# Patient Record
Sex: FEMALE | Race: WHITE | Employment: OTHER | URBAN - METROPOLITAN AREA
[De-identification: names, ages, dates, MRNs, and addresses within clinical notes are randomized per-mention and may not be internally consistent; named-entity substitution may affect disease eponyms.]

---

## 2017-04-24 PROBLEM — M43.16 SPONDYLOLISTHESIS OF LUMBAR REGION: Status: ACTIVE | Noted: 2017-04-24

## 2017-04-28 ENCOUNTER — HOSPITAL ENCOUNTER (OUTPATIENT)
Dept: MRI IMAGING | Facility: HOSPITAL | Age: 72
Discharge: HOME | End: 2017-04-28
Attending: PHYSICIAN ASSISTANT
Payer: MEDICARE

## 2017-04-28 DIAGNOSIS — M43.16 SPONDYLOLISTHESIS OF LUMBAR REGION: ICD-10-CM

## 2017-04-28 DIAGNOSIS — M54.16 LUMBAR RADICULOPATHY: ICD-10-CM

## 2017-04-28 PROCEDURE — 72148 MRI LUMBAR SPINE W/O DYE: CPT

## 2017-07-07 ENCOUNTER — LAB ENCOUNTER (OUTPATIENT)
Dept: LAB | Facility: HOSPITAL | Age: 72
End: 2017-07-07
Attending: INTERNAL MEDICINE
Payer: MEDICARE

## 2017-07-07 ENCOUNTER — HOSPITAL ENCOUNTER (OUTPATIENT)
Dept: GENERAL RADIOLOGY | Facility: HOSPITAL | Age: 72
Discharge: HOME | End: 2017-07-07
Attending: INTERNAL MEDICINE
Payer: MEDICARE

## 2017-07-07 DIAGNOSIS — Z01.818 PRE-OP TESTING: ICD-10-CM

## 2017-07-07 DIAGNOSIS — Z01.818 PRE-OP EXAM: ICD-10-CM

## 2017-07-07 DIAGNOSIS — Z01.818 PRE-OP EXAMINATION: ICD-10-CM

## 2017-07-07 DIAGNOSIS — Z01.818 PRE-OP EXAMINATION: Primary | ICD-10-CM

## 2017-07-07 LAB
ALBUMIN SERPL BCP-MCNC: 3.7 G/DL (ref 3.5–4.8)
ALBUMIN/GLOB SERPL: 1.4 {RATIO} (ref 1–2)
ALP SERPL-CCNC: 113 U/L (ref 32–100)
ALT SERPL-CCNC: 13 U/L (ref 14–54)
ANION GAP SERPL CALC-SCNC: 13 MMOL/L (ref 0–18)
APTT PPP: 24.8 SECONDS (ref 23.2–35.3)
AST SERPL-CCNC: 17 U/L (ref 15–41)
BASOPHILS # BLD: 0.1 K/UL (ref 0–0.2)
BASOPHILS NFR BLD: 1 %
BILIRUB SERPL-MCNC: 0.4 MG/DL (ref 0.3–1.2)
BILIRUB UR QL: NEGATIVE
BUN SERPL-MCNC: 16 MG/DL (ref 8–20)
BUN/CREAT SERPL: 17 (ref 10–20)
CALCIUM SERPL-MCNC: 9.4 MG/DL (ref 8.5–10.5)
CHLORIDE SERPL-SCNC: 104 MMOL/L (ref 95–110)
CO2 SERPL-SCNC: 24 MMOL/L (ref 22–32)
COLOR UR: YELLOW
CREAT SERPL-MCNC: 0.94 MG/DL (ref 0.5–1.5)
EOSINOPHIL # BLD: 0.1 K/UL (ref 0–0.7)
EOSINOPHIL NFR BLD: 2 %
ERYTHROCYTE [DISTWIDTH] IN BLOOD BY AUTOMATED COUNT: 15.3 % (ref 11–15)
GLOBULIN PLAS-MCNC: 2.6 G/DL (ref 2.5–3.7)
GLUCOSE SERPL-MCNC: 130 MG/DL (ref 70–99)
GLUCOSE UR-MCNC: NEGATIVE MG/DL
HCT VFR BLD AUTO: 39.8 % (ref 35–48)
HGB BLD-MCNC: 13.2 G/DL (ref 12–16)
HGB UR QL STRIP.AUTO: NEGATIVE
HYALINE CASTS #/AREA URNS AUTO: 1 /LPF
INR BLD: 0.9 (ref 0.9–1.2)
KETONES UR-MCNC: NEGATIVE MG/DL
LYMPHOCYTES # BLD: 1.7 K/UL (ref 1–4)
LYMPHOCYTES NFR BLD: 23 %
MCH RBC QN AUTO: 28.1 PG (ref 27–32)
MCHC RBC AUTO-ENTMCNC: 33.3 G/DL (ref 32–37)
MCV RBC AUTO: 84.5 FL (ref 80–100)
MONOCYTES # BLD: 0.5 K/UL (ref 0–1)
MONOCYTES NFR BLD: 7 %
NEUTROPHILS # BLD AUTO: 4.8 K/UL (ref 1.8–7.7)
NEUTROPHILS NFR BLD: 67 %
NITRITE UR QL STRIP.AUTO: NEGATIVE
OSMOLALITY UR CALC.SUM OF ELEC: 295 MOSM/KG (ref 275–295)
PH UR: 5 [PH] (ref 5–8)
PLATELET # BLD AUTO: 247 K/UL (ref 140–400)
PMV BLD AUTO: 9.5 FL (ref 7.4–10.3)
POTASSIUM SERPL-SCNC: 4.1 MMOL/L (ref 3.3–5.1)
PROT SERPL-MCNC: 6.3 G/DL (ref 5.9–8.4)
PROT UR-MCNC: 30 MG/DL
PROTHROMBIN TIME: 12.2 SECONDS (ref 11.8–14.5)
RBC # BLD AUTO: 4.71 M/UL (ref 3.7–5.4)
RBC #/AREA URNS AUTO: 1 /HPF
SODIUM SERPL-SCNC: 141 MMOL/L (ref 136–144)
SP GR UR STRIP: 1.02 (ref 1–1.03)
UROBILINOGEN UR STRIP-ACNC: <2
VIT C UR-MCNC: NEGATIVE MG/DL
WBC # BLD AUTO: 7.2 K/UL (ref 4–11)
WBC #/AREA URNS AUTO: 4 /HPF

## 2017-07-07 PROCEDURE — 81001 URINALYSIS AUTO W/SCOPE: CPT

## 2017-07-07 PROCEDURE — 80053 COMPREHEN METABOLIC PANEL: CPT

## 2017-07-07 PROCEDURE — 85025 COMPLETE CBC W/AUTO DIFF WBC: CPT

## 2017-07-07 PROCEDURE — 93010 ELECTROCARDIOGRAM REPORT: CPT | Performed by: INTERNAL MEDICINE

## 2017-07-07 PROCEDURE — 85730 THROMBOPLASTIN TIME PARTIAL: CPT

## 2017-07-07 PROCEDURE — 36415 COLL VENOUS BLD VENIPUNCTURE: CPT

## 2017-07-07 PROCEDURE — 85610 PROTHROMBIN TIME: CPT

## 2017-07-07 PROCEDURE — 93005 ELECTROCARDIOGRAM TRACING: CPT

## 2017-07-07 PROCEDURE — 87081 CULTURE SCREEN ONLY: CPT

## 2017-07-07 PROCEDURE — 71020 XR CHEST PA + LAT CHEST (CPT=71020): CPT | Performed by: INTERNAL MEDICINE

## 2017-07-13 ENCOUNTER — HOSPITAL ENCOUNTER (OUTPATIENT)
Dept: NUCLEAR MEDICINE | Facility: HOSPITAL | Age: 72
Discharge: HOME | End: 2017-07-13
Attending: INTERNAL MEDICINE
Payer: MEDICARE

## 2017-07-13 ENCOUNTER — HOSPITAL ENCOUNTER (OUTPATIENT)
Dept: CV DIAGNOSTICS | Facility: HOSPITAL | Age: 72
Discharge: HOME | End: 2017-07-13
Attending: INTERNAL MEDICINE
Payer: MEDICARE

## 2017-07-13 DIAGNOSIS — Z01.810 PRE-OPERATIVE CARDIOVASCULAR EXAMINATION: ICD-10-CM

## 2017-07-13 DIAGNOSIS — I25.10 ASHD (ARTERIOSCLEROTIC HEART DISEASE): ICD-10-CM

## 2017-07-13 PROCEDURE — 78452 HT MUSCLE IMAGE SPECT MULT: CPT | Performed by: INTERNAL MEDICINE

## 2017-07-13 PROCEDURE — 93016 CV STRESS TEST SUPVJ ONLY: CPT | Performed by: INTERNAL MEDICINE

## 2017-07-13 PROCEDURE — 93018 CV STRESS TEST I&R ONLY: CPT | Performed by: INTERNAL MEDICINE

## 2017-07-13 PROCEDURE — 93017 CV STRESS TEST TRACING ONLY: CPT | Performed by: INTERNAL MEDICINE

## 2017-07-13 RX ORDER — 0.9 % SODIUM CHLORIDE 0.9 %
VIAL (ML) INJECTION
Status: COMPLETED
Start: 2017-07-13 | End: 2017-07-13

## 2017-07-13 RX ADMIN — 0.9 % SODIUM CHLORIDE 10 ML: 0.9 % VIAL (ML) INJECTION at 12:50:00

## 2017-07-18 RX ORDER — ALBUTEROL SULFATE 90 UG/1
2 AEROSOL, METERED RESPIRATORY (INHALATION) EVERY 6 HOURS PRN
COMMUNITY
End: 2017-11-27

## 2017-07-21 ENCOUNTER — APPOINTMENT (OUTPATIENT)
Dept: GENERAL RADIOLOGY | Facility: HOSPITAL | Age: 72
End: 2017-07-21
Attending: ORTHOPAEDIC SURGERY
Payer: MEDICARE

## 2017-07-21 ENCOUNTER — HOSPITAL ENCOUNTER (OUTPATIENT)
Facility: HOSPITAL | Age: 72
Setting detail: HOSPITAL OUTPATIENT SURGERY
Discharge: HOME | End: 2017-07-21
Attending: ORTHOPAEDIC SURGERY | Admitting: ORTHOPAEDIC SURGERY
Payer: MEDICARE

## 2017-07-21 ENCOUNTER — ANESTHESIA (OUTPATIENT)
Dept: SURGERY | Facility: HOSPITAL | Age: 72
End: 2017-07-21

## 2017-07-21 ENCOUNTER — SURGERY (OUTPATIENT)
Age: 72
End: 2017-07-21

## 2017-07-21 ENCOUNTER — ANESTHESIA EVENT (OUTPATIENT)
Dept: SURGERY | Facility: HOSPITAL | Age: 72
End: 2017-07-21

## 2017-07-21 VITALS
HEART RATE: 68 BPM | HEIGHT: 61 IN | WEIGHT: 171.13 LBS | SYSTOLIC BLOOD PRESSURE: 126 MMHG | TEMPERATURE: 98 F | RESPIRATION RATE: 16 BRPM | DIASTOLIC BLOOD PRESSURE: 47 MMHG | BODY MASS INDEX: 32.31 KG/M2 | OXYGEN SATURATION: 95 %

## 2017-07-21 DIAGNOSIS — M51.26 LUMBAR DISC HERNIATION: Primary | ICD-10-CM

## 2017-07-21 DIAGNOSIS — M43.16 SPONDYLOLISTHESIS OF LUMBAR REGION: ICD-10-CM

## 2017-07-21 LAB
GLUCOSE BLDC GLUCOMTR-MCNC: 180 MG/DL (ref 70–99)
GLUCOSE BLDC GLUCOMTR-MCNC: 236 MG/DL (ref 70–99)

## 2017-07-21 PROCEDURE — 88311 DECALCIFY TISSUE: CPT | Performed by: ORTHOPAEDIC SURGERY

## 2017-07-21 PROCEDURE — 82962 GLUCOSE BLOOD TEST: CPT

## 2017-07-21 PROCEDURE — 88304 TISSUE EXAM BY PATHOLOGIST: CPT | Performed by: ORTHOPAEDIC SURGERY

## 2017-07-21 PROCEDURE — 76001 XR C-ARM FLUORO >1 HOUR  (CPT=76001): CPT | Performed by: ORTHOPAEDIC SURGERY

## 2017-07-21 PROCEDURE — 72100 X-RAY EXAM L-S SPINE 2/3 VWS: CPT | Performed by: ORTHOPAEDIC SURGERY

## 2017-07-21 PROCEDURE — 01NB0ZZ RELEASE LUMBAR NERVE, OPEN APPROACH: ICD-10-PCS | Performed by: ORTHOPAEDIC SURGERY

## 2017-07-21 PROCEDURE — 0SB20ZZ EXCISION OF LUMBAR VERTEBRAL DISC, OPEN APPROACH: ICD-10-PCS | Performed by: ORTHOPAEDIC SURGERY

## 2017-07-21 RX ORDER — NALOXONE HYDROCHLORIDE 0.4 MG/ML
80 INJECTION, SOLUTION INTRAMUSCULAR; INTRAVENOUS; SUBCUTANEOUS AS NEEDED
Status: DISCONTINUED | OUTPATIENT
Start: 2017-07-21 | End: 2017-07-21

## 2017-07-21 RX ORDER — HYDROMORPHONE HYDROCHLORIDE 1 MG/ML
0.4 INJECTION, SOLUTION INTRAMUSCULAR; INTRAVENOUS; SUBCUTANEOUS EVERY 5 MIN PRN
Status: DISCONTINUED | OUTPATIENT
Start: 2017-07-21 | End: 2017-07-21

## 2017-07-21 RX ORDER — METOCLOPRAMIDE 10 MG/1
10 TABLET ORAL ONCE
Status: COMPLETED | OUTPATIENT
Start: 2017-07-21 | End: 2017-07-21

## 2017-07-21 RX ORDER — DEXTROSE MONOHYDRATE 25 G/50ML
50 INJECTION, SOLUTION INTRAVENOUS AS NEEDED
Status: DISCONTINUED | OUTPATIENT
Start: 2017-07-21 | End: 2017-07-21

## 2017-07-21 RX ORDER — PANTOPRAZOLE SODIUM 40 MG/1
40 TABLET, DELAYED RELEASE ORAL NIGHTLY
Status: CANCELLED | OUTPATIENT
Start: 2017-07-21

## 2017-07-21 RX ORDER — OXYBUTYNIN CHLORIDE 5 MG/1
5 TABLET, EXTENDED RELEASE ORAL DAILY
Status: DISCONTINUED | OUTPATIENT
Start: 2017-07-22 | End: 2017-07-21

## 2017-07-21 RX ORDER — ACETAMINOPHEN 325 MG/1
650 TABLET ORAL ONCE
Status: DISCONTINUED | OUTPATIENT
Start: 2017-07-21 | End: 2017-07-21 | Stop reason: HOSPADM

## 2017-07-21 RX ORDER — SODIUM CHLORIDE, SODIUM LACTATE, POTASSIUM CHLORIDE, CALCIUM CHLORIDE 600; 310; 30; 20 MG/100ML; MG/100ML; MG/100ML; MG/100ML
INJECTION, SOLUTION INTRAVENOUS CONTINUOUS
Status: DISCONTINUED | OUTPATIENT
Start: 2017-07-21 | End: 2017-07-21

## 2017-07-21 RX ORDER — EPHEDRINE SULFATE 50 MG/ML
INJECTION, SOLUTION INTRAVENOUS AS NEEDED
Status: DISCONTINUED | OUTPATIENT
Start: 2017-07-21 | End: 2017-07-21 | Stop reason: SURG

## 2017-07-21 RX ORDER — NEBIVOLOL 5 MG/1
5 TABLET ORAL
Status: DISCONTINUED | OUTPATIENT
Start: 2017-07-22 | End: 2017-07-21

## 2017-07-21 RX ORDER — EZETIMIBE 10 MG/1
10 TABLET ORAL NIGHTLY
Status: DISCONTINUED | OUTPATIENT
Start: 2017-07-21 | End: 2017-07-21

## 2017-07-21 RX ORDER — NIFEDIPINE 60 MG/1
60 TABLET, EXTENDED RELEASE ORAL DAILY
Status: CANCELLED | OUTPATIENT
Start: 2017-07-22

## 2017-07-21 RX ORDER — HYDROCODONE BITARTRATE AND ACETAMINOPHEN 10; 325 MG/1; MG/1
2 TABLET ORAL EVERY 6 HOURS PRN
Status: DISCONTINUED | OUTPATIENT
Start: 2017-07-21 | End: 2017-07-21

## 2017-07-21 RX ORDER — MORPHINE SULFATE 4 MG/ML
6 INJECTION, SOLUTION INTRAMUSCULAR; INTRAVENOUS EVERY 10 MIN PRN
Status: DISCONTINUED | OUTPATIENT
Start: 2017-07-21 | End: 2017-07-21

## 2017-07-21 RX ORDER — GLYCOPYRROLATE 0.2 MG/ML
INJECTION INTRAMUSCULAR; INTRAVENOUS AS NEEDED
Status: DISCONTINUED | OUTPATIENT
Start: 2017-07-21 | End: 2017-07-21 | Stop reason: SURG

## 2017-07-21 RX ORDER — ONDANSETRON 2 MG/ML
4 INJECTION INTRAMUSCULAR; INTRAVENOUS ONCE AS NEEDED
Status: DISCONTINUED | OUTPATIENT
Start: 2017-07-21 | End: 2017-07-21

## 2017-07-21 RX ORDER — NEBIVOLOL 5 MG/1
5 TABLET ORAL
Status: CANCELLED | OUTPATIENT
Start: 2017-07-22

## 2017-07-21 RX ORDER — HALOPERIDOL 5 MG/ML
0.25 INJECTION INTRAMUSCULAR ONCE AS NEEDED
Status: DISCONTINUED | OUTPATIENT
Start: 2017-07-21 | End: 2017-07-21

## 2017-07-21 RX ORDER — FAMOTIDINE 20 MG/1
20 TABLET ORAL ONCE
Status: COMPLETED | OUTPATIENT
Start: 2017-07-21 | End: 2017-07-21

## 2017-07-21 RX ORDER — PANTOPRAZOLE SODIUM 40 MG/1
40 TABLET, DELAYED RELEASE ORAL NIGHTLY
Status: DISCONTINUED | OUTPATIENT
Start: 2017-07-21 | End: 2017-07-21

## 2017-07-21 RX ORDER — METOPROLOL TARTRATE 5 MG/5ML
2.5 INJECTION INTRAVENOUS ONCE
Status: DISCONTINUED | OUTPATIENT
Start: 2017-07-21 | End: 2017-07-21

## 2017-07-21 RX ORDER — HYDROMORPHONE HYDROCHLORIDE 1 MG/ML
0.6 INJECTION, SOLUTION INTRAMUSCULAR; INTRAVENOUS; SUBCUTANEOUS EVERY 5 MIN PRN
Status: DISCONTINUED | OUTPATIENT
Start: 2017-07-21 | End: 2017-07-21

## 2017-07-21 RX ORDER — HYDROMORPHONE HYDROCHLORIDE 1 MG/ML
0.2 INJECTION, SOLUTION INTRAMUSCULAR; INTRAVENOUS; SUBCUTANEOUS EVERY 5 MIN PRN
Status: DISCONTINUED | OUTPATIENT
Start: 2017-07-21 | End: 2017-07-21

## 2017-07-21 RX ORDER — TEMAZEPAM 15 MG/1
15 CAPSULE ORAL NIGHTLY PRN
Status: DISCONTINUED | OUTPATIENT
Start: 2017-07-21 | End: 2017-07-21

## 2017-07-21 RX ORDER — HYDROCODONE BITARTRATE AND ACETAMINOPHEN 5; 325 MG/1; MG/1
2 TABLET ORAL AS NEEDED
Status: COMPLETED | OUTPATIENT
Start: 2017-07-21 | End: 2017-07-21

## 2017-07-21 RX ORDER — HYDROCODONE BITARTRATE AND ACETAMINOPHEN 5; 325 MG/1; MG/1
1 TABLET ORAL AS NEEDED
Status: COMPLETED | OUTPATIENT
Start: 2017-07-21 | End: 2017-07-21

## 2017-07-21 RX ORDER — MORPHINE SULFATE 2 MG/ML
2 INJECTION, SOLUTION INTRAMUSCULAR; INTRAVENOUS EVERY 10 MIN PRN
Status: DISCONTINUED | OUTPATIENT
Start: 2017-07-21 | End: 2017-07-21

## 2017-07-21 RX ORDER — ONDANSETRON 2 MG/ML
INJECTION INTRAMUSCULAR; INTRAVENOUS AS NEEDED
Status: DISCONTINUED | OUTPATIENT
Start: 2017-07-21 | End: 2017-07-21 | Stop reason: SURG

## 2017-07-21 RX ORDER — NIFEDIPINE 60 MG/1
60 TABLET, EXTENDED RELEASE ORAL DAILY
Status: DISCONTINUED | OUTPATIENT
Start: 2017-07-22 | End: 2017-07-21

## 2017-07-21 RX ORDER — BUPIVACAINE HYDROCHLORIDE 2.5 MG/ML
INJECTION, SOLUTION EPIDURAL; INFILTRATION; INTRACAUDAL AS NEEDED
Status: DISCONTINUED | OUTPATIENT
Start: 2017-07-21 | End: 2017-07-21 | Stop reason: HOSPADM

## 2017-07-21 RX ORDER — MORPHINE SULFATE 4 MG/ML
4 INJECTION, SOLUTION INTRAMUSCULAR; INTRAVENOUS EVERY 10 MIN PRN
Status: DISCONTINUED | OUTPATIENT
Start: 2017-07-21 | End: 2017-07-21

## 2017-07-21 RX ORDER — NEOSTIGMINE METHYLSULFATE 0.5 MG/ML
INJECTION INTRAVENOUS AS NEEDED
Status: DISCONTINUED | OUTPATIENT
Start: 2017-07-21 | End: 2017-07-21 | Stop reason: SURG

## 2017-07-21 RX ORDER — ALBUTEROL SULFATE 90 UG/1
2 AEROSOL, METERED RESPIRATORY (INHALATION) EVERY 6 HOURS PRN
Status: DISCONTINUED | OUTPATIENT
Start: 2017-07-21 | End: 2017-07-21

## 2017-07-21 RX ORDER — ROCURONIUM BROMIDE 10 MG/ML
INJECTION, SOLUTION INTRAVENOUS AS NEEDED
Status: DISCONTINUED | OUTPATIENT
Start: 2017-07-21 | End: 2017-07-21 | Stop reason: SURG

## 2017-07-21 RX ORDER — DIAZEPAM 5 MG/1
5 TABLET ORAL EVERY 6 HOURS PRN
Status: DISCONTINUED | OUTPATIENT
Start: 2017-07-21 | End: 2017-07-21

## 2017-07-21 RX ORDER — EZETIMIBE 10 MG/1
10 TABLET ORAL NIGHTLY
Status: CANCELLED | OUTPATIENT
Start: 2017-07-21

## 2017-07-21 RX ORDER — OXYBUTYNIN CHLORIDE 5 MG/1
5 TABLET, EXTENDED RELEASE ORAL DAILY
Status: CANCELLED | OUTPATIENT
Start: 2017-07-22

## 2017-07-21 RX ORDER — LIDOCAINE HYDROCHLORIDE 10 MG/ML
INJECTION, SOLUTION EPIDURAL; INFILTRATION; INTRACAUDAL; PERINEURAL AS NEEDED
Status: DISCONTINUED | OUTPATIENT
Start: 2017-07-21 | End: 2017-07-21 | Stop reason: SURG

## 2017-07-21 RX ADMIN — ONDANSETRON 4 MG: 2 INJECTION INTRAMUSCULAR; INTRAVENOUS at 11:20:00

## 2017-07-21 RX ADMIN — LIDOCAINE HYDROCHLORIDE 40 MG: 10 INJECTION, SOLUTION EPIDURAL; INFILTRATION; INTRACAUDAL; PERINEURAL at 10:03:00

## 2017-07-21 RX ADMIN — SODIUM CHLORIDE, SODIUM LACTATE, POTASSIUM CHLORIDE, CALCIUM CHLORIDE: 600; 310; 30; 20 INJECTION, SOLUTION INTRAVENOUS at 10:40:00

## 2017-07-21 RX ADMIN — SODIUM CHLORIDE, SODIUM LACTATE, POTASSIUM CHLORIDE, CALCIUM CHLORIDE: 600; 310; 30; 20 INJECTION, SOLUTION INTRAVENOUS at 11:15:00

## 2017-07-21 RX ADMIN — GLYCOPYRROLATE 0.6 MG: 0.2 INJECTION INTRAMUSCULAR; INTRAVENOUS at 11:25:00

## 2017-07-21 RX ADMIN — SODIUM CHLORIDE, SODIUM LACTATE, POTASSIUM CHLORIDE, CALCIUM CHLORIDE: 600; 310; 30; 20 INJECTION, SOLUTION INTRAVENOUS at 11:01:00

## 2017-07-21 RX ADMIN — ROCURONIUM BROMIDE 40 MG: 10 INJECTION, SOLUTION INTRAVENOUS at 10:03:00

## 2017-07-21 RX ADMIN — SODIUM CHLORIDE, SODIUM LACTATE, POTASSIUM CHLORIDE, CALCIUM CHLORIDE: 600; 310; 30; 20 INJECTION, SOLUTION INTRAVENOUS at 11:00:00

## 2017-07-21 RX ADMIN — NEOSTIGMINE METHYLSULFATE 3 MG: 0.5 INJECTION INTRAVENOUS at 11:25:00

## 2017-07-21 RX ADMIN — SODIUM CHLORIDE, SODIUM LACTATE, POTASSIUM CHLORIDE, CALCIUM CHLORIDE: 600; 310; 30; 20 INJECTION, SOLUTION INTRAVENOUS at 09:58:00

## 2017-07-21 RX ADMIN — EPHEDRINE SULFATE 10 MG: 50 INJECTION, SOLUTION INTRAVENOUS at 10:25:00

## 2017-07-21 NOTE — ANESTHESIA POSTPROCEDURE EVALUATION
Patient: Londa Skiff    Procedure Summary     Date:  07/21/17 Room / Location:  [address] [place] [address] MAIN OR 10 / [address] [place] [address] MAIN OR    Anesthesia Start:  1069 Anesthesia Stop:  6002    Procedure:  POSTERIOR LUMBAR LAMINECT SPINAL FUS W/INSTR 1 LEV (N/A ) Diagnosis:  (lumbar

## 2017-07-21 NOTE — INTERVAL H&P NOTE
Pre-op Diagnosis: lumbar spondylolisthesis, lumbar stenosis    The above referenced H&P was reviewed by Fernanda Vázquez MD on 7/21/2017, the patient was examined and no significant changes have occurred in the patient's condition since the H&P was performed.

## 2017-07-21 NOTE — OPERATIVE REPORT
[facility]   PATIENT'S NAME:  Melany Hilliardmonds   ATTENDING PHYSICIAN:  Basim Berumen M.D. OPERATING PHYSICIAN:  Basim Berumen M.D.     PATIENT CSN#: [id number]  MEDICAL RECORD #:  [id number]  YOB: 1945    ADMISSION DATE: 7/21/2017  OPE used a Bovie cautery to expose down to the fascial layer on the bilateral aspect of the L3 lamina down to the L3-4 facet joints bilaterally. Using the Bovie, we retracted the muscular layer off the lamina using a Ladd.  We placed our retractor in position a and covered with Tegaderm dressings. Patient was then undraped and placed onto the bed. Patient was extubated and taken to recovery in stable condition.   There no complications to the procedure and I was present for the entire case    My physician assistan

## 2017-07-21 NOTE — ANESTHESIA PREPROCEDURE EVALUATION
Anesthesia PreOp Note    HPI:     Harvinder Medley is a 67year old female who presents for preoperative consultation requested by: Claudia Fox MD    Date of Surgery: 7/21/2017    Procedure(s):  POSTERIOR LUMBAR LAMINECT SPINAL FUS W/INSTR 1 LEV  Indicat FUROSEMIDE OR Take 20 mg by mouth. Disp:  Rfl:  7/19/2017   Coenzyme Q10 (CO Q 10 OR) Take by mouth. Disp:  Rfl:  7/17/2017   PANTOPRAZOLE SODIUM OR Take 40 mg by mouth nightly.    Disp:  Rfl:  7/17/2017   Clopidogrel Bisulfate 75 MG Oral Tab Take 75 mg b RBC 4.71 07/07/2017   HGB 13.2 07/07/2017   HCT 39.8 07/07/2017   MCV 84.5 07/07/2017   MCH 28.1 07/07/2017   MCHC 33.3 07/07/2017   RDW 15.3 (H) 07/07/2017    07/07/2017   MPV 9.5 07/07/2017       Lab Results  Component Value Date    07/07/20

## 2017-07-31 PROBLEM — Z98.890 S/P LUMBAR LAMINECTOMY: Status: ACTIVE | Noted: 2017-07-31

## 2017-07-31 PROBLEM — Z98.890 S/P LUMBAR MICRODISCECTOMY: Status: ACTIVE | Noted: 2017-07-31

## 2017-10-16 ENCOUNTER — HOSPITAL ENCOUNTER (INPATIENT)
Facility: HOSPITAL | Age: 72
LOS: 8 days | Discharge: HOME HEALTH | DRG: 041 | End: 2017-10-24
Attending: EMERGENCY MEDICINE | Admitting: INTERNAL MEDICINE
Payer: MEDICARE

## 2017-10-16 ENCOUNTER — APPOINTMENT (OUTPATIENT)
Dept: CT IMAGING | Facility: HOSPITAL | Age: 72
DRG: 041 | End: 2017-10-16
Attending: EMERGENCY MEDICINE
Payer: MEDICARE

## 2017-10-16 ENCOUNTER — APPOINTMENT (OUTPATIENT)
Dept: CT IMAGING | Facility: HOSPITAL | Age: 72
DRG: 041 | End: 2017-10-16
Attending: INTERNAL MEDICINE
Payer: MEDICARE

## 2017-10-16 ENCOUNTER — APPOINTMENT (OUTPATIENT)
Dept: GENERAL RADIOLOGY | Facility: HOSPITAL | Age: 72
DRG: 041 | End: 2017-10-16
Attending: EMERGENCY MEDICINE
Payer: MEDICARE

## 2017-10-16 DIAGNOSIS — G45.1 HEMISPHERIC CAROTID ARTERY SYNDROME: ICD-10-CM

## 2017-10-16 DIAGNOSIS — I63.9 CVA (CEREBRAL VASCULAR ACCIDENT) (HCC): ICD-10-CM

## 2017-10-16 DIAGNOSIS — I16.9 HYPERTENSIVE CRISIS: Primary | ICD-10-CM

## 2017-10-16 PROBLEM — R73.9 HYPERGLYCEMIA: Status: ACTIVE | Noted: 2017-10-16

## 2017-10-16 PROCEDURE — 70450 CT HEAD/BRAIN W/O DYE: CPT | Performed by: INTERNAL MEDICINE

## 2017-10-16 PROCEDURE — 99291 CRITICAL CARE FIRST HOUR: CPT | Performed by: HOSPITALIST

## 2017-10-16 PROCEDURE — 0JH602Z INSERTION OF MONITORING DEVICE INTO CHEST SUBCUTANEOUS TISSUE AND FASCIA, OPEN APPROACH: ICD-10-PCS | Performed by: INTERNAL MEDICINE

## 2017-10-16 PROCEDURE — 71010 XR CHEST AP PORTABLE  (CPT=71010): CPT | Performed by: EMERGENCY MEDICINE

## 2017-10-16 PROCEDURE — 99223 1ST HOSP IP/OBS HIGH 75: CPT | Performed by: OTHER

## 2017-10-16 PROCEDURE — 70450 CT HEAD/BRAIN W/O DYE: CPT | Performed by: EMERGENCY MEDICINE

## 2017-10-16 RX ORDER — MORPHINE SULFATE 2 MG/ML
2 INJECTION, SOLUTION INTRAMUSCULAR; INTRAVENOUS EVERY 4 HOURS PRN
Status: DISCONTINUED | OUTPATIENT
Start: 2017-10-16 | End: 2017-10-24

## 2017-10-16 RX ORDER — ALBUTEROL SULFATE 90 UG/1
2 AEROSOL, METERED RESPIRATORY (INHALATION) EVERY 6 HOURS PRN
Status: DISCONTINUED | OUTPATIENT
Start: 2017-10-16 | End: 2017-10-24

## 2017-10-16 RX ORDER — PANTOPRAZOLE SODIUM 40 MG/1
40 TABLET, DELAYED RELEASE ORAL NIGHTLY
Status: DISCONTINUED | OUTPATIENT
Start: 2017-10-16 | End: 2017-10-24

## 2017-10-16 RX ORDER — NEBIVOLOL 5 MG/1
5 TABLET ORAL DAILY
Status: DISCONTINUED | OUTPATIENT
Start: 2017-10-16 | End: 2017-10-20

## 2017-10-16 RX ORDER — LORAZEPAM 2 MG/ML
1 INJECTION INTRAMUSCULAR EVERY 4 HOURS PRN
Status: DISCONTINUED | OUTPATIENT
Start: 2017-10-16 | End: 2017-10-24

## 2017-10-16 RX ORDER — ENOXAPARIN SODIUM 100 MG/ML
40 INJECTION SUBCUTANEOUS DAILY
Status: DISCONTINUED | OUTPATIENT
Start: 2017-10-16 | End: 2017-10-16

## 2017-10-16 RX ORDER — DEXTROSE MONOHYDRATE 25 G/50ML
50 INJECTION, SOLUTION INTRAVENOUS AS NEEDED
Status: DISCONTINUED | OUTPATIENT
Start: 2017-10-16 | End: 2017-10-24

## 2017-10-16 RX ORDER — TEMAZEPAM 15 MG/1
15 CAPSULE ORAL NIGHTLY PRN
Status: DISCONTINUED | OUTPATIENT
Start: 2017-10-16 | End: 2017-10-24

## 2017-10-16 RX ORDER — HYDRALAZINE HYDROCHLORIDE 20 MG/ML
10 INJECTION INTRAMUSCULAR; INTRAVENOUS ONCE
Status: COMPLETED | OUTPATIENT
Start: 2017-10-16 | End: 2017-10-16

## 2017-10-16 RX ORDER — SODIUM CHLORIDE 9 MG/ML
INJECTION, SOLUTION INTRAVENOUS CONTINUOUS
Status: DISCONTINUED | OUTPATIENT
Start: 2017-10-16 | End: 2017-10-24

## 2017-10-16 RX ORDER — ENALAPRILAT 2.5 MG/2ML
1.25 INJECTION INTRAVENOUS ONCE
Status: COMPLETED | OUTPATIENT
Start: 2017-10-16 | End: 2017-10-16

## 2017-10-16 RX ORDER — CLOPIDOGREL BISULFATE 75 MG/1
75 TABLET ORAL DAILY
Status: DISCONTINUED | OUTPATIENT
Start: 2017-10-16 | End: 2017-10-18

## 2017-10-16 RX ORDER — HALOPERIDOL 5 MG/ML
1 INJECTION INTRAMUSCULAR EVERY 6 HOURS PRN
Status: DISCONTINUED | OUTPATIENT
Start: 2017-10-16 | End: 2017-10-18

## 2017-10-16 RX ORDER — LOSARTAN POTASSIUM 25 MG/1
25 TABLET ORAL DAILY
Status: DISCONTINUED | OUTPATIENT
Start: 2017-10-16 | End: 2017-10-16

## 2017-10-16 RX ORDER — CLOPIDOGREL BISULFATE 75 MG/1
75 TABLET ORAL DAILY
COMMUNITY
End: 2017-10-24

## 2017-10-16 RX ORDER — LOSARTAN POTASSIUM 25 MG/1
25 TABLET ORAL DAILY
COMMUNITY
End: 2017-10-24

## 2017-10-16 RX ORDER — HEPARIN SODIUM 5000 [USP'U]/ML
5000 INJECTION, SOLUTION INTRAVENOUS; SUBCUTANEOUS EVERY 12 HOURS SCHEDULED
Status: DISCONTINUED | OUTPATIENT
Start: 2017-10-17 | End: 2017-10-18

## 2017-10-16 RX ORDER — ONDANSETRON 2 MG/ML
4 INJECTION INTRAMUSCULAR; INTRAVENOUS EVERY 6 HOURS PRN
Status: DISCONTINUED | OUTPATIENT
Start: 2017-10-16 | End: 2017-10-24

## 2017-10-16 RX ORDER — ASPIRIN 325 MG
325 TABLET ORAL DAILY
Status: DISCONTINUED | OUTPATIENT
Start: 2017-10-16 | End: 2017-10-18

## 2017-10-16 RX ORDER — HYDRALAZINE HYDROCHLORIDE 25 MG/1
25 TABLET, FILM COATED ORAL EVERY 8 HOURS SCHEDULED
Status: DISCONTINUED | OUTPATIENT
Start: 2017-10-16 | End: 2017-10-20

## 2017-10-16 RX ORDER — ENALAPRIL MALEATE 10 MG/1
10 TABLET ORAL DAILY
Status: DISCONTINUED | OUTPATIENT
Start: 2017-10-16 | End: 2017-10-20

## 2017-10-16 RX ORDER — HYDROCODONE BITARTRATE AND ACETAMINOPHEN 5; 325 MG/1; MG/1
1 TABLET ORAL EVERY 4 HOURS PRN
Status: DISCONTINUED | OUTPATIENT
Start: 2017-10-16 | End: 2017-10-24

## 2017-10-16 RX ORDER — ASPIRIN 300 MG
300 SUPPOSITORY, RECTAL RECTAL DAILY
Status: DISCONTINUED | OUTPATIENT
Start: 2017-10-16 | End: 2017-10-18

## 2017-10-16 RX ORDER — OXYBUTYNIN CHLORIDE 10 MG/1
10 TABLET, EXTENDED RELEASE ORAL DAILY
Status: DISCONTINUED | OUTPATIENT
Start: 2017-10-16 | End: 2017-10-24

## 2017-10-16 RX ORDER — ACETAMINOPHEN 325 MG/1
650 TABLET ORAL EVERY 6 HOURS PRN
Status: DISCONTINUED | OUTPATIENT
Start: 2017-10-16 | End: 2017-10-24

## 2017-10-16 RX ORDER — HYDRALAZINE HYDROCHLORIDE 20 MG/ML
10 INJECTION INTRAMUSCULAR; INTRAVENOUS
Status: DISCONTINUED | OUTPATIENT
Start: 2017-10-16 | End: 2017-10-24

## 2017-10-16 NOTE — TREATMENT PLAN
Last known well 15[facility] alert &n rrt called, stat ct scan done.

## 2017-10-16 NOTE — CONSULTS
[facility] HOSP - [facility]    Report of Consultation    Homa Redman Patient Status:  Inpatient    1945 MRN [id number]   Location [facility] 2W/SW Attending Jaqui Capone MD   Hosp Day # 0 PCP Emma Lucio     Date of Admission:  10 Medical History:   Diagnosis Date   • Anxiety state    • Aortic aneurysm, thoracic (HCC)    • Back problem    • Calculus of kidney    • Cataract    • COPD (chronic obstructive pulmonary disease) (HCC)    • Coronary atherosclerosis    • [facility]    • Sodium (PROTONIX) EC tab 40 mg 40 mg Oral Nightly   Oxybutynin Chloride ER (DITROPAN-XL) 24 hr tab 10 mg 10 mg Oral Daily   temazepam (RESTORIL) cap 15 mg 15 mg Oral Nightly PRN   hydrALAzine HCl (APRESOLINE) tab 25 mg 25 mg Oral Q8H [address]   acetaminophen (T or frequency; no nocturia  MUSCULOSKELETAL: no joint complaints upper or lower extremities  PSYCHE:no depression or anxiety  NEURO: As in HPI    Physical Exam:   Blood pressure 142/96, pulse 85, temperature 98.2 °F (36.8 °C), resp.  rate 16, height 60\", we 07/07/2017   TP 6.3 07/07/2017   AST 17 07/07/2017   ALT 13 (L) 07/07/2017   PTT 24.8 07/07/2017   INR 0.9 07/07/2017   PTP 12.2 07/07/2017   TROP 0.01 10/16/2017         Imaging:  Ct Brain Or Head ([id number])    Result Date: 10/16/2017  CONCLUSION:  1.  No acu BRAINSTEM: No edema, hemorrhage, mass, acute infarction, or significant atrophy. CALVARIUM: No apparent fracture, mass, or other significant visible lesion. SINUSES: Limited views demonstrate no significant mucosal thickening or fluid.   ORBITS: Limited v posterior ischemia in the left hemisphere. Clinically she has significantly improved, and now has normal speech and language function. She has a mild right facial droop, but moves all 4 extremities well.   I reviewed the CT images with the patient's son,

## 2017-10-16 NOTE — H&P
[address] Patient Status:  Inpatient    1945 MRN [id number]   Location [facility] 2W/SW Attending Arnaldo Morris MD   Hosp Day # 0 VIRAL Irene     Date:  10/16/2017  Date of Anaphylaxis  Shellfish-Derived P*      Codeine                 Other (See Comments)    Comment:Face and body get puffy/swell             headache    Home Medications:    Prescriptions Prior to Admission:  Clopidogrel Bisulfate 75 MG Oral Tab Take 75 mg by Nose:    Throat:    Neck:   Supple, symmetrical, trachea midline, no adenopathy;     thyroid:  no enlargement/tenderness/nodules; no carotid    bruit or JVD   Back:     Symmetric, no curvature, ROM normal, no CVA tenderness   Lungs:     Clear to auscult the clinical documentation in H+P. Based on patients current state of illness, I anticipate that, after discharge, patient will require TBD.       Rodolfo Combs  10/16/2017  11:09 AM

## 2017-10-16 NOTE — ED PROVIDER NOTES
Patient Seen in: [facility] Emergency Department    History   Patient presents with:  Altered Mental Status (neurologic)  Headache (neurologic)    Stated Complaint: pt has been sick for a few days pt has new onset of confussion.  fever for few d* 0314]  BP: (!) 204/132  Pulse: 95  Resp: 16  Temp: 98.5 °F (36.9 °C)  Temp src: Oral  SpO2: 94 %  O2 Device: None (Room air)    Current:BP (!) 218/87   Pulse 90   Temp 98.5 °F (36.9 °C) (Oral)   Resp 22   Ht 152.4 cm (5')   Wt 79.4 kg   SpO2 97%   BMI 34. 1 Narrative: The following orders were created for panel order CBC WITH DIFFERENTIAL WITH PLATELET.   Procedure                               Abnormality         Status                     ---------                               -----------         ------ Reviewed: 4/24/2017          ICD-10-CM Noted POA    Hyperglycemia R73.9 10/16/2017 Yes    Hypertensive crisis I16.9 10/16/2017 Unknown

## 2017-10-16 NOTE — PLAN OF CARE
CARDIOVASCULAR - ADULT    • Maintains optimal cardiac output and hemodynamic stability Progressing        Diabetes/Glucose Control    • Glucose maintained within prescribed range Progressing        DISCHARGE PLANNING    • Discharge to home or other facilit

## 2017-10-16 NOTE — SIGNIFICANT EVENT
[facility]    Progress Note    Dennie Leriche Patient Status:  Inpatient    1945 MRN [id number]   Location [facility] 2W/SW Attending Matthew Gomez MD   Hosp Day # 0 PCP Angel Stewart       Subjective:   Dennie Leriche 10/16/2017    (H) 10/16/2017   CA 10.3 10/16/2017   ALB 3.7 07/07/2017   ALKPHO 113 (H) 07/07/2017   BILT 0.4 07/07/2017   TP 6.3 07/07/2017   AST 17 07/07/2017   ALT 13 (L) 07/07/2017   PTT 24.8 07/07/2017   INR 0.9 07/07/2017   TROP 0.01 10/16/201

## 2017-10-16 NOTE — PROGRESS NOTES
10/16/17 1600   Clinical Encounter Type   Visited With Patient and family together   Routine Visit Introduction   Continue Visiting Yes   Crisis Visit Critical care   Referral From Other (Comment)  (Stroke Alert)   Referral To    Patient Spiritu

## 2017-10-16 NOTE — ED INITIAL ASSESSMENT (HPI)
Patient reporting generalized headache and intermittent blurred vision. +emesis. Patient somewhat confused.

## 2017-10-17 ENCOUNTER — APPOINTMENT (OUTPATIENT)
Dept: MRI IMAGING | Facility: HOSPITAL | Age: 72
DRG: 041 | End: 2017-10-17
Attending: Other
Payer: MEDICARE

## 2017-10-17 ENCOUNTER — APPOINTMENT (OUTPATIENT)
Dept: CV DIAGNOSTICS | Facility: HOSPITAL | Age: 72
DRG: 041 | End: 2017-10-17
Attending: Other
Payer: MEDICARE

## 2017-10-17 ENCOUNTER — APPOINTMENT (OUTPATIENT)
Dept: ULTRASOUND IMAGING | Facility: HOSPITAL | Age: 72
DRG: 041 | End: 2017-10-17
Attending: Other
Payer: MEDICARE

## 2017-10-17 PROCEDURE — 93306 TTE W/DOPPLER COMPLETE: CPT | Performed by: OTHER

## 2017-10-17 PROCEDURE — 70551 MRI BRAIN STEM W/O DYE: CPT | Performed by: OTHER

## 2017-10-17 PROCEDURE — 70544 MR ANGIOGRAPHY HEAD W/O DYE: CPT | Performed by: OTHER

## 2017-10-17 PROCEDURE — 99232 SBSQ HOSP IP/OBS MODERATE 35: CPT | Performed by: OTHER

## 2017-10-17 PROCEDURE — 93880 EXTRACRANIAL BILAT STUDY: CPT | Performed by: OTHER

## 2017-10-17 PROCEDURE — B246YZZ ULTRASONOGRAPHY OF RIGHT AND LEFT HEART USING OTHER CONTRAST: ICD-10-PCS | Performed by: INTERNAL MEDICINE

## 2017-10-17 RX ORDER — 0.9 % SODIUM CHLORIDE 0.9 %
VIAL (ML) INJECTION
Status: DISPENSED
Start: 2017-10-17 | End: 2017-10-18

## 2017-10-17 RX ORDER — LORAZEPAM 2 MG/ML
2 INJECTION INTRAMUSCULAR
Status: COMPLETED | OUTPATIENT
Start: 2017-10-18 | End: 2017-10-17

## 2017-10-17 RX ORDER — POTASSIUM CHLORIDE 20 MEQ/1
40 TABLET, EXTENDED RELEASE ORAL EVERY 4 HOURS
Status: COMPLETED | OUTPATIENT
Start: 2017-10-17 | End: 2017-10-17

## 2017-10-17 RX ORDER — ACETAMINOPHEN 650 MG/1
650 SUPPOSITORY RECTAL EVERY 6 HOURS PRN
Status: DISCONTINUED | OUTPATIENT
Start: 2017-10-17 | End: 2017-10-24

## 2017-10-17 RX ORDER — SODIUM CHLORIDE 9 MG/ML
INJECTION, SOLUTION INTRAVENOUS
Status: COMPLETED
Start: 2017-10-17 | End: 2017-10-17

## 2017-10-17 NOTE — OCCUPATIONAL THERAPY NOTE
Pt is on bedrest 10-17 until 16:50, Pt currently at cardiac testing, nurse Herberth Mcconnell aware that therapy will reatempt 10-18.

## 2017-10-17 NOTE — PROGRESS NOTES
10/17/17 0915   Clinical Encounter Type   Visited With Patient and family together   Routine Visit Follow-up   Continue Visiting Yes   Crisis Visit Critical care   Patient Spiritual Encounters   Spiritual Assessment Completed 1   Spiritual Needs Comfort

## 2017-10-17 NOTE — PROGRESS NOTES
[facility]    Progress Note    Jose Boucher Patient Status:  Inpatient    1945 MRN [id number]   Location [facility] 2W/SW Attending Vanessa Ruiz MD   Hosp Day # 1 PCP Merline Rudd       Subjective:   Jose Boucher pending. Decision on anticoagulation will be made once the MRI results are available. I discussed the plan with the patient and her daughter, who is at the bedside.         Medications:     Current Facility-Administered Medications:  acetaminophen (TYLENO Intramuscular Q6H PRN       Results:     Lab Results  Component Value Date   WBC 8.9 10/17/2017   HGB 13.0 10/17/2017   HCT 39.2 10/17/2017    10/17/2017   CREATSERUM 1.43 10/17/2017   BUN 19 10/17/2017    10/17/2017   K 3.4 10/17/2017   CL 10 Automated exposure control for dose reduction was used. FINDINGS:  Exam quality is degraded by patient motion artifact which decreases sensitivity for subtle abnormalities which can be seen with ischemic stroke as well as other small sized abnormalities. intracranial process. Exam quality is degraded due to the presence of patient motion artifact, despite repeat imaging acquisition. No large intra- or extra-axial measurable hematoma. This exam is limited for assessment of ischemic stroke.  2. There are m

## 2017-10-17 NOTE — PLAN OF CARE
Problem: Diabetes/Glucose Control  Goal: Glucose maintained within prescribed range  INTERVENTIONS:  - Monitor Blood Glucose as ordered  - Assess for signs and symptoms of hyperglycemia and hypoglycemia  - Administer ordered medications to maintain glucose daughter at bedside.      Problem: CARDIOVASCULAR - ADULT  Goal: Maintains optimal cardiac output and hemodynamic stability  INTERVENTIONS:  - Monitor vital signs, rhythm, and trends  - Monitor for bleeding, hypotension and signs of decreased cardiac output

## 2017-10-17 NOTE — SLP NOTE
SPEECH/LANGUAGE/COGNITIVE EVALUATION - INPATIENT    Admission Date: 10/16/2017  Evaluation Date: 10/17/17    Reason for Referral: R/O aspiration (\"STOKE ALERT\" called on Pt. )    ASSESSMENT & PLAN   ASSESSMENT & IMPRESSION    Pt with reported periods of patient motion artifact, despite repeat imaging acquisition.  No large intra- or extra-axial measurable hematoma.  This exam is limited for assessment of ischemic stroke.   2. There are moderate microvascular white matter ischemic changes, likely related to

## 2017-10-17 NOTE — DIETARY NOTE
ADULT NUTRITION INITIAL ASSESSMENT    Pt is at moderate nutrition risk. Pt does not meet malnutrition criteria. RECOMMENDATIONS TO MD:  none at this time.       NUTRITION DIAGNOSIS/PROBLEM:  Inadequate oral intake related to decreased ability to consu 152.4 cm (5')       WT: 77.5 kg (170 lb 13.7 oz)  BMI: Body mass index is 33.37 kg/m².          BMI Classification: 30-34.9 kg/m2 - obesity class I  IBW: 100#         170%% IBW       Usual Body Wt: 155-160# per DTR        106% UBW  WEIGHT HISTORY:  Wt Readi

## 2017-10-17 NOTE — PHYSICAL THERAPY NOTE
Patient on bedrest till [email or web address], currently gone for cardiac testing. Will check back later, RN Lisa Osgood is aware.

## 2017-10-17 NOTE — CM/SW NOTE
CTL met with the patient's daughter at bedside regarding progression of care and discharge plan. Patient is a 67year old female who lives with her daughter & son in [facility]. Patient's daughter works prn in the [address] [place] [address] [employer].   They live near [address] [place] [address]

## 2017-10-17 NOTE — SLP NOTE
ADULT SWALLOWING EVALUATION    ASSESSMENT    ASSESSMENT/OVERALL IMPRESSION:       Per Pt's daughter, Pt coughs on liquids \"sometimes\" at home. This evaluation was ordered d/t \"stroke alert\" and Pt's report of being SOB after breakfast this a.m.      Pt Problems:    Hyperglycemia    Hemispheric carotid artery syndrome      Past Medical History  Past Medical History:   Diagnosis Date   • Anxiety state    • Aortic aneurysm, thoracic (HCC)    • Back problem    • Calculus of kidney    • Cataract    • COPD (ch assistance 100 % of the time across 1 session. In Progress   Goal #2 The patient will tolerate solid consistency and thin liquids without overt signs or symptoms of aspiration with 100 % accuracy over one session(s).     In Progress     FOLLOW UP  Treatmen

## 2017-10-17 NOTE — CM/SW NOTE
SLIM met with the patient at bedside today and she lives with her daughter and son in a 2 story home in [facility]. She is independent with her care and has a h/o outpatient and cardiac rehab.   PT/OT pending at this time and SLIM will follow-up pending needs/rec

## 2017-10-18 ENCOUNTER — APPOINTMENT (OUTPATIENT)
Dept: CV DIAGNOSTICS | Facility: HOSPITAL | Age: 72
DRG: 041 | End: 2017-10-18
Attending: INTERNAL MEDICINE
Payer: MEDICARE

## 2017-10-18 PROCEDURE — 99232 SBSQ HOSP IP/OBS MODERATE 35: CPT | Performed by: OTHER

## 2017-10-18 RX ORDER — MIDAZOLAM HYDROCHLORIDE 1 MG/ML
INJECTION INTRAMUSCULAR; INTRAVENOUS
Status: COMPLETED
Start: 2017-10-18 | End: 2017-10-18

## 2017-10-18 RX ORDER — HALOPERIDOL 5 MG/ML
2 INJECTION INTRAMUSCULAR EVERY 6 HOURS PRN
Status: DISCONTINUED | OUTPATIENT
Start: 2017-10-18 | End: 2017-10-24

## 2017-10-18 RX ORDER — HEPARIN SODIUM AND DEXTROSE 10000; 5 [USP'U]/100ML; G/100ML
12 INJECTION INTRAVENOUS ONCE
Status: COMPLETED | OUTPATIENT
Start: 2017-10-18 | End: 2017-10-18

## 2017-10-18 RX ORDER — 0.9 % SODIUM CHLORIDE 0.9 %
VIAL (ML) INJECTION
Status: COMPLETED
Start: 2017-10-18 | End: 2017-10-18

## 2017-10-18 RX ORDER — HEPARIN SODIUM 1000 [USP'U]/ML
30 INJECTION, SOLUTION INTRAVENOUS; SUBCUTANEOUS ONCE
Status: COMPLETED | OUTPATIENT
Start: 2017-10-18 | End: 2017-10-18

## 2017-10-18 RX ORDER — HEPARIN SODIUM 1000 [USP'U]/ML
30 INJECTION, SOLUTION INTRAVENOUS; SUBCUTANEOUS ONCE
Status: DISCONTINUED | OUTPATIENT
Start: 2017-10-18 | End: 2017-10-18

## 2017-10-18 RX ORDER — MIDAZOLAM HYDROCHLORIDE 1 MG/ML
0.5 INJECTION INTRAMUSCULAR; INTRAVENOUS ONCE
Status: COMPLETED | OUTPATIENT
Start: 2017-10-18 | End: 2017-10-18

## 2017-10-18 RX ORDER — HEPARIN SODIUM AND DEXTROSE 10000; 5 [USP'U]/100ML; G/100ML
INJECTION INTRAVENOUS CONTINUOUS
Status: DISCONTINUED | OUTPATIENT
Start: 2017-10-18 | End: 2017-10-19

## 2017-10-18 RX ORDER — 0.9 % SODIUM CHLORIDE 0.9 %
VIAL (ML) INJECTION
Status: DISPENSED
Start: 2017-10-18 | End: 2017-10-18

## 2017-10-18 RX ORDER — ATORVASTATIN CALCIUM 40 MG/1
40 TABLET, FILM COATED ORAL NIGHTLY
Status: DISCONTINUED | OUTPATIENT
Start: 2017-10-18 | End: 2017-10-19

## 2017-10-18 NOTE — PLAN OF CARE
Problem: Diabetes/Glucose Control  Goal: Glucose maintained within prescribed range  INTERVENTIONS:  - Monitor Blood Glucose as ordered  - Assess for signs and symptoms of hyperglycemia and hypoglycemia  - Administer ordered medications to maintain glucose Problem: CARDIOVASCULAR - ADULT  Goal: Maintains optimal cardiac output and hemodynamic stability  INTERVENTIONS:  - Monitor vital signs, rhythm, and trends  - Monitor for bleeding, hypotension and signs of decreased cardiac output  - Evaluate effectiv

## 2017-10-18 NOTE — PLAN OF CARE
Pt currently resting at bedside with family awaiting MRI. She is currently A/O x4 and her BP has improved to 136/83.

## 2017-10-18 NOTE — SLP NOTE
SPEECH DAILY NOTE - INPATIENT    ASSESSMENT & PLAN   ASSESSMENT  New orders rec'd to re-evaluate patient's swallow. Pt reportedly \"coughed\" on medication administration today. Per family, pt coughed on \"very large pill\".  Upon entrance for this session, Undetermined    Treatment Plan  Treatment Plan/Recommendations: Dysphagia therapy;Cognitive communication therapy    Interdisciplinary Communication: Discussed with RN    GOALS  Goal #1 The patient will utilize compensatory strategies as outlined by  DANNY

## 2017-10-18 NOTE — CONSULTS
Patient is a 67year old female who was admitted to the hospital for acute CVA. Patient presented from home with headaches and aphasia. She was brought to the ED and evaluated by neurology.  MRI done showing several small acute infarcts in the left david haloperidol lactate (HALDOL) 5 MG/ML injection 2 mg 2 mg Intramuscular Q6H PRN   acetaminophen (TYLENOL) 650 MG rectal suppository 650 mg 650 mg Rectal Q6H PRN   CefTRIAXone Sodium (ROCEPHIN) 1 g in sodium chloride 0.9 % 100 mL IVPB-minibag/addvantage 1 g PANTOPRAZOLE SODIUM OR Take 40 mg by mouth nightly. Nebivolol HCl 5 MG Oral Tab Take 5 mg by mouth daily. Solifenacin Succinate (VESICARE) 5 MG Oral Tab Take 5 mg by mouth daily. ezetimibe 10 MG Oral Tab Take 10 mg by mouth nightly.    KRILL OIL OR - Bilateral carotid dopplar shows bilateral proximal ICA plague without hemodynamic significance   - Echo shows normal left ventricular systolic function, increased ventricular thickness, bubble study inconclusive   - ECG shows sinus rhythm no history of a

## 2017-10-18 NOTE — OCCUPATIONAL THERAPY NOTE
OCCUPATIONAL THERAPY EVALUATION - INPATIENT     Room Number: 225/225-A  Evaluation Date: 10/18/2017  Type of Evaluation: Initial  Presenting Problem: micro infarcts partietal cortex    Physician Order: IP Consult to Occupational Therapy  Reason for Therapy Date   • Anxiety state    • Aortic aneurysm, thoracic (HCC)    • Back problem    • Calculus of kidney    • Cataract    • COPD (chronic obstructive pulmonary disease) (HCC)    • Coronary atherosclerosis    • Diabetes ([facility]    • Diverticulosis of large intes right side sensory deficits. Pt responding to left side but not to right side during sensation assessment.       Communication: Pt appears to have some word finding difficulty    Behavioral/Emotional/Social: cooperative, lethargic    RANGE OF MOTION   Upper april  Upper Extremity Dressing: NT  Lower Extremity Dressing: Max A to don socks      Education Provided: Role of OT, bed mobility via log roll  Patient End of Session: In bed;Needs met;Call light within reach;RN aware of session/findings; Alarm set      O

## 2017-10-18 NOTE — PHYSICAL THERAPY NOTE
PT eval orders received. Pt is off the bed rest. But per EMR notes and RN Edil, pt was confused, combative, pulling IV and yelling last night. Pt is on soft restraints and has been given Haldol. Pt is resting at this time and sleeping.  Per RN, please let pt

## 2017-10-18 NOTE — PHYSICAL THERAPY NOTE
PHYSICAL THERAPY EVALUATION - INPATIENT     Room Number: 225/225-A  Evaluation Date: 10/18/2017  Type of Evaluation: Initial  Physician Order: PT Eval and Treat    Presenting Problem: HTN crisis; several small acute infarct of L parietal & occipital re History related to current admission: Per H&P: Yadira Saleh is a(n) 67year old female presenting to ED with C/C severe headache for one week. She was noted to have BP on >200/100 in Ed. Denies focal motor weakness or sensory changes.  No reported carrying heavy laundry basket. SUBJECTIVE  \"I'm not going to be here for many days. I'm going home. \"    PHYSICAL THERAPY EXAMINATION     OBJECTIVE  Precautions: Bed/chair alarm  Fall Risk: High fall risk    WEIGHT BEARING RESTRICTION  Weight Bearing modified independent with walker - rolling     Goal #2  Current Status NT   Goal #3 Patient is able to ambulate 150 feet with assist device: walker - rolling at assistance level: modified independent   Goal #3   Current Status NT   Goal #4 If pt goes home:

## 2017-10-18 NOTE — PROGRESS NOTES
[facility]    Progress Note    Eliana Smith Patient Status:  Inpatient    1945 MRN [id number]   Location [facility] 2W/SW Attending Eva Smith MD   [facility] Day # 2 PCP Juliana Pham       Subjective:   Eliana Smith carotid stenosis. 2D echocardiogram showed no definite embolic source. Sedimentation rate was normal.    I would recommend continuing heparin.   Because of the multiple lesions, I would recommend a transesophageal echocardiogram.  I discussed this with th 300 MG rectal suppository 300 mg 300 mg Rectal Daily   LORazepam (ATIVAN) injection 1 mg 1 mg Intravenous Q4H PRN   0.9%  NaCl infusion  Intravenous Continuous       Results:     Lab Results  Component Value Date   WBC 6.5 10/18/2017   HGB 12.8 10/18/2017 obtained without contrast material.  Automated exposure control for dose reduction was used.    FINDINGS:  Exam quality is degraded by patient motion artifact which decreases sensitivity for subtle abnormalities which can be seen with ischemic stroke as wel 10/16/2017  CONCLUSION:  1. No acute intracranial process. Exam quality is degraded due to the presence of patient motion artifact, despite repeat imaging acquisition. No large intra- or extra-axial measurable hematoma.   This exam is limited for assessme

## 2017-10-18 NOTE — PROGRESS NOTES
During writers shift this evening, Dr. Tho Dunn (neuro) called and informed writer that he read the patients MRI and the results show acute embolytic infarcts. Ordered low dose Heparin gtt with NO loading dose, and cancel ASA on patients MAR.    Informed hi the dr decided to place her in the restraints because it was for her own safety. She acknowledged and understood and was ok with the restraints.

## 2017-10-18 NOTE — PROGRESS NOTES
[facility]    Progress Note    Abdon Rowland Patient Status:  Inpatient    1945 MRN [id number]   Location [facility] 2W/SW Attending Cecily Katz MD   [facility] Day # 2 PCP Travis Knott       Subjective:   Elvan Bosworth tab 10 mg 10 mg Oral Daily   ondansetron HCl (ZOFRAN) injection 4 mg 4 mg Intravenous Q6H PRN   hydrALAzine HCl (APRESOLINE) injection 10 mg 10 mg Intravenous Q3H PRN   dextrose 50% injection 50 mL 50 mL Intravenous PRN   Insulin Aspart Pen (NOVOLOG) 100 U (L) 10/16/2017   PTT 34.6 10/18/2017   INR 1.0 10/16/2017   ESRML 6 10/16/2017   TROP 0.01 10/16/2017   CKMB 1.0 10/16/2017       Assessment and Plan:     Hypertensive crisis  Blood pressure better controlled and titrated meds      Hyperglycemia    Better SPACES: Ventricles, cisterns, and sulci are appropriate for age. No hydrocephalus, subarachnoid hemorrhage, or mass. No midline shift. CEREBRUM: No edema, hemorrhage, mass, acute infarction, or significant atrophy.   WHITE MATTER: Moderate chronic white microvascular white matter ischemic changes, likely related to long-standing hypertension and/or diabetes. 3. Atherosclerosis in the anterior and posterior circulations. Findings were discussed with Dr. Christian Navarro on 10/16/17 at 1631 hours.                 Dina Tao

## 2017-10-18 NOTE — PLAN OF CARE
CARDIOVASCULAR - ADULT    • Maintains optimal cardiac output and hemodynamic stability Progressing    Patient SR on heart monitor    Diabetes/Glucose Control    • Glucose maintained within prescribed range Progressing    Patient takes oral diabetic medicat

## 2017-10-18 NOTE — PLAN OF CARE
Problem: Risk for Violence-Violent Restraints/Seclusion  Goal: Patient will not express any violent or self-destructive behaviors  Interventions:  - Apply the least restrictive restraint to prevent harm if patient strikes out and is not responding to redir

## 2017-10-18 NOTE — PROGRESS NOTES
[facility]    Progress Note    Daniellorne Rivas Patient Status:  Inpatient    1945 MRN [id number]   Location [facility] 2W/SW Attending Gilmer Lucas MD   Hosp Day # 2 PCP [facility] FRANCISCO       SUBJECTIVE:  No CP, SOB, or Intravenous Continuous   Sodium Chloride 0.9 % solution      haloperidol lactate (HALDOL) 5 MG/ML injection 2 mg 2 mg Intramuscular Q6H PRN   atorvastatin (LIPITOR) tab 40 mg 40 mg Oral Nightly   acetaminophen (TYLENOL) 650 MG rectal suppository 650 mg 650 tomorrow. Cardiology consult for AARON. # Headache: improved now.   # H/o Aortic aneurysm   # HLD  # DM  # Back pain, recent back surgery: pain control  # Dysphagia: SLP eval     Questions/concerns were discussed with patient and family, daughter and harriet

## 2017-10-19 ENCOUNTER — APPOINTMENT (OUTPATIENT)
Dept: CV DIAGNOSTICS | Facility: HOSPITAL | Age: 72
DRG: 041 | End: 2017-10-19
Attending: INTERNAL MEDICINE
Payer: MEDICARE

## 2017-10-19 ENCOUNTER — ANESTHESIA (OUTPATIENT)
Dept: INTERVENTIONAL RADIOLOGY/VASCULAR | Facility: HOSPITAL | Age: 72
DRG: 041 | End: 2017-10-19
Payer: MEDICARE

## 2017-10-19 ENCOUNTER — APPOINTMENT (OUTPATIENT)
Dept: INTERVENTIONAL RADIOLOGY/VASCULAR | Facility: HOSPITAL | Age: 72
DRG: 041 | End: 2017-10-19
Attending: INTERNAL MEDICINE
Payer: MEDICARE

## 2017-10-19 PROCEDURE — 93325 DOPPLER ECHO COLOR FLOW MAPG: CPT | Performed by: INTERNAL MEDICINE

## 2017-10-19 PROCEDURE — 93320 DOPPLER ECHO COMPLETE: CPT | Performed by: INTERNAL MEDICINE

## 2017-10-19 PROCEDURE — B246ZZ4 ULTRASONOGRAPHY OF RIGHT AND LEFT HEART, TRANSESOPHAGEAL: ICD-10-PCS | Performed by: INTERNAL MEDICINE

## 2017-10-19 PROCEDURE — 99232 SBSQ HOSP IP/OBS MODERATE 35: CPT | Performed by: OTHER

## 2017-10-19 RX ORDER — LIDOCAINE HYDROCHLORIDE 10 MG/ML
INJECTION, SOLUTION EPIDURAL; INFILTRATION; INTRACAUDAL; PERINEURAL AS NEEDED
Status: DISCONTINUED | OUTPATIENT
Start: 2017-10-19 | End: 2017-10-19 | Stop reason: SURG

## 2017-10-19 RX ORDER — CLOPIDOGREL BISULFATE 75 MG/1
75 TABLET ORAL DAILY
Status: DISCONTINUED | OUTPATIENT
Start: 2017-10-19 | End: 2017-10-24

## 2017-10-19 RX ADMIN — LIDOCAINE HYDROCHLORIDE 25 MG: 10 INJECTION, SOLUTION EPIDURAL; INFILTRATION; INTRACAUDAL; PERINEURAL at 13:57:00

## 2017-10-19 NOTE — PROGRESS NOTES
Patient transported off unit by hospital staff to get AARON. Patient alert and has friendly demeanor. Patient has working IV line. Transporter has patient chart with patient consents. Patient has no objective S & S of acute distress.   Patient daughter Rosaura Prasad

## 2017-10-19 NOTE — PROGRESS NOTES
Patient seen in follow up. No overnight events. Patient denies any chest pain or sob. Denies any cough. Denies abdominal pain.      10/19/17  0500   BP: 130/55   Pulse: 69   Resp: 19   Temp:        Intake/Output Summary (Last 24 hours) at 10/19/17 092 Oxybutynin Chloride ER (DITROPAN-XL) 24 hr tab 10 mg 10 mg Oral Daily   temazepam (RESTORIL) cap 15 mg 15 mg Oral Nightly PRN   hydrALAzine HCl (APRESOLINE) tab 25 mg 25 mg Oral Q8H RODNEY   acetaminophen (TYLENOL) tab 650 mg 650 mg Oral Q6H PRN   HYDROcodone 1. Acute CVA    - MRI showed several small acute infarcts in the left parietal and occipital region raising concern for emboli   - Bilateral carotid dopplar shows bilateral proximal ICA plague without hemodynamic significance   - Echo shows normal left agustin

## 2017-10-19 NOTE — CM/SW NOTE
SLIM left a message for the patient's daughter to discuss SNF options for discharge. MD ordered a PM&R consult and SLIM will follow-up pending recommendations.      Maria Esther Miles [facility]  [id number]

## 2017-10-19 NOTE — PROGRESS NOTES
Patient and daughter states they do not want patient to take Atorvastatin or ANY statins. Daughter reports patient takes Zetia at home. Dr. Erica Dominguez informed. Verbal Order received to discontinue Atorvastatin.

## 2017-10-19 NOTE — PROGRESS NOTES
Marcy Maxwell  [id number]      Post procedure/ recovery hand-off report given to [facility]. Patient's vital signs stable. MD was able to talk to daughter.     Kaycee Aguirre RN

## 2017-10-19 NOTE — SLP NOTE
Pt NPO for AARON today at time of attempt. SLP to follow for diet zachariah/cog tx as recommended once patient able to take PO intake.      Krystle Shea MA, [address] Pathologist  [place]. [phone]

## 2017-10-19 NOTE — PROGRESS NOTES
Called to give handoff report to floor nurse, spoke to [facility] [place] [facility] will call CCU back. Awaiting call back from [facility] on 3rd floor.

## 2017-10-19 NOTE — PROGRESS NOTES
[facility]    Progress Note    Luella Ahumada Patient Status:  Inpatient    1945 MRN [id number]   Location [facility] 2W/SW Attending Nidhi Hernandez MD   Hosp Day # 3 PCP Fidel Negro       Subjective:   Luella Ahumada Units/hr Intravenous Continuous   haloperidol lactate (HALDOL) 5 MG/ML injection 2 mg 2 mg Intramuscular Q6H PRN   acetaminophen (TYLENOL) 650 MG rectal suppository 650 mg 650 mg Rectal Q6H PRN   Enalapril Maleate (VASOTEC) tab 10 mg 10 mg Oral Daily   ond degradation by patient motion artifact. Negative MRA within limitations of the exam. 2. MRI of the brain was performed and reported separately. Mri Brain (oem=[id number])    Result Date: 10/17/2017  CONCLUSION:  1.  Few acute tiny/ micro-infarcts in left

## 2017-10-19 NOTE — CONSULTS
PM&R Consult dictated. Recommendations:    Based on patient's current functional status with ongoing medical needs, she would benefit from short acute rehab stay at this time. ELOS 10-14 days. Prognosis fair. Goal is home with daughter.  If patient progr

## 2017-10-19 NOTE — ANESTHESIA PREPROCEDURE EVALUATION
Anesthesia PreOp Note    HPI:     Kang Fofana is a 67year old female who presents for preoperative consultation requested by: * No surgeons listed *    Date of Surgery:     * No procedures listed *  Indication: * No pre-op diagnosis entered *      Hi time   Coenzyme Q10 (CO Q 10 OR) Take by mouth. Disp:  Rfl:  10/15/2017 at Unknown time   temazepam 15 MG Oral Cap Take 15 mg by mouth nightly as needed for Sleep.  Disp:  Rfl:  Past Month at Unknown time   PANTOPRAZOLE SODIUM OR Take 40 mg by mouth nightly Inhalation Q6H PRN Ariana Bacon MD     MetFORMIN HCl (GLUCOPHAGE) tab 850 mg 850 mg Oral BID with meals Ariana Bacon  mg at 10/18/17 1800    Nebivolol HCl (BYSTOLIC) tab 5 mg 5 mg Oral Daily Ariana Bacon MD 5 mg at 10/19/17 0950    Pantoprazole Other Topics Concern   None on file     Social History Narrative   None on file       Available pre-op labs reviewed. Lab Results  Component Value Date   WBC 6.5 10/18/2017   RBC 4.60 10/18/2017   HGB 12.8 10/18/2017   HCT 39.0 10/18/2017   MCV 84. 9 complications, and any alternative forms of anesthetic management. All of the patient's questions were answered to the best of my ability. The patient desires the anesthetic management as planned.   Cody Lackey  10/19/2017 1:08 PM

## 2017-10-19 NOTE — PHYSICAL THERAPY NOTE
PHYSICAL THERAPY TREATMENT NOTE - INPATIENT    Room Number: 225/225-A       Presenting Problem: HTN crisis; several small acute infarct of L parietal & occipital region    Problem List  Principal Problem:    Hypertensive crisis  Active Problems:    Hyperg -  Dynamic Standing: Fair -    ACTIVITY TOLERANCE  O2 Saturation with activity 93%  Room air    AM-PAC '6-Clicks' INPATIENT SHORT FORM - BASIC MOBILITY  How much difficulty does the patient currently have. ..  -   Turning over in bed (including adjusting be Patient to demonstrate independence with home activity/exercise instructions provided to patient in preparation for discharge.    Goal #5   Current Status IN PROGRESS   Goal #6     Goal #6  Current Status

## 2017-10-19 NOTE — ANESTHESIA POSTPROCEDURE EVALUATION
Patient: Floresita Navarro    Procedure Summary     Date:  10/19/17 Room / Location:  [place] [address].    Anesthesia Start:  2769 Anesthesia Stop:      Procedure:  EP AARON Diagnosis:       CVA (cerebral vascular accident) ([facility] [address]      (C

## 2017-10-19 NOTE — PROGRESS NOTES
Handoff report given to next shift. Patient alert and oriented to handoff report. Patient denies pain or distress.   No objective S & S of acute distress at time of handoff

## 2017-10-19 NOTE — PROGRESS NOTES
[facility]    Progress Note    Vishnu Massed Patient Status:  Inpatient    1945 MRN [id number]   Location [facility] 2W/SW Attending Winnie Trujillo MD   Hosp Day # 3 PCP [facility] FRANCISCO       SUBJECTIVE:  No CP, SOB, or injection 4 mg 4 mg Intravenous Q6H PRN   hydrALAzine HCl (APRESOLINE) injection 10 mg 10 mg Intravenous Q3H PRN   dextrose 50% injection 50 mL 50 mL Intravenous PRN   Insulin Aspart Pen (NOVOLOG) 100 UNIT/ML flexpen 1-5 Units 1-5 Units Subcutaneous TID CC

## 2017-10-19 NOTE — PROGRESS NOTES
Handoff report given to Jacky Werner RN on 3rd Floor. Patient awake and alert, patient denies pain and discomfort. Patient has no objective S & S of distress. Patient has all belonging with them on transport. Transport ordered.   Patient transport and daugh

## 2017-10-19 NOTE — PROGRESS NOTES
Spoke to Dr. Sean Mosqueda, Neuro and Dr. Eri Bui ok to transfer patient to Remote Tele. Informed Dr. Eri Bui of Results for Rhonda Goltz (MRN [id number]) as of 10/19/2017 17:00   Ref. Range 10/19/2017 16:01   APTT Latest Ref Range: 23.2 - 35.3 seconds 148. 7

## 2017-10-19 NOTE — PLAN OF CARE
Safety Risk - Non-Violent Restraints    • Patient will remain free from self-harm NOT Progressing          Patient attempting to discontinue medical treatment, patient confused

## 2017-10-19 NOTE — OCCUPATIONAL THERAPY NOTE
OCCUPATIONAL THERAPY TREATMENT NOTE - INPATIENT     Room Number: 225/225-A   Presenting Problem: micro infarcts partietal cortex    Problem List  Principal Problem:    Hypertensive crisis  Active Problems:    Hyperglycemia    Hemispheric carotid artery syn (including washing, rinsing, drying)?: A Lot  -   Toileting, which includes using toilet, bedpan or urinal? : A Lot  -   Putting on and taking off regular upper body clothing?: A Lot  -   Taking care of personal grooming such as brushing teeth?: None  -

## 2017-10-19 NOTE — CONSULTS
[facility]    PATIENT'S NAME: Beny Gerardo   ATTENDING PHYSICIAN: Barbie Sheikh MD   CONSULTING PHYSICIAN: Minerva Gardner DO   PATIENT ACCOUNT#:   [de-identified]    LOCATION:  [address] [place] [address] Road #:   [id number]       DATE OF BIRTH:  0 Codeine, contrast iodine, shell fish. FAMILY HISTORY:  Significant for father having cancer and diabetes, mother having diabetes.     SOCIAL HISTORY:  The patient lives with her daughter in a house with 2 steps to enter, a flight of steps to the bedroom range of motion distal upper extremities. Manual muscle test reveals fairly good strength symmetrically with hand  limited by wrist restraints. Bilateral lower extremity at least 3/5 for hip flexion bilaterally, dorsiflexion 5/5 bilaterally.   Mae Marie 10 mg and titrating up to t.i.d. as tolerated to help improve agitation. May also consider trialing Seroquel at bedtime and titrating up as needed for behavioral management.     I would be happy to reassess this patient should her medical and her functiona

## 2017-10-19 NOTE — PLAN OF CARE
CARDIOVASCULAR - ADULT    • Maintains optimal cardiac output and hemodynamic stability Progressing    Patient on heart monitor BP has hydrolazine    Diabetes/Glucose Control    • Glucose maintained within prescribed range Progressing    Monitoring patient

## 2017-10-20 PROCEDURE — 90792 PSYCH DIAG EVAL W/MED SRVCS: CPT | Performed by: OTHER

## 2017-10-20 PROCEDURE — 99232 SBSQ HOSP IP/OBS MODERATE 35: CPT | Performed by: OTHER

## 2017-10-20 RX ORDER — ENOXAPARIN SODIUM 100 MG/ML
40 INJECTION SUBCUTANEOUS DAILY
Status: DISCONTINUED | OUTPATIENT
Start: 2017-10-20 | End: 2017-10-24

## 2017-10-20 RX ORDER — NEBIVOLOL 5 MG/1
10 TABLET ORAL DAILY
Status: DISCONTINUED | OUTPATIENT
Start: 2017-10-21 | End: 2017-10-24

## 2017-10-20 RX ORDER — NALOXONE HYDROCHLORIDE 0.4 MG/ML
80 INJECTION, SOLUTION INTRAMUSCULAR; INTRAVENOUS; SUBCUTANEOUS AS NEEDED
Status: ACTIVE | OUTPATIENT
Start: 2017-10-20 | End: 2017-10-20

## 2017-10-20 RX ORDER — ENALAPRIL MALEATE 20 MG/1
20 TABLET ORAL DAILY
Status: DISCONTINUED | OUTPATIENT
Start: 2017-10-21 | End: 2017-10-21

## 2017-10-20 RX ORDER — HYDRALAZINE HYDROCHLORIDE 25 MG/1
25 TABLET, FILM COATED ORAL EVERY 6 HOURS PRN
Status: DISCONTINUED | OUTPATIENT
Start: 2017-10-20 | End: 2017-10-24

## 2017-10-20 RX ORDER — SODIUM CHLORIDE, SODIUM LACTATE, POTASSIUM CHLORIDE, CALCIUM CHLORIDE 600; 310; 30; 20 MG/100ML; MG/100ML; MG/100ML; MG/100ML
INJECTION, SOLUTION INTRAVENOUS CONTINUOUS
Status: DISCONTINUED | OUTPATIENT
Start: 2017-10-20 | End: 2017-10-24

## 2017-10-20 RX ORDER — RISPERIDONE 0.5 MG/1
0.5 TABLET, FILM COATED ORAL NIGHTLY
Status: DISCONTINUED | OUTPATIENT
Start: 2017-10-20 | End: 2017-10-24

## 2017-10-20 NOTE — PROGRESS NOTES
After fall, patient continues to be non-compliant with fall prevention + recommendations from staff. Patient having multiple changes in mood and is restless at times. Patient is alert and oriented but continues to be very non-compliant. Patient also becomin

## 2017-10-20 NOTE — PROGRESS NOTES
[facility]    Progress Note    Luella Ahumada Patient Status:  Inpatient    1945 MRN [id number]   Location [facility] 3W/SW Attending Nidhi Hernandez MD   [address] Day # 4 PCP Fidel Negro       Subjective:   Luella Ahumada options with the patient and her daughter, we elected to treat with Plavix. I did discuss the possibility of intermittent atrial fibrillation being a possible source of the stroke, and suggested a 30 day event monitor be arranged.   Family is agreeable to 650 mg Oral Q6H PRN   HYDROcodone-acetaminophen (NORCO) 5-325 MG per tab 1 tablet 1 tablet Oral Q4H PRN   LORazepam (ATIVAN) injection 1 mg 1 mg Intravenous Q4H PRN   0.9%  NaCl infusion  Intravenous Continuous       Results:     Lab Results  Component Martine

## 2017-10-20 NOTE — CM/SW NOTE
SW contacted [facility] - left  for admissions    SW contacted [facility] - per admissions, still reviewing at this time.      Levi Tan, [address]

## 2017-10-20 NOTE — PROGRESS NOTES
Patient having fall episode at approx. 2330 (10/19/17). Fall happened after patient became agitated and was walking in the room with family. Patient reports \"being tripped up\" by family (daughter).  Patient assessed immediately after staff alerting this n

## 2017-10-20 NOTE — SLP NOTE
SPEECH DAILY NOTE - INPATIENT    ASSESSMENT & PLAN   ASSESSMENT  RN called SLP department for diet recommendations. SLP followed with patient and family for diet tolerance and PO safety.  Family reported patient did not eat breakfast today and her last meal encouragement for PO intake during session. Family at bedside. In progress. Goal #2 The patient will tolerate solid consistency and thin liquids without overt signs or symptoms of aspiration with 100 % accuracy over one session(s).     Pt seen on hard s

## 2017-10-20 NOTE — PROGRESS NOTES
[facility]    Progress Note    Marvin Goodpasture Patient Status:  Inpatient    1945 MRN [id number]   Location [facility] 3W/SW Attending Zach Smith MD   Hosp Day # 4 PCP MILE SINGH       SUBJECTIVE:  No CP, SOB, or MG/ML injection 2 mg 2 mg Intramuscular Q6H PRN   acetaminophen (TYLENOL) 650 MG rectal suppository 650 mg 650 mg Rectal Q6H PRN   ondansetron HCl (ZOFRAN) injection 4 mg 4 mg Intravenous Q6H PRN   hydrALAzine HCl (APRESOLINE) injection 10 mg 10 mg Intrave with patient and coordinating care: 25 minutes            Zabrina Ruiz  10/20/2017  2:46 PM

## 2017-10-20 NOTE — PLAN OF CARE
DISCHARGE PLANNING    • Discharge to home or other facility with appropriate resources Not Progressing        Patient/Family Goals    • Patient/Family Long Term Goal Not Progressing        SAFETY ADULT - FALL    • Free from fall injury Not Progressing

## 2017-10-20 NOTE — CM/SW NOTE
SW spoke w/ [facility] from [facility] who stated MD currently reviewing pt's referral. Francesca/[facility] liaison will do on-site eval Piter 10/22, contact #: [phone] ex. [phone],  -Ext.  [phone]

## 2017-10-20 NOTE — PROGRESS NOTES
Patient seen in follow up. No overnight events. Patient denies any chest pain or sob. Denies any cough. Denies abdominal pain. Discussed with nursing staff  Chart reviewed.         10/20/17  0844   BP: (!) 182/86   Pulse: 78   Resp: 20   Temp: Pantoprazole Sodium (PROTONIX) EC tab 40 mg 40 mg Oral Nightly   Oxybutynin Chloride ER (DITROPAN-XL) 24 hr tab 10 mg 10 mg Oral Daily   temazepam (RESTORIL) cap 15 mg 15 mg Oral Nightly PRN   hydrALAzine HCl (APRESOLINE) tab 25 mg 25 mg Oral Q8H [facility]   ace - Attempted bedside AARON but patient too agitated and pulling the probe out and gagging.   - NPO after midnight   - On heparin drip. Will start atorvastatin 40mg daily   2. Coronary artery disease with history of stent placement more than a year   3.  HTN

## 2017-10-21 RX ORDER — POTASSIUM CHLORIDE 20 MEQ/1
40 TABLET, EXTENDED RELEASE ORAL ONCE
Status: DISCONTINUED | OUTPATIENT
Start: 2017-10-21 | End: 2017-10-24

## 2017-10-21 RX ORDER — POTASSIUM CHLORIDE 20 MEQ/1
40 TABLET, EXTENDED RELEASE ORAL EVERY 4 HOURS
Status: COMPLETED | OUTPATIENT
Start: 2017-10-21 | End: 2017-10-21

## 2017-10-21 RX ORDER — ENALAPRIL MALEATE 20 MG/1
20 TABLET ORAL 2 TIMES DAILY
Status: DISCONTINUED | OUTPATIENT
Start: 2017-10-21 | End: 2017-10-24

## 2017-10-21 NOTE — SLP NOTE
SPEECH DAILY NOTE - INPATIENT    ASSESSMENT & PLAN   ASSESSMENT  Pt seen sitting upright in bed for all PO trials and cognitive tx. Family at bedside. Pt required moderate verbal cues and encouragement for participation.  Pt rec'd reclined position with huong Helena Burns

## 2017-10-21 NOTE — PROGRESS NOTES
[facility]    Progress Note    Kang Fofana Patient Status:  Inpatient    1945 MRN [id number]   Location [facility] 3W/SW Attending Tracy Viera MD   [facility] Day [facility]       SUBJECTIVE:  No CP, SOB, or Pen (NOVOLOG) 100 UNIT/ML flexpen 1-5 Units 1-5 Units Subcutaneous Once   Enalapril Maleate (VASOTEC) tab 20 mg 20 mg Oral Daily   Nebivolol HCl (BYSTOLIC) tab 10 mg 10 mg Oral Daily   hydrALAzine HCl (APRESOLINE) tab 25 mg 25 mg Oral Q6H PRN   Enoxaparin H/o aortic aneurysm  # DM    AARON negative for any source of emboli, heparin discontinued. Put on Plavix per neuro recs. No need for [facility] now. Continue with Plavix, statin. PT/OT working with her. PMR recommended acute rehab.    SW working on placement for [facility]

## 2017-10-21 NOTE — CONSULTS
[facility]    Report of Consultation    Gaurav Serrano Patient Status:  Inpatient    1945 MRN [id number]   Location [facility] 3W/SW Attending Darcie Reno MD   Hosp Day # 4 PCP Alfie Heredia     Date of Admission: and she understand that there are not really    Patient otherwise admitted that recently her vision has been more frequent and dependence on the situation.   Patient for example reported when she go to the hospital she tend to have more confusion and has be obstructive pulmonary disease) ([facility]    • Coronary atherosclerosis    • Diabetes ([facility]    • Diverticulosis of large intestine    • Heart attack     2013   • High blood pressure    • High cholesterol    • History of stomach ulcers    • Incontinence    • Rosaura Escamilla injection 10 mg 10 mg Intravenous Q3H PRN   dextrose 50% injection 50 mL 50 mL Intravenous PRN   Insulin Aspart Pen (NOVOLOG) 100 UNIT/ML flexpen 1-5 Units 1-5 Units Subcutaneous TID CC   morphINE sulfate (PF) 2 MG/ML injection 2 mg 2 mg Intravenous Q4H FL no adverse effects      Review of Systems:     As by Admitting/Attending    Results:     Laboratory Data:    Lab Results  Component Value Date   WBC 6.5 10/18/2017   HGB 12.8 10/18/2017   HCT 39.0 10/18/2017    10/20/2017   CREATSERUM 1.13 10/18/201 the current symptom and severity. The patient with chronic history of visual hallucination who has been exhibiting recent changes in the trend of her hallucination with more frequency and bizarre images.   Patient admitted with history of depression ryan Antibody Screen Once      Emergency MRSA Screen by PCR STAT      Urine Culture, Routine Once    Meds This Visit:    No prescriptions requested or ordered in this encounter           Rhiannon Brady MD  10/20/2017

## 2017-10-21 NOTE — PROGRESS NOTES
Patient seen in follow up. Tele stable.  Alert and conversant     10/21/17  0834   BP: (!) 175/82   Pulse: 55   Resp: 18   Temp:        Intake/Output Summary (Last 24 hours) at 10/21/17 1225  Last data filed at 10/21/17 0700   Gross per 24 hour   In Albuterol Sulfate  (90 Base) MCG/ACT inhaler 2 puff 2 puff Inhalation Q6H PRN   MetFORMIN HCl (GLUCOPHAGE) tab 850 mg 850 mg Oral BID with meals   Pantoprazole Sodium (PROTONIX) EC tab 40 mg 40 mg Oral Nightly   Oxybutynin Chloride ER (DITROPAN-XL) - Bilateral carotid dopplar shows bilateral proximal ICA plague without hemodynamic significance   - Echo shows normal left ventricular systolic function, increased ventricular thickness, bubble study inconclusive   - ECG shows sinus rhythm no history of a

## 2017-10-22 RX ORDER — HYDROCHLOROTHIAZIDE 25 MG/1
25 TABLET ORAL DAILY
Status: DISCONTINUED | OUTPATIENT
Start: 2017-10-22 | End: 2017-10-24

## 2017-10-22 NOTE — PHYSICAL THERAPY NOTE
PHYSICAL THERAPY TREATMENT NOTE - INPATIENT    Room Number:312/312A     Presenting Problem: HTN crisis; several small acute infarct of L parietal & occipital region    Problem List  Principal Problem:    Hypertensive crisis  Active Problems:    Hyperglyce (supervision and assist at home)     PLAN  PT Treatment Plan: Bed mobility;Transfer training;Gait training;Stair training;Strengthening;Patient education; Body mechanics; Family education    SUBJECTIVE  Pt wanted her restraints taken off.     OBJECTIVE  Preca demonstrate supine - sit EOB @ level: modified independent   Goal #1   Current Status Mod I   Goal #2 Patient is able to demonstrate transfers Sit to/from Stand at assistance level: modified independent with walker - rolling   Goal #2  Current Status Mod I

## 2017-10-22 NOTE — PROGRESS NOTES
[place] [facility]    Progress Note    Toro Fontenot Patient Status:  Inpatient    1945 MRN [id number]   Location [facility] 3W/SW Attending Rob Lozano MD   Hosp Day # 6 PCP MILE SINGH       SUBJECTIVE:  No CP, SOB, or flexpen 1-5 Units 1-5 Units Subcutaneous Once   Nebivolol HCl (BYSTOLIC) tab 10 mg 10 mg Oral Daily   hydrALAzine HCl (APRESOLINE) tab 25 mg 25 mg Oral Q6H PRN   Enoxaparin Sodium (LOVENOX) 40 MG/0.4ML injection 40 mg 40 mg Subcutaneous Daily   risperiDONE discontinued. Put on Plavix per neuro recs. [facility]. Continue with Plavix, statin. PT/OT working with her. PMR recommended acute rehab. SW working on placement for acute rehab.     # Acute hallucination: On and off, could be from anesthesia fo

## 2017-10-22 NOTE — PROGRESS NOTES
Patient seen in follow up. Isolated PVCs on tele.      10/22/17  8110   BP: 160/69   Pulse: 67   Resp: 18   Temp: 98.1 °F (36.7 °C)       Intake/Output Summary (Last 24 hours) at 10/22/17 1352  Last data filed at 10/22/17 1000   Gross per 24 hour MetFORMIN HCl (GLUCOPHAGE) tab 850 mg 850 mg Oral BID with meals   Pantoprazole Sodium (PROTONIX) EC tab 40 mg 40 mg Oral Nightly   Oxybutynin Chloride ER (DITROPAN-XL) 24 hr tab 10 mg 10 mg Oral Daily   temazepam (RESTORIL) cap 15 mg 15 mg Oral Nightly WA - ECG shows sinus rhythm no history of atrial fibrillation   -  AARON negative for shunts or thrombi. 2. Coronary artery disease with history of stent placement more than a year   3. HTN   4. Dyslipidemia   - Not on statin   5. Thoracic aortic aneurysm.  B

## 2017-10-23 ENCOUNTER — APPOINTMENT (OUTPATIENT)
Dept: INTERVENTIONAL RADIOLOGY/VASCULAR | Facility: HOSPITAL | Age: 72
DRG: 041 | End: 2017-10-23
Attending: INTERNAL MEDICINE
Payer: MEDICARE

## 2017-10-23 PROCEDURE — 99232 SBSQ HOSP IP/OBS MODERATE 35: CPT | Performed by: OTHER

## 2017-10-23 RX ORDER — LIDOCAINE HYDROCHLORIDE AND EPINEPHRINE 20; 5 MG/ML; UG/ML
INJECTION, SOLUTION EPIDURAL; INFILTRATION; INTRACAUDAL; PERINEURAL
Status: DISPENSED
Start: 2017-10-23 | End: 2017-10-23

## 2017-10-23 NOTE — PHYSICAL THERAPY NOTE
PHYSICAL THERAPY TREATMENT NOTE - INPATIENT    Room Number: 446/773-F       Presenting Problem: HTN crisis; several small acute infarct of L parietal & occipital region    Problem List  Principal Problem:    Hypertensive crisis  Active Problems:    Hyperg and blankets)?: None   -   Sitting down on and standing up from a chair with arms (e.g., wheelchair, bedside commode, etc.): None   -   Moving from lying on back to sitting on the side of the bed?: None   How much help from another person does the patient Goal #6  Current Status

## 2017-10-23 NOTE — PROGRESS NOTES
Patient seen in follow up. S/p LINQ implantation  Discussed with nursing staff  Chart reviewed.         10/23/17  0815   BP: 155/62   Pulse: 71   Resp: 24   Temp:        Intake/Output Summary (Last 24 hours) at 10/23/17 1038  Last data filed at 10/2 Albuterol Sulfate  (90 Base) MCG/ACT inhaler 2 puff 2 puff Inhalation Q6H PRN   MetFORMIN HCl (GLUCOPHAGE) tab 850 mg 850 mg Oral BID with meals   Pantoprazole Sodium (PROTONIX) EC tab 40 mg 40 mg Oral Nightly   Oxybutynin Chloride ER (DITROPAN-XL) 2. Coronary artery disease with history of stent placement more than a year   3. HTN   4. Dyslipidemia   - Not on statin   5. Thoracic aortic aneurysm. Being monitored outpatient  - On bystolic and enalapril     S/p Linq device implantation.  No complicatio

## 2017-10-23 NOTE — OPERATIVE REPORT
Preop diagnosis: cryptogenic cva   Post op diagnosis: same  Procedures: Linq implantation  Findings: As above. Patient tolerated well.    EBL: 20 mls  Specimens: None

## 2017-10-23 NOTE — SLP NOTE
SPEECH DAILY NOTE - INPATIENT    ASSESSMENT & PLAN   ASSESSMENT  Patient seen for both cognitive communication and swallowing. Swallowing:  Patient seen to monitor tolerance of PO diet and train compensatory strategies.   Observed with food and liquid without overt signs or symptoms of aspiration with 100 % accuracy over one session(s). Pt seen on hard solid and thin liquids trials by cup and assessment of thin liquids by straw. No overt clinical signs of aspiration while SLP was in room.   Cough with

## 2017-10-23 NOTE — PROGRESS NOTES
The patient was sleep today, otherwise communicating with her daughter who was in the room and with RN. The patient has been exhibiting much improvement according to the staff and family with no symptom or sign of confusion or auditory hallucination.   Kayce Fatima

## 2017-10-23 NOTE — PROGRESS NOTES
Loop recorder implanted by Dr Tanisha Sanchez. Dressing applied. Pt. Tolerated well.  Handoff to [employer]

## 2017-10-24 VITALS
HEIGHT: 60 IN | TEMPERATURE: 98 F | RESPIRATION RATE: 16 BRPM | SYSTOLIC BLOOD PRESSURE: 143 MMHG | OXYGEN SATURATION: 96 % | HEART RATE: 60 BPM | BODY MASS INDEX: 44.72 KG/M2 | WEIGHT: 227.81 LBS | DIASTOLIC BLOOD PRESSURE: 74 MMHG

## 2017-10-24 RX ORDER — RISPERIDONE 0.5 MG/1
0.5 TABLET, FILM COATED ORAL NIGHTLY
Qty: 30 TABLET | Refills: 0 | Status: SHIPPED | OUTPATIENT
Start: 2017-10-24 | End: 2017-11-27

## 2017-10-24 RX ORDER — POTASSIUM CHLORIDE 20 MEQ/1
40 TABLET, EXTENDED RELEASE ORAL EVERY 4 HOURS
Status: COMPLETED | OUTPATIENT
Start: 2017-10-24 | End: 2017-10-24

## 2017-10-24 RX ORDER — HYDROCHLOROTHIAZIDE 25 MG/1
25 TABLET ORAL DAILY
Qty: 30 TABLET | Refills: 0 | Status: SHIPPED | OUTPATIENT
Start: 2017-10-25 | End: 2017-11-24

## 2017-10-24 RX ORDER — NEBIVOLOL 10 MG/1
10 TABLET ORAL DAILY
Qty: 30 TABLET | Refills: 0 | Status: SHIPPED | OUTPATIENT
Start: 2017-10-25 | End: 2017-11-24

## 2017-10-24 RX ORDER — ENALAPRIL MALEATE 20 MG/1
20 TABLET ORAL 2 TIMES DAILY
Qty: 60 TABLET | Refills: 0 | Status: SHIPPED | OUTPATIENT
Start: 2017-10-24 | End: 2018-11-07 | Stop reason: ALTCHOICE

## 2017-10-24 RX ORDER — CLOPIDOGREL BISULFATE 75 MG/1
75 TABLET ORAL DAILY
Qty: 30 TABLET | Refills: 0 | Status: SHIPPED | OUTPATIENT
Start: 2017-10-25

## 2017-10-24 NOTE — PHYSICAL THERAPY NOTE
PHYSICAL THERAPY TREATMENT NOTE - INPATIENT    Room Number: 854/098-M       Presenting Problem: HTN crisis; several small acute infarct of L parietal & occipital region    Problem List  Principal Problem:    Hypertensive crisis  Active Problems:    Hyperg to a chair (including a wheelchair)?: None   -   Need to walk in hospital room?: None   -   Climbing 3-5 steps with a railing?: A Little    AM-PAC Score:  Raw Score: 23   PT Approx Degree of Impairment Score: 11.2%   Standardized Score (AM-PAC Scale): 56. 9

## 2017-10-24 NOTE — PHYSICAL THERAPY NOTE
Attempted PT Rx, however, pt requesting I come back after she finishes her lunch, as she just started eating. Will return.

## 2017-10-24 NOTE — PLAN OF CARE
CARDIOVASCULAR - ADULT    • Maintains optimal cardiac output and hemodynamic stability Progressing        Telemetry monitor in place, Link device in place. Patient reminded of need to stay within 6 feet of link monitor when sleeping.    DISCHARGE PLANNING

## 2017-10-24 NOTE — CM/SW NOTE
[facility]  Face to Face Encounter Note    Londa Skiff Patient Status:  Inpatient    1945 MRN [id number]   Location [facility] 3W/SW Attending Elpidio Pena MD   Hosp Day # 8 PCP Autumn Bowman I, or a non-physician practi nursing care, physical therapy and/or speech therapy or continues to need occupational therapy. The patient is under my care, and I have initiated the establishment of the plan of care.   This physician will be followed by a physician who will periodically

## 2017-10-24 NOTE — DIETARY NOTE
ADULT NUTRITION REASSESSMENT    Pt is at moderate nutrition risk. Pt does not meet malnutrition criteria. RECOMMENDATIONS TO MD:  none at this time.       NUTRITION DIAGNOSIS/PROBLEM:  Inadequate oral intake related to decreased ability to consume suf atherosclerosis, others as documented.     ANTHROPOMETRICS:  HT: 152.4 cm (5')       WT: 170 .9 lb on 10/17;  228.8# today requested repeat weight--now 166.1#  More consistent weight daily weights       GASTROINTESTINAL PROBLEMS: diet upgraded by SLP    JUAN

## 2017-10-24 NOTE — DISCHARGE SUMMARY
[place] [facility]    Discharge Summary    Jessica Mathis Patient Status:  Inpatient    1945 MRN [id number]   Location [facility] 3W/SW Attending Nicol Allred MD   [facility] Day # 8 PCP Zabrina Redding     Date of Admission: 10/16/ negative. Seen and followed by neurology. Stroke work up done. MRI brain revealed  Few acute tiny/ micro-infarcts in left parietal cortex and 2 in the left posterior frontal cortex. No large cortical infarcts. Aphasia and confusion resolved.  Given multipl 0     risperiDONE 0.5 MG Tabs  Commonly known as:  RISPERDAL      Take 1 tablet (0.5 mg total) by mouth nightly.    Quantity:  30 tablet  Refills:  0        CHANGE how you take these medications      Instructions Prescription details   Nebivolol HCl 10 MG T Tabs  · risperiDONE 0.5 MG Tabs         Follow up Visits:  Follow-up with PCP in 7 days            Whitney Cruz  10/24/2017  10:33 AM

## 2017-10-24 NOTE — SLP NOTE
SPEECH DAILY NOTE - INPATIENT    ASSESSMENT & PLAN   ASSESSMENT  Patient seen for dysphagia therapy to monitor swallowing tolerance and train swallowing precautions. Pt is alert and cooperative for therapy.   The pt independently repositioned herself to a compensatory strategies as outlined by  BSSE (clinical evaluation) including Slow rate, Small bites, Small sips, No straws with no assistance 100 % of the time across 1 session. Educated the pt on swallowing strategies.   The pt demonstrated use of preca

## 2017-10-24 NOTE — CM/SW NOTE
10/24/17 CM Discharge planning / MDO for home health   Met with pt, plans to return home agreed to home RN and PT. Referral, [facility] orders and face to face made to Kylie Belcher at [facility].   Martine Daigle X [phone]

## 2017-10-24 NOTE — PROGRESS NOTES
Patient seen in follow up. No overnight events.    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

## 2017-10-24 NOTE — PROGRESS NOTES
[facility]    Progress Note    Casey Isaac Patient Status:  Inpatient    1945 MRN [id number]   Location [facility] 3W/SW Attending Ania Ortega MD   Hosp Day # 7 PCP MILE SINGH       SUBJECTIVE:  No CP, SOB, or Enoxaparin Sodium (LOVENOX) 40 MG/0.4ML injection 40 mg 40 mg Subcutaneous Daily   risperiDONE (RISPERDAL) tab 0.5 mg 0.5 mg Oral Nightly   Clopidogrel Bisulfate (PLAVIX) tab 75 mg 75 mg Oral Daily   haloperidol lactate (HALDOL) 5 MG/ML injection 2 mg 2 placement for acute rehab. # Acute hallucination: On and off, could be from anesthesia for AARON. Better with risperidone. Awaiting placement to acute rehab. # Implanted loop recorder placed today. Cleared from cardiology for discharge.   Possible disc

## 2017-10-25 ENCOUNTER — TELEPHONE (OUTPATIENT)
Dept: CARDIOLOGY UNIT | Facility: HOSPITAL | Age: 72
End: 2017-10-25

## 2017-11-07 ENCOUNTER — TELEPHONE (OUTPATIENT)
Dept: MEDSURG UNIT | Facility: HOSPITAL | Age: 72
End: 2017-11-07

## 2018-01-29 ENCOUNTER — HOSPITAL ENCOUNTER (OUTPATIENT)
Dept: CT IMAGING | Facility: HOSPITAL | Age: 73
Discharge: HOME | End: 2018-01-29
Attending: INTERNAL MEDICINE
Payer: MEDICARE

## 2018-01-29 DIAGNOSIS — I71.9 AORTIC ANEURYSM (HCC): ICD-10-CM

## 2018-01-29 PROCEDURE — 71250 CT THORAX DX C-: CPT | Performed by: INTERNAL MEDICINE

## 2018-01-29 PROCEDURE — 74150 CT ABDOMEN W/O CONTRAST: CPT | Performed by: INTERNAL MEDICINE

## 2018-07-12 ENCOUNTER — LAB ENCOUNTER (OUTPATIENT)
Dept: LAB | Age: 73
End: 2018-07-12
Attending: INTERNAL MEDICINE
Payer: MEDICARE

## 2018-07-12 DIAGNOSIS — I25.10 CORONARY ATHEROSCLEROSIS OF NATIVE CORONARY ARTERY: Primary | ICD-10-CM

## 2018-07-12 LAB
ANION GAP SERPL CALC-SCNC: 13 MMOL/L (ref 0–18)
BUN SERPL-MCNC: 30 MG/DL (ref 8–20)
BUN/CREAT SERPL: 18.8 (ref 10–20)
CALCIUM SERPL-MCNC: 9.6 MG/DL (ref 8.5–10.5)
CHLORIDE SERPL-SCNC: 100 MMOL/L (ref 95–110)
CHOLEST SERPL-MCNC: 218 MG/DL (ref 110–200)
CO2 SERPL-SCNC: 29 MMOL/L (ref 22–32)
CREAT SERPL-MCNC: 1.6 MG/DL (ref 0.5–1.5)
GLUCOSE SERPL-MCNC: 148 MG/DL (ref 70–99)
HDLC SERPL-MCNC: 32 MG/DL
LDLC SERPL DIRECT ASSAY-MCNC: 111 MG/DL (ref 0–99)
NONHDLC SERPL-MCNC: 186 MG/DL
OSMOLALITY UR CALC.SUM OF ELEC: 303 MOSM/KG (ref 275–295)
POTASSIUM SERPL-SCNC: 3.3 MMOL/L (ref 3.3–5.1)
SODIUM SERPL-SCNC: 142 MMOL/L (ref 136–144)
TRIGL SERPL-MCNC: 522 MG/DL (ref 1–149)

## 2018-07-12 PROCEDURE — 80061 LIPID PANEL: CPT

## 2018-07-12 PROCEDURE — 36415 COLL VENOUS BLD VENIPUNCTURE: CPT

## 2018-07-12 PROCEDURE — 83721 ASSAY OF BLOOD LIPOPROTEIN: CPT

## 2018-07-12 PROCEDURE — 80048 BASIC METABOLIC PNL TOTAL CA: CPT

## 2018-08-02 ENCOUNTER — HOSPITAL ENCOUNTER (OUTPATIENT)
Dept: MAMMOGRAPHY | Age: 73
Discharge: HOME | End: 2018-08-02
Attending: INTERNAL MEDICINE
Payer: MEDICARE

## 2018-08-02 ENCOUNTER — HOSPITAL ENCOUNTER (OUTPATIENT)
Dept: GENERAL RADIOLOGY | Age: 73
Discharge: HOME | End: 2018-08-02
Attending: INTERNAL MEDICINE
Payer: MEDICARE

## 2018-08-02 DIAGNOSIS — R07.89 OTHER CHEST PAIN: ICD-10-CM

## 2018-08-02 DIAGNOSIS — Z12.31 ENCOUNTER FOR SCREENING MAMMOGRAM FOR MALIGNANT NEOPLASM OF BREAST: ICD-10-CM

## 2018-08-02 PROCEDURE — 77067 SCR MAMMO BI INCL CAD: CPT | Performed by: INTERNAL MEDICINE

## 2018-08-02 PROCEDURE — 71046 X-RAY EXAM CHEST 2 VIEWS: CPT | Performed by: INTERNAL MEDICINE

## 2018-08-24 ENCOUNTER — HOSPITAL ENCOUNTER (OUTPATIENT)
Dept: ULTRASOUND IMAGING | Facility: HOSPITAL | Age: 73
Discharge: HOME | End: 2018-08-24
Attending: INTERNAL MEDICINE
Payer: MEDICARE

## 2018-08-24 ENCOUNTER — HOSPITAL ENCOUNTER (OUTPATIENT)
Dept: MAMMOGRAPHY | Facility: HOSPITAL | Age: 73
Discharge: HOME | End: 2018-08-24
Attending: INTERNAL MEDICINE
Payer: MEDICARE

## 2018-08-24 DIAGNOSIS — R92.8 ABNORMAL MAMMOGRAM: ICD-10-CM

## 2018-08-24 PROCEDURE — 76642 ULTRASOUND BREAST LIMITED: CPT | Performed by: INTERNAL MEDICINE

## 2018-08-24 PROCEDURE — 77065 DX MAMMO INCL CAD UNI: CPT | Performed by: INTERNAL MEDICINE

## 2018-08-24 PROCEDURE — 77061 BREAST TOMOSYNTHESIS UNI: CPT | Performed by: INTERNAL MEDICINE

## 2018-11-07 ENCOUNTER — OFFICE VISIT (OUTPATIENT)
Dept: SURGERY | Facility: CLINIC | Age: 73
End: 2018-11-07
Payer: MEDICARE

## 2018-11-07 VITALS
BODY MASS INDEX: 30.96 KG/M2 | WEIGHT: 164 LBS | RESPIRATION RATE: 18 BRPM | HEART RATE: 56 BPM | DIASTOLIC BLOOD PRESSURE: 110 MMHG | OXYGEN SATURATION: 97 % | SYSTOLIC BLOOD PRESSURE: 155 MMHG | HEIGHT: 61 IN

## 2018-11-07 DIAGNOSIS — N60.82 CYST, BREAST, SEBACEOUS, LEFT: Primary | ICD-10-CM

## 2018-11-07 PROCEDURE — 99204 OFFICE O/P NEW MOD 45 MIN: CPT | Performed by: SURGERY

## 2018-11-07 RX ORDER — POTASSIUM CHLORIDE 750 MG/1
TABLET, FILM COATED, EXTENDED RELEASE ORAL
Refills: 0 | COMMUNITY
Start: 2018-08-20 | End: 2019-07-24 | Stop reason: ALTCHOICE

## 2018-11-07 RX ORDER — NEBIVOLOL 20 MG/1
20 TABLET ORAL DAILY
COMMUNITY

## 2018-11-07 RX ORDER — NYSTATIN 100000 U/G
CREAM TOPICAL
COMMUNITY
End: 2018-12-08

## 2018-11-07 RX ORDER — HYDROCHLOROTHIAZIDE 25 MG/1
25 TABLET ORAL DAILY
Status: ON HOLD | COMMUNITY
Start: 2017-11-16 | End: 2021-07-05

## 2018-11-07 RX ORDER — AMLODIPINE BESYLATE 10 MG/1
TABLET ORAL
Refills: 0 | Status: ON HOLD | COMMUNITY
Start: 2018-08-20 | End: 2021-07-05

## 2018-11-07 RX ORDER — LANCETS 33 GAUGE
EACH MISCELLANEOUS
COMMUNITY

## 2018-11-07 RX ORDER — NEOMYCIN SULFATE, POLYMYXIN B SULFATE AND HYDROCORTISONE 10; 3.5; 1 MG/ML; MG/ML; [USP'U]/ML
SUSPENSION/ DROPS AURICULAR (OTIC)
Status: ON HOLD | COMMUNITY
End: 2021-07-05

## 2018-11-07 RX ORDER — KRILL/OM-3/DHA/EPA/PHOSPHO/AST 500-110 MG
1 CAPSULE ORAL
COMMUNITY

## 2018-11-07 NOTE — PROGRESS NOTES
Education Record    Learner:  Patient and Family Member    Disease / Estela Villa instructions    Barriers / Limitations:  None   Comments:    Method:  Discussion and Printed material   Comments:    General Topics:  Procedure and Plan of care reviewe

## 2018-11-12 NOTE — PROGRESS NOTES
Breast Surgery New Patient Consultation    This is the first visit for this 75year old woman, referred by Dr. Jose Antonio Caba, who presents for evaluation of Left breast sebaceous cyst.    History of Present Illness:   Ms. Meg Crystal is a 75year old woman HYSTERECTOMY     • PRATEEK NEEDLE LOCALIZATION W/ SPECIMEN 1 SITE RIGHT      benign-20 years ago   • POSTERIOR LUMBAR LAMINECT SPINAL FUS W/INSTR 1 LEV N/A 7/21/2017    Performed by Ronel Sanchez MD at [facility] OR   • [address] [place] [address] mouth. Disp:  Rfl:    PANTOPRAZOLE SODIUM OR Take 40 mg by mouth nightly. Disp:  Rfl:    ezetimibe 10 MG Oral Tab Take 10 mg by mouth nightly.  Disp:  Rfl:        Allergies:      Enalapril Maleate-F*    Oscar 121  Radiology Contrast *    ANAPHYL while walking.     Breasts:  See history of present illness    Gastrointestinal:     There is no history of difficulty or pain with swallowing, reflux symptoms, vomiting, dark or bloody stools, constipation, yellowing of the skin, indigestion, nausea, peoples normal. Her affect is appropriate. HEENT: The head is normocephalic. The neck is supple. The thyroid is not enlarged and is without palpable masses/nodules. There are no palpable masses. The trachea is in the midline. Conjunctiva are clear, non-icteric. noted bilaterally. I personally reviewed her recent imaging we discussed this at length. We discussed that sebaceous cysts are not known to be associated with any increased risk for the development of cancer.   They can be associated with recurrent infect

## 2018-12-13 ENCOUNTER — ANESTHESIA EVENT (OUTPATIENT)
Dept: SURGERY | Facility: HOSPITAL | Age: 73
End: 2018-12-13
Payer: MEDICARE

## 2018-12-13 ENCOUNTER — HOSPITAL ENCOUNTER (OUTPATIENT)
Facility: HOSPITAL | Age: 73
Setting detail: HOSPITAL OUTPATIENT SURGERY
Discharge: HOME | End: 2018-12-13
Attending: SURGERY | Admitting: SURGERY
Payer: MEDICARE

## 2018-12-13 ENCOUNTER — ANESTHESIA (OUTPATIENT)
Dept: SURGERY | Facility: HOSPITAL | Age: 73
End: 2018-12-13
Payer: MEDICARE

## 2018-12-13 VITALS
DIASTOLIC BLOOD PRESSURE: 59 MMHG | HEIGHT: 61 IN | HEART RATE: 55 BPM | WEIGHT: 163 LBS | RESPIRATION RATE: 14 BRPM | TEMPERATURE: 98 F | SYSTOLIC BLOOD PRESSURE: 130 MMHG | BODY MASS INDEX: 30.78 KG/M2 | OXYGEN SATURATION: 97 %

## 2018-12-13 DIAGNOSIS — L72.3 SEBACEOUS CYST: ICD-10-CM

## 2018-12-13 PROCEDURE — 82962 GLUCOSE BLOOD TEST: CPT

## 2018-12-13 PROCEDURE — 88304 TISSUE EXAM BY PATHOLOGIST: CPT | Performed by: SURGERY

## 2018-12-13 PROCEDURE — 0HBU0ZZ EXCISION OF LEFT BREAST, OPEN APPROACH: ICD-10-PCS | Performed by: SURGERY

## 2018-12-13 RX ORDER — DEXTROSE MONOHYDRATE 50 MG/ML
INJECTION, SOLUTION INTRAVENOUS CONTINUOUS
Status: DISCONTINUED | OUTPATIENT
Start: 2018-12-13 | End: 2018-12-13

## 2018-12-13 RX ORDER — INSULIN ASPART 100 [IU]/ML
INJECTION, SOLUTION INTRAVENOUS; SUBCUTANEOUS ONCE
Status: COMPLETED | OUTPATIENT
Start: 2018-12-13 | End: 2018-12-13

## 2018-12-13 RX ORDER — LIDOCAINE HYDROCHLORIDE 10 MG/ML
INJECTION, SOLUTION EPIDURAL; INFILTRATION; INTRACAUDAL; PERINEURAL AS NEEDED
Status: DISCONTINUED | OUTPATIENT
Start: 2018-12-13 | End: 2018-12-13 | Stop reason: SURG

## 2018-12-13 RX ORDER — MIDAZOLAM HYDROCHLORIDE 1 MG/ML
INJECTION INTRAMUSCULAR; INTRAVENOUS AS NEEDED
Status: DISCONTINUED | OUTPATIENT
Start: 2018-12-13 | End: 2018-12-13 | Stop reason: SURG

## 2018-12-13 RX ORDER — CEFAZOLIN SODIUM/WATER 2 G/20 ML
2 SYRINGE (ML) INTRAVENOUS ONCE
Status: COMPLETED | OUTPATIENT
Start: 2018-12-13 | End: 2018-12-13

## 2018-12-13 RX ORDER — FAMOTIDINE 20 MG/1
20 TABLET ORAL ONCE
Status: COMPLETED | OUTPATIENT
Start: 2018-12-13 | End: 2018-12-13

## 2018-12-13 RX ORDER — METOCLOPRAMIDE 10 MG/1
10 TABLET ORAL ONCE
Status: COMPLETED | OUTPATIENT
Start: 2018-12-13 | End: 2018-12-13

## 2018-12-13 RX ORDER — ACETAMINOPHEN 500 MG
1000 TABLET ORAL ONCE
Status: COMPLETED | OUTPATIENT
Start: 2018-12-13 | End: 2018-12-13

## 2018-12-13 RX ORDER — LIDOCAINE HYDROCHLORIDE AND EPINEPHRINE 10; 10 MG/ML; UG/ML
INJECTION, SOLUTION INFILTRATION; PERINEURAL AS NEEDED
Status: DISCONTINUED | OUTPATIENT
Start: 2018-12-13 | End: 2018-12-13 | Stop reason: HOSPADM

## 2018-12-13 RX ORDER — BUPIVACAINE HYDROCHLORIDE 5 MG/ML
INJECTION, SOLUTION EPIDURAL; INTRACAUDAL AS NEEDED
Status: DISCONTINUED | OUTPATIENT
Start: 2018-12-13 | End: 2018-12-13 | Stop reason: HOSPADM

## 2018-12-13 RX ORDER — TRAMADOL HYDROCHLORIDE 50 MG/1
TABLET ORAL EVERY 6 HOURS PRN
Qty: 20 TABLET | Refills: 0 | Status: SHIPPED | OUTPATIENT
Start: 2018-12-13 | End: 2018-12-19 | Stop reason: ALTCHOICE

## 2018-12-13 RX ORDER — SODIUM CHLORIDE, SODIUM LACTATE, POTASSIUM CHLORIDE, CALCIUM CHLORIDE 600; 310; 30; 20 MG/100ML; MG/100ML; MG/100ML; MG/100ML
INJECTION, SOLUTION INTRAVENOUS CONTINUOUS
Status: DISCONTINUED | OUTPATIENT
Start: 2018-12-13 | End: 2018-12-13

## 2018-12-13 RX ORDER — DEXTROSE MONOHYDRATE 25 G/50ML
50 INJECTION, SOLUTION INTRAVENOUS
Status: DISCONTINUED | OUTPATIENT
Start: 2018-12-13 | End: 2018-12-13 | Stop reason: HOSPADM

## 2018-12-13 RX ADMIN — SODIUM CHLORIDE, SODIUM LACTATE, POTASSIUM CHLORIDE, CALCIUM CHLORIDE: 600; 310; 30; 20 INJECTION, SOLUTION INTRAVENOUS at 08:20:00

## 2018-12-13 RX ADMIN — CEFAZOLIN SODIUM/WATER 2 G: 2 G/20 ML SYRINGE (ML) INTRAVENOUS at 08:09:00

## 2018-12-13 RX ADMIN — LIDOCAINE HYDROCHLORIDE 50 MG: 10 INJECTION, SOLUTION EPIDURAL; INFILTRATION; INTRACAUDAL; PERINEURAL at 08:04:00

## 2018-12-13 RX ADMIN — SODIUM CHLORIDE, SODIUM LACTATE, POTASSIUM CHLORIDE, CALCIUM CHLORIDE: 600; 310; 30; 20 INJECTION, SOLUTION INTRAVENOUS at 07:55:00

## 2018-12-13 RX ADMIN — MIDAZOLAM HYDROCHLORIDE 1 MG: 1 INJECTION INTRAMUSCULAR; INTRAVENOUS at 08:01:00

## 2018-12-13 NOTE — ANESTHESIA PREPROCEDURE EVALUATION
Anesthesia PreOp Note    HPI:     Casey Isaac is a 75year old female who presents for preoperative consultation requested by: Lucía Parsons MD    Date of Surgery: 12/13/2018    Procedure(s):  BREAST BIOPSY  Indication: Sebaceous cyst [L72.3] 7/21/2017    Performed by Claudia Fox MD at [facility] MAIN OR   • SPINE SURGERY PROCEDURE UNLISTED           Medications Prior to Admission:  Krill Oil (OMEGA-3) 500 MG Oral Cap Take 1 tablet by mouth.  Disp:  Rfl:  Taking   AmLODIPine Besylate 10 MG Oral Tab T 10 mg Oral Once Ed Tracy MD   ceFAZolin sodium (ANCEF/KEFZOL) 2 GM/20ML premix IV syringe 2 g 2 g Intravenous Once Ed Tracy MD     No current [facility]-ordered outpatient medications on file.       Enalapril Maleate-F*    SHORTNESS OF BREATH height is 1.549 m (5' 1\") and weight is 74.8 kg (165 lb).     12/08/18  1128   Weight: 74.8 kg (165 lb)   Height: 1.549 m (5' 1\")        Anesthesia ROS/Med Hx and Physical Exam     Patient summary reviewed and Nursing notes reviewed    Airway   Mallampati

## 2018-12-13 NOTE — H&P
History of Present Illness:   Ms. Nelsy Talley is a 75year old woman who presents with concerns of a self detected pain associated with a mass in the left breast that has been present since April 2018.   The patient denies any nipple discharge, palpabl NEELA N/A 2017     Performed by Brock Morrison MD at [address] [place] [address] MAIN OR   • SPINE SURGERY PROCEDURE UNLISTED             Gynecological History:  Pt is a   Pt was 23years old at time of first pregnancy. She denies any cumulative breastfeeding history.   Mane Max nightly.  Disp:  Rfl:          Allergies:       Enalapril Maleate-F*    SHORTNESS OF BREATH  Radiology Contrast *    ANAPHYLAXIS  Shellfish-Derived P*      Codeine                 OTHER (SEE COMMENTS)    Comment:Face and body get puffy/swell, headache difficulty or pain with swallowing, reflux symptoms, vomiting, dark or bloody stools, constipation, yellowing of the skin, indigestion, nausea, change in bowel habits, diarrhea, abdominal pain or vomiting blood.      Genitourinary:  The patient denies+ susan is not enlarged and is without palpable masses/nodules. There are no palpable masses. The trachea is in the midline. Conjunctiva are clear, non-icteric.     Chest: The chest expands symmetrically. The lungs are clear to auscultation.     Heart:  The rhythm discussed that sebaceous cysts are not known to be associated with any increased risk for the development of cancer. They can be associated with recurrent infections or pain or discomfort and may enlarge over time.   Though her cyst is small, does appear t

## 2018-12-13 NOTE — BRIEF OP NOTE
Pre-Operative Diagnosis: Sebaceous cyst [L72.3]     Post-Operative Diagnosis: Sebaceous cyst [L72.3]      Procedure Performed:   Procedure(s):  Excision of Left breast sebaceous cyst    Surgeon(s) and Role:     Fernando Moses MD - Primary    [place]

## 2018-12-13 NOTE — ANESTHESIA POSTPROCEDURE EVALUATION
Patient: Londa Skiff    Procedure Summary     Date:  12/13/18 Room / Location:  [address] [place] [address] MAIN OR 02 / [address] [place] [address] OR    Anesthesia Start:  0801 Anesthesia Stop:      Procedure:  BREAST BIOPSY (Left ) Diagnosis:       Sebaceous cyst      (Sebaceous cyst [L72. 3

## 2018-12-14 ENCOUNTER — TELEPHONE (OUTPATIENT)
Dept: SURGERY | Facility: CLINIC | Age: 73
End: 2018-12-14

## 2018-12-14 NOTE — OPERATIVE REPORT
[facility]    PATIENT'S NAME: Rhonda Goltz   ATTENDING PHYSICIAN: Doris Pratt MD   OPERATING PHYSICIAN: Doris Goff.  Navi Pratt MD   PATIENT ACCOUNT#:   [id number]    LOCATION:  [facility] 3 [facility] 10  MEDICAL RECORD #:   [id number] the 1 to 3 o'clock periareolar border with a 15-blade knife in the skin. Palpable cyst was identified, brought into the field, and using sharp dissection electrocautery, this lesion was excised.   It was sent without orientation, labeled as left breast cys

## 2018-12-14 NOTE — TELEPHONE ENCOUNTER
Pt reports she is doing well after her procedure. A little sore, but not bad. I let her know the pathology showed benign cyst.Pt verbalized understanding, confirmed post op appt.

## 2018-12-19 ENCOUNTER — OFFICE VISIT (OUTPATIENT)
Dept: SURGERY | Facility: CLINIC | Age: 73
End: 2018-12-19
Payer: MEDICARE

## 2018-12-19 VITALS
HEART RATE: 59 BPM | DIASTOLIC BLOOD PRESSURE: 69 MMHG | HEIGHT: 61 IN | WEIGHT: 163 LBS | SYSTOLIC BLOOD PRESSURE: 148 MMHG | OXYGEN SATURATION: 60 % | BODY MASS INDEX: 30.78 KG/M2 | RESPIRATION RATE: 18 BRPM

## 2018-12-19 DIAGNOSIS — N60.82 CYST, BREAST, SEBACEOUS, LEFT: Primary | ICD-10-CM

## 2018-12-19 PROCEDURE — 99024 POSTOP FOLLOW-UP VISIT: CPT | Performed by: SURGERY

## 2018-12-19 RX ORDER — POTASSIUM CHLORIDE 20 MEQ/1
20 TABLET, EXTENDED RELEASE ORAL WEEKLY
Refills: 1 | COMMUNITY
Start: 2018-07-17 | End: 2019-07-24 | Stop reason: ALTCHOICE

## 2018-12-19 RX ORDER — PANTOPRAZOLE SODIUM 40 MG/1
TABLET, DELAYED RELEASE ORAL
Refills: 0 | COMMUNITY
Start: 2018-11-27

## 2019-01-21 ENCOUNTER — HOSPITAL ENCOUNTER (OUTPATIENT)
Dept: CT IMAGING | Facility: HOSPITAL | Age: 74
Discharge: HOME | End: 2019-01-21
Attending: UROLOGY
Payer: MEDICARE

## 2019-01-21 DIAGNOSIS — N20.0 CALCIUM KIDNEY STONE: ICD-10-CM

## 2019-01-21 PROCEDURE — 74176 CT ABD & PELVIS W/O CONTRAST: CPT | Performed by: UROLOGY

## 2019-02-20 ENCOUNTER — LAB ENCOUNTER (OUTPATIENT)
Dept: LAB | Age: 74
End: 2019-02-20
Attending: INTERNAL MEDICINE
Payer: MEDICARE

## 2019-02-20 DIAGNOSIS — E78.5 HYPERLIPEMIA: Primary | ICD-10-CM

## 2019-02-20 LAB
CHOLEST SMN-MCNC: 183 MG/DL (ref ?–200)
HDLC SERPL-MCNC: 30 MG/DL (ref 40–59)
LDLC SERPL DIRECT ASSAY-MCNC: 97 MG/DL (ref ?–100)
NONHDLC SERPL-MCNC: 153 MG/DL (ref ?–130)
TRIGL SERPL-MCNC: 407 MG/DL (ref 30–149)

## 2019-02-20 PROCEDURE — 83721 ASSAY OF BLOOD LIPOPROTEIN: CPT

## 2019-02-20 PROCEDURE — 80061 LIPID PANEL: CPT

## 2019-02-20 PROCEDURE — 36415 COLL VENOUS BLD VENIPUNCTURE: CPT

## 2019-03-04 NOTE — PROGRESS NOTES
Breast Surgery Post-Operative Visit    Diagnosis: Left breast sebaceous cyst status post surgical excision on December 13, 2018. Stage: N/A    Disease Status:  Surgical treatment complete, no further treatment pending. History:    This 75year old wom Laterality Date   • APPENDECTOMY     • ARTHROSCOPY OF JOINT UNLISTED Right     Rotator cuff surgery   • BACK SURGERY  07/21/2017    L3-4 LUIS and hemilaminectomies   • BREAST BIOPSY Left 12/13/2018    Performed by Lucía Parsons MD at [facility] OR   • [place] DIRECTED Disp:  Rfl:    Albuterol Sulfate HFA (PROAIR HFA) 108 (90 Base) MCG/ACT Inhalation Aero Soln Inhale into the lungs every 6 (six) hours as needed for Wheezing.  Disp:  Rfl:    Clopidogrel Bisulfate 75 MG Oral Tab Take 1 tablet (75 mg total) by mouth in voice, facial trauma.     Respiratory:  The patient denies +chronic cough, phlegm, hemoptysis, pleurisy/chest pain, pneumonia, asthma, wheezing, difficulty in breathing with exertion, emphysema, chronic bronchitis, +shortness of breath or abnormal sound fertility or hormone problems, +cold intolerance, thyroid disease. Allergic/Immunologic:  There is no history of hives, hay fever, angioedema or anaphylaxis.     /69 (BP Location: Left arm, Patient Position: Sitting, Cuff Size: adult)   Pulse 59

## 2019-03-09 ENCOUNTER — HOSPITAL ENCOUNTER (OUTPATIENT)
Dept: CV DIAGNOSTICS | Facility: HOSPITAL | Age: 74
Discharge: HOME | End: 2019-03-09
Attending: INTERNAL MEDICINE
Payer: MEDICARE

## 2019-03-09 DIAGNOSIS — I25.10 CAD (CORONARY ARTERY DISEASE): ICD-10-CM

## 2019-03-09 PROCEDURE — 93306 TTE W/DOPPLER COMPLETE: CPT | Performed by: INTERNAL MEDICINE

## 2019-06-09 ENCOUNTER — LAB ENCOUNTER (OUTPATIENT)
Dept: LAB | Facility: HOSPITAL | Age: 74
End: 2019-06-09
Attending: INTERNAL MEDICINE
Payer: MEDICARE

## 2019-06-09 DIAGNOSIS — Q71.819: ICD-10-CM

## 2019-06-09 DIAGNOSIS — E78.5 HYPERLIPEMIA: Primary | ICD-10-CM

## 2019-06-09 DIAGNOSIS — E10.10 DIABETIC ACIDOSIS, TYPE I (HCC): ICD-10-CM

## 2019-06-09 DIAGNOSIS — I10 ESSENTIAL HYPERTENSION, MALIGNANT: ICD-10-CM

## 2019-06-09 PROCEDURE — 80061 LIPID PANEL: CPT

## 2019-06-09 PROCEDURE — 36415 COLL VENOUS BLD VENIPUNCTURE: CPT

## 2019-06-09 PROCEDURE — 80048 BASIC METABOLIC PNL TOTAL CA: CPT

## 2019-07-24 ENCOUNTER — OFFICE VISIT (OUTPATIENT)
Dept: NEPHROLOGY | Facility: CLINIC | Age: 74
End: 2019-07-24
Payer: MEDICARE

## 2019-07-24 ENCOUNTER — LAB ENCOUNTER (OUTPATIENT)
Dept: LAB | Facility: HOSPITAL | Age: 74
End: 2019-07-24
Attending: INTERNAL MEDICINE
Payer: MEDICARE

## 2019-07-24 VITALS
SYSTOLIC BLOOD PRESSURE: 124 MMHG | HEART RATE: 66 BPM | HEIGHT: 60 IN | DIASTOLIC BLOOD PRESSURE: 65 MMHG | BODY MASS INDEX: 30.98 KG/M2 | WEIGHT: 157.81 LBS

## 2019-07-24 DIAGNOSIS — N18.30 CKD (CHRONIC KIDNEY DISEASE) STAGE 3, GFR 30-59 ML/MIN (HCC): ICD-10-CM

## 2019-07-24 DIAGNOSIS — N18.30 CKD (CHRONIC KIDNEY DISEASE) STAGE 3, GFR 30-59 ML/MIN (HCC): Primary | ICD-10-CM

## 2019-07-24 LAB
ALBUMIN SERPL-MCNC: 3.7 G/DL (ref 3.4–5)
ANION GAP SERPL CALC-SCNC: 12 MMOL/L (ref 0–18)
BASOPHILS # BLD AUTO: 0.1 X10(3) UL (ref 0–0.2)
BASOPHILS NFR BLD AUTO: 1.1 %
BILIRUB UR QL: NEGATIVE
BUN BLD-MCNC: 28 MG/DL (ref 7–18)
BUN/CREAT SERPL: 19.9 (ref 10–20)
C3 SERPL-MCNC: 133 MG/DL (ref 90–180)
C4 SERPL-MCNC: 25.7 MG/DL (ref 10–40)
CALCIUM BLD-MCNC: 10.3 MG/DL (ref 8.5–10.1)
CHLORIDE SERPL-SCNC: 104 MMOL/L (ref 98–112)
CLARITY UR: CLEAR
CO2 SERPL-SCNC: 28 MMOL/L (ref 21–32)
COLOR UR: YELLOW
CREAT BLD-MCNC: 1.41 MG/DL (ref 0.55–1.02)
CREAT UR-SCNC: 174 MG/DL
CRP SERPL-MCNC: 0.86 MG/DL (ref ?–0.3)
DEPRECATED RDW RBC AUTO: 43.7 FL (ref 35.1–46.3)
EOSINOPHIL # BLD AUTO: 0.45 X10(3) UL (ref 0–0.7)
EOSINOPHIL NFR BLD AUTO: 4.9 %
ERYTHROCYTE [DISTWIDTH] IN BLOOD BY AUTOMATED COUNT: 13.6 % (ref 11–15)
ERYTHROCYTE [SEDIMENTATION RATE] IN BLOOD: 9 MM/HR (ref 0–30)
GLUCOSE BLD-MCNC: 136 MG/DL (ref 70–99)
GLUCOSE UR-MCNC: NEGATIVE MG/DL
HCT VFR BLD AUTO: 40.7 % (ref 35–48)
HGB BLD-MCNC: 13.4 G/DL (ref 12–16)
HGB UR QL STRIP.AUTO: NEGATIVE
IMM GRANULOCYTES # BLD AUTO: 0.03 X10(3) UL (ref 0–1)
IMM GRANULOCYTES NFR BLD: 0.3 %
KETONES UR-MCNC: NEGATIVE MG/DL
LEUKOCYTE ESTERASE UR QL STRIP.AUTO: NEGATIVE
LYMPHOCYTES # BLD AUTO: 2.15 X10(3) UL (ref 1–4)
LYMPHOCYTES NFR BLD AUTO: 23.3 %
MCH RBC QN AUTO: 28.7 PG (ref 26–34)
MCHC RBC AUTO-ENTMCNC: 32.9 G/DL (ref 31–37)
MCV RBC AUTO: 87.2 FL (ref 80–100)
MICROALBUMIN UR-MCNC: 7.7 MG/DL
MICROALBUMIN/CREAT 24H UR-RTO: 44.3 UG/MG (ref ?–30)
MONOCYTES # BLD AUTO: 0.67 X10(3) UL (ref 0.1–1)
MONOCYTES NFR BLD AUTO: 7.3 %
NEUTROPHILS # BLD AUTO: 5.84 X10 (3) UL (ref 1.5–7.7)
NEUTROPHILS # BLD AUTO: 5.84 X10(3) UL (ref 1.5–7.7)
NEUTROPHILS NFR BLD AUTO: 63.1 %
NITRITE UR QL STRIP.AUTO: NEGATIVE
OSMOLALITY SERPL CALC.SUM OF ELEC: 306 MOSM/KG (ref 275–295)
PH UR: 6 [PH] (ref 5–8)
PHOSPHATE SERPL-MCNC: 2.8 MG/DL (ref 2.5–4.9)
PLATELET # BLD AUTO: 354 10(3)UL (ref 150–450)
POTASSIUM SERPL-SCNC: 3.4 MMOL/L (ref 3.5–5.1)
PROT UR-MCNC: 29.2 MG/DL
PROT UR-MCNC: NEGATIVE MG/DL
RBC # BLD AUTO: 4.67 X10(6)UL (ref 3.8–5.3)
RHEUMATOID FACT SERPL-ACNC: <10 IU/ML (ref ?–15)
SODIUM SERPL-SCNC: 144 MMOL/L (ref 136–145)
SP GR UR STRIP: 1.02 (ref 1–1.03)
UROBILINOGEN UR STRIP-ACNC: <2
VIT C UR-MCNC: NEGATIVE MG/DL
WBC # BLD AUTO: 9.2 X10(3) UL (ref 4–11)

## 2019-07-24 PROCEDURE — 85652 RBC SED RATE AUTOMATED: CPT

## 2019-07-24 PROCEDURE — 84166 PROTEIN E-PHORESIS/URINE/CSF: CPT

## 2019-07-24 PROCEDURE — 36415 COLL VENOUS BLD VENIPUNCTURE: CPT

## 2019-07-24 PROCEDURE — 86160 COMPLEMENT ANTIGEN: CPT

## 2019-07-24 PROCEDURE — 82043 UR ALBUMIN QUANTITATIVE: CPT

## 2019-07-24 PROCEDURE — 81003 URINALYSIS AUTO W/O SCOPE: CPT

## 2019-07-24 PROCEDURE — 84165 PROTEIN E-PHORESIS SERUM: CPT

## 2019-07-24 PROCEDURE — 85025 COMPLETE CBC W/AUTO DIFF WBC: CPT

## 2019-07-24 PROCEDURE — 86140 C-REACTIVE PROTEIN: CPT

## 2019-07-24 PROCEDURE — 86038 ANTINUCLEAR ANTIBODIES: CPT

## 2019-07-24 PROCEDURE — 82570 ASSAY OF URINE CREATININE: CPT

## 2019-07-24 PROCEDURE — G0463 HOSPITAL OUTPT CLINIC VISIT: HCPCS | Performed by: INTERNAL MEDICINE

## 2019-07-24 PROCEDURE — 86335 IMMUNFIX E-PHORSIS/URINE/CSF: CPT

## 2019-07-24 PROCEDURE — 86431 RHEUMATOID FACTOR QUANT: CPT

## 2019-07-24 PROCEDURE — 86039 ANTINUCLEAR ANTIBODIES (ANA): CPT

## 2019-07-24 PROCEDURE — 80069 RENAL FUNCTION PANEL: CPT

## 2019-07-24 PROCEDURE — 99205 OFFICE O/P NEW HI 60 MIN: CPT | Performed by: INTERNAL MEDICINE

## 2019-07-24 RX ORDER — HYDRALAZINE HYDROCHLORIDE 25 MG/1
25 TABLET, FILM COATED ORAL 2 TIMES DAILY
Refills: 0 | COMMUNITY
Start: 2019-07-24

## 2019-07-24 NOTE — PROGRESS NOTES
07/24/19        Patient: Casey Isaac   YOB: 1945   Date of Visit: 7/24/2019       Dear  Dr. Isha Black,      Thank you for referring Casey Isaac to my practice. Please find my assessment and plan below.       As you know she is a 74-y of systems is otherwise unremarkable. On physical exam her blood pressure was 124/65 with a pulse of 66 and she weighed 157 pounds. Her neck was supple without JVD. Lungs were clear to auscultation percussion.   Heart revealed a regular rate and rhythm

## 2019-07-25 LAB — NUCLEAR IGG TITR SER IF: POSITIVE {TITER}

## 2019-07-26 LAB
ALBUMIN SERPL ELPH-MCNC: 4.31 G/DL (ref 3.75–5.21)
ALBUMIN/GLOB SERPL: 1.54 {RATIO} (ref 1–2)
ALPHA1 GLOB SERPL ELPH-MCNC: 0.4 G/DL (ref 0.19–0.46)
ALPHA2 GLOB SERPL ELPH-MCNC: 0.85 G/DL (ref 0.48–1.05)
ANA NUCLEOLAR TITR SER IF: 160 {TITER}
B-GLOBULIN SERPL ELPH-MCNC: 0.81 G/DL (ref 0.68–1.23)
GAMMA GLOB SERPL ELPH-MCNC: 0.73 G/DL (ref 0.62–1.7)
TOTAL PROTEIN (SPECIAL TESTING): 7.1 G/DL (ref 6.5–9.1)

## 2019-07-27 ENCOUNTER — TELEPHONE (OUTPATIENT)
Dept: NEPHROLOGY | Facility: CLINIC | Age: 74
End: 2019-07-27

## 2019-07-29 NOTE — TELEPHONE ENCOUNTER
US kidneys is scheduled for 7/31/19 at [facility]. LMTCB to schedule a follow up visit with Dr. Mary Alcaraz to discuss results.

## 2019-07-31 ENCOUNTER — TELEPHONE (OUTPATIENT)
Dept: NEPHROLOGY | Facility: CLINIC | Age: 74
End: 2019-07-31

## 2019-07-31 ENCOUNTER — HOSPITAL ENCOUNTER (OUTPATIENT)
Dept: ULTRASOUND IMAGING | Age: 74
Discharge: HOME | End: 2019-07-31
Attending: INTERNAL MEDICINE
Payer: MEDICARE

## 2019-07-31 DIAGNOSIS — N18.30 CKD (CHRONIC KIDNEY DISEASE) STAGE 3, GFR 30-59 ML/MIN (HCC): ICD-10-CM

## 2019-07-31 PROCEDURE — 76770 US EXAM ABDO BACK WALL COMP: CPT | Performed by: INTERNAL MEDICINE

## 2019-08-02 ENCOUNTER — OFFICE VISIT (OUTPATIENT)
Dept: NEPHROLOGY | Facility: CLINIC | Age: 74
End: 2019-08-02
Payer: MEDICARE

## 2019-08-02 VITALS
WEIGHT: 158.63 LBS | HEIGHT: 60 IN | BODY MASS INDEX: 31.14 KG/M2 | SYSTOLIC BLOOD PRESSURE: 112 MMHG | DIASTOLIC BLOOD PRESSURE: 70 MMHG | HEART RATE: 58 BPM

## 2019-08-02 DIAGNOSIS — N18.30 CKD (CHRONIC KIDNEY DISEASE) STAGE 3, GFR 30-59 ML/MIN (HCC): Primary | ICD-10-CM

## 2019-08-02 PROCEDURE — 99213 OFFICE O/P EST LOW 20 MIN: CPT | Performed by: INTERNAL MEDICINE

## 2019-08-02 PROCEDURE — G0463 HOSPITAL OUTPT CLINIC VISIT: HCPCS | Performed by: INTERNAL MEDICINE

## 2019-08-02 RX ORDER — TROSPIUM CHLORIDE 20 MG/1
TABLET, FILM COATED ORAL
Status: ON HOLD | COMMUNITY
End: 2021-07-05

## 2019-08-03 NOTE — PATIENT INSTRUCTIONS
Continue to monitor your blood pressure regularly. Repeat your kidney blood test in 3 months. Remember if you should have another episode where you pass out I strongly recommend that you go to the emergency room.

## 2019-08-03 NOTE — PROGRESS NOTES
08/02/19        Patient: Katelynn Antonio   YOB: 1945   Date of Visit: 8/2/2019       Dear  Dr. Lisa Cook,      Thank you for referring Karlrenetta Marisela to my practice. Please find my assessment and plan below.       As you know she is a 74-ye indicated. Thank you again for allowing me to participate in the care of your patient. If you have any questions please feel free to call.            Sincerely,   Toni Magaña MD   [facility], [facility]  [address], [facility]

## 2019-10-03 ENCOUNTER — APPOINTMENT (OUTPATIENT)
Dept: GENERAL RADIOLOGY | Facility: HOSPITAL | Age: 74
End: 2019-10-03
Attending: EMERGENCY MEDICINE
Payer: MEDICARE

## 2019-10-03 ENCOUNTER — HOSPITAL ENCOUNTER (EMERGENCY)
Facility: HOSPITAL | Age: 74
Discharge: HOME | End: 2019-10-03
Attending: EMERGENCY MEDICINE
Payer: MEDICARE

## 2019-10-03 VITALS
OXYGEN SATURATION: 95 % | DIASTOLIC BLOOD PRESSURE: 85 MMHG | TEMPERATURE: 99 F | WEIGHT: 158.5 LBS | BODY MASS INDEX: 31 KG/M2 | SYSTOLIC BLOOD PRESSURE: 135 MMHG | RESPIRATION RATE: 18 BRPM | HEART RATE: 72 BPM

## 2019-10-03 DIAGNOSIS — S63.501A SPRAIN OF RIGHT WRIST, INITIAL ENCOUNTER: Primary | ICD-10-CM

## 2019-10-03 PROCEDURE — 99283 EMERGENCY DEPT VISIT LOW MDM: CPT

## 2019-10-03 PROCEDURE — 73110 X-RAY EXAM OF WRIST: CPT | Performed by: EMERGENCY MEDICINE

## 2019-10-03 RX ORDER — HYDROCODONE BITARTRATE AND ACETAMINOPHEN 5; 325 MG/1; MG/1
1-2 TABLET ORAL EVERY 6 HOURS PRN
Qty: 10 TABLET | Refills: 0 | Status: SHIPPED | OUTPATIENT
Start: 2019-10-03 | End: 2019-10-10

## 2019-10-03 NOTE — ED INITIAL ASSESSMENT (HPI)
The patient states that she lost her balance approximately 5 days ago after tripping on an electrical cord at home and she braced her fall with her right arm onto a wall as she slid to the floor. The patient is Plavix and Eliquis.  The patient complains of

## 2019-10-03 NOTE — ED NOTES
The patient states that she has had thoughts of wishing she was dead or wishing she could go to sleep and not wake up \"off and on for a long time\" because she had not felt physically well for \"a long time\".  The patient denies any current plan to harm h

## 2019-10-03 NOTE — ED NOTES
Informed by Yina Jain RN that Ms [facility] no longer needs to be assessed and that the request is for resources only. Confirmed with Dr Tamika Hess that assessment is being cancelled. Provided outpatient resources for sofy psychiatrists.

## 2019-10-03 NOTE — ED NOTES
Patient with c/o right wrist pain, swelling, redness s/p fall last week. + radial pulse, +swelling noted. + CMS. Patient tried to take a tramadol last night with little relief. Tylenol/iburprofen offered, but refused.  Patient also admits to feeling down an

## 2019-10-04 NOTE — ED PROVIDER NOTES
Patient Seen in: [facility] AND [facility] Emergency Department    History   Patient presents with:  Upper Extremity Injury (musculoskeletal)  Fall (musculoskeletal, neurologic)  Eval-P (psychiatric)      HPI    Patient presents to the ED after tripping 5 days a Aunt         x2 in 66's       Smoking Status: Social History    Socioeconomic History      Marital status:        Spouse name: Not on file      Number of children: Not on file      Years of education: Not on file      Highest education level: Not on Available and Reviewed while in ED: Xr Wrist Complete (min 3 Views), Right (cpt=73110)    Result Date: 10/3/2019  CONCLUSION:  1. There is no acute appearing fracture or dislocation. Degenerative narrowing as discussed above.     Dictated by (CST): Lc

## 2019-11-21 ENCOUNTER — HOSPITAL ENCOUNTER (OUTPATIENT)
Age: 74
Discharge: OTHER INSTITUTION | End: 2019-11-21
Attending: EMERGENCY MEDICINE
Payer: MEDICARE

## 2019-11-21 ENCOUNTER — APPOINTMENT (OUTPATIENT)
Dept: CT IMAGING | Facility: HOSPITAL | Age: 74
End: 2019-11-21
Attending: EMERGENCY MEDICINE
Payer: MEDICARE

## 2019-11-21 ENCOUNTER — HOSPITAL ENCOUNTER (EMERGENCY)
Facility: HOSPITAL | Age: 74
Discharge: HOME | End: 2019-11-21
Attending: EMERGENCY MEDICINE
Payer: MEDICARE

## 2019-11-21 VITALS
RESPIRATION RATE: 18 BRPM | HEART RATE: 77 BPM | DIASTOLIC BLOOD PRESSURE: 86 MMHG | OXYGEN SATURATION: 97 % | TEMPERATURE: 98 F | SYSTOLIC BLOOD PRESSURE: 132 MMHG

## 2019-11-21 VITALS
HEIGHT: 61 IN | BODY MASS INDEX: 30 KG/M2 | HEART RATE: 78 BPM | SYSTOLIC BLOOD PRESSURE: 155 MMHG | OXYGEN SATURATION: 97 % | TEMPERATURE: 98 F | RESPIRATION RATE: 18 BRPM | DIASTOLIC BLOOD PRESSURE: 71 MMHG

## 2019-11-21 DIAGNOSIS — E87.6 HYPOKALEMIA: ICD-10-CM

## 2019-11-21 DIAGNOSIS — L03.213 PERIORBITAL CELLULITIS OF RIGHT EYE: Primary | ICD-10-CM

## 2019-11-21 DIAGNOSIS — B02.9 HERPES ZOSTER WITHOUT COMPLICATION: Primary | ICD-10-CM

## 2019-11-21 PROCEDURE — 99213 OFFICE O/P EST LOW 20 MIN: CPT

## 2019-11-21 PROCEDURE — 99284 EMERGENCY DEPT VISIT MOD MDM: CPT

## 2019-11-21 PROCEDURE — 70450 CT HEAD/BRAIN W/O DYE: CPT | Performed by: EMERGENCY MEDICINE

## 2019-11-21 PROCEDURE — 36415 COLL VENOUS BLD VENIPUNCTURE: CPT

## 2019-11-21 PROCEDURE — 80048 BASIC METABOLIC PNL TOTAL CA: CPT | Performed by: EMERGENCY MEDICINE

## 2019-11-21 PROCEDURE — 85025 COMPLETE CBC W/AUTO DIFF WBC: CPT | Performed by: EMERGENCY MEDICINE

## 2019-11-21 PROCEDURE — 99212 OFFICE O/P EST SF 10 MIN: CPT

## 2019-11-21 RX ORDER — TETRACAINE HYDROCHLORIDE 5 MG/ML
1 SOLUTION OPHTHALMIC ONCE
Status: COMPLETED | OUTPATIENT
Start: 2019-11-21 | End: 2019-11-21

## 2019-11-21 RX ORDER — POTASSIUM CHLORIDE 20 MEQ/1
20 TABLET, EXTENDED RELEASE ORAL 2 TIMES DAILY
Qty: 8 TABLET | Refills: 0 | Status: SHIPPED | OUTPATIENT
Start: 2019-11-21 | End: 2019-11-25

## 2019-11-21 RX ORDER — POTASSIUM CHLORIDE 20 MEQ/1
40 TABLET, EXTENDED RELEASE ORAL ONCE
Status: COMPLETED | OUTPATIENT
Start: 2019-11-21 | End: 2019-11-21

## 2019-11-21 RX ORDER — PREDNISONE 10 MG/1
TABLET ORAL
Qty: 74 TABLET | Refills: 0 | Status: SHIPPED | OUTPATIENT
Start: 2019-11-21 | End: 2019-12-12

## 2019-11-21 RX ORDER — GABAPENTIN 100 MG/1
100 CAPSULE ORAL 3 TIMES DAILY
Qty: 30 CAPSULE | Refills: 0 | Status: SHIPPED | OUTPATIENT
Start: 2019-11-21 | End: 2019-12-01

## 2019-11-21 NOTE — ED PROVIDER NOTES
Patient Seen in: [facility]    History   Patient presents with:  Headache    Stated Complaint: right side headache and swollen, pain in eye     HPI    Patient is here with complaint of pain and redness and swelling to th status: Current Every Day Smoker        Packs/day: 0.50        Years: 35.00        Pack years: 17.5      Smokeless tobacco: Former User      Tobacco comment: 1-3 cigs per day    Alcohol use: No    Drug use: No      Family history is reviewed and is as note except as noted above.           Physical Exam     ED Triage Vitals [11/21/19 1730]   /86   Pulse 77   Resp 18   Temp 98.2 °F (36.8 °C)   Temp src Oral   SpO2 97 %   O2 Device None (Room air)       Current:/86   Pulse 77   Temp 98.2 °F (36.8 °C) Prescribed:  Current Discharge Medication List

## 2019-11-21 NOTE — ED INITIAL ASSESSMENT (HPI)
Pt to IC with headache and nausea starting today. Pt states she has decreased vision in right eye.  Dr Damian Anderson at [facility]

## 2019-11-22 NOTE — ED INITIAL ASSESSMENT (HPI)
Pt sent her from [facility] for further evaluation. Pt feels nausea and vomiting this am. Pt does reports right eye swelling, pain , ear pain all to right side of head.  Pt was on eliquis and has not taken it since last week

## 2019-11-22 NOTE — ED NOTES
Pt to ER from [address] [place] [address] with c/o right forehead and right periorbital pain/redness since 11-8-19. Pt states this started after her cardiac stress test and MD \"squeezed\" a cyst to right forehead. Dry raised lesions noted to right forehead and around right eye.  P

## 2019-11-22 NOTE — ED PROVIDER NOTES
Patient Seen in: [facility] AND [facility] Emergency Department      History   Patient presents with:  Cellulitis (integumentary, infectious)    Stated Complaint: periorbital cellulitis of right eye    HPI    74yoF with hx of HTN, HL, COPD, sent here from [facility] LOCALIZATION WIRE 1 SITE RIGHT (CPT=19281)      benign-20 years ago   • POSTERIOR LUMBAR LAMINECT SPINAL FUS W/INSTR 1 LEV N/A 7/21/2017    Performed by Miguel Norwood MD at [address]   • [address] [place] [address] atraumatic. Comments: Old vesicular lesions crusted over  in right V1 distribution, no proptosis, no entrapment, TMs normal b/l  Eyes:      Pupils: Pupils are equal, round, and reactive to light. Neck:      Musculoskeletal: Normal range of motion. Ref Range    WBC 6.7 4.0 - 11.0 x10(3) uL    RBC 4.34 3.80 - 5.30 x10(6)uL    HGB 12.0 12.0 - 16.0 g/dL    HCT 36.6 35.0 - 48.0 %    MCV 84.3 80.0 - 100.0 fL    MCH 27.6 26.0 - 34.0 pg    MCHC 32.8 31.0 - 37.0 g/dL    RDW-SD 44.3 35.1 - 46.3 fL    RDW 14. 7 reviewed the labs and imaging and found no leukocytosis, no anemia, hypokalemia  - fluorescein stain without dendritic ulcer  - CT without acute finding  - supportive care discussed with close followup    The patient was informed of their elevated blood pr screening including reassessment of your blood pressure.     Medications Prescribed:  Current Discharge Medication List    START taking these medications    gabapentin 100 MG Oral Cap  Take 1 capsule (100 mg total) by mouth 3 (three) times daily for 10 days

## 2020-01-20 ENCOUNTER — HOSPITAL ENCOUNTER (OUTPATIENT)
Dept: CT IMAGING | Facility: HOSPITAL | Age: 75
Discharge: HOME | End: 2020-01-20
Attending: INTERNAL MEDICINE
Payer: MEDICARE

## 2020-01-20 ENCOUNTER — LAB ENCOUNTER (OUTPATIENT)
Dept: LAB | Facility: HOSPITAL | Age: 75
End: 2020-01-20
Attending: INTERNAL MEDICINE
Payer: MEDICARE

## 2020-01-20 DIAGNOSIS — Z82.49 FH: THORACIC ANEURYSM: ICD-10-CM

## 2020-01-20 DIAGNOSIS — I10 ESSENTIAL HYPERTENSION, MALIGNANT: Primary | ICD-10-CM

## 2020-01-20 LAB
ANION GAP SERPL CALC-SCNC: 10 MMOL/L (ref 0–18)
BUN BLD-MCNC: 42 MG/DL (ref 7–18)
BUN/CREAT SERPL: 23.5 (ref 10–20)
CALCIUM BLD-MCNC: 10.3 MG/DL (ref 8.5–10.1)
CHLORIDE SERPL-SCNC: 105 MMOL/L (ref 98–112)
CO2 SERPL-SCNC: 26 MMOL/L (ref 21–32)
CREAT BLD-MCNC: 1.6 MG/DL (ref 0.55–1.02)
CREAT BLD-MCNC: 1.79 MG/DL (ref 0.55–1.02)
GLUCOSE BLD-MCNC: 242 MG/DL (ref 70–99)
OSMOLALITY SERPL CALC.SUM OF ELEC: 310 MOSM/KG (ref 275–295)
PATIENT FASTING Y/N/NP: YES
POTASSIUM SERPL-SCNC: 3.6 MMOL/L (ref 3.5–5.1)
SODIUM SERPL-SCNC: 141 MMOL/L (ref 136–145)

## 2020-01-20 PROCEDURE — 36415 COLL VENOUS BLD VENIPUNCTURE: CPT

## 2020-01-20 PROCEDURE — 82565 ASSAY OF CREATININE: CPT

## 2020-01-20 PROCEDURE — 71275 CT ANGIOGRAPHY CHEST: CPT | Performed by: INTERNAL MEDICINE

## 2020-01-20 PROCEDURE — 80048 BASIC METABOLIC PNL TOTAL CA: CPT

## 2020-12-19 ENCOUNTER — LAB ENCOUNTER (OUTPATIENT)
Dept: LAB | Age: 75
End: 2020-12-19
Attending: INTERNAL MEDICINE
Payer: MEDICARE

## 2020-12-19 DIAGNOSIS — I10 ESSENTIAL HYPERTENSION, MALIGNANT: ICD-10-CM

## 2020-12-19 DIAGNOSIS — E78.5 HYPERLIPEMIA: Primary | ICD-10-CM

## 2020-12-19 PROCEDURE — 80061 LIPID PANEL: CPT

## 2020-12-19 PROCEDURE — 80053 COMPREHEN METABOLIC PANEL: CPT

## 2020-12-19 PROCEDURE — 36415 COLL VENOUS BLD VENIPUNCTURE: CPT

## 2021-01-07 ENCOUNTER — HOSPITAL ENCOUNTER (OUTPATIENT)
Dept: CV DIAGNOSTICS | Facility: HOSPITAL | Age: 76
Discharge: HOME | End: 2021-01-07
Attending: INTERNAL MEDICINE
Payer: MEDICARE

## 2021-01-07 ENCOUNTER — HOSPITAL ENCOUNTER (OUTPATIENT)
Dept: NUCLEAR MEDICINE | Facility: HOSPITAL | Age: 76
Discharge: HOME | End: 2021-01-07
Attending: INTERNAL MEDICINE
Payer: MEDICARE

## 2021-01-07 DIAGNOSIS — R07.9 CHEST PAIN, UNSPECIFIED: ICD-10-CM

## 2021-01-07 PROCEDURE — 78452 HT MUSCLE IMAGE SPECT MULT: CPT | Performed by: INTERNAL MEDICINE

## 2021-01-07 PROCEDURE — 93018 CV STRESS TEST I&R ONLY: CPT | Performed by: INTERNAL MEDICINE

## 2021-01-07 PROCEDURE — 93017 CV STRESS TEST TRACING ONLY: CPT | Performed by: INTERNAL MEDICINE

## 2021-01-07 PROCEDURE — 93016 CV STRESS TEST SUPVJ ONLY: CPT | Performed by: INTERNAL MEDICINE

## 2021-01-12 ENCOUNTER — HOSPITAL ENCOUNTER (OUTPATIENT)
Dept: CT IMAGING | Facility: HOSPITAL | Age: 76
Discharge: HOME | End: 2021-01-12
Attending: INTERNAL MEDICINE
Payer: MEDICARE

## 2021-01-12 DIAGNOSIS — I71.2 ASCENDING AORTIC ANEURYSM (HCC): ICD-10-CM

## 2021-01-12 LAB — CREAT BLD-MCNC: 1.5 MG/DL (ref 0.55–1.02)

## 2021-01-12 PROCEDURE — 82565 ASSAY OF CREATININE: CPT

## 2021-01-12 PROCEDURE — 71260 CT THORAX DX C+: CPT | Performed by: INTERNAL MEDICINE

## 2021-02-01 DIAGNOSIS — Z23 NEED FOR VACCINATION: ICD-10-CM

## 2021-02-10 ENCOUNTER — IMMUNIZATION (OUTPATIENT)
Dept: LAB | Age: 76
End: 2021-02-10
Attending: HOSPITALIST
Payer: MEDICARE

## 2021-02-10 DIAGNOSIS — Z23 NEED FOR VACCINATION: Primary | ICD-10-CM

## 2021-02-10 PROCEDURE — 0001A SARSCOV2 VAC 30MCG/0.3ML IM: CPT

## 2021-03-03 ENCOUNTER — IMMUNIZATION (OUTPATIENT)
Dept: LAB | Age: 76
End: 2021-03-03
Attending: HOSPITALIST
Payer: MEDICARE

## 2021-03-03 DIAGNOSIS — Z23 NEED FOR VACCINATION: Primary | ICD-10-CM

## 2021-03-03 PROCEDURE — 0002A SARSCOV2 VAC 30MCG/0.3ML IM: CPT

## 2021-04-30 ENCOUNTER — LAB ENCOUNTER (OUTPATIENT)
Dept: LAB | Age: 76
End: 2021-04-30
Attending: INTERNAL MEDICINE
Payer: MEDICARE

## 2021-04-30 DIAGNOSIS — I10 ESSENTIAL HYPERTENSION, MALIGNANT: Primary | ICD-10-CM

## 2021-04-30 PROCEDURE — 36415 COLL VENOUS BLD VENIPUNCTURE: CPT

## 2021-04-30 PROCEDURE — 80048 BASIC METABOLIC PNL TOTAL CA: CPT

## 2021-06-01 ENCOUNTER — HOSPITAL ENCOUNTER (OUTPATIENT)
Dept: MRI IMAGING | Facility: HOSPITAL | Age: 76
Discharge: HOME | End: 2021-06-01
Attending: NURSE PRACTITIONER
Payer: MEDICARE

## 2021-06-01 DIAGNOSIS — M54.12 CERVICAL RADICULITIS: ICD-10-CM

## 2021-06-01 PROCEDURE — 72141 MRI NECK SPINE W/O DYE: CPT | Performed by: NURSE PRACTITIONER

## 2021-07-01 ENCOUNTER — HOSPITAL ENCOUNTER (OUTPATIENT)
Dept: MRI IMAGING | Age: 76
Discharge: HOME | End: 2021-07-01
Attending: PAIN MEDICINE
Payer: MEDICARE

## 2021-07-01 DIAGNOSIS — M54.16 LUMBAR RADICULOPATHY: ICD-10-CM

## 2021-07-01 PROCEDURE — 72148 MRI LUMBAR SPINE W/O DYE: CPT | Performed by: PAIN MEDICINE

## 2021-07-03 ENCOUNTER — APPOINTMENT (OUTPATIENT)
Dept: GENERAL RADIOLOGY | Facility: HOSPITAL | Age: 76
End: 2021-07-03
Attending: EMERGENCY MEDICINE
Payer: MEDICARE

## 2021-07-03 ENCOUNTER — HOSPITAL ENCOUNTER (OUTPATIENT)
Facility: HOSPITAL | Age: 76
Setting detail: OBSERVATION
Discharge: HOME | End: 2021-07-05
Attending: EMERGENCY MEDICINE | Admitting: HOSPITALIST
Payer: MEDICARE

## 2021-07-03 DIAGNOSIS — E87.6 HYPOKALEMIA: ICD-10-CM

## 2021-07-03 DIAGNOSIS — L03.116 LEFT LEG CELLULITIS: Primary | ICD-10-CM

## 2021-07-03 DIAGNOSIS — N18.9 CHRONIC KIDNEY DISEASE, UNSPECIFIED CKD STAGE: ICD-10-CM

## 2021-07-03 LAB
ANION GAP SERPL CALC-SCNC: 9 MMOL/L (ref 0–18)
BASOPHILS # BLD AUTO: 0.05 X10(3) UL (ref 0–0.2)
BASOPHILS NFR BLD AUTO: 0.3 %
BUN BLD-MCNC: 26 MG/DL (ref 7–18)
BUN/CREAT SERPL: 18.7 (ref 10–20)
CALCIUM BLD-MCNC: 9.4 MG/DL (ref 8.5–10.1)
CHLORIDE SERPL-SCNC: 100 MMOL/L (ref 98–112)
CO2 SERPL-SCNC: 31 MMOL/L (ref 21–32)
CREAT BLD-MCNC: 1.39 MG/DL
DEPRECATED RDW RBC AUTO: 42.4 FL (ref 35.1–46.3)
EOSINOPHIL # BLD AUTO: 0.13 X10(3) UL (ref 0–0.7)
EOSINOPHIL NFR BLD AUTO: 0.8 %
ERYTHROCYTE [DISTWIDTH] IN BLOOD BY AUTOMATED COUNT: 13.8 % (ref 11–15)
GLUCOSE BLD-MCNC: 154 MG/DL (ref 70–99)
GLUCOSE BLDC GLUCOMTR-MCNC: 187 MG/DL (ref 70–99)
HCT VFR BLD AUTO: 37.9 %
HGB BLD-MCNC: 12.3 G/DL
IMM GRANULOCYTES # BLD AUTO: 0.08 X10(3) UL (ref 0–1)
IMM GRANULOCYTES NFR BLD: 0.5 %
LACTATE SERPL-SCNC: 1.5 MMOL/L (ref 0.4–2)
LYMPHOCYTES # BLD AUTO: 1.28 X10(3) UL (ref 1–4)
LYMPHOCYTES NFR BLD AUTO: 8.3 %
MCH RBC QN AUTO: 27.2 PG (ref 26–34)
MCHC RBC AUTO-ENTMCNC: 32.5 G/DL (ref 31–37)
MCV RBC AUTO: 83.7 FL
MONOCYTES # BLD AUTO: 1.1 X10(3) UL (ref 0.1–1)
MONOCYTES NFR BLD AUTO: 7.2 %
NEUTROPHILS # BLD AUTO: 12.72 X10 (3) UL (ref 1.5–7.7)
NEUTROPHILS # BLD AUTO: 12.72 X10(3) UL (ref 1.5–7.7)
NEUTROPHILS NFR BLD AUTO: 82.9 %
NT-PROBNP SERPL-MCNC: 622 PG/ML (ref ?–450)
OSMOLALITY SERPL CALC.SUM OF ELEC: 298 MOSM/KG (ref 275–295)
PLATELET # BLD AUTO: 337 10(3)UL (ref 150–450)
POTASSIUM SERPL-SCNC: 3 MMOL/L (ref 3.5–5.1)
PROCALCITONIN SERPL-MCNC: 0.1 NG/ML (ref ?–0.16)
RBC # BLD AUTO: 4.53 X10(6)UL
SODIUM SERPL-SCNC: 140 MMOL/L (ref 136–145)
URATE SERPL-MCNC: 9.2 MG/DL
WBC # BLD AUTO: 15.4 X10(3) UL (ref 4–11)

## 2021-07-03 PROCEDURE — 73630 X-RAY EXAM OF FOOT: CPT | Performed by: EMERGENCY MEDICINE

## 2021-07-03 PROCEDURE — 99220 INITIAL OBSERVATION CARE,LEVL III: CPT | Performed by: HOSPITALIST

## 2021-07-03 PROCEDURE — 73502 X-RAY EXAM HIP UNI 2-3 VIEWS: CPT | Performed by: EMERGENCY MEDICINE

## 2021-07-03 PROCEDURE — 73610 X-RAY EXAM OF ANKLE: CPT | Performed by: EMERGENCY MEDICINE

## 2021-07-03 RX ORDER — AMLODIPINE BESYLATE 5 MG/1
5 TABLET ORAL 2 TIMES DAILY
COMMUNITY

## 2021-07-03 RX ORDER — TIZANIDINE 4 MG/1
4 TABLET ORAL NIGHTLY PRN
COMMUNITY

## 2021-07-03 RX ORDER — POTASSIUM CHLORIDE 20 MEQ/1
40 TABLET, EXTENDED RELEASE ORAL ONCE
Status: COMPLETED | OUTPATIENT
Start: 2021-07-03 | End: 2021-07-03

## 2021-07-04 LAB
ANION GAP SERPL CALC-SCNC: 7 MMOL/L (ref 0–18)
BASOPHILS # BLD AUTO: 0.06 X10(3) UL (ref 0–0.2)
BASOPHILS NFR BLD AUTO: 0.6 %
BUN BLD-MCNC: 27 MG/DL (ref 7–18)
BUN/CREAT SERPL: 20.9 (ref 10–20)
CALCIUM BLD-MCNC: 9.5 MG/DL (ref 8.5–10.1)
CHLORIDE SERPL-SCNC: 102 MMOL/L (ref 98–112)
CO2 SERPL-SCNC: 31 MMOL/L (ref 21–32)
CREAT BLD-MCNC: 1.29 MG/DL
DEPRECATED RDW RBC AUTO: 42.6 FL (ref 35.1–46.3)
EOSINOPHIL # BLD AUTO: 0.18 X10(3) UL (ref 0–0.7)
EOSINOPHIL NFR BLD AUTO: 1.8 %
ERYTHROCYTE [DISTWIDTH] IN BLOOD BY AUTOMATED COUNT: 13.9 % (ref 11–15)
EST. AVERAGE GLUCOSE BLD GHB EST-MCNC: 180 MG/DL (ref 68–126)
GLUCOSE BLD-MCNC: 150 MG/DL (ref 70–99)
GLUCOSE BLDC GLUCOMTR-MCNC: 151 MG/DL (ref 70–99)
GLUCOSE BLDC GLUCOMTR-MCNC: 286 MG/DL (ref 70–99)
GLUCOSE BLDC GLUCOMTR-MCNC: 299 MG/DL (ref 70–99)
GLUCOSE BLDC GLUCOMTR-MCNC: 389 MG/DL (ref 70–99)
GLUCOSE BLDC GLUCOMTR-MCNC: 430 MG/DL (ref 70–99)
HBA1C MFR BLD HPLC: 7.9 % (ref ?–5.7)
HCT VFR BLD AUTO: 34.4 %
HGB BLD-MCNC: 11.1 G/DL
IMM GRANULOCYTES # BLD AUTO: 0.07 X10(3) UL (ref 0–1)
IMM GRANULOCYTES NFR BLD: 0.7 %
LYMPHOCYTES # BLD AUTO: 1.38 X10(3) UL (ref 1–4)
LYMPHOCYTES NFR BLD AUTO: 13.7 %
MCH RBC QN AUTO: 27.1 PG (ref 26–34)
MCHC RBC AUTO-ENTMCNC: 32.3 G/DL (ref 31–37)
MCV RBC AUTO: 83.9 FL
MONOCYTES # BLD AUTO: 0.91 X10(3) UL (ref 0.1–1)
MONOCYTES NFR BLD AUTO: 9 %
NEUTROPHILS # BLD AUTO: 7.46 X10 (3) UL (ref 1.5–7.7)
NEUTROPHILS # BLD AUTO: 7.46 X10(3) UL (ref 1.5–7.7)
NEUTROPHILS NFR BLD AUTO: 74.2 %
OSMOLALITY SERPL CALC.SUM OF ELEC: 298 MOSM/KG (ref 275–295)
PLATELET # BLD AUTO: 300 10(3)UL (ref 150–450)
POTASSIUM SERPL-SCNC: 3.2 MMOL/L (ref 3.5–5.1)
RBC # BLD AUTO: 4.1 X10(6)UL
SODIUM SERPL-SCNC: 140 MMOL/L (ref 136–145)
WBC # BLD AUTO: 10.1 X10(3) UL (ref 4–11)

## 2021-07-04 PROCEDURE — 99226 SUBSEQUENT OBSERVATION CARE: CPT | Performed by: HOSPITALIST

## 2021-07-04 RX ORDER — PANTOPRAZOLE SODIUM 40 MG/1
40 TABLET, DELAYED RELEASE ORAL DAILY
Status: DISCONTINUED | OUTPATIENT
Start: 2021-07-04 | End: 2021-07-04

## 2021-07-04 RX ORDER — ONDANSETRON 2 MG/ML
4 INJECTION INTRAMUSCULAR; INTRAVENOUS EVERY 6 HOURS PRN
Status: DISCONTINUED | OUTPATIENT
Start: 2021-07-04 | End: 2021-07-05

## 2021-07-04 RX ORDER — NEBIVOLOL 5 MG/1
20 TABLET ORAL NIGHTLY
Status: DISCONTINUED | OUTPATIENT
Start: 2021-07-04 | End: 2021-07-05

## 2021-07-04 RX ORDER — ACETAMINOPHEN 325 MG/1
650 TABLET ORAL EVERY 6 HOURS PRN
Status: DISCONTINUED | OUTPATIENT
Start: 2021-07-04 | End: 2021-07-05

## 2021-07-04 RX ORDER — POTASSIUM CHLORIDE 1.5 G/1.77G
40 POWDER, FOR SOLUTION ORAL ONCE
Status: COMPLETED | OUTPATIENT
Start: 2021-07-04 | End: 2021-07-04

## 2021-07-04 RX ORDER — DEXTROSE MONOHYDRATE 25 G/50ML
50 INJECTION, SOLUTION INTRAVENOUS
Status: DISCONTINUED | OUTPATIENT
Start: 2021-07-04 | End: 2021-07-05

## 2021-07-04 RX ORDER — HEPARIN SODIUM 5000 [USP'U]/ML
5000 INJECTION, SOLUTION INTRAVENOUS; SUBCUTANEOUS EVERY 12 HOURS
Status: DISCONTINUED | OUTPATIENT
Start: 2021-07-04 | End: 2021-07-05

## 2021-07-04 RX ORDER — AMLODIPINE BESYLATE 5 MG/1
5 TABLET ORAL 2 TIMES DAILY
Status: DISCONTINUED | OUTPATIENT
Start: 2021-07-04 | End: 2021-07-05

## 2021-07-04 RX ORDER — INDOMETHACIN 75 MG/1
75 CAPSULE, EXTENDED RELEASE ORAL 2 TIMES DAILY WITH MEALS
Status: DISCONTINUED | OUTPATIENT
Start: 2021-07-04 | End: 2021-07-04

## 2021-07-04 RX ORDER — TRAMADOL HYDROCHLORIDE 50 MG/1
50 TABLET ORAL EVERY 12 HOURS PRN
Status: DISCONTINUED | OUTPATIENT
Start: 2021-07-04 | End: 2021-07-05

## 2021-07-04 RX ORDER — HYDRALAZINE HYDROCHLORIDE 25 MG/1
25 TABLET, FILM COATED ORAL 2 TIMES DAILY
Status: DISCONTINUED | OUTPATIENT
Start: 2021-07-04 | End: 2021-07-05

## 2021-07-04 RX ORDER — CLOPIDOGREL BISULFATE 75 MG/1
75 TABLET ORAL DAILY
Status: DISCONTINUED | OUTPATIENT
Start: 2021-07-04 | End: 2021-07-05

## 2021-07-04 RX ORDER — EZETIMIBE 10 MG/1
10 TABLET ORAL NIGHTLY
Status: DISCONTINUED | OUTPATIENT
Start: 2021-07-04 | End: 2021-07-05

## 2021-07-04 RX ORDER — ALBUTEROL SULFATE 90 UG/1
2 AEROSOL, METERED RESPIRATORY (INHALATION) EVERY 6 HOURS PRN
Status: DISCONTINUED | OUTPATIENT
Start: 2021-07-04 | End: 2021-07-05

## 2021-07-04 RX ORDER — TIZANIDINE 4 MG/1
4 TABLET ORAL NIGHTLY PRN
Status: DISCONTINUED | OUTPATIENT
Start: 2021-07-04 | End: 2021-07-05

## 2021-07-04 RX ORDER — METHYLPREDNISOLONE SODIUM SUCCINATE 40 MG/ML
40 INJECTION, POWDER, LYOPHILIZED, FOR SOLUTION INTRAMUSCULAR; INTRAVENOUS EVERY 8 HOURS
Status: DISCONTINUED | OUTPATIENT
Start: 2021-07-04 | End: 2021-07-05

## 2021-07-04 RX ORDER — ZOLPIDEM TARTRATE 5 MG/1
5 TABLET ORAL NIGHTLY PRN
Status: DISCONTINUED | OUTPATIENT
Start: 2021-07-04 | End: 2021-07-05

## 2021-07-04 RX ORDER — PANTOPRAZOLE SODIUM 40 MG/1
40 TABLET, DELAYED RELEASE ORAL
Status: DISCONTINUED | OUTPATIENT
Start: 2021-07-04 | End: 2021-07-05

## 2021-07-04 NOTE — SLP NOTE
ADULT SWALLOWING EVALUATION    ASSESSMENT    ASSESSMENT/OVERALL IMPRESSION:    This BSE was ordered d/t Pt presenting with overall cough. Pt reported no current swallowing difficulty. No PMH of dysphagia at [facility].  Pt on solid/thin liquids at home, with fam Diet Recommendations - Solids: Regular  Diet Recommendations - Liquids: Thin Liquids    Compensatory Strategies Recommended: No straws  Aspiration Precautions: Upright position; Slow rate;Small bites and sips; No straw  Medication Administration Recommenda impaired     (Please note: Silent aspiration cannot be evaluated clinically.  Videofluoroscopic Swallow Study is required to rule-out silent aspiration.)    GOALS  Goal #1 The patient will tolerate solid consistency and thin liquids without overt signs or s

## 2021-07-04 NOTE — PLAN OF CARE
A/Ox4. Fall risk precautions appropriate for pt: bed in lowest position, call light within reach, non-skid socks. Alarm parameters appropriate for pt: bed/chair alarm on. Potasium replaced per protocol. Speech therapist today. Tolerating diet.    Pt on SAFETY ADULT - FALL  Goal: Free from fall injury  Description: INTERVENTIONS:  - Assess pt frequently for physical needs  - Identify cognitive and physical deficits and behaviors that affect risk of falls.   - [place] fall precautions as indicated by asse Assess and document dressing/incision, wound bed, drain sites and surrounding tissue  - Implement wound care per orders  - Initiate isolation precautions as appropriate  - Initiate Pressure Ulcer prevention bundle as indicated  Outcome: Progressing  Goal:

## 2021-07-04 NOTE — ED PROVIDER NOTES
Patient Seen in: [facility] AND [facility] Emergency Department      History   Patient presents with:  Pain    Stated Complaint: Pain    HPI/Subjective:   HPI    67year old female with multiple medical issues including thoracic aneurysm, copd, cad with stent, d Smoking status: Current Every Day Smoker        Packs/day: 0.50        Years: 35.00        Pack years: 17.5      Smokeless tobacco: Former User      Tobacco comment: 5-6 cigs per day    Vaping Use      Vaping Use: Never used    Alcohol use: No    Drug u ankle: No swelling. Tenderness present over the lateral malleolus and medial malleolus. Decreased range of motion. Left foot: Tenderness present. No swelling. Legs:    Skin:     General: Skin is warm and dry. Findings: No rash.    Neurologi DIFFERENTIAL[[id number]]          Abnormal            Final result                 Please view results for these tests on the individual orders.    HEMOGLOBIN A1C         MDM      Pulse Ox: 93%, Abnormal, expected for COPD      Radiology findings: XR ANKLE ( Impression:  Left leg cellulitis  (primary encounter diagnosis)  Hypokalemia  Chronic kidney disease, unspecified CKD stage     Disposition:  Admit  7/3/2021  9:13 pm    Follow-up:  No follow-up provider specified.   We recommend that you schedule follow up

## 2021-07-04 NOTE — PROGRESS NOTES
[facility]    Progress Note    Kang Fofana Patient Status:  Observation    1945 MRN [id number]   Location [facility] 5SW/SE Attending Radha Moon MD   Hosp Day # 0 PCP Stephanie Estes       Subjective:   Tami Oliver MethylPREDNISolone Sodium Succ  40 mg Intravenous Q8H       Current PRN Inpatient Meds:      tiZANidine HCl, Albuterol Sulfate HFA, glucose **OR** Glucose-Vitamin C **OR** dextrose **OR** glucose **OR** Glucose-Vitamin C, acetaminophen, ondansetron HCl, zo at 8:47 PM          MRI SPINE LUMBAR (CPT=72148)    Result Date: 7/1/2021  CONCLUSION:  1. Multilevel degenerative changes of the lumbar spine, particularly at L3-L4, where there is a left paracentral paracentral/foraminal annular fissure.   There is result nephrotoxic medications, continue to monitor BMP as needed.     Prophylaxis  Subcutaneous heparin     CODE STATUS  Full         Greater than 35 minutes spent, >50% spent counseling re: treatment plan and workup    Terri Armenta MD  [facility]

## 2021-07-04 NOTE — ED QUICK NOTES
Orders for admission, patient is aware of plan and ready to go upstairs. Any questions, please call ED RN Barnard Brochure  at extension [phone].      Type of COVID test sent: n/a  COVID Suspicion level: fully vaccinated - now  Drug(s) infusing: ancef  LOC at time of

## 2021-07-04 NOTE — PROGRESS NOTES
[facility] Pharmacy Note:  Renal Adjustment for cefazolin (ANCEF)    Chey Tidwell is a 67year old patient who has been prescribed cefazolin (ANCEF) 1gm every 8 hrs. The estimated creatinine clearance is 25.6 mL/min (A) (based on SCr of 1.39 mg/dL (H)).  The

## 2021-07-04 NOTE — H&P
[id number] [address] Patient Status:  Observation    1945 MRN [id number]   Location [facility] 5SW/SE Attending Lety Louis MD   Hosp Day # 0 PCP Zackary Claros     Date:  2021  Date PROCEDURE UNLISTED       Family History   Problem Relation Age of Onset   • Cancer Father    • Diabetes Father    • Diabetes Mother    • Cancer Daughter    • Breast Cancer Maternal Aunt         x2 in 66's      reports that she has been smoking.  She has a 1 BLOOD GLUCOSE TWO TIMES A DAY AS DIRECTED   Pantoprazole Sodium 40 MG Oral Tab EC   Yes Yes   Sig: TK 1 T PO QD   Trospium Chloride 20 MG Oral Tab   Yes No   Sig: trospium 20 mg tablet   Take 1 tablet every day by oral route.    amLODIPine Besylate 5 MG Ora breath sounds are equal, symmetrical chest wall expansion. Cardiovascular:  Normal rate, regular rhythm, no murmur, no edema. Gastrointestinal:  Soft, non-tender, non-distended, normal bowel sounds, no organomegaly.   Lymphatics:  No lymphadenopathy neck, 7/03/2021 at 8:44 PM             Assessment and Plan:    Left foot cellulitis  Patient started on Ancef 1 g IV piggyback every 8 hours, will continue same. Area of erythema has been demarcated we will continue to monitor.     Possible gout  Elevated uric a

## 2021-07-04 NOTE — PLAN OF CARE
Problem: Patient Centered Care  Goal: Patient preferences are identified and integrated in the patient's plan of care  Description: Interventions:  - What would you like us to know as we care for you?    Problem: Diabetes/Glucose Control  Goal: Glucose ma skin breakdown  - Initiate interventions, skin care algorithm/standards of care as needed  Outcome: Progressing  Goal: Incision(s), wounds(s) or drain site(s) healing without S/S of infection  Description: INTERVENTIONS:  - Assess and document risk factors

## 2021-07-05 VITALS
OXYGEN SATURATION: 95 % | HEIGHT: 61 IN | TEMPERATURE: 98 F | DIASTOLIC BLOOD PRESSURE: 60 MMHG | HEART RATE: 66 BPM | RESPIRATION RATE: 18 BRPM | SYSTOLIC BLOOD PRESSURE: 123 MMHG | BODY MASS INDEX: 29.77 KG/M2 | WEIGHT: 157.69 LBS

## 2021-07-05 LAB
GLUCOSE BLDC GLUCOMTR-MCNC: 211 MG/DL (ref 70–99)
GLUCOSE BLDC GLUCOMTR-MCNC: 272 MG/DL (ref 70–99)
GLUCOSE BLDC GLUCOMTR-MCNC: 368 MG/DL (ref 70–99)

## 2021-07-05 PROCEDURE — 99217 OBSERVATION CARE DISCHARGE: CPT | Performed by: HOSPITALIST

## 2021-07-05 RX ORDER — TRAMADOL HYDROCHLORIDE 50 MG/1
50 TABLET ORAL EVERY 12 HOURS PRN
Qty: 25 TABLET | Refills: 0 | Status: SHIPPED | OUTPATIENT
Start: 2021-07-05

## 2021-07-05 RX ORDER — METHYLPREDNISOLONE SODIUM SUCCINATE 40 MG/ML
20 INJECTION, POWDER, LYOPHILIZED, FOR SOLUTION INTRAMUSCULAR; INTRAVENOUS EVERY 8 HOURS
Status: DISCONTINUED | OUTPATIENT
Start: 2021-07-05 | End: 2021-07-05

## 2021-07-05 RX ORDER — PEN NEEDLE, DIABETIC 32GX 5/32"
NEEDLE, DISPOSABLE MISCELLANEOUS
Qty: 60 EACH | Refills: 6 | Status: SHIPPED | OUTPATIENT
Start: 2021-07-05

## 2021-07-05 RX ORDER — PREDNISONE 10 MG/1
TABLET ORAL
Qty: 9 TABLET | Refills: 0 | Status: SHIPPED | OUTPATIENT
Start: 2021-07-05 | End: 2021-07-09

## 2021-07-05 NOTE — PROGRESS NOTES
Discharge RN Summary: Patient has discharge order in. Patient to discharge home with family. IV to be removed by JOVANNA Anton. Understands to follow up with PCP in 2 days. Patient understands to  mesd from pharmacy. aware of stopped meds per avs

## 2021-07-05 NOTE — OCCUPATIONAL THERAPY NOTE
OCCUPATIONAL THERAPY EVALUATION - INPATIENT     Room Number: 561/561-A  Evaluation Date: 7/5/2021  Type of Evaluation: Initial       Physician Order: IP Consult to Occupational Therapy  Reason for Therapy: ADL/IADL Dysfunction and Discharge Planning    [facility] techniques;ADL training;Functional transfer training; Endurance training;Patient/Family education;Patient/Family training; Compensatory technique education       OCCUPATIONAL THERAPY MEDICAL/SOCIAL HISTORY     Problem List  Principal Problem:    Left leg damir Status:  WFL - within functional limits    RANGE OF MOTION   Upper extremity ROM is within functional limits     STRENGTH ASSESSMENT  Upper extremity strength is within functional limits     ACTIVITIES OF DAILY LIVING ASSESSMENT  AM-PAC ‘6-Clicks’ Inpatien

## 2021-07-05 NOTE — DISCHARGE SUMMARY
[facility]    Discharge Summary    Toro Fontenot Patient Status:  Observation    1945 MRN [id number]   Location [facility] 5SW/SE Attending Abhijeet Ortiz MD   Hosp Day # 0 PCP Javan Rowe     Date of Admission: sliding scale coverage as needed, Last A1c value was 7.9% done 7/3/2021.   Sugars worse on steroids - gave Rx for sliding scale at home but patient may refuse      Hypokalemia  Initiate electrolyte replacement protocol.- 2/2 HCTZ     CKD stage III  Renal fu osseous injury of the lumbar spine is evident. 5. Mild scarring of the paraspinal fat is suggested. 6. Lesser incidental findings as above.     Dictated by (CST): Jeffrey Nur MD on 7/01/2021 at 2:39 PM     Finalized by (CST): Jeffrey Nur MD on 7/ injection as directed by your doctor   Quantity: 60 each  Refills: 6     Diclofenac Sodium 1 % Gel  Commonly known as: VOLTAREN      Apply 2 g topically 3 (three) times daily as needed (ankle pain).    Quantity: 1 each  Refills: 0     Insulin Aspart Pen 100 medications      Instructions Prescription details   Clopidogrel Bisulfate 75 MG Tabs  Commonly known as: PLAVIX      Take 1 tablet (75 mg total) by mouth daily. Quantity: 30 tablet  Refills: 0     CO Q 10 OR      Take by mouth.    Refills: 0     ezetimib medications  · BD Pen Needle Kayla U/F 32G X 4 MM Misc         Follow up Visits  [address] [place] [address] 27-21-80-10    Call in 2 days  With an update      Consultants  Chat With All Active Members    Provider

## 2021-07-05 NOTE — PLAN OF CARE
Problem: Patient Centered Care  Goal: Patient preferences are identified and integrated in the patient's plan of care  Description: Interventions:  - What would you like us to know as we care for you?   - Provide timely, complete, and accurate informatio precautions as indicated by assessment.  - Educate pt/family on patient safety including physical limitations  - Instruct pt to call for assistance with activity based on assessment  - Modify environment to reduce risk of injury  - Provide assistive device indicated  Outcome: Progressing  Goal: Oral mucous membranes remain intact  Description: INTERVENTIONS  - Assess oral mucosa and hygiene practices  - Implement preventative oral hygiene regimen  - Implement oral medicated treatments as ordered  Outcome: Pr

## 2021-07-05 NOTE — PLAN OF CARE
A/Ox4. Fall risk precautions appropriate for pt: bed in lowest position, call light within reach, non-skid socks. Alarm parameters appropriate for pt: bed/chair alarm on. PT/OT today. Pt cleared for DC. DC instructions given by DC RN. IV removed.   Pt u Goal: to go home     Interventions:  - POC update  - See additional Care Plan goals for specific interventions  Outcome: Adequate for Discharge  Goal: Patient/Family Short Term Goal  Description: Patient's Short Term Goal:not to be so anxious    Interventi factors for pressure ulcer development  - Assess and document skin integrity  - Monitor for areas of redness and/or skin breakdown  - Initiate interventions, skin care algorithm/standards of care as needed  Outcome: Adequate for Discharge  Goal: Incision(s

## 2021-07-05 NOTE — PROGRESS NOTES
MD contacted regarding unreconciled medication ( levemir), MD stated pt would not be going home with levemir, but he had  Added sliding scale of novolog. New AVS printed containing Novolog sliding scale.  MD reached to ask for prescription for Novolog and

## 2021-07-05 NOTE — CM/SW NOTE
PT rec is home PT    Cm met with pt to discuss. Pt sts she has done [address] in the past and is not interested. She is enrolled with outpt therapy and a pain sevice. Pt lives with her dtr and declined DC needs at this time.     / to

## 2021-07-05 NOTE — PHYSICAL THERAPY NOTE
PHYSICAL THERAPY EVALUATION - INPATIENT     Room Number: 561/561-A  Evaluation Date: 7/5/2021  Type of Evaluation: Initial   Physician Order: PT Eval and Treat    Presenting Problem: Left LE cellulitis  Reason for Therapy: Mobility Dysfunction and Dischar discharge. DISCHARGE RECOMMENDATIONS  PT Discharge Recommendations: Home with home health PT;24 hour care/supervision    PLAN  PT Treatment Plan: Bed mobility; Body mechanics; Energy conservation;Patient education; Family education;Gait training;Strengthen Rolling walker;Cane    Prior Level of [place]: prior to admission, patient was independent with functional mobility and ADLs. Has a cane and walker at home. Lives with daughter and grandson.  Hx of falls    SUBJECTIVE  Patient agreeable to therapy sess Scale): 50.25   CMS Modifier (G-Code): CJ      Exercise/Education Provided:  Bed mobility  Body mechanics  Energy conservation  Functional activity tolerated  Gait training  Posture  Strengthening  Transfer training    Patient End of Session: Up in chair;C

## 2021-07-05 NOTE — SLP NOTE
SPEECH DAILY NOTE - INPATIENT    ASSESSMENT & PLAN   ASSESSMENT  PPE REQUIRED. THIS SLP WORE GOGGLES, GLOVES, AND DROPLET MASK. HANDS SANITIZED/WASHED UPON ENTRANCE/EXIT. Pt f/u for ongoing meal assessment per recommendations of 7/5/2021 BSE.  RN report evaluation) including Slow rate, Small bites, Small sips, No straws, Upright 90 degrees with no feeding assistance 95 % of the time across 1-2 sessions. Pt with independent use of strategies.   Goal met        FOLLOW UP  Follow Up Needed (Documentation Req

## 2021-07-21 ENCOUNTER — HOSPITAL ENCOUNTER (OUTPATIENT)
Dept: INTERVENTIONAL RADIOLOGY/VASCULAR | Facility: HOSPITAL | Age: 76
Discharge: HOME | End: 2021-07-21
Attending: INTERNAL MEDICINE | Admitting: INTERNAL MEDICINE
Payer: MEDICARE

## 2021-07-21 VITALS
SYSTOLIC BLOOD PRESSURE: 150 MMHG | DIASTOLIC BLOOD PRESSURE: 54 MMHG | BODY MASS INDEX: 30.21 KG/M2 | WEIGHT: 160 LBS | OXYGEN SATURATION: 94 % | HEART RATE: 71 BPM | RESPIRATION RATE: 16 BRPM | HEIGHT: 61 IN

## 2021-07-21 DIAGNOSIS — I63.9 CEREBRAL VASCULAR ACCIDENT (HCC): ICD-10-CM

## 2021-07-21 PROCEDURE — 33286 RMVL SUBQ CAR RHYTHM MNTR: CPT

## 2021-07-21 PROCEDURE — 0JPT02Z REMOVAL OF MONITORING DEVICE FROM TRUNK SUBCUTANEOUS TISSUE AND FASCIA, OPEN APPROACH: ICD-10-PCS | Performed by: INTERNAL MEDICINE

## 2021-07-21 RX ORDER — LIDOCAINE HYDROCHLORIDE 20 MG/ML
INJECTION, SOLUTION EPIDURAL; INFILTRATION; INTRACAUDAL; PERINEURAL
Status: COMPLETED
Start: 2021-07-21 | End: 2021-07-21

## 2021-07-21 RX ORDER — LIDOCAINE HYDROCHLORIDE AND EPINEPHRINE 10; 10 MG/ML; UG/ML
INJECTION, SOLUTION INFILTRATION; PERINEURAL
Status: COMPLETED
Start: 2021-07-21 | End: 2021-07-21

## 2021-07-21 NOTE — OPERATIVE REPORT
Preop diagnosis: ILR at explant  Post op diagnosis: Same  Procedures: ILR explanted successfully  Findings: As above.   EBL: 20 mls  Specimens: None

## 2021-07-21 NOTE — H&P
Patient is a 67year old female for implantable loop explant. No symptoms.     Past Medical History:   Past Medical History:   Diagnosis Date   • Aortic aneurysm, thoracic (HCC)    • Back problem    • Calculus of kidney    • Cataract    • COPD (chronic obst and Norco             previously with no adverse effects  Dilaudid [[employer]*    OTHER (SEE COMMENTS)    Comment:PATIENT'S DAUGHTER HAD A VERY BAD REACTION FROM             DILAUDID (MENTALLY)  Sulfa Antibiotics       OTHER (SEE COMMENTS)    Comment:Rash IMPRESSION:    For Implantable loop explant. Discussed with patient.     Kashmir Escamilla DO [facility]  [address] [facility] [address]  [place]  [phone]

## 2021-07-21 NOTE — IVS NOTE
S/p linq removal. Patient tolerated it well. Denies pain. Dc instructions given to patient and family. Verbalized understanding. Discharge home per wheelchair to the lobby in fair condition.

## 2021-07-22 NOTE — PROCEDURES
[facility]    PATIENT'S NAME: Juan Francisco Jimenezurbon   ATTENDING PHYSICIAN: Kashmir Whalen DO   OPERATING PHYSICIAN: Kashmir Whalen DO   PATIENT ACCOUNT#:   [de-identified]    LOCATION:  [address]  MEDICAL RECORD #:   [id number]       DATE Mikel Newsome

## 2021-09-18 ENCOUNTER — HOSPITAL ENCOUNTER (OUTPATIENT)
Dept: MAMMOGRAPHY | Age: 76
Discharge: HOME | End: 2021-09-18
Attending: INTERNAL MEDICINE
Payer: MEDICARE

## 2021-09-18 DIAGNOSIS — Z12.31 BREAST CANCER SCREENING BY MAMMOGRAM: ICD-10-CM

## 2021-09-18 PROCEDURE — 77067 SCR MAMMO BI INCL CAD: CPT | Performed by: INTERNAL MEDICINE

## 2021-09-18 PROCEDURE — 77063 BREAST TOMOSYNTHESIS BI: CPT | Performed by: INTERNAL MEDICINE

## 2022-02-21 ENCOUNTER — LAB ENCOUNTER (OUTPATIENT)
Dept: LAB | Age: 77
End: 2022-02-21
Payer: MEDICARE

## 2022-02-21 ENCOUNTER — HOSPITAL ENCOUNTER (OUTPATIENT)
Dept: GENERAL RADIOLOGY | Age: 77
Discharge: HOME | End: 2022-02-21
Payer: MEDICARE

## 2022-02-21 DIAGNOSIS — I25.10 CORONARY ATHEROSCLEROSIS OF NATIVE CORONARY ARTERY: Primary | ICD-10-CM

## 2022-02-21 DIAGNOSIS — E78.5 HYPERLIPEMIA: ICD-10-CM

## 2022-02-21 DIAGNOSIS — M25.551 RIGHT HIP PAIN: ICD-10-CM

## 2022-02-21 LAB
CHOLEST SERPL-MCNC: 184 MG/DL (ref ?–200)
FASTING PATIENT LIPID ANSWER: YES
HDLC SERPL-MCNC: 38 MG/DL (ref 40–59)
LDLC SERPL CALC-MCNC: 105 MG/DL (ref ?–100)
NONHDLC SERPL-MCNC: 146 MG/DL (ref ?–130)
TRIGL SERPL-MCNC: 240 MG/DL (ref 30–149)
VLDLC SERPL CALC-MCNC: 41 MG/DL (ref 0–30)

## 2022-02-21 PROCEDURE — 73502 X-RAY EXAM HIP UNI 2-3 VIEWS: CPT

## 2022-02-21 PROCEDURE — 80061 LIPID PANEL: CPT

## 2022-02-21 PROCEDURE — 36415 COLL VENOUS BLD VENIPUNCTURE: CPT

## 2022-02-21 PROCEDURE — 73502 X-RAY EXAM HIP UNI 2-3 VIEWS: CPT | Performed by: NURSE PRACTITIONER

## 2022-03-23 ENCOUNTER — TELEPHONE (OUTPATIENT)
Dept: PHYSICAL MEDICINE AND REHAB | Facility: CLINIC | Age: 77
End: 2022-03-23

## 2022-03-23 ENCOUNTER — OFFICE VISIT (OUTPATIENT)
Dept: NEUROLOGY | Facility: CLINIC | Age: 77
End: 2022-03-23
Payer: MEDICARE

## 2022-03-23 ENCOUNTER — LAB ENCOUNTER (OUTPATIENT)
Dept: LAB | Age: 77
End: 2022-03-23
Attending: Other
Payer: MEDICARE

## 2022-03-23 VITALS
SYSTOLIC BLOOD PRESSURE: 138 MMHG | DIASTOLIC BLOOD PRESSURE: 76 MMHG | WEIGHT: 152 LBS | HEIGHT: 60 IN | BODY MASS INDEX: 29.84 KG/M2 | HEART RATE: 80 BPM

## 2022-03-23 DIAGNOSIS — I63.9 CEREBROVASCULAR ACCIDENT (CVA), UNSPECIFIED MECHANISM (HCC): Primary | ICD-10-CM

## 2022-03-23 DIAGNOSIS — R51.9 CHRONIC INTRACTABLE HEADACHE, UNSPECIFIED HEADACHE TYPE: ICD-10-CM

## 2022-03-23 DIAGNOSIS — R25.1 TREMORS OF NERVOUS SYSTEM: ICD-10-CM

## 2022-03-23 DIAGNOSIS — R41.3 MEMORY CHANGE: ICD-10-CM

## 2022-03-23 DIAGNOSIS — G89.29 CHRONIC INTRACTABLE HEADACHE, UNSPECIFIED HEADACHE TYPE: ICD-10-CM

## 2022-03-23 DIAGNOSIS — I63.9 CEREBROVASCULAR ACCIDENT (CVA), UNSPECIFIED MECHANISM (HCC): ICD-10-CM

## 2022-03-23 LAB
ERYTHROCYTE [SEDIMENTATION RATE] IN BLOOD: 9 MM/HR
TSI SER-ACNC: 1.03 MIU/ML (ref 0.36–3.74)
VIT B12 SERPL-MCNC: 302 PG/ML (ref 193–986)

## 2022-03-23 PROCEDURE — 84443 ASSAY THYROID STIM HORMONE: CPT

## 2022-03-23 PROCEDURE — 85652 RBC SED RATE AUTOMATED: CPT

## 2022-03-23 PROCEDURE — 36415 COLL VENOUS BLD VENIPUNCTURE: CPT

## 2022-03-23 PROCEDURE — 99204 OFFICE O/P NEW MOD 45 MIN: CPT | Performed by: OTHER

## 2022-03-23 PROCEDURE — 82607 VITAMIN B-12: CPT

## 2022-03-23 RX ORDER — ISOSORBIDE MONONITRATE 30 MG/1
30 TABLET, EXTENDED RELEASE ORAL DAILY
COMMUNITY
Start: 2022-01-22

## 2022-03-23 RX ORDER — POTASSIUM CHLORIDE 750 MG/1
10 TABLET, FILM COATED, EXTENDED RELEASE ORAL
COMMUNITY
Start: 2022-02-14

## 2022-03-23 RX ORDER — HYDROCHLOROTHIAZIDE 25 MG/1
12.5 TABLET ORAL DAILY
COMMUNITY
Start: 2022-01-22

## 2022-03-23 NOTE — PROGRESS NOTES
Reason the office with her daughter with whom she lives. They live in [place]. She is a trip with tremors since her stroke in 2018. She had trouble with language with a stroke. She complete recovery. She is followed with neurology since then. She apparently fell about 2 weeks ago with right forehead trauma no loss of consciousness. Subsequently has had right frontal temporal headaches. No jaw claudication. Denies cervical, lumbar spine pain. Denies focal weakness in the arms or legs. No visual symptoms. No trouble swallowing. No other history of TIA CVA loss consciousness seizure    The tremors do not occur at rest.  They are random. She relates it does not interfere with her activities of daily living. Daughter is also concerned about some short-term memory difficulty. Exam: Speech language intact. Visual fields are full. Cranial nerves normal.  Strength in the arms and legs normal.  Alternating motion rates normal.  Tone is normal.  No rest tremor. No signs of trauma. Finger-nose normal.    Impression: Posttraumatic headache. Will obtain a CT of the head. Tremors. Short-term memory difficulty. She relates that the present time she does not wish to try any medication for the tremor or memory difficulty. Will obtain B12 thyroid function. We will call him with results.

## 2022-03-23 NOTE — TELEPHONE ENCOUNTER
Per Medicare Guidelines -no authorization is required for imaging     Status: Approved-Covered Benefit    Patient can proceed with scheduling CT Brain CPT 87204   Patient is scheduled 3/25/22

## 2022-03-25 ENCOUNTER — HOSPITAL ENCOUNTER (OUTPATIENT)
Dept: CT IMAGING | Age: 77
Discharge: HOME | End: 2022-03-25
Attending: Other
Payer: MEDICARE

## 2022-03-25 DIAGNOSIS — R51.9 CHRONIC INTRACTABLE HEADACHE, UNSPECIFIED HEADACHE TYPE: ICD-10-CM

## 2022-03-25 DIAGNOSIS — G89.29 CHRONIC INTRACTABLE HEADACHE, UNSPECIFIED HEADACHE TYPE: ICD-10-CM

## 2022-03-25 DIAGNOSIS — I63.9 CEREBROVASCULAR ACCIDENT (CVA), UNSPECIFIED MECHANISM (HCC): ICD-10-CM

## 2022-03-25 PROCEDURE — 70450 CT HEAD/BRAIN W/O DYE: CPT | Performed by: OTHER

## 2022-06-21 ENCOUNTER — TELEPHONE (OUTPATIENT)
Dept: GASTROENTEROLOGY | Facility: CLINIC | Age: 77
End: 2022-06-21

## 2022-06-21 ENCOUNTER — OFFICE VISIT (OUTPATIENT)
Dept: GASTROENTEROLOGY | Facility: CLINIC | Age: 77
End: 2022-06-21
Payer: MEDICARE

## 2022-06-21 ENCOUNTER — LAB ENCOUNTER (OUTPATIENT)
Dept: LAB | Age: 77
End: 2022-06-21
Attending: INTERNAL MEDICINE
Payer: MEDICARE

## 2022-06-21 VITALS
DIASTOLIC BLOOD PRESSURE: 81 MMHG | WEIGHT: 158 LBS | BODY MASS INDEX: 31.02 KG/M2 | HEIGHT: 60 IN | HEART RATE: 71 BPM | SYSTOLIC BLOOD PRESSURE: 118 MMHG

## 2022-06-21 DIAGNOSIS — K92.1 MELENA: Primary | ICD-10-CM

## 2022-06-21 DIAGNOSIS — R10.10 UPPER ABDOMINAL PAIN: ICD-10-CM

## 2022-06-21 DIAGNOSIS — K92.1 MELENA: ICD-10-CM

## 2022-06-21 LAB
ALBUMIN SERPL-MCNC: 3.7 G/DL (ref 3.4–5)
ALBUMIN/GLOB SERPL: 1.3 {RATIO} (ref 1–2)
ALP LIVER SERPL-CCNC: 77 U/L
ALT SERPL-CCNC: 17 U/L
ANION GAP SERPL CALC-SCNC: 11 MMOL/L (ref 0–18)
AST SERPL-CCNC: 11 U/L (ref 15–37)
BASOPHILS # BLD AUTO: 0.11 X10(3) UL (ref 0–0.2)
BASOPHILS NFR BLD AUTO: 1.4 %
BILIRUB SERPL-MCNC: 0.3 MG/DL (ref 0.1–2)
BUN BLD-MCNC: 32 MG/DL (ref 7–18)
BUN/CREAT SERPL: 20.1 (ref 10–20)
CALCIUM BLD-MCNC: 10.5 MG/DL (ref 8.5–10.1)
CHLORIDE SERPL-SCNC: 105 MMOL/L (ref 98–112)
CO2 SERPL-SCNC: 28 MMOL/L (ref 21–32)
CREAT BLD-MCNC: 1.59 MG/DL
DEPRECATED RDW RBC AUTO: 44.9 FL (ref 35.1–46.3)
EOSINOPHIL # BLD AUTO: 0.16 X10(3) UL (ref 0–0.7)
EOSINOPHIL NFR BLD AUTO: 2.1 %
ERYTHROCYTE [DISTWIDTH] IN BLOOD BY AUTOMATED COUNT: 13.8 % (ref 11–15)
FASTING STATUS PATIENT QL REPORTED: NO
GLOBULIN PLAS-MCNC: 2.9 G/DL (ref 2.8–4.4)
GLUCOSE BLD-MCNC: 145 MG/DL (ref 70–99)
HCT VFR BLD AUTO: 40.5 %
HGB BLD-MCNC: 12.7 G/DL
IMM GRANULOCYTES # BLD AUTO: 0.03 X10(3) UL (ref 0–1)
IMM GRANULOCYTES NFR BLD: 0.4 %
LYMPHOCYTES # BLD AUTO: 1.54 X10(3) UL (ref 1–4)
LYMPHOCYTES NFR BLD AUTO: 19.9 %
MCH RBC QN AUTO: 27.9 PG (ref 26–34)
MCHC RBC AUTO-ENTMCNC: 31.4 G/DL (ref 31–37)
MCV RBC AUTO: 88.8 FL
MONOCYTES # BLD AUTO: 0.51 X10(3) UL (ref 0.1–1)
MONOCYTES NFR BLD AUTO: 6.6 %
NEUTROPHILS # BLD AUTO: 5.38 X10 (3) UL (ref 1.5–7.7)
NEUTROPHILS # BLD AUTO: 5.38 X10(3) UL (ref 1.5–7.7)
NEUTROPHILS NFR BLD AUTO: 69.6 %
OSMOLALITY SERPL CALC.SUM OF ELEC: 307 MOSM/KG (ref 275–295)
PLATELET # BLD AUTO: 316 10(3)UL (ref 150–450)
POTASSIUM SERPL-SCNC: 3.7 MMOL/L (ref 3.5–5.1)
PROT SERPL-MCNC: 6.6 G/DL (ref 6.4–8.2)
RBC # BLD AUTO: 4.56 X10(6)UL
SODIUM SERPL-SCNC: 144 MMOL/L (ref 136–145)
WBC # BLD AUTO: 7.7 X10(3) UL (ref 4–11)

## 2022-06-21 PROCEDURE — 80053 COMPREHEN METABOLIC PANEL: CPT

## 2022-06-21 PROCEDURE — 85025 COMPLETE CBC W/AUTO DIFF WBC: CPT

## 2022-06-21 PROCEDURE — 36415 COLL VENOUS BLD VENIPUNCTURE: CPT

## 2022-06-21 PROCEDURE — 99204 OFFICE O/P NEW MOD 45 MIN: CPT | Performed by: INTERNAL MEDICINE

## 2022-06-21 RX ORDER — PANTOPRAZOLE SODIUM 40 MG/1
40 TABLET, DELAYED RELEASE ORAL
Qty: 180 TABLET | Refills: 3 | Status: ON HOLD | OUTPATIENT
Start: 2022-06-21

## 2022-06-21 RX ORDER — ALPRAZOLAM 0.25 MG/1
0.25 TABLET ORAL DAILY PRN
Status: ON HOLD | COMMUNITY
Start: 2022-06-04

## 2022-06-21 NOTE — TELEPHONE ENCOUNTER
Scheduled for:  D [id number]  Provider Name: Dr Mitchel Arriaga   Date: Monica Winterkeo 7/28/2022   Location: [facility]    Sedation: MAC   Time: 12:30 pm   Prep: Nothing after midnight the day before procedure and NPO 3 hrs prior procedure  Meds/Allergies Reconciled?: NKDA    Diagnosis with codes: melena K92.1, upper abdominal pain R10.10,   Was patient informed to call insurance with codes (Y/N):  Yes  Referral sent?:  yes  [address] [place] [address] or [address] [place] [address] notified?: I sent an electronic request to Endo Scheduling and received a confirmation today. Medication Orders: Hold the Metformin the night before and the morning of the procedure  -We will reach out to your cardiologist ( Dr Lauren Parker) to see if OK to told the Plavix for 5 days before the procedure  Pt is aware to NOT take iron pills, herbal meds and diet supplements for 7 days before exam. Also to NOT take any form of alcohol, recreational drugs and any forms of ED meds 24 hours before exam.      Misc Orders:  Patient was informed that they will need a COVID 19 test prior to their procedure. Patient verbally understood & will await a phone call from [facility] to schedule. Further instructions given by staff:   Instructions given and pt verbalized understanding

## 2022-06-21 NOTE — TELEPHONE ENCOUNTER
GI Staff - please reach out to the patient's cardiologist re: whether it is OK to hold plavix for 5 days prior to the EGD.

## 2022-06-21 NOTE — PATIENT INSTRUCTIONS
PLAN    - Please schedule the abdominal dopplers and abdominal ultrasound and CT scan    - Schedule upper endoscopy (EGD) - see below    - Please have blood work done today    EGD Instructions  1. Schedule EGD with anesthesia [Diagnosis: melena, upper abdominal pain]    2. Medication adjustment:       A. Hold the metformin the night before and the morning of the procedure     B. We will reach out to your cardiologist to see if OK to told the Plavix for 5 days before the procedure    3. You will need to go for COVID testing 72 hours before procedure. The testing team will call you a few days before your procedure to notify you where/when you can get COVID testing. If you do not go for COVID testing, the procedure cannot be performed. 4. If you start any NEW medication after your visit today, please notify us. Certain medications will need to be held before the procedure, or the procedure cannot be performed.

## 2022-06-28 ENCOUNTER — TELEPHONE (OUTPATIENT)
Dept: GASTROENTEROLOGY | Facility: CLINIC | Age: 77
End: 2022-06-28

## 2022-06-28 NOTE — TELEPHONE ENCOUNTER
Dr. Tanja Khanna--    Received incoming call from [facility]. US abdomen doppler is not covered (per medicare) but US abdomen (w/o doppler) is covered per plan. If you'd like patient to proceed with both exams, need to change doppler scan diagnosis code. Patients daughter doesn't understand why two ultrasounds ordered and would like clarification as to why. Thank you!

## 2022-06-28 NOTE — TELEPHONE ENCOUNTER
Marcie/Central Scheduling called for clarification on ultrasound orders. Diagnosis is not passing medical necessity for Medicare. Please call.

## 2022-06-30 ENCOUNTER — TELEPHONE (OUTPATIENT)
Dept: GASTROENTEROLOGY | Facility: CLINIC | Age: 77
End: 2022-06-30

## 2022-06-30 DIAGNOSIS — R10.84 GENERALIZED ABDOMINAL PAIN: Primary | ICD-10-CM

## 2022-07-05 ENCOUNTER — HOSPITAL ENCOUNTER (OUTPATIENT)
Dept: CT IMAGING | Facility: HOSPITAL | Age: 77
Discharge: HOME | End: 2022-07-05
Attending: INTERNAL MEDICINE
Payer: MEDICARE

## 2022-07-05 DIAGNOSIS — R10.10 UPPER ABDOMINAL PAIN: ICD-10-CM

## 2022-07-05 PROCEDURE — 74176 CT ABD & PELVIS W/O CONTRAST: CPT | Performed by: INTERNAL MEDICINE

## 2022-07-06 NOTE — TELEPHONE ENCOUNTER
Refer to telephone encounter dated 06/30/2022. Routed to Dr. Brenden Bernard for further advisement.

## 2022-07-14 NOTE — TELEPHONE ENCOUNTER
Contacted Dr. Kathy Juárez at [facility]. 2nd attempt. Requested clearance to hold plavix 5 days prior to procedure. Scheduled for egd with Dr. Reyna Parkinson Thursday, 07/28/2022. Spoke with Yazmin, . Verified initial blood thinner hold form was received via fax and uploaded into patients chart. MD has not addressed yet as of today. Printed out for rn clinical staff to review and obtain MD response. Aware procedure is scheduled in two weeks. Plan to await f/u fax and/or contact office for update.

## 2022-07-15 NOTE — TELEPHONE ENCOUNTER
Called pt's daughter back but no answer. Recommend getting the US dopplers only to evaluate the mesenteric vessels for now. I re-ordered it and put my contact info in case there are still issues with the order.     Neri Javier MD  [facility], [facility]

## 2022-07-19 NOTE — TELEPHONE ENCOUNTER
Provided with return fax with Dr. Lenore Zuniga signature and check zaki next to request for OK to hold plavix x 5 days prior to EGD. Sent to scan for record. EGD with Dr. Mayte Sheldon next Thursday, 07/28/2022. Contacted patients daughter to relay anticoagulant adjustment instructions given received clearance on behalf of cardiology. Left message to call back. 1st attempt. Plan to await call back and/or follow up.

## 2022-07-20 NOTE — TELEPHONE ENCOUNTER
Spoke with Zia Melgar directly and confirmed . Conveyed below plavix instruction. Communicated received OK per Dr. Naveed Berry to hold x 5 days prior to egd. Patient read-back correctly and her daughter also present who added into her phone notes for reference. No additional questions or concerns at this time.

## 2022-07-25 ENCOUNTER — HOSPITAL ENCOUNTER (OUTPATIENT)
Dept: ULTRASOUND IMAGING | Age: 77
Discharge: HOME | End: 2022-07-25
Attending: INTERNAL MEDICINE
Payer: MEDICARE

## 2022-07-25 ENCOUNTER — TELEPHONE (OUTPATIENT)
Dept: GASTROENTEROLOGY | Facility: CLINIC | Age: 77
End: 2022-07-25

## 2022-07-25 ENCOUNTER — LAB ENCOUNTER (OUTPATIENT)
Dept: LAB | Age: 77
End: 2022-07-25
Attending: INTERNAL MEDICINE
Payer: MEDICARE

## 2022-07-25 DIAGNOSIS — R10.10 UPPER ABDOMINAL PAIN: ICD-10-CM

## 2022-07-25 DIAGNOSIS — Z01.818 PRE-OP TESTING: ICD-10-CM

## 2022-07-25 LAB — SARS-COV-2 RNA RESP QL NAA+PROBE: NOT DETECTED

## 2022-07-25 PROCEDURE — 76705 ECHO EXAM OF ABDOMEN: CPT | Performed by: INTERNAL MEDICINE

## 2022-07-25 NOTE — TELEPHONE ENCOUNTER
Spoke with Estela Flores from [facility] administration at radiology. Verified patient name,  and rationale for urgent call. Need verification for diagnosis codes for ordered abdomen doppler. Scheduled for both us abdomen and us abdomen doppler. With patients plan does NOT cover doppler with utilized dx codes. Doppler NOT covered with generalized abdominal pain and upper abdominal pain coding. Out of nursing scope of practice to change dx coding entered on behalf of ordering provider. Faizanjane Sim is choosing to ONLY proceed with US abdomen given doppler exam NOT covered. No further action / inquires required.

## 2022-07-25 NOTE — TELEPHONE ENCOUNTER
Pt is there now for US - Dr gautam Epps [address] will only cover one - asking which one should be done today

## 2022-07-28 ENCOUNTER — ANESTHESIA (OUTPATIENT)
Dept: ENDOSCOPY | Facility: HOSPITAL | Age: 77
End: 2022-07-28
Payer: MEDICARE

## 2022-07-28 ENCOUNTER — ANESTHESIA EVENT (OUTPATIENT)
Dept: ENDOSCOPY | Facility: HOSPITAL | Age: 77
End: 2022-07-28
Payer: MEDICARE

## 2022-07-28 ENCOUNTER — TELEPHONE (OUTPATIENT)
Dept: GASTROENTEROLOGY | Facility: CLINIC | Age: 77
End: 2022-07-28

## 2022-07-28 DIAGNOSIS — R10.13 EPIGASTRIC PAIN: Primary | ICD-10-CM

## 2022-07-28 NOTE — TELEPHONE ENCOUNTER
Please fax cardiac clearance request to Dr. Francisco Burnett at [phone] tomorrow. Thank you  Phone [phone]      Today's procedure cancelled in Epic.

## 2022-07-28 NOTE — TELEPHONE ENCOUNTER
Case discussed with anesthesia. Recommend canceling the EGD since her cardiologist ordered a stress test for chest pain. The patient has no s/sx of GI bleeding and recent hgb was 12.4. EGD is being done to evaluate her epigastric pain. Recommend rescheduling the EGD once cleared by cardiology.     Nisreen Marquez MD  [address] [facility]

## 2022-07-29 NOTE — TELEPHONE ENCOUNTER
Requested cardiac clearance per Dr. Cyndy Fox at [facility]. F: [phone]; [phone]    Need cardiac clearance prior to r/s given reporting chest pain at time of EGD. Fax confirmation received 07/29/2022 at 12:23 pm.     Plan to await f/u response per Dr. Cyndy Fox and/or contact [facility] directly for updates.

## 2022-09-08 NOTE — TELEPHONE ENCOUNTER
Spoke with Patient, she does not wish to move forward with scheduling EGD. Will call when she is ready.     Closing TE

## 2022-09-08 NOTE — TELEPHONE ENCOUNTER
Dr. Lorena Tena [facility]) / GI Schedulers--    Granted cardiac clearance as requested per Dr. Tiffany Gonsales ([facility]). See below. Please assist with r/s EGD. Attached original MD orders. Ensure she understands to hold her clopidogrel (plavix) 5 days prior to procedure. Thank you!      \"EGD    - dx: melena K92.1; epigastric pain R10.10    - hold metformin the night before and morning of the procedures    - avoid all NSAIDs\"

## 2022-09-09 RX ORDER — METOPROLOL SUCCINATE 50 MG/1
50 TABLET, EXTENDED RELEASE ORAL DAILY
COMMUNITY

## 2022-09-12 ENCOUNTER — NURSE ONLY (OUTPATIENT)
Dept: LAB | Age: 77
End: 2022-09-12
Attending: INTERNAL MEDICINE
Payer: MEDICARE

## 2022-09-12 ENCOUNTER — LAB ENCOUNTER (OUTPATIENT)
Dept: LAB | Age: 77
End: 2022-09-12
Attending: INTERNAL MEDICINE
Payer: MEDICARE

## 2022-09-12 DIAGNOSIS — I25.10 CORONARY ATHEROSCLEROSIS OF NATIVE CORONARY ARTERY: ICD-10-CM

## 2022-09-12 DIAGNOSIS — E78.5 HYPERLIPEMIA: ICD-10-CM

## 2022-09-12 DIAGNOSIS — Z01.818 PRE-OP TESTING: ICD-10-CM

## 2022-09-12 LAB
ANION GAP SERPL CALC-SCNC: 13 MMOL/L (ref 0–18)
BUN BLD-MCNC: 33 MG/DL (ref 7–18)
BUN/CREAT SERPL: 17.6 (ref 10–20)
CALCIUM BLD-MCNC: 10 MG/DL (ref 8.5–10.1)
CHLORIDE SERPL-SCNC: 101 MMOL/L (ref 98–112)
CO2 SERPL-SCNC: 26 MMOL/L (ref 21–32)
CREAT BLD-MCNC: 1.88 MG/DL
DEPRECATED RDW RBC AUTO: 42.1 FL (ref 35.1–46.3)
ERYTHROCYTE [DISTWIDTH] IN BLOOD BY AUTOMATED COUNT: 13.7 % (ref 11–15)
FASTING STATUS PATIENT QL REPORTED: NO
GFR SERPLBLD BASED ON 1.73 SQ M-ARVRAT: 27 ML/MIN/1.73M2 (ref 60–?)
GLUCOSE BLD-MCNC: 223 MG/DL (ref 70–99)
HCT VFR BLD AUTO: 39.3 %
HGB BLD-MCNC: 12.8 G/DL
MCH RBC QN AUTO: 27.5 PG (ref 26–34)
MCHC RBC AUTO-ENTMCNC: 32.6 G/DL (ref 31–37)
MCV RBC AUTO: 84.3 FL
OSMOLALITY SERPL CALC.SUM OF ELEC: 304 MOSM/KG (ref 275–295)
PLATELET # BLD AUTO: 342 10(3)UL (ref 150–450)
POTASSIUM SERPL-SCNC: 3.1 MMOL/L (ref 3.5–5.1)
RBC # BLD AUTO: 4.66 X10(6)UL
SODIUM SERPL-SCNC: 140 MMOL/L (ref 136–145)
WBC # BLD AUTO: 7.6 X10(3) UL (ref 4–11)

## 2022-09-12 PROCEDURE — 80048 BASIC METABOLIC PNL TOTAL CA: CPT

## 2022-09-12 PROCEDURE — 85027 COMPLETE CBC AUTOMATED: CPT

## 2022-09-12 PROCEDURE — 36415 COLL VENOUS BLD VENIPUNCTURE: CPT

## 2022-09-13 LAB — SARS-COV-2 RNA RESP QL NAA+PROBE: NOT DETECTED

## 2022-09-15 ENCOUNTER — HOSPITAL ENCOUNTER (OUTPATIENT)
Dept: INTERVENTIONAL RADIOLOGY/VASCULAR | Facility: HOSPITAL | Age: 77
Discharge: HOME | End: 2022-09-15
Attending: INTERNAL MEDICINE | Admitting: INTERNAL MEDICINE

## 2022-09-15 VITALS
WEIGHT: 168 LBS | BODY MASS INDEX: 32.98 KG/M2 | SYSTOLIC BLOOD PRESSURE: 125 MMHG | RESPIRATION RATE: 23 BRPM | HEIGHT: 60 IN | HEART RATE: 78 BPM | DIASTOLIC BLOOD PRESSURE: 111 MMHG | TEMPERATURE: 98 F | OXYGEN SATURATION: 94 %

## 2022-09-15 DIAGNOSIS — I25.10 CAD (CORONARY ARTERY DISEASE): ICD-10-CM

## 2022-09-15 DIAGNOSIS — Z01.818 PRE-OP TESTING: Primary | ICD-10-CM

## 2022-09-15 LAB — GLUCOSE BLDC GLUCOMTR-MCNC: 356 MG/DL (ref 70–99)

## 2022-09-15 PROCEDURE — 93454 CORONARY ARTERY ANGIO S&I: CPT

## 2022-09-15 PROCEDURE — B2111ZZ FLUOROSCOPY OF MULTIPLE CORONARY ARTERIES USING LOW OSMOLAR CONTRAST: ICD-10-PCS | Performed by: INTERNAL MEDICINE

## 2022-09-15 PROCEDURE — 82962 GLUCOSE BLOOD TEST: CPT

## 2022-09-15 PROCEDURE — 36415 COLL VENOUS BLD VENIPUNCTURE: CPT

## 2022-09-15 PROCEDURE — 99152 MOD SED SAME PHYS/QHP 5/>YRS: CPT

## 2022-09-15 PROCEDURE — 99153 MOD SED SAME PHYS/QHP EA: CPT

## 2022-09-15 RX ORDER — DIPHENHYDRAMINE HYDROCHLORIDE 50 MG/ML
50 INJECTION INTRAMUSCULAR; INTRAVENOUS ONCE
Status: COMPLETED | OUTPATIENT
Start: 2022-09-15 | End: 2022-09-15

## 2022-09-15 RX ORDER — HEPARIN SODIUM 1000 [USP'U]/ML
INJECTION, SOLUTION INTRAVENOUS; SUBCUTANEOUS
Status: COMPLETED
Start: 2022-09-15 | End: 2022-09-15

## 2022-09-15 RX ORDER — SODIUM CHLORIDE 9 MG/ML
INJECTION, SOLUTION INTRAVENOUS
Status: DISCONTINUED | OUTPATIENT
Start: 2022-09-15 | End: 2022-09-15

## 2022-09-15 RX ORDER — LIDOCAINE HYDROCHLORIDE 20 MG/ML
INJECTION, SOLUTION EPIDURAL; INFILTRATION; INTRACAUDAL; PERINEURAL
Status: COMPLETED
Start: 2022-09-15 | End: 2022-09-15

## 2022-09-15 RX ORDER — NITROGLYCERIN 20 MG/100ML
INJECTION INTRAVENOUS
Status: COMPLETED
Start: 2022-09-15 | End: 2022-09-15

## 2022-09-15 RX ORDER — SODIUM CHLORIDE 9 MG/ML
1 INJECTION, SOLUTION INTRAVENOUS CONTINUOUS
Status: DISCONTINUED | OUTPATIENT
Start: 2022-09-15 | End: 2022-09-16

## 2022-09-15 RX ORDER — DIPHENHYDRAMINE HYDROCHLORIDE 50 MG/ML
INJECTION INTRAMUSCULAR; INTRAVENOUS
Status: DISCONTINUED
Start: 2022-09-15 | End: 2022-09-15

## 2022-09-15 RX ORDER — CHLORHEXIDINE GLUCONATE 4 G/100ML
30 SOLUTION TOPICAL
Status: COMPLETED | OUTPATIENT
Start: 2022-09-15 | End: 2022-09-15

## 2022-09-15 RX ORDER — MIDAZOLAM HYDROCHLORIDE 1 MG/ML
INJECTION INTRAMUSCULAR; INTRAVENOUS
Status: COMPLETED
Start: 2022-09-15 | End: 2022-09-15

## 2022-09-15 RX ORDER — VERAPAMIL HYDROCHLORIDE 2.5 MG/ML
INJECTION, SOLUTION INTRAVENOUS
Status: COMPLETED
Start: 2022-09-15 | End: 2022-09-15

## 2022-09-15 RX ORDER — DIPHENHYDRAMINE HYDROCHLORIDE 50 MG/ML
INJECTION INTRAMUSCULAR; INTRAVENOUS
Status: COMPLETED
Start: 2022-09-15 | End: 2022-09-15

## 2022-09-15 RX ADMIN — CHLORHEXIDINE GLUCONATE 30 ML: 4 SOLUTION TOPICAL at 08:30:00

## 2022-09-15 RX ADMIN — SODIUM CHLORIDE 3 ML/KG/HR: 9 INJECTION, SOLUTION INTRAVENOUS at 08:30:00

## 2022-09-15 RX ADMIN — DIPHENHYDRAMINE HYDROCHLORIDE 50 MG: 50 INJECTION INTRAMUSCULAR; INTRAVENOUS at 10:28:00

## 2022-09-15 RX ADMIN — SODIUM CHLORIDE 1 ML/KG/HR: 9 INJECTION, SOLUTION INTRAVENOUS at 09:30:00

## 2022-09-15 NOTE — PROCEDURES
[facility]    PATIENT'S NAME: Kailyn Meyers   ATTENDING PHYSICIAN: Kashmir Whalen DO   OPERATING PHYSICIAN: Kashmir Whalen DO   PATIENT ACCOUNT#:   [de-identified]    LOCATION:  [place] [address]  MEDICAL RECORD #:   [id number]       YOB: 1945  ADMISSION DATE:       09/15/2022      OPERATION DATE:  09/15/2022    CARDIAC PROCEDURE TRANSCRIPTION      ANGIOGRAPHY  PREOPERATIVE DIAGNOSIS:    POSTOPERATIVE DIAGNOSIS:    PROCEDURE PERFORMED:  Bilateral coronary angiogram.    SEDATION:  Conscious sedation for 45 minutes by a registered nurse who independently monitored the patient's blood pressure, oximetry, and heart rate. INDICATION:  Chest pain, abnormal stress test.    DESCRIPTION OF PROCEDURE:  Informed consent was obtained. The patient was placed in the cardiac catheterization lab. Right wrist was sterilely draped and prepped. The patient received preprocedural hydration because of renal insufficiency. A 6-Afghan sheath was inserted. We noted a lot of tortuosity in the right radial artery as well as the brachial artery. We navigated with a Whisper wire and then a Glidewire. We used TIG catheter for right coronary angiography and an EBU 5-Afghan 4.0 guide catheter for left coronary angiography. At the end of the procedure, a radial band was applied. The patient received heparin, verapamil, and nitroglycerin during the procedure. FINDINGS:  There is a proximal right coronary artery stent that is totally occluded and fills by collaterals faintly distally from the right, but mainly fills from the distal LAD and mid LAD brisk collaterals. The left main is normal in appearance. The left anterior descending coronary artery is normal in appearance in the proximal portion; in the mid portion there is a 50% calcific stenosis appreciated.   At the very distal portion of the vessel, there is a very heavily calcified 90% stenosis that happens to be right where there are 2 large collateral branches going to the distal right coronary artery. There are 2 major diagonal branches; both are patent with moderate proximal disease involving the first diagonal branch and mild proximal disease involving the second diagonal branch. The ramus intermedius branch has severe disease in the proximal portion about 50% stenosis. The rest of the circumflex is small. IMPRESSION:    1. 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 2.   Very distal heavily calcified 90% stenosis of the left anterior descending artery right where there are collaterals going to the distal right coronary artery. RECOMMENDATIONS:  There is a distal left anterior descending artery stenosis, but this area is very heavily calcified and also in a location where there are collateral vessels going to the distal right coronary artery at about a 90-degree bend and therefore high risk for occlusion of these collateral vessels. Intervention in the distal vessel with heavy calcification also is with risk and poor outcome. Therefore, medical management would be recommended. Furthermore, the patient has renal failure and additional contrast use would have jeopardized the renal function.       Dictated By Smiley Mary DO  d: 09/15/2022 12:03:48  t: 09/15/2022 14:06:01  [facility] [id number]  AS/

## 2022-09-15 NOTE — IVS NOTE
DISCHARGE NOTE     Pt is able to sit up and ambulate without difficulty. Pt voided and tolerated fluids and food. Procedural site remains dry and intact with good circulation, motion, and sensation. No signs and symptoms of bleeding/hematoma noted. IV access removed  Instruction provided, patient/family verbalizes understanding. Dr. Britany Greenfield spoke with patient/family post procedure. Pt discharge via wheelchair to [address]     Follow up Appointment: as already scheduled with Dr. Britany Greenfield in 2 weeks. Dr Britany Greenfield aware of follow up date and ok with the 2 week follow up.     New Prescription: none

## 2022-10-31 ENCOUNTER — HOSPITAL ENCOUNTER (OUTPATIENT)
Age: 77
Discharge: HOME | End: 2022-10-31
Payer: MEDICARE

## 2022-10-31 ENCOUNTER — APPOINTMENT (OUTPATIENT)
Dept: GENERAL RADIOLOGY | Age: 77
End: 2022-10-31
Attending: NURSE PRACTITIONER
Payer: MEDICARE

## 2022-10-31 VITALS
TEMPERATURE: 97 F | HEART RATE: 68 BPM | SYSTOLIC BLOOD PRESSURE: 153 MMHG | RESPIRATION RATE: 20 BRPM | OXYGEN SATURATION: 97 % | DIASTOLIC BLOOD PRESSURE: 79 MMHG

## 2022-10-31 DIAGNOSIS — S69.92XA INJURY OF LEFT WRIST, INITIAL ENCOUNTER: Primary | ICD-10-CM

## 2022-10-31 DIAGNOSIS — S69.92XA HAND INJURY, LEFT, INITIAL ENCOUNTER: ICD-10-CM

## 2022-10-31 PROCEDURE — 73110 X-RAY EXAM OF WRIST: CPT | Performed by: NURSE PRACTITIONER

## 2022-10-31 PROCEDURE — 99203 OFFICE O/P NEW LOW 30 MIN: CPT | Performed by: NURSE PRACTITIONER

## 2022-10-31 RX ORDER — TRAMADOL HYDROCHLORIDE 50 MG/1
50 TABLET ORAL EVERY 6 HOURS PRN
Qty: 10 TABLET | Refills: 0 | Status: SHIPPED | OUTPATIENT
Start: 2022-10-31

## 2023-02-05 ENCOUNTER — LAB ENCOUNTER (OUTPATIENT)
Dept: LAB | Facility: REFERENCE LAB | Age: 78
End: 2023-02-05
Attending: INTERNAL MEDICINE
Payer: MEDICARE

## 2023-02-05 DIAGNOSIS — I25.10 CORONARY ATHEROSCLEROSIS OF NATIVE CORONARY ARTERY: Primary | ICD-10-CM

## 2023-02-05 LAB
ANION GAP SERPL CALC-SCNC: 7 MMOL/L (ref 0–18)
BUN BLD-MCNC: 33 MG/DL (ref 7–18)
BUN/CREAT SERPL: 19 (ref 10–20)
CALCIUM BLD-MCNC: 10 MG/DL (ref 8.5–10.1)
CHLORIDE SERPL-SCNC: 106 MMOL/L (ref 98–112)
CO2 SERPL-SCNC: 30 MMOL/L (ref 21–32)
CREAT BLD-MCNC: 1.74 MG/DL
FASTING STATUS PATIENT QL REPORTED: YES
GFR SERPLBLD BASED ON 1.73 SQ M-ARVRAT: 30 ML/MIN/1.73M2 (ref 60–?)
GLUCOSE BLD-MCNC: 188 MG/DL (ref 70–99)
OSMOLALITY SERPL CALC.SUM OF ELEC: 308 MOSM/KG (ref 275–295)
POTASSIUM SERPL-SCNC: 3.8 MMOL/L (ref 3.5–5.1)
SODIUM SERPL-SCNC: 143 MMOL/L (ref 136–145)

## 2023-02-05 PROCEDURE — 80048 BASIC METABOLIC PNL TOTAL CA: CPT

## 2023-02-05 PROCEDURE — 36415 COLL VENOUS BLD VENIPUNCTURE: CPT

## 2023-02-05 PROCEDURE — 80061 LIPID PANEL: CPT

## 2023-02-06 LAB
CHOLEST SERPL-MCNC: 177 MG/DL (ref ?–200)
HDLC SERPL-MCNC: 32 MG/DL (ref 40–59)
LDLC SERPL CALC-MCNC: 99 MG/DL (ref ?–100)
NONHDLC SERPL-MCNC: 145 MG/DL (ref ?–130)
TRIGL SERPL-MCNC: 271 MG/DL (ref 30–149)
VLDLC SERPL CALC-MCNC: 45 MG/DL (ref 0–30)

## 2023-09-19 ENCOUNTER — HOSPITAL ENCOUNTER (OUTPATIENT)
Dept: CT IMAGING | Facility: HOSPITAL | Age: 78
Discharge: HOME | End: 2023-09-19
Attending: INTERNAL MEDICINE
Payer: MEDICARE

## 2023-09-19 DIAGNOSIS — I71.40 ABDOMINAL AORTIC ANEURYSM (AAA) WITHOUT RUPTURE, UNSPECIFIED PART (HCC): ICD-10-CM

## 2023-09-19 LAB
CREAT BLD-MCNC: 1.5 MG/DL
EGFRCR SERPLBLD CKD-EPI 2021: 35 ML/MIN/1.73M2 (ref 60–?)

## 2023-09-19 PROCEDURE — 82565 ASSAY OF CREATININE: CPT

## 2023-09-19 PROCEDURE — 75635 CT ANGIO ABDOMINAL ARTERIES: CPT | Performed by: INTERNAL MEDICINE

## 2023-10-02 ENCOUNTER — HOSPITAL ENCOUNTER (OUTPATIENT)
Dept: CT IMAGING | Age: 78
Discharge: HOME | End: 2023-10-02
Attending: INTERNAL MEDICINE
Payer: MEDICARE

## 2023-10-02 ENCOUNTER — EKG ENCOUNTER (OUTPATIENT)
Dept: LAB | Age: 78
End: 2023-10-02
Attending: SURGERY
Payer: MEDICARE

## 2023-10-02 ENCOUNTER — LAB ENCOUNTER (OUTPATIENT)
Dept: LAB | Age: 78
End: 2023-10-02
Attending: SURGERY
Payer: MEDICARE

## 2023-10-02 DIAGNOSIS — Z01.818 PRE-OP TESTING: ICD-10-CM

## 2023-10-02 DIAGNOSIS — I71.60: ICD-10-CM

## 2023-10-02 DIAGNOSIS — I71.40 ABDOMINAL AORTIC ANEURYSM (AAA) (HCC): ICD-10-CM

## 2023-10-02 DIAGNOSIS — Z91.89 AT HIGH RISK FOR BLEEDING: ICD-10-CM

## 2023-10-02 LAB
ALBUMIN SERPL-MCNC: 3 G/DL (ref 3.4–5)
ALBUMIN/GLOB SERPL: 0.8 {RATIO} (ref 1–2)
ALP LIVER SERPL-CCNC: 98 U/L
ALT SERPL-CCNC: 21 U/L
ANION GAP SERPL CALC-SCNC: 12 MMOL/L (ref 0–18)
ANTIBODY SCREEN: NEGATIVE
APTT PPP: 22.5 SECONDS (ref 23.3–35.6)
AST SERPL-CCNC: 13 U/L (ref 15–37)
ATRIAL RATE: 68 BPM
BASOPHILS # BLD AUTO: 0.02 X10(3) UL (ref 0–0.2)
BASOPHILS NFR BLD AUTO: 0.2 %
BILIRUB SERPL-MCNC: 0.3 MG/DL (ref 0.1–2)
BUN BLD-MCNC: 23 MG/DL (ref 7–18)
CALCIUM BLD-MCNC: 9.6 MG/DL (ref 8.5–10.1)
CHLORIDE SERPL-SCNC: 102 MMOL/L (ref 98–112)
CO2 SERPL-SCNC: 22 MMOL/L (ref 21–32)
CREAT BLD-MCNC: 1.47 MG/DL
EGFRCR SERPLBLD CKD-EPI 2021: 36 ML/MIN/1.73M2 (ref 60–?)
EOSINOPHIL # BLD AUTO: 0 X10(3) UL (ref 0–0.7)
EOSINOPHIL NFR BLD AUTO: 0 %
ERYTHROCYTE [DISTWIDTH] IN BLOOD BY AUTOMATED COUNT: 14.3 %
FASTING STATUS PATIENT QL REPORTED: YES
GLOBULIN PLAS-MCNC: 4 G/DL (ref 2.8–4.4)
GLUCOSE BLD-MCNC: 318 MG/DL (ref 70–99)
HCT VFR BLD AUTO: 38.5 %
HGB BLD-MCNC: 12.2 G/DL
IMM GRANULOCYTES # BLD AUTO: 0.06 X10(3) UL (ref 0–1)
IMM GRANULOCYTES NFR BLD: 0.6 %
INR BLD: 0.96 (ref 0.85–1.16)
LYMPHOCYTES # BLD AUTO: 0.7 X10(3) UL (ref 1–4)
LYMPHOCYTES NFR BLD AUTO: 6.6 %
MCH RBC QN AUTO: 27.2 PG (ref 26–34)
MCHC RBC AUTO-ENTMCNC: 31.7 G/DL (ref 31–37)
MCV RBC AUTO: 85.7 FL
MONOCYTES # BLD AUTO: 0.1 X10(3) UL (ref 0.1–1)
MONOCYTES NFR BLD AUTO: 0.9 %
NEUTROPHILS # BLD AUTO: 9.69 X10 (3) UL (ref 1.5–7.7)
NEUTROPHILS # BLD AUTO: 9.69 X10(3) UL (ref 1.5–7.7)
NEUTROPHILS NFR BLD AUTO: 91.7 %
OSMOLALITY SERPL CALC.SUM OF ELEC: 298 MOSM/KG (ref 275–295)
P AXIS: 67 DEGREES
P-R INTERVAL: 154 MS
PLATELET # BLD AUTO: 362 10(3)UL (ref 150–450)
POTASSIUM SERPL-SCNC: 3.5 MMOL/L (ref 3.5–5.1)
PROT SERPL-MCNC: 7 G/DL (ref 6.4–8.2)
PROTHROMBIN TIME: 12.8 SECONDS (ref 11.6–14.8)
Q-T INTERVAL: 448 MS
QRS DURATION: 94 MS
QTC CALCULATION (BEZET): 476 MS
R AXIS: -44 DEGREES
RBC # BLD AUTO: 4.49 X10(6)UL
RH BLOOD TYPE: POSITIVE
SODIUM SERPL-SCNC: 136 MMOL/L (ref 136–145)
T AXIS: -21 DEGREES
VENTRICULAR RATE: 68 BPM
WBC # BLD AUTO: 10.6 X10(3) UL (ref 4–11)

## 2023-10-02 PROCEDURE — 86850 RBC ANTIBODY SCREEN: CPT

## 2023-10-02 PROCEDURE — 85610 PROTHROMBIN TIME: CPT

## 2023-10-02 PROCEDURE — 71275 CT ANGIOGRAPHY CHEST: CPT | Performed by: INTERNAL MEDICINE

## 2023-10-02 PROCEDURE — 82565 ASSAY OF CREATININE: CPT

## 2023-10-02 PROCEDURE — 86901 BLOOD TYPING SEROLOGIC RH(D): CPT

## 2023-10-02 PROCEDURE — 85730 THROMBOPLASTIN TIME PARTIAL: CPT

## 2023-10-02 PROCEDURE — 36415 COLL VENOUS BLD VENIPUNCTURE: CPT

## 2023-10-02 PROCEDURE — 93010 ELECTROCARDIOGRAM REPORT: CPT | Performed by: INTERNAL MEDICINE

## 2023-10-02 PROCEDURE — 86900 BLOOD TYPING SEROLOGIC ABO: CPT

## 2023-10-02 PROCEDURE — 93005 ELECTROCARDIOGRAM TRACING: CPT

## 2023-10-02 PROCEDURE — 85025 COMPLETE CBC W/AUTO DIFF WBC: CPT

## 2023-10-02 PROCEDURE — 80053 COMPREHEN METABOLIC PANEL: CPT

## 2023-10-02 RX ORDER — FENOFIBRATE 48 MG/1
48 TABLET, COATED ORAL DAILY
COMMUNITY
End: 2023-10-19

## 2023-10-02 RX ORDER — METOPROLOL TARTRATE 100 MG/1
100 TABLET ORAL 2 TIMES DAILY
COMMUNITY

## 2023-10-02 RX ORDER — NITROGLYCERIN 0.4 MG/1
0.4 TABLET SUBLINGUAL EVERY 5 MIN PRN
COMMUNITY

## 2023-10-03 LAB
CREAT BLD-MCNC: 1.4 MG/DL
EGFRCR SERPLBLD CKD-EPI 2021: 38 ML/MIN/1.73M2 (ref 60–?)

## 2023-10-03 RX ORDER — PREDNISONE 50 MG/1
50 TABLET ORAL DAILY
COMMUNITY
End: 2023-10-19

## 2023-10-03 NOTE — PAT NURSING NOTE
PAT nursing note: patient denies taking ASA, ACEs, ARBs or other anti-coagulants; no PPM, ICD or spinal cord stimulator; daughter/patient verbalized understanding of all instructions. Pre-Surgical Instructions for 10/9/23      Pre-Surgical Testing:     -You are scheduled for a COVID-19 test on Saturday, 10/7 at 2:00 pm at the [facility] located [address]/[facility].   -If the COVID-19 test is refused or is positive, the surgery will be cancelled. Visitor Instructions    -Visitors are welcome to accompany you the day of surgery and may visit with you from 7:00 am-8:00 pm during your hospital inpatient stay. Pre-Op Instructions    Scheduled Surgery: You are scheduled for Vascular Surgery with Dr. Dianna Mcghee      Date of Procedure: Monday, 10/09/23      Diet Instructions:     -Do not eat or drink after 10:00 pm the evening prior to surgery. This includes water, gum, candy and food. Medication Instructions:     Continue to take all of your prescribed medications EXCEPT:    -The morning of surgery only take your Prednisone, Benadryl, Metoprolol and Isosorbide with a sip of water.   -You can take Tylenol as needed for pain relief. Medications to Stop:     -The last dose of Plavix is 10/2  -The last dose of Metformin will be 10/4 evening dose.  -Stop all vitamins, supplements, herbal products and NSAID's (ex. Ibuprofen, Excederin, Aleve, Diclofenac, and any gels having steroids) starting today, 10/3. Contrast Allergy:     -Please take your Prednisone and Benadryl medication as instructed prior to surgery.  -Contact your pharmacy to make sure the prescriptions were called in. If the prescriptions have NOT been called in, please contact your surgeon. Diabetic Instructions:     -The last dose of Metformin will be 10/4 evening dose. -Continue to monitor you glucose levels and contact your physician with any questions or concerns.    -If you wear a CGM (continuous glucose monitor), you will need to remove the entire device (sensor/transmitter) and leave at home prior to your procedure. Skin Prep:     -Shower with Dial Soap the morning of your surgery (or any antibacterial soap). -You can wear deodorant but no make-up, lotions, powder, or perfume. Time of Arrival: 06:30 am    -This is going to be a tentative time of arrival for surgery.   -We will call you the buisness day prior to your surgery in the late afternoon to reconfirm your arrival.   -Please check your voicemail for a message. Admit/Discharge Status:     -You will be admitted to the hospital after surgery. Discharge Teaching:     -Your nurse will give you specific instructions before discharge.  -Any questions, please call the surgeon's office. Additional Instructions:     -Bring insurance card(s) and picture ID.  -Leave all valuables at home, including jewelry.  -Wear comfortable clothing.  -No contacts, wear glasses and bring eye glass case. -Bring brace/immobilizer/crutches/walker.  -You'll receive arrival time 1 business day prior to scheduled surgery. Park in the [place] parking garage at [facility], [address]. [address], [place]. -Check in at the [facility] [employer] reception desk in the [place]. -Our  will be there to check you in for your procedure.   -Complimentary  parking is available starting at 6:00 am.  -Wheelchairs are available at all entrances. If you are scheduled to arrive early for 5:30 am, the [employer] reception desk does not open till 5:30 am. It may be dark, but you are in the correct location. We are open M-F from 8am- 5pm. We are closed on holidays and weekends. We can be reached at [phone].        Thank you

## 2023-10-07 ENCOUNTER — LAB ENCOUNTER (OUTPATIENT)
Dept: LAB | Age: 78
End: 2023-10-07
Attending: SURGERY
Payer: MEDICARE

## 2023-10-07 DIAGNOSIS — Z01.818 PRE-OP TESTING: ICD-10-CM

## 2023-10-07 DIAGNOSIS — I71.60: ICD-10-CM

## 2023-10-07 PROCEDURE — 87635 SARS-COV-2 COVID-19 AMP PRB: CPT

## 2023-10-08 ENCOUNTER — ANESTHESIA EVENT (OUTPATIENT)
Dept: CARDIAC SURGERY | Facility: HOSPITAL | Age: 78
End: 2023-10-08
Payer: MEDICARE

## 2023-10-08 LAB — SARS-COV-2 RNA RESP QL NAA+PROBE: NOT DETECTED

## 2023-10-09 ENCOUNTER — ANESTHESIA (OUTPATIENT)
Dept: CARDIAC SURGERY | Facility: HOSPITAL | Age: 78
End: 2023-10-09
Payer: MEDICARE

## 2023-10-09 ENCOUNTER — HOSPITAL ENCOUNTER (INPATIENT)
Facility: HOSPITAL | Age: 78
LOS: 10 days | Discharge: HOME HEALTH | DRG: 220 | End: 2023-10-19
Attending: SURGERY | Admitting: SURGERY

## 2023-10-09 DIAGNOSIS — Z91.89 AT HIGH RISK FOR BLEEDING: ICD-10-CM

## 2023-10-09 DIAGNOSIS — I71.60: Primary | ICD-10-CM

## 2023-10-09 DIAGNOSIS — Z01.818 PRE-OP TESTING: ICD-10-CM

## 2023-10-09 LAB
ALBUMIN SERPL-MCNC: 2.6 G/DL (ref 3.4–5)
ALBUMIN/GLOB SERPL: 0.9 {RATIO} (ref 1–2)
ALP LIVER SERPL-CCNC: 90 U/L
ALT SERPL-CCNC: 13 U/L
ANION GAP SERPL CALC-SCNC: 10 MMOL/L (ref 0–18)
APTT PPP: 34.1 SECONDS (ref 23.3–35.6)
AST SERPL-CCNC: 12 U/L (ref 15–37)
BASE EXCESS BLD CALC-SCNC: -2 MMOL/L
BASE EXCESS BLD CALC-SCNC: -2 MMOL/L
BASE EXCESS BLD CALC-SCNC: 1 MMOL/L
BASE EXCESS BLDA CALC-SCNC: -1.2 MMOL/L (ref ?–2)
BASE EXCESS BLDA CALC-SCNC: -3.8 MMOL/L (ref ?–2)
BASOPHILS # BLD: 0.22 X10(3) UL (ref 0–0.2)
BASOPHILS NFR BLD: 1 %
BILIRUB SERPL-MCNC: 0.2 MG/DL (ref 0.1–2)
BODY TEMPERATURE: 98.6 F
BODY TEMPERATURE: 98.6 F
BUN BLD-MCNC: 27 MG/DL (ref 7–18)
CA-I BLD-SCNC: 1.1 MMOL/L (ref 0.95–1.32)
CA-I BLD-SCNC: 1.1 MMOL/L (ref 1.12–1.32)
CA-I BLD-SCNC: 1.12 MMOL/L (ref 0.95–1.32)
CA-I BLD-SCNC: 1.12 MMOL/L (ref 1.12–1.32)
CA-I BLD-SCNC: 1.17 MMOL/L (ref 1.12–1.32)
CALCIUM BLD-MCNC: 7.8 MG/DL (ref 8.5–10.1)
CHLORIDE SERPL-SCNC: 110 MMOL/L (ref 98–112)
CO2 BLD-SCNC: 23 MMOL/L (ref 22–32)
CO2 BLD-SCNC: 23 MMOL/L (ref 22–32)
CO2 BLD-SCNC: 28 MMOL/L (ref 22–32)
CO2 SERPL-SCNC: 20 MMOL/L (ref 21–32)
COHGB MFR BLD: 1.5 % SAT (ref 0–3)
COHGB MFR BLD: 1.6 % SAT (ref 0–3)
CREAT BLD-MCNC: 1.68 MG/DL
EGFRCR SERPLBLD CKD-EPI 2021: 31 ML/MIN/1.73M2 (ref 60–?)
EOSINOPHIL # BLD: 0 X10(3) UL (ref 0–0.7)
EOSINOPHIL NFR BLD: 0 %
ERYTHROCYTE [DISTWIDTH] IN BLOOD BY AUTOMATED COUNT: 14.2 %
FIO2: 36 %
GLOBULIN PLAS-MCNC: 2.8 G/DL (ref 2.8–4.4)
GLUCOSE BLD-MCNC: 232 MG/DL (ref 70–99)
GLUCOSE BLD-MCNC: 235 MG/DL (ref 70–99)
GLUCOSE BLD-MCNC: 263 MG/DL (ref 70–99)
GLUCOSE BLD-MCNC: 280 MG/DL (ref 70–99)
GLUCOSE BLD-MCNC: 292 MG/DL (ref 70–99)
GLUCOSE BLD-MCNC: 312 MG/DL (ref 70–99)
GLUCOSE BLD-MCNC: 387 MG/DL (ref 70–99)
GLUCOSE BLD-MCNC: 453 MG/DL (ref 70–99)
HCO3 BLD-SCNC: 21.7 MEQ/L
HCO3 BLD-SCNC: 22 MEQ/L
HCO3 BLD-SCNC: 26.3 MEQ/L
HCO3 BLDA-SCNC: 22 MEQ/L (ref 21–27)
HCO3 BLDA-SCNC: 24 MEQ/L (ref 21–27)
HCT VFR BLD AUTO: 32.4 %
HCT VFR BLD AUTO: 36.8 %
HCT VFR BLD CALC: 29 %
HCT VFR BLD CALC: 29 %
HCT VFR BLD CALC: 32 %
HGB BLD-MCNC: 10.4 G/DL
HGB BLD-MCNC: 10.6 G/DL
HGB BLD-MCNC: 11.2 G/DL
HGB BLD-MCNC: 12 G/DL
INR BLD: 1.27 (ref 0.85–1.16)
ISTAT ACTIVATED CLOTTING TIME: 113 SECONDS (ref 74–137)
ISTAT ACTIVATED CLOTTING TIME: 143 SECONDS (ref 74–137)
ISTAT ACTIVATED CLOTTING TIME: 215 SECONDS (ref 74–137)
ISTAT ACTIVATED CLOTTING TIME: 245 SECONDS (ref 74–137)
ISTAT ACTIVATED CLOTTING TIME: 263 SECONDS (ref 74–137)
ISTAT ACTIVATED CLOTTING TIME: 275 SECONDS (ref 74–137)
ISTAT ACTIVATED CLOTTING TIME: 293 SECONDS (ref 74–137)
L/M: 2 L/MIN
L/M: 4 L/MIN
LACTATE BLD-SCNC: 2.3 MMOL/L (ref 0.5–2)
LACTATE BLD-SCNC: 4 MMOL/L (ref 0.5–2)
LACTATE SERPL-SCNC: 5.3 MMOL/L (ref 0.4–2)
LYMPHOCYTES NFR BLD: 0.89 X10(3) UL (ref 1–4)
LYMPHOCYTES NFR BLD: 4 %
MCH RBC QN AUTO: 27.3 PG (ref 26–34)
MCHC RBC AUTO-ENTMCNC: 32.7 G/DL (ref 31–37)
MCV RBC AUTO: 83.5 FL
METAMYELOCYTES # BLD: 0.45 X10(3) UL
METAMYELOCYTES NFR BLD: 2 %
METHGB MFR BLD: 0 % SAT (ref 0.4–1.5)
METHGB MFR BLD: 0.2 % SAT (ref 0.4–1.5)
MONOCYTES # BLD: 0.22 X10(3) UL (ref 0.1–1)
MONOCYTES NFR BLD: 1 %
MORPHOLOGY: NORMAL
MRSA DNA SPEC QL NAA+PROBE: NEGATIVE
NEUTROPHILS # BLD AUTO: 19.1 X10 (3) UL (ref 1.5–7.7)
NEUTROPHILS NFR BLD: 87 %
NEUTS BAND NFR BLD: 5 %
NEUTS HYPERSEG # BLD: 20.52 X10(3) UL (ref 1.5–7.7)
OSMOLALITY SERPL CALC.SUM OF ELEC: 303 MOSM/KG (ref 275–295)
OXYHGB MFR BLDA: 96.9 % (ref 92–100)
OXYHGB MFR BLDA: 97.7 % (ref 92–100)
PCO2 BLD: 29 MMHG
PCO2 BLD: 30.7 MMHG
PCO2 BLD: 43.6 MMHG
PCO2 BLDA: 39 MM HG (ref 35–45)
PCO2 BLDA: 43 MM HG (ref 35–45)
PH BLD: 7.39 [PH]
PH BLD: 7.46 [PH]
PH BLD: 7.48 [PH]
PH BLDA: 7.35 [PH] (ref 7.35–7.45)
PH BLDA: 7.36 [PH] (ref 7.35–7.45)
PLATELET # BLD AUTO: 328 10(3)UL (ref 150–450)
PLATELET MORPHOLOGY: NORMAL
PO2 BLD: 168 MMHG
PO2 BLD: 194 MMHG
PO2 BLD: 396 MMHG
PO2 BLDA: 88 MM HG (ref 80–100)
PO2 BLDA: 93 MM HG (ref 80–100)
POTASSIUM BLD-SCNC: 2.8 MMOL/L (ref 3.6–5.1)
POTASSIUM BLD-SCNC: 3.2 MMOL/L (ref 3.6–5.1)
POTASSIUM BLD-SCNC: 3.3 MMOL/L (ref 3.6–5.1)
POTASSIUM BLD-SCNC: 3.4 MMOL/L (ref 3.6–5.1)
POTASSIUM BLD-SCNC: 3.6 MMOL/L (ref 3.6–5.1)
POTASSIUM SERPL-SCNC: 3.2 MMOL/L (ref 3.5–5.1)
PROT SERPL-MCNC: 5.4 G/DL (ref 6.4–8.2)
PROTHROMBIN TIME: 15.9 SECONDS (ref 11.6–14.8)
RBC # BLD AUTO: 3.88 X10(6)UL
SAO2 % BLD: 100 %
SODIUM BLD-SCNC: 136 MMOL/L (ref 135–145)
SODIUM BLD-SCNC: 137 MMOL/L (ref 136–145)
SODIUM BLD-SCNC: 137 MMOL/L (ref 136–145)
SODIUM BLD-SCNC: 138 MMOL/L (ref 135–145)
SODIUM BLD-SCNC: 138 MMOL/L (ref 136–145)
SODIUM SERPL-SCNC: 140 MMOL/L (ref 136–145)
TOTAL CELLS COUNTED BLD: 100
WBC # BLD AUTO: 22.3 X10(3) UL (ref 4–11)

## 2023-10-09 PROCEDURE — B440ZZ3 ULTRASONOGRAPHY OF ABDOMINAL AORTA, INTRAVASCULAR: ICD-10-PCS | Performed by: SURGERY

## 2023-10-09 PROCEDURE — 85730 THROMBOPLASTIN TIME PARTIAL: CPT | Performed by: SURGERY

## 2023-10-09 PROCEDURE — 85018 HEMOGLOBIN: CPT | Performed by: SURGERY

## 2023-10-09 PROCEDURE — B44HZZ3 ULTRASONOGRAPHY OF BILATERAL LOWER EXTREMITY ARTERIES, INTRAVASCULAR: ICD-10-PCS | Performed by: SURGERY

## 2023-10-09 PROCEDURE — 82330 ASSAY OF CALCIUM: CPT

## 2023-10-09 PROCEDURE — 84295 ASSAY OF SERUM SODIUM: CPT | Performed by: SURGERY

## 2023-10-09 PROCEDURE — 009U30Z DRAINAGE OF SPINAL CANAL WITH DRAINAGE DEVICE, PERCUTANEOUS APPROACH: ICD-10-PCS | Performed by: SURGERY

## 2023-10-09 PROCEDURE — 85014 HEMATOCRIT: CPT

## 2023-10-09 PROCEDURE — B31P1ZZ FLUOROSCOPY OF THORACO-ABDOMINAL AORTA USING LOW OSMOLAR CONTRAST: ICD-10-PCS | Performed by: SURGERY

## 2023-10-09 PROCEDURE — 82962 GLUCOSE BLOOD TEST: CPT

## 2023-10-09 PROCEDURE — B41CYZZ FLUOROSCOPY OF PELVIC ARTERIES USING OTHER CONTRAST: ICD-10-PCS | Performed by: SURGERY

## 2023-10-09 PROCEDURE — 82330 ASSAY OF CALCIUM: CPT | Performed by: SURGERY

## 2023-10-09 PROCEDURE — 83050 HGB METHEMOGLOBIN QUAN: CPT | Performed by: SURGERY

## 2023-10-09 PROCEDURE — 85025 COMPLETE CBC W/AUTO DIFF WBC: CPT | Performed by: SURGERY

## 2023-10-09 PROCEDURE — 83605 ASSAY OF LACTIC ACID: CPT | Performed by: SURGERY

## 2023-10-09 PROCEDURE — 82803 BLOOD GASES ANY COMBINATION: CPT

## 2023-10-09 PROCEDURE — 84295 ASSAY OF SERUM SODIUM: CPT

## 2023-10-09 PROCEDURE — 85347 COAGULATION TIME ACTIVATED: CPT

## 2023-10-09 PROCEDURE — 02VW3FZ RESTRICTION OF THORACIC AORTA, DESCENDING WITH BRANCHED OR FENESTRATED INTRALUMINAL DEVICE, THREE OR MORE ARTERIES, PERCUTANEOUS APPROACH: ICD-10-PCS | Performed by: SURGERY

## 2023-10-09 PROCEDURE — 36430 TRANSFUSION BLD/BLD COMPNT: CPT

## 2023-10-09 PROCEDURE — 80053 COMPREHEN METABOLIC PANEL: CPT | Performed by: SURGERY

## 2023-10-09 PROCEDURE — 04793DZ DILATION OF RIGHT RENAL ARTERY WITH INTRALUMINAL DEVICE, PERCUTANEOUS APPROACH: ICD-10-PCS | Performed by: SURGERY

## 2023-10-09 PROCEDURE — 84132 ASSAY OF SERUM POTASSIUM: CPT

## 2023-10-09 PROCEDURE — 85014 HEMATOCRIT: CPT | Performed by: SURGERY

## 2023-10-09 PROCEDURE — 85007 BL SMEAR W/DIFF WBC COUNT: CPT | Performed by: SURGERY

## 2023-10-09 PROCEDURE — 82375 ASSAY CARBOXYHB QUANT: CPT | Performed by: SURGERY

## 2023-10-09 PROCEDURE — 85610 PROTHROMBIN TIME: CPT | Performed by: SURGERY

## 2023-10-09 PROCEDURE — 83036 HEMOGLOBIN GLYCOSYLATED A1C: CPT | Performed by: HOSPITALIST

## 2023-10-09 PROCEDURE — 87641 MR-STAPH DNA AMP PROBE: CPT | Performed by: HOSPITALIST

## 2023-10-09 PROCEDURE — 84132 ASSAY OF SERUM POTASSIUM: CPT | Performed by: SURGERY

## 2023-10-09 PROCEDURE — 82803 BLOOD GASES ANY COMBINATION: CPT | Performed by: SURGERY

## 2023-10-09 PROCEDURE — 85027 COMPLETE CBC AUTOMATED: CPT | Performed by: SURGERY

## 2023-10-09 PROCEDURE — 047A3DZ DILATION OF LEFT RENAL ARTERY WITH INTRALUMINAL DEVICE, PERCUTANEOUS APPROACH: ICD-10-PCS | Performed by: SURGERY

## 2023-10-09 PROCEDURE — 30233N1 TRANSFUSION OF NONAUTOLOGOUS RED BLOOD CELLS INTO PERIPHERAL VEIN, PERCUTANEOUS APPROACH: ICD-10-PCS | Performed by: SURGERY

## 2023-10-09 DEVICE — VIABAHN BX BALLOON EXP ENDO 7MMX29MM 7FR 135CMCATH HEPARIN
Type: IMPLANTABLE DEVICE | Status: FUNCTIONAL
Brand: GORE VIABAHN VBX BALLOON EXPANDABLE ENDO

## 2023-10-09 DEVICE — VIABAHN BX BALLOON EXP ENDO 10MMX29MM 8FR135CMCATH HEPARIN
Type: IMPLANTABLE DEVICE | Status: FUNCTIONAL
Brand: GORE VIABAHN VBX BALLOON EXPANDABLE ENDO

## 2023-10-09 DEVICE — PERCLOSE PROGLIDE™ SUTURE-MEDIATED CLOSURE SYSTEM
Type: IMPLANTABLE DEVICE | Status: FUNCTIONAL
Brand: PERCLOSE PROGLIDE™

## 2023-10-09 DEVICE — VIABAHN BX BALLOON EXP ENDO 5MMX29MM 7FR 135CMCATH HEPARIN
Type: IMPLANTABLE DEVICE | Status: FUNCTIONAL
Brand: GORE VIABAHN VBX BALLOON EXPANDABLE ENDO

## 2023-10-09 DEVICE — ICAST COVERED STENT, 6MMX22MMX120CM
Type: IMPLANTABLE DEVICE | Status: FUNCTIONAL
Brand: ICAST

## 2023-10-09 RX ORDER — PROTAMINE SULFATE 10 MG/ML
INJECTION, SOLUTION INTRAVENOUS AS NEEDED
Status: DISCONTINUED | OUTPATIENT
Start: 2023-10-09 | End: 2023-10-09 | Stop reason: SURG

## 2023-10-09 RX ORDER — PANTOPRAZOLE SODIUM 40 MG/1
40 TABLET, DELAYED RELEASE ORAL
Status: DISCONTINUED | OUTPATIENT
Start: 2023-10-09 | End: 2023-10-19

## 2023-10-09 RX ORDER — NICOTINE POLACRILEX 4 MG
30 LOZENGE BUCCAL
Status: DISCONTINUED | OUTPATIENT
Start: 2023-10-09 | End: 2023-10-19

## 2023-10-09 RX ORDER — ASPIRIN 81 MG/1
81 TABLET, CHEWABLE ORAL DAILY
Status: DISCONTINUED | OUTPATIENT
Start: 2023-10-09 | End: 2023-10-19

## 2023-10-09 RX ORDER — METOPROLOL TARTRATE 100 MG/1
100 TABLET ORAL 2 TIMES DAILY
Status: DISCONTINUED | OUTPATIENT
Start: 2023-10-09 | End: 2023-10-19

## 2023-10-09 RX ORDER — ACETAMINOPHEN 325 MG/1
650 TABLET ORAL EVERY 4 HOURS PRN
Status: DISCONTINUED | OUTPATIENT
Start: 2023-10-09 | End: 2023-10-13

## 2023-10-09 RX ORDER — DEXTROSE MONOHYDRATE 25 G/50ML
50 INJECTION, SOLUTION INTRAVENOUS
Status: DISCONTINUED | OUTPATIENT
Start: 2023-10-09 | End: 2023-10-19

## 2023-10-09 RX ORDER — ONDANSETRON 2 MG/ML
INJECTION INTRAMUSCULAR; INTRAVENOUS AS NEEDED
Status: DISCONTINUED | OUTPATIENT
Start: 2023-10-09 | End: 2023-10-09 | Stop reason: SURG

## 2023-10-09 RX ORDER — MORPHINE SULFATE 2 MG/ML
1 INJECTION, SOLUTION INTRAMUSCULAR; INTRAVENOUS EVERY 2 HOUR PRN
Status: DISCONTINUED | OUTPATIENT
Start: 2023-10-09 | End: 2023-10-19

## 2023-10-09 RX ORDER — SODIUM CHLORIDE 9 MG/ML
INJECTION, SOLUTION INTRAVENOUS ONCE
Status: COMPLETED | OUTPATIENT
Start: 2023-10-09 | End: 2023-10-09

## 2023-10-09 RX ORDER — CEFAZOLIN SODIUM/WATER 2 G/20 ML
SYRINGE (ML) INTRAVENOUS AS NEEDED
Status: DISCONTINUED | OUTPATIENT
Start: 2023-10-09 | End: 2023-10-09 | Stop reason: SURG

## 2023-10-09 RX ORDER — SODIUM CHLORIDE, SODIUM LACTATE, POTASSIUM CHLORIDE, CALCIUM CHLORIDE 600; 310; 30; 20 MG/100ML; MG/100ML; MG/100ML; MG/100ML
INJECTION, SOLUTION INTRAVENOUS CONTINUOUS
Status: DISCONTINUED | OUTPATIENT
Start: 2023-10-09 | End: 2023-10-14

## 2023-10-09 RX ORDER — SODIUM CHLORIDE, SODIUM LACTATE, POTASSIUM CHLORIDE, CALCIUM CHLORIDE 600; 310; 30; 20 MG/100ML; MG/100ML; MG/100ML; MG/100ML
INJECTION, SOLUTION INTRAVENOUS CONTINUOUS
Status: DISCONTINUED | OUTPATIENT
Start: 2023-10-09 | End: 2023-10-10

## 2023-10-09 RX ORDER — MORPHINE SULFATE 2 MG/ML
2 INJECTION, SOLUTION INTRAMUSCULAR; INTRAVENOUS EVERY 2 HOUR PRN
Status: DISCONTINUED | OUTPATIENT
Start: 2023-10-09 | End: 2023-10-19

## 2023-10-09 RX ORDER — BUPIVACAINE HYDROCHLORIDE 5 MG/ML
INJECTION, SOLUTION EPIDURAL; INTRACAUDAL AS NEEDED
Status: DISCONTINUED | OUTPATIENT
Start: 2023-10-09 | End: 2023-10-09 | Stop reason: HOSPADM

## 2023-10-09 RX ORDER — KETAMINE HYDROCHLORIDE 50 MG/ML
INJECTION, SOLUTION, CONCENTRATE INTRAMUSCULAR; INTRAVENOUS AS NEEDED
Status: DISCONTINUED | OUTPATIENT
Start: 2023-10-09 | End: 2023-10-09 | Stop reason: SURG

## 2023-10-09 RX ORDER — DEXAMETHASONE SODIUM PHOSPHATE 4 MG/ML
VIAL (ML) INJECTION AS NEEDED
Status: DISCONTINUED | OUTPATIENT
Start: 2023-10-09 | End: 2023-10-09 | Stop reason: SURG

## 2023-10-09 RX ORDER — LIDOCAINE HYDROCHLORIDE 40 MG/ML
SOLUTION TOPICAL AS NEEDED
Status: DISCONTINUED | OUTPATIENT
Start: 2023-10-09 | End: 2023-10-09 | Stop reason: SURG

## 2023-10-09 RX ORDER — SODIUM CHLORIDE, SODIUM LACTATE, POTASSIUM CHLORIDE, CALCIUM CHLORIDE 600; 310; 30; 20 MG/100ML; MG/100ML; MG/100ML; MG/100ML
INJECTION, SOLUTION INTRAVENOUS CONTINUOUS PRN
Status: DISCONTINUED | OUTPATIENT
Start: 2023-10-09 | End: 2023-10-09 | Stop reason: SURG

## 2023-10-09 RX ORDER — POTASSIUM CHLORIDE 20 MEQ/1
40 TABLET, EXTENDED RELEASE ORAL EVERY 4 HOURS
Status: DISPENSED | OUTPATIENT
Start: 2023-10-09 | End: 2023-10-10

## 2023-10-09 RX ORDER — HEPARIN SODIUM 5000 [USP'U]/ML
5000 INJECTION, SOLUTION INTRAVENOUS; SUBCUTANEOUS EVERY 8 HOURS SCHEDULED
Status: DISCONTINUED | OUTPATIENT
Start: 2023-10-09 | End: 2023-10-19

## 2023-10-09 RX ORDER — HYDROMORPHONE HYDROCHLORIDE 1 MG/ML
0.4 INJECTION, SOLUTION INTRAMUSCULAR; INTRAVENOUS; SUBCUTANEOUS EVERY 5 MIN PRN
Status: ACTIVE | OUTPATIENT
Start: 2023-10-09 | End: 2023-10-09

## 2023-10-09 RX ORDER — SODIUM CHLORIDE 9 MG/ML
INJECTION, SOLUTION INTRAVENOUS CONTINUOUS PRN
Status: DISCONTINUED | OUTPATIENT
Start: 2023-10-09 | End: 2023-10-09 | Stop reason: SURG

## 2023-10-09 RX ORDER — NICOTINE POLACRILEX 4 MG
15 LOZENGE BUCCAL
Status: DISCONTINUED | OUTPATIENT
Start: 2023-10-09 | End: 2023-10-19

## 2023-10-09 RX ORDER — TRAMADOL HYDROCHLORIDE 50 MG/1
50 TABLET ORAL EVERY 12 HOURS PRN
Status: DISCONTINUED | OUTPATIENT
Start: 2023-10-09 | End: 2023-10-13

## 2023-10-09 RX ORDER — ROCURONIUM BROMIDE 10 MG/ML
INJECTION, SOLUTION INTRAVENOUS AS NEEDED
Status: DISCONTINUED | OUTPATIENT
Start: 2023-10-09 | End: 2023-10-09 | Stop reason: SURG

## 2023-10-09 RX ORDER — EZETIMIBE 10 MG/1
10 TABLET ORAL NIGHTLY
Status: DISCONTINUED | OUTPATIENT
Start: 2023-10-09 | End: 2023-10-19

## 2023-10-09 RX ORDER — NALOXONE HYDROCHLORIDE 0.4 MG/ML
80 INJECTION, SOLUTION INTRAMUSCULAR; INTRAVENOUS; SUBCUTANEOUS AS NEEDED
Status: ACTIVE | OUTPATIENT
Start: 2023-10-09 | End: 2023-10-09

## 2023-10-09 RX ORDER — CEFAZOLIN SODIUM/WATER 2 G/20 ML
2 SYRINGE (ML) INTRAVENOUS EVERY 8 HOURS
Qty: 40 ML | Refills: 0 | Status: COMPLETED | OUTPATIENT
Start: 2023-10-09 | End: 2023-10-10

## 2023-10-09 RX ORDER — MORPHINE SULFATE 4 MG/ML
4 INJECTION, SOLUTION INTRAMUSCULAR; INTRAVENOUS EVERY 2 HOUR PRN
Status: DISCONTINUED | OUTPATIENT
Start: 2023-10-09 | End: 2023-10-19

## 2023-10-09 RX ORDER — IODIXANOL 320 MG/ML
100 INJECTION, SOLUTION INTRAVASCULAR
Status: DISCONTINUED | OUTPATIENT
Start: 2023-10-09 | End: 2023-10-19

## 2023-10-09 RX ORDER — ONDANSETRON 2 MG/ML
4 INJECTION INTRAMUSCULAR; INTRAVENOUS EVERY 6 HOURS PRN
Status: DISCONTINUED | OUTPATIENT
Start: 2023-10-09 | End: 2023-10-19

## 2023-10-09 RX ORDER — FENOFIBRATE 67 MG/1
67 CAPSULE ORAL
Status: DISCONTINUED | OUTPATIENT
Start: 2023-10-10 | End: 2023-10-19

## 2023-10-09 RX ORDER — CLOPIDOGREL BISULFATE 75 MG/1
75 TABLET ORAL DAILY
Status: DISCONTINUED | OUTPATIENT
Start: 2023-10-09 | End: 2023-10-09

## 2023-10-09 RX ORDER — FENOFIBRATE 48 MG/1
48 TABLET, COATED ORAL DAILY
Status: DISCONTINUED | OUTPATIENT
Start: 2023-10-09 | End: 2023-10-09

## 2023-10-09 RX ORDER — ONDANSETRON 2 MG/ML
4 INJECTION INTRAMUSCULAR; INTRAVENOUS ONCE AS NEEDED
Status: ACTIVE | OUTPATIENT
Start: 2023-10-09 | End: 2023-10-09

## 2023-10-09 RX ORDER — HEPARIN SODIUM 1000 [USP'U]/ML
INJECTION, SOLUTION INTRAVENOUS; SUBCUTANEOUS AS NEEDED
Status: DISCONTINUED | OUTPATIENT
Start: 2023-10-09 | End: 2023-10-09 | Stop reason: SURG

## 2023-10-09 RX ORDER — NITROGLYCERIN 5 MG/ML
INJECTION, SOLUTION INTRAVENOUS AS NEEDED
Status: DISCONTINUED | OUTPATIENT
Start: 2023-10-09 | End: 2023-10-09 | Stop reason: SURG

## 2023-10-09 RX ORDER — METOCLOPRAMIDE HYDROCHLORIDE 5 MG/ML
5 INJECTION INTRAMUSCULAR; INTRAVENOUS EVERY 8 HOURS PRN
Status: DISCONTINUED | OUTPATIENT
Start: 2023-10-09 | End: 2023-10-19

## 2023-10-09 RX ORDER — TRAMADOL HYDROCHLORIDE 50 MG/1
100 TABLET ORAL EVERY 12 HOURS PRN
Status: DISCONTINUED | OUTPATIENT
Start: 2023-10-09 | End: 2023-10-13

## 2023-10-09 RX ADMIN — NITROGLYCERIN 100 MCG: 5 INJECTION, SOLUTION INTRAVENOUS at 11:13:00

## 2023-10-09 RX ADMIN — ROCURONIUM BROMIDE 50 MG: 10 INJECTION, SOLUTION INTRAVENOUS at 08:35:00

## 2023-10-09 RX ADMIN — CEFAZOLIN SODIUM/WATER 2 G: 2 G/20 ML SYRINGE (ML) INTRAVENOUS at 12:34:00

## 2023-10-09 RX ADMIN — SODIUM CHLORIDE, SODIUM LACTATE, POTASSIUM CHLORIDE, CALCIUM CHLORIDE: 600; 310; 30; 20 INJECTION, SOLUTION INTRAVENOUS at 11:51:00

## 2023-10-09 RX ADMIN — HEPARIN SODIUM 2000 UNITS: 1000 INJECTION, SOLUTION INTRAVENOUS; SUBCUTANEOUS at 11:26:00

## 2023-10-09 RX ADMIN — KETAMINE HYDROCHLORIDE 10 MG: 50 INJECTION, SOLUTION, CONCENTRATE INTRAMUSCULAR; INTRAVENOUS at 08:26:00

## 2023-10-09 RX ADMIN — NITROGLYCERIN 100 MCG: 5 INJECTION, SOLUTION INTRAVENOUS at 10:26:00

## 2023-10-09 RX ADMIN — NITROGLYCERIN 100 MCG: 5 INJECTION, SOLUTION INTRAVENOUS at 10:16:00

## 2023-10-09 RX ADMIN — ONDANSETRON 4 MG: 2 INJECTION INTRAMUSCULAR; INTRAVENOUS at 13:00:00

## 2023-10-09 RX ADMIN — HEPARIN SODIUM 3000 UNITS: 1000 INJECTION, SOLUTION INTRAVENOUS; SUBCUTANEOUS at 10:02:00

## 2023-10-09 RX ADMIN — ROCURONIUM BROMIDE 20 MG: 10 INJECTION, SOLUTION INTRAVENOUS at 09:49:00

## 2023-10-09 RX ADMIN — HEPARIN SODIUM 8000 UNITS: 1000 INJECTION, SOLUTION INTRAVENOUS; SUBCUTANEOUS at 09:31:00

## 2023-10-09 RX ADMIN — CEFAZOLIN SODIUM/WATER 2 G: 2 G/20 ML SYRINGE (ML) INTRAVENOUS at 08:41:00

## 2023-10-09 RX ADMIN — HEPARIN SODIUM 2000 UNITS: 1000 INJECTION, SOLUTION INTRAVENOUS; SUBCUTANEOUS at 12:36:00

## 2023-10-09 RX ADMIN — LIDOCAINE HYDROCHLORIDE 4 ML: 40 SOLUTION TOPICAL at 08:35:00

## 2023-10-09 RX ADMIN — SODIUM CHLORIDE: 9 INJECTION, SOLUTION INTRAVENOUS at 08:39:00

## 2023-10-09 RX ADMIN — DEXAMETHASONE SODIUM PHOSPHATE 8 MG: 4 MG/ML VIAL (ML) INJECTION at 08:57:00

## 2023-10-09 RX ADMIN — PROTAMINE SULFATE 40 MG: 10 INJECTION, SOLUTION INTRAVENOUS at 13:10:00

## 2023-10-09 RX ADMIN — NITROGLYCERIN 50 MCG: 5 INJECTION, SOLUTION INTRAVENOUS at 10:09:00

## 2023-10-09 RX ADMIN — HEPARIN SODIUM 2000 UNITS: 1000 INJECTION, SOLUTION INTRAVENOUS; SUBCUTANEOUS at 12:06:00

## 2023-10-09 RX ADMIN — NITROGLYCERIN 100 MCG: 5 INJECTION, SOLUTION INTRAVENOUS at 11:36:00

## 2023-10-09 RX ADMIN — SODIUM CHLORIDE: 9 INJECTION, SOLUTION INTRAVENOUS at 08:24:00

## 2023-10-09 RX ADMIN — SODIUM CHLORIDE: 9 INJECTION, SOLUTION INTRAVENOUS at 13:05:00

## 2023-10-09 RX ADMIN — CEFAZOLIN SODIUM/WATER: 2 G/20 ML SYRINGE (ML) INTRAVENOUS at 14:30:00

## 2023-10-09 RX ADMIN — HEPARIN SODIUM 3000 UNITS: 1000 INJECTION, SOLUTION INTRAVENOUS; SUBCUTANEOUS at 09:41:00

## 2023-10-09 RX ADMIN — NITROGLYCERIN 150 MCG: 5 INJECTION, SOLUTION INTRAVENOUS at 10:18:00

## 2023-10-09 RX ADMIN — NITROGLYCERIN 100 MCG: 5 INJECTION, SOLUTION INTRAVENOUS at 10:21:00

## 2023-10-09 RX ADMIN — PROTAMINE SULFATE 10 MG: 10 INJECTION, SOLUTION INTRAVENOUS at 13:09:00

## 2023-10-09 RX ADMIN — ROCURONIUM BROMIDE 20 MG: 10 INJECTION, SOLUTION INTRAVENOUS at 09:13:00

## 2023-10-09 RX ADMIN — ROCURONIUM BROMIDE 10 MG: 10 INJECTION, SOLUTION INTRAVENOUS at 11:13:00

## 2023-10-09 RX ADMIN — SODIUM CHLORIDE: 9 INJECTION, SOLUTION INTRAVENOUS at 14:30:00

## 2023-10-09 NOTE — ANESTHESIA PROCEDURE NOTES
Airway  Date/Time: 10/9/2023 8:37 AM  Urgency: elective      General Information and Staff    Patient location during procedure: OR  Anesthesiologist: Lori Mathias MD  Performed: anesthesiologist   Performed by: Lori Mathias MD  Authorized by: Lori Mathias MD      Indications and Patient Condition  Indications for airway management: anesthesia  Spontaneous Ventilation: absent  Sedation level: deep  Preoxygenated: yes  Patient position: sniffing  Mask difficulty assessment: 2 - vent by mask + OA or adjuvant +/- NMBA    Final Airway Details  Final airway type: endotracheal airway      Successful airway: ETT  Cuffed: yes   Successful intubation technique: Video laryngoscopy  Endotracheal tube insertion site: oral  Blade: GlideScope  Blade size: #3  ETT size (mm): 7.5    Cormack-Lehane Classification: grade I - full view of glottis  Placement verified by: capnometry   Measured from: lips  ETT to lips (cm): 21  Number of attempts at approach: 1

## 2023-10-09 NOTE — ANESTHESIA PROCEDURE NOTES
Epidural Block    Date/Time: 10/9/2023 1:45 PM    Performed by: Barby Maravilla MD  Authorized by: Barby Maravilla MD    General Information and Staff    Start Time:  10/9/2023 1:45 PM  End Time:  10/9/2023 2:00 PM  Anesthesiologist:  Barby Maravilla MD  Performed by: Anesthesiologist  Patient Location:  OR  Reason for Block: at surgeon's request and post-op pain management    Preanesthetic Checklist: patient identified, IV checked, risks and benefits discussed, surgical consent, monitors and equipment checked, pre-op evaluation, timeout performed and anesthesia consent      Procedure Details    Patient Position:  Right lateral decubitus  Prep: ChloraPrep    Monitoring:  Heart rate, cardiac monitor and continuous pulse ox  Approach:  Midline  Location:  L 3-4    Needle and Epidural Catheter    Needle Type:  Tuohy  Epidural needle gauge: 14G. Needle Length:  3.5 in  Catheter Type:  End hole  Catheter at Skin Depth:  7.5    Assessment    Sensory Level:   Events: cerebrospinal fluid      Additional Comments     This was placement of a lumbar drain post procedure, post extubation. Uncomplicated, atraumatic placement of catheter. Clear CSF aspirated and draining. 10cc CSF initially removed. Sterile dressings applied, tube clamped and pt taken to CNICU.

## 2023-10-09 NOTE — ANESTHESIA PROCEDURE NOTES
Arterial Line    Date/Time: 10/9/2023 8:32 AM    Performed by: Cherry Manning MD  Authorized by: Cherry Manning MD    General Information and Staff    Procedure Start:  10/9/2023 8:32 AM  Procedure End:  10/9/2023 8:34 AM  Anesthesiologist:  Cherry Manning MD  Performed By:  Anesthesiologist  Patient Location:  OR  Indication: continuous blood pressure monitoring and blood sampling needed    Site Identification: real time ultrasound guided and image stored and retrievable    Preanesthetic Checklist: 2 patient identifiers, IV checked, risks and benefits discussed, monitors and equipment checked, pre-op evaluation, timeout performed, anesthesia consent and sterile technique used    Procedure Details    Catheter Size:  20 G  Catheter Length:  1 and 3/4 inch  Catheter Type:  Arrow  Seldinger Technique?: Yes    Laterality:  Left  Site:  Radial artery  Site Prep: chlorhexidine    Line Secured:  Wrist Brace, tape and Tegaderm    Assessment    Events: patient tolerated procedure well with no complications      Medications  10/9/2023 8:32 AM      Additional Comments

## 2023-10-09 NOTE — ANESTHESIA PROCEDURE NOTES
Peripheral IV  Date/Time: 10/9/2023 8:39 AM  Inserted by: Marie Denise MD    Placement  Needle size: 18 G  Location: antecubital  Local anesthetic: none  Site prep: alcohol  Technique: ultrasound guided  Attempts: 1

## 2023-10-09 NOTE — ADDENDUM NOTE
Addendum  created 10/09/23 1454 by Silvano Marina MD    Child order released for a procedure order, Clinical Note Signed, Delete clinical note, Intraprocedure Blocks edited, Intraprocedure Event edited, LDA created via procedure documentation, LDA updated via procedure documentation, Order Canceled from Note, SmartForm saved

## 2023-10-09 NOTE — ANESTHESIA PROCEDURE NOTES
Spinal Block    Date/Time: 10/9/2023 1:45 PM    Performed by: Lizbeth Mary MD  Authorized by: Lizbeth Mary MD      General Information and Staff    Start Time:  10/9/2023 1:45 PM  End Time:  10/9/2023 2:00 PM  Anesthesiologist:  Lizbeth Mary MD  Performed by: Anesthesiologist  Site identification: surface landmarks    Preanesthetic Checklist: patient identified, IV checked, risks and benefits discussed, monitors and equipment checked, pre-op evaluation, timeout performed, anesthesia consent and sterile technique used      Procedure Details    Patient Position:  Right lateral decubitus  Prep: ChloraPrep    Monitoring:  Cardiac monitor, heart rate and continuous pulse ox  Approach:  Midline  Location:  L3-4  Injection Technique:  Catheter    Needle    Needle Type:  Tuohy  Needle Gauge:  24 G (14G)  Needle Length:  3.5 in  Catheter at Skin Depth:  7.5    Assessment    Sensory Level:   Events: clear CSF, CSF aspirated, well tolerated and blood negative      Additional Comments     This was placement of a lumbar drain post procedure, post extubation. Uncomplicated, atraumatic placement of catheter. Clear CSF aspirated and draining. 10cc CSF initially removed. Sterile dressings applied, tube clamped and pt taken to CNICU.

## 2023-10-10 LAB
ANION GAP SERPL CALC-SCNC: 6 MMOL/L (ref 0–18)
APTT PPP: 23.8 SECONDS (ref 23.3–35.6)
BUN BLD-MCNC: 24 MG/DL (ref 7–18)
CALCIUM BLD-MCNC: 8.4 MG/DL (ref 8.5–10.1)
CHLORIDE SERPL-SCNC: 105 MMOL/L (ref 98–112)
CO2 SERPL-SCNC: 26 MMOL/L (ref 21–32)
CREAT BLD-MCNC: 1.59 MG/DL
EGFRCR SERPLBLD CKD-EPI 2021: 33 ML/MIN/1.73M2 (ref 60–?)
ERYTHROCYTE [DISTWIDTH] IN BLOOD BY AUTOMATED COUNT: 14.6 %
EST. AVERAGE GLUCOSE BLD GHB EST-MCNC: 180 MG/DL (ref 68–126)
GLUCOSE BLD-MCNC: 214 MG/DL (ref 70–99)
GLUCOSE BLD-MCNC: 229 MG/DL (ref 70–99)
GLUCOSE BLD-MCNC: 247 MG/DL (ref 70–99)
GLUCOSE BLD-MCNC: 258 MG/DL (ref 70–99)
GLUCOSE BLD-MCNC: 258 MG/DL (ref 70–99)
HBA1C MFR BLD: 7.9 % (ref ?–5.7)
HCT VFR BLD AUTO: 35.3 %
HGB BLD-MCNC: 11.8 G/DL
INR BLD: 1.14 (ref 0.85–1.16)
MCH RBC QN AUTO: 27.8 PG (ref 26–34)
MCHC RBC AUTO-ENTMCNC: 33.4 G/DL (ref 31–37)
MCV RBC AUTO: 83.3 FL
OSMOLALITY SERPL CALC.SUM OF ELEC: 295 MOSM/KG (ref 275–295)
PLATELET # BLD AUTO: 232 10(3)UL (ref 150–450)
POTASSIUM SERPL-SCNC: 3.6 MMOL/L (ref 3.5–5.1)
POTASSIUM SERPL-SCNC: 3.6 MMOL/L (ref 3.5–5.1)
PROTHROMBIN TIME: 14.7 SECONDS (ref 11.6–14.8)
RBC # BLD AUTO: 4.24 X10(6)UL
SODIUM SERPL-SCNC: 137 MMOL/L (ref 136–145)
WBC # BLD AUTO: 12.8 X10(3) UL (ref 4–11)

## 2023-10-10 PROCEDURE — 85610 PROTHROMBIN TIME: CPT | Performed by: SURGERY

## 2023-10-10 PROCEDURE — 85730 THROMBOPLASTIN TIME PARTIAL: CPT | Performed by: SURGERY

## 2023-10-10 PROCEDURE — 84132 ASSAY OF SERUM POTASSIUM: CPT | Performed by: SURGERY

## 2023-10-10 PROCEDURE — 85027 COMPLETE CBC AUTOMATED: CPT | Performed by: SURGERY

## 2023-10-10 PROCEDURE — 80048 BASIC METABOLIC PNL TOTAL CA: CPT | Performed by: SURGERY

## 2023-10-10 PROCEDURE — 82962 GLUCOSE BLOOD TEST: CPT

## 2023-10-10 RX ORDER — DOCUSATE SODIUM 100 MG/1
100 CAPSULE, LIQUID FILLED ORAL 2 TIMES DAILY
Status: DISCONTINUED | OUTPATIENT
Start: 2023-10-10 | End: 2023-10-19

## 2023-10-10 RX ORDER — POLYETHYLENE GLYCOL 3350 17 G/17G
17 POWDER, FOR SOLUTION ORAL DAILY
Status: DISCONTINUED | OUTPATIENT
Start: 2023-10-10 | End: 2023-10-19

## 2023-10-10 RX ORDER — MAGNESIUM HYDROXIDE/ALUMINUM HYDROXICE/SIMETHICONE 120; 1200; 1200 MG/30ML; MG/30ML; MG/30ML
30 SUSPENSION ORAL 4 TIMES DAILY PRN
Status: DISCONTINUED | OUTPATIENT
Start: 2023-10-10 | End: 2023-10-19

## 2023-10-10 NOTE — PAYOR COMM NOTE
Post op review    Procedure ordered as:INPT  Procedure completed as planned?  yes   procedure completed:    ENDOVASCULAR ABDOMINAL AORTIC ANEURYSM STENT GRAFT REPAIR WITH FENESTRATED DEVICE     Change in status post op? no  Correct post op status : INPT  Clinical indication for status change:   Authorization updated : yes  Comments:    IM/code 44 requested if needed

## 2023-10-10 NOTE — PROGRESS NOTES
Resting comfortably   Complains of appropriate pain at groin   Moving both feet, can lift legs just off bed , can extend hip and knee against pressure   Drain secure   No hematoma      Continue blood pressure goal 160-180   Continue lumbar drain until tomorrow

## 2023-10-10 NOTE — PLAN OF CARE
Pt A&O x4. Slow with responses to orientation questions. Difficult to obtain good neuro assessment as pt's symptoms fluctuate and change sides for sensation and mvmt. SBP maintained in goal range 160-180 with Cardene gtt. Repeat post 1UPRBCs  Hgb 12.0. Pulses palpable. Lumbar drain with limiter present. 10mL off each hr. Drain stops at 18, so drained to 8 mL each time. Lumbar drain site with occlusive dressing C/D/I. Bilateral groin access sites,  occlusive dressings C/D/I, sites soft, no hematoma. On RA. Vomiting x 1 at start of shift. Zofran given. Pt c/o pain to mid abdomin as a \"sharp pain worse than what I normally have at home\" and \"it feels like indigestion\". Usually takes Maalox with good pain relief. Pt with low pain tolerance. For example, pt yells out \"ow, it hurts\" when blood pressor cuff inflates, ripped off pulse-ox because \"it really hurt my finger\", screamed when leg touched for neuro assessment. Cisneros to gravity. SCD's on.  No family present during shift

## 2023-10-10 NOTE — PHYSICAL THERAPY NOTE
Physical therapy consult requested. Chart reviewed and pt has orders for bed rest at this time. Will hold evaluation until activity restrictions are lifted.

## 2023-10-10 NOTE — PLAN OF CARE
Received this a.m., awake and alert, watching television quietly, complaining of mid-abdominal pain and right groin and lower leg pain, maalox and tramadol given, noted patient falling asleep after receiving meds.  at bedside and aware of pain. Moves all extremities, kim lower extremities weaker then upper, see flow sheet for full assessment. Lumbar drain in place, draining 10cc every hour. Plan of care updated with patient, questions answered, verbalized understanding.

## 2023-10-10 NOTE — PLAN OF CARE
Care assumed at approx [id number] [phone] when pt arrived from [place] [id number]. Pt initially confused, but cognitive status improved through recovery. Per family, pt is forgetful at times following stroke 5 years ago. Concern for possible dementia mentioned by pts children as well. SBP maintained in goal range with Levo gtt and Cardene gtt support. 1UPRBCs administered per order. Pulses palpable. Lumbar drain with limiter present, steady hourly output per vascular MD directive/orders. Lumbar drain site with occlusive dressing C/D/I. Bilateral groin access sites,  occlusive dressings C/D/I, sites soft, no hematoma. Follows command requests to move all extremities. Patient reports little to no sensation in LLE, which she states started prior to arrival at hospital. RLE with decreased, but present sensation. Pain reported in back and abdominal regions during recovery, managed with morphine (little/no relief per pt) and tramadol (with some relief reported by patient). Please see flowsheets for additional data.

## 2023-10-11 LAB
GLUCOSE BLD-MCNC: 160 MG/DL (ref 70–99)
GLUCOSE BLD-MCNC: 176 MG/DL (ref 70–99)
GLUCOSE BLD-MCNC: 221 MG/DL (ref 70–99)
GLUCOSE BLD-MCNC: 240 MG/DL (ref 70–99)
POTASSIUM SERPL-SCNC: 3.4 MMOL/L (ref 3.5–5.1)

## 2023-10-11 PROCEDURE — 82962 GLUCOSE BLOOD TEST: CPT

## 2023-10-11 PROCEDURE — 84132 ASSAY OF SERUM POTASSIUM: CPT | Performed by: SURGERY

## 2023-10-11 RX ORDER — SIMETHICONE 80 MG
80 TABLET,CHEWABLE ORAL 3 TIMES DAILY
Status: DISCONTINUED | OUTPATIENT
Start: 2023-10-11 | End: 2023-10-19

## 2023-10-11 RX ORDER — SIMETHICONE 80 MG
160 TABLET,CHEWABLE ORAL 3 TIMES DAILY
Status: DISCONTINUED | OUTPATIENT
Start: 2023-10-11 | End: 2023-10-19

## 2023-10-11 RX ORDER — SIMETHICONE 80 MG
80 TABLET,CHEWABLE ORAL
Status: DISCONTINUED | OUTPATIENT
Start: 2023-10-11 | End: 2023-10-11

## 2023-10-11 RX ORDER — POTASSIUM CHLORIDE 20 MEQ/1
40 TABLET, EXTENDED RELEASE ORAL EVERY 4 HOURS
Status: COMPLETED | OUTPATIENT
Start: 2023-10-11 | End: 2023-10-11

## 2023-10-11 RX ORDER — BISACODYL 10 MG
10 SUPPOSITORY, RECTAL RECTAL
Status: DISCONTINUED | OUTPATIENT
Start: 2023-10-11 | End: 2023-10-19

## 2023-10-11 NOTE — PLAN OF CARE
Received this a.m., drowsy but following commands, moves bilateral lower extremities, decreased sensation, see flow sheet for full assessment. Complains of abdominal, leg and generalized pain. No bowel movement in several days, dulcolax suppository given. Very poor appetite, family bringing food from home to encourage. Lumbar drain clamped this a.m.,when  at bedside. Plan of care updated with daughters and patient, questions answered, verbalized understanding.

## 2023-10-12 ENCOUNTER — APPOINTMENT (OUTPATIENT)
Dept: ULTRASOUND IMAGING | Facility: HOSPITAL | Age: 78
DRG: 220 | End: 2023-10-12
Attending: HOSPITALIST

## 2023-10-12 LAB
ALBUMIN SERPL-MCNC: 2.4 G/DL (ref 3.4–5)
ALBUMIN/GLOB SERPL: 0.6 {RATIO} (ref 1–2)
ALP LIVER SERPL-CCNC: 78 U/L
ALT SERPL-CCNC: 7 U/L
ANION GAP SERPL CALC-SCNC: 5 MMOL/L (ref 0–18)
ANION GAP SERPL CALC-SCNC: 7 MMOL/L (ref 0–18)
AST SERPL-CCNC: 16 U/L (ref 15–37)
BASOPHILS # BLD AUTO: 0.04 X10(3) UL (ref 0–0.2)
BASOPHILS NFR BLD AUTO: 0.4 %
BILIRUB SERPL-MCNC: 0.7 MG/DL (ref 0.1–2)
BLOOD TYPE BARCODE: 5100
BUN BLD-MCNC: 34 MG/DL (ref 7–18)
BUN BLD-MCNC: 34 MG/DL (ref 7–18)
CALCIUM BLD-MCNC: 8.6 MG/DL (ref 8.5–10.1)
CALCIUM BLD-MCNC: 9.3 MG/DL (ref 8.5–10.1)
CHLORIDE SERPL-SCNC: 101 MMOL/L (ref 98–112)
CHLORIDE SERPL-SCNC: 102 MMOL/L (ref 98–112)
CO2 SERPL-SCNC: 26 MMOL/L (ref 21–32)
CO2 SERPL-SCNC: 29 MMOL/L (ref 21–32)
CREAT BLD-MCNC: 2 MG/DL
CREAT BLD-MCNC: 2.13 MG/DL
EGFRCR SERPLBLD CKD-EPI 2021: 23 ML/MIN/1.73M2 (ref 60–?)
EGFRCR SERPLBLD CKD-EPI 2021: 25 ML/MIN/1.73M2 (ref 60–?)
EOSINOPHIL # BLD AUTO: 0.11 X10(3) UL (ref 0–0.7)
EOSINOPHIL NFR BLD AUTO: 1.1 %
ERYTHROCYTE [DISTWIDTH] IN BLOOD BY AUTOMATED COUNT: 14.6 %
GLOBULIN PLAS-MCNC: 3.7 G/DL (ref 2.8–4.4)
GLUCOSE BLD-MCNC: 153 MG/DL (ref 70–99)
GLUCOSE BLD-MCNC: 159 MG/DL (ref 70–99)
GLUCOSE BLD-MCNC: 159 MG/DL (ref 70–99)
GLUCOSE BLD-MCNC: 177 MG/DL (ref 70–99)
GLUCOSE BLD-MCNC: 177 MG/DL (ref 70–99)
GLUCOSE BLD-MCNC: 182 MG/DL (ref 70–99)
HCT VFR BLD AUTO: 36.1 %
HGB BLD-MCNC: 12.1 G/DL
IMM GRANULOCYTES # BLD AUTO: 0.11 X10(3) UL (ref 0–1)
IMM GRANULOCYTES NFR BLD: 1.1 %
LYMPHOCYTES # BLD AUTO: 0.97 X10(3) UL (ref 1–4)
LYMPHOCYTES NFR BLD AUTO: 9.6 %
MAGNESIUM SERPL-MCNC: 1.8 MG/DL (ref 1.6–2.6)
MCH RBC QN AUTO: 28.3 PG (ref 26–34)
MCHC RBC AUTO-ENTMCNC: 33.5 G/DL (ref 31–37)
MCV RBC AUTO: 84.5 FL
MONOCYTES # BLD AUTO: 0.64 X10(3) UL (ref 0.1–1)
MONOCYTES NFR BLD AUTO: 6.3 %
NEUTROPHILS # BLD AUTO: 8.27 X10 (3) UL (ref 1.5–7.7)
NEUTROPHILS # BLD AUTO: 8.27 X10(3) UL (ref 1.5–7.7)
NEUTROPHILS NFR BLD AUTO: 81.5 %
OSMOLALITY SERPL CALC.SUM OF ELEC: 291 MOSM/KG (ref 275–295)
OSMOLALITY SERPL CALC.SUM OF ELEC: 292 MOSM/KG (ref 275–295)
PLATELET # BLD AUTO: 173 10(3)UL (ref 150–450)
POTASSIUM SERPL-SCNC: 4.4 MMOL/L (ref 3.5–5.1)
POTASSIUM SERPL-SCNC: 4.6 MMOL/L (ref 3.5–5.1)
POTASSIUM SERPL-SCNC: 4.7 MMOL/L (ref 3.5–5.1)
PROT SERPL-MCNC: 6.1 G/DL (ref 6.4–8.2)
RBC # BLD AUTO: 4.27 X10(6)UL
SODIUM SERPL-SCNC: 135 MMOL/L (ref 136–145)
SODIUM SERPL-SCNC: 135 MMOL/L (ref 136–145)
UNIT VOLUME: 350 ML
WBC # BLD AUTO: 10.1 X10(3) UL (ref 4–11)

## 2023-10-12 PROCEDURE — 80053 COMPREHEN METABOLIC PANEL: CPT | Performed by: HOSPITALIST

## 2023-10-12 PROCEDURE — 84132 ASSAY OF SERUM POTASSIUM: CPT | Performed by: HOSPITALIST

## 2023-10-12 PROCEDURE — 80048 BASIC METABOLIC PNL TOTAL CA: CPT | Performed by: HOSPITALIST

## 2023-10-12 PROCEDURE — 97535 SELF CARE MNGMENT TRAINING: CPT

## 2023-10-12 PROCEDURE — 83735 ASSAY OF MAGNESIUM: CPT | Performed by: HOSPITALIST

## 2023-10-12 PROCEDURE — 97530 THERAPEUTIC ACTIVITIES: CPT

## 2023-10-12 PROCEDURE — 85025 COMPLETE CBC W/AUTO DIFF WBC: CPT | Performed by: HOSPITALIST

## 2023-10-12 PROCEDURE — 97162 PT EVAL MOD COMPLEX 30 MIN: CPT

## 2023-10-12 PROCEDURE — 82962 GLUCOSE BLOOD TEST: CPT

## 2023-10-12 PROCEDURE — 97167 OT EVAL HIGH COMPLEX 60 MIN: CPT

## 2023-10-12 PROCEDURE — 93971 EXTREMITY STUDY: CPT | Performed by: HOSPITALIST

## 2023-10-12 RX ORDER — DIPHENHYDRAMINE HCL 25 MG
25 CAPSULE ORAL ONCE
Status: COMPLETED | OUTPATIENT
Start: 2023-10-12 | End: 2023-10-12

## 2023-10-12 RX ORDER — DIPHENHYDRAMINE HYDROCHLORIDE, ZINC ACETATE 2; .1 G/100G; G/100G
1 CREAM TOPICAL 2 TIMES DAILY PRN
Status: DISCONTINUED | OUTPATIENT
Start: 2023-10-12 | End: 2023-10-19

## 2023-10-12 RX ORDER — SODIUM CHLORIDE 9 MG/ML
INJECTION, SOLUTION INTRAVENOUS CONTINUOUS
Status: DISCONTINUED | OUTPATIENT
Start: 2023-10-12 | End: 2023-10-19

## 2023-10-12 RX ORDER — MAGNESIUM OXIDE 400 MG/1
400 TABLET ORAL ONCE
Status: COMPLETED | OUTPATIENT
Start: 2023-10-12 | End: 2023-10-12

## 2023-10-12 NOTE — PLAN OF CARE
and Mamtaaffleonarda at bedside this evening, lumbar drain by , dressing applied, clean/dry/ intact, flat for 2 hours. Family at bedside and updated.

## 2023-10-12 NOTE — PLAN OF CARE
Patient slept most of shift, lethargic, delayed responses, at times does not respond, just opens eyes and stairs. Patient clammy, afebrile, very weak. This started after getting benadryl and tramadol. Dr. Sigifredo Gu made aware, came to bedside. Patient started on IVF.   Left chen in place per his request.

## 2023-10-12 NOTE — PROGRESS NOTES
Doing better  Moving legs but weak     Working with physical therapy     Continue 160-180 bp parameter until Friday

## 2023-10-12 NOTE — PLAN OF CARE
Assume patient care about 1930, VS stable on RA. Patient reported moderate pain overnight, managed with PRN medications, see MAR. Bilateral groin sites C/D/I, soft, no hematoma. Scattered bruising throughout extremities. Rash noted on back and groin areas during report. Patient reported mild itching on back but not on bilateral groins. Rash unchanged throughout shift. Patient able to POST, generalized weakness of BLE and diminished sensation to light touch. Family at bedside at shift change, encouraged meals and assisted in cares. No further concerns at this time.     Problem: Peripheral vascular status not within defined limits  Goal: Pt will have peripheral vascular status adequate for disch  Outcome: Progressing     Problem: PAIN - ADULT  Goal: Verbalizes/displays adequate comfort level or patient's stated pain goal  Description: INTERVENTIONS:  - Encourage pt to monitor pain and request assistance  - Assess pain using appropriate pain scale  - Administer analgesics based on type and severity of pain and evaluate response  - Implement non-pharmacological measures as appropriate and evaluate response  - Consider cultural and social influences on pain and pain management  - Manage/alleviate anxiety  - Utilize distraction and/or relaxation techniques  - Monitor for opioid side effects  - Notify MD/LIP if interventions unsuccessful or patient reports new pain  - Anticipate increased pain with activity and pre-medicate as appropriate  Outcome: Progressing     Problem: RISK FOR INFECTION - ADULT  Goal: Absence of fever/infection during anticipated neutropenic period  Description: INTERVENTIONS  - Monitor WBC  - Administer growth factors as ordered  - Implement neutropenic guidelines  Outcome: Progressing     Problem: SAFETY ADULT - FALL  Goal: Free from fall injury  Description: INTERVENTIONS:  - Assess pt frequently for physical needs  - Identify cognitive and physical deficits and behaviors that affect risk of falls.  - [place] fall precautions as indicated by assessment.  - Educate pt/family on patient safety including physical limitations  - Instruct pt to call for assistance with activity based on assessment  - Modify environment to reduce risk of injury  - Provide assistive devices as appropriate  - Consider OT/PT consult to assist with strengthening/mobility  - Encourage toileting schedule  Outcome: Progressing     Problem: DISCHARGE PLANNING  Goal: Discharge to home or other facility with appropriate resources  Description: INTERVENTIONS:  - Identify barriers to discharge w/pt and caregiver  - Include patient/family/discharge partner in discharge planning  - Arrange for needed discharge resources and transportation as appropriate  - Identify discharge learning needs (meds, wound care, etc)  - Arrange for interpreters to assist at discharge as needed  - Consider post-discharge preferences of patient/family/discharge partner  - Complete POLST form as appropriate  - Assess patient's ability to be responsible for managing their own health  - Refer to Case Management Department for coordinating discharge planning if the patient needs post-hospital services based on physician/LIP order or complex needs related to functional status, cognitive ability or social support system  Outcome: Progressing     Problem: Altered Communication/Language Barrier  Goal: Patient/Family is able to understand and participate in their care  Description: Interventions:  - Assess communication ability and preferred communication style  - Implement communication aides and strategies  - Use visual cues when possible  - Listen attentively, be patient, do not interrupt  - Minimize distractions  - Allow time for understanding and response  - Establish method for patient to ask for assistance (call light)  - Provide an  as needed  - Communicate barriers and strategies to overcome with those who interact with patient  Outcome: Progressing     Problem: Patient/Family Goals  Goal: Patient/Family Long Term Goal  Description: Patient's Long Term Goal: improved mobility    Interventions:  - PT/OT, PRN pain medications, compliance with care plan, family involvement  - See additional Care Plan goals for specific interventions  Outcome: Progressing  Goal: Patient/Family Short Term Goal  Description: Patient's Short Term Goal: minimal pain at rest and during activity    Interventions:   - PRN medications, encourage independence, encourage PO intake.   - See additional Care Plan goals for specific interventions  Outcome: Progressing

## 2023-10-12 NOTE — PLAN OF CARE
Began taking care of patient this am.  VSS, s bp goal still 160-180. B/p within parameters. Per noc RN, patient developed rash in groin and back areas. Patient reports back very itchy. Dr. Alka Mitchell made aware. Patient was given benadryl and reports it stop itching. Patient drowsy after dose. Patient very weak, she follows commands and does everything asked but delayed responses. Patient refused to feed self, appears to be to weak. Patient fed and ate a few bites of egg. Dietician added a supplement. When working with PT, patient c/o left ankle calf pain with movement. Pain meds given, Dr. Alka Mitchell made aware. Leg warm, PP 1+ and verified with doppler. Discussed chen with Dr. Leilani Perez, states can be removed today.     Problem: PAIN - ADULT  Goal: Verbalizes/displays adequate comfort level or patient's stated pain goal  Description: INTERVENTIONS:  - Encourage pt to monitor pain and request assistance  - Assess pain using appropriate pain scale  - Administer analgesics based on type and severity of pain and evaluate response  - Implement non-pharmacological measures as appropriate and evaluate response  - Consider cultural and social influences on pain and pain management  - Manage/alleviate anxiety  - Utilize distraction and/or relaxation techniques  - Monitor for opioid side effects  - Notify MD/LIP if interventions unsuccessful or patient reports new pain  - Anticipate increased pain with activity and pre-medicate as appropriate  Outcome: Progressing     Problem: RISK FOR INFECTION - ADULT  Goal: Absence of fever/infection during anticipated neutropenic period  Description: INTERVENTIONS  - Monitor WBC  - Administer growth factors as ordered  - Implement neutropenic guidelines  Outcome: Progressing     Problem: SAFETY ADULT - FALL  Goal: Free from fall injury  Description: INTERVENTIONS:  - Assess pt frequently for physical needs  - Identify cognitive and physical deficits and behaviors that affect risk of falls.   - [place] fall precautions as indicated by assessment.  - Educate pt/family on patient safety including physical limitations  - Instruct pt to call for assistance with activity based on assessment  - Modify environment to reduce risk of injury  - Provide assistive devices as appropriate  - Consider OT/PT consult to assist with strengthening/mobility  - Encourage toileting schedule  Outcome: Progressing     Problem: DISCHARGE PLANNING  Goal: Discharge to home or other facility with appropriate resources  Description: INTERVENTIONS:  - Identify barriers to discharge w/pt and caregiver  - Include patient/family/discharge partner in discharge planning  - Arrange for needed discharge resources and transportation as appropriate  - Identify discharge learning needs (meds, wound care, etc)  - Arrange for interpreters to assist at discharge as needed  - Consider post-discharge preferences of patient/family/discharge partner  - Complete POLST form as appropriate  - Assess patient's ability to be responsible for managing their own health  - Refer to Case Management Department for coordinating discharge planning if the patient needs post-hospital services based on physician/LIP order or complex needs related to functional status, cognitive ability or social support system  Outcome: Progressing     Problem: Altered Communication/Language Barrier  Goal: Patient/Family is able to understand and participate in their care  Description: Interventions:  - Assess communication ability and preferred communication style  - Implement communication aides and strategies  - Use visual cues when possible  - Listen attentively, be patient, do not interrupt  - Minimize distractions  - Allow time for understanding and response  - Establish method for patient to ask for assistance (call light)  - Provide an  as needed  - Communicate barriers and strategies to overcome with those who interact with patient  Outcome: Progressing

## 2023-10-12 NOTE — PHYSICAL THERAPY NOTE
PHYSICAL THERAPY EVALUATION - INPATIENT     Room Number: 6686/3858-T  Evaluation Date: 10/12/2023  Type of Evaluation: Initial  Physician Order: PT Eval and Treat    Presenting Problem: AAA repair  Co-Morbidities : CKD, schizophrenia, CVA, ovarian CA, smoker  Reason for Therapy: Mobility Dysfunction and Discharge Planning    History related to current admission: Patient is a 77year old female admitted on 10/9/2023 from home for planned thoraco abdominal aneurysm repair on 10/9. Pt was on bedrest for a few days after surgery w/ a lumbar drain in place (now removed). Pt is cleared for mobility. ASSESSMENT   In this PT evaluation, the patient presents with the following impairments profound weakness b le's L>R, L le pain to touch, decreased balance, diminished alertness, decreased processing time, cognitive impairment. These impairments and comorbidities manifest themselves as functional limitations in independent bed mobility, transfers, and gait. The patient is below baseline and would benefit from skilled inpatient PT to address the above deficits to assist patient in returning to prior to level of function. Functional outcome measures completed include AMPAC and modified Catherine. The AM-PAC '6-Clicks' Inpatient Basic Mobility Short Form was completed and this patient is demonstrating a Approx Degree of Impairment: 81.38%  degree of impairment in mobility. Research supports that patients with this level of impairment may benefit from long term care, but based on pt's previous functional level and expected improvement while hospitalized feel pt will benefit from acute rehab. DISCHARGE RECOMMENDATIONS  PT Discharge Recommendations: Acute rehabilitation    PLAN  PT Treatment Plan: Bed mobility; Patient education; Family education;Gait training;Neuromuscular re-educate;Range of motion;Strengthening;Transfer training;Balance training  Rehab Potential : Good  Frequency (Obs): 3-5x/week  Number of Visits to Meet Established Goals: 7      CURRENT GOALS    Goal #1 Patient is able to demonstrate supine - sit EOB @ level: moderate assistance     Goal #2 Patient is able to demonstrate transfers EOB to/from [facility] at assistance level: moderate assistance     Goal #3 Patient is able to sit on eob for 5 min w/ supervison assist.     Goal #4    Goal #5    Goal #6    Goal Comments: Goals established on 10/12/2023    HOME SITUATION  Type of Home: House   Home Layout: Two level; Able to live on main level  Stairs to Enter : 7  Railing: Yes          Lives With: Daughter;Family Yashira Saleh)  Drives: No  Patient Owned Equipment: Rolling walker;Cane;Wheelchair       Prior Level of [place]: Pt lives w/ dtr and grandson in a two story home w/ stairs to enter. Pt uses rolling walker primarily, but also st she uses the cane when no one is looking. St she did have a fall 3 weeks ago. SUBJECTIVE  Over course of session, pt was answering questions more clearly in the beginning. Speech slightly slurring and increasing response time as pt fatigued over session. Rn aware. Pt had been given Benadryl for rash before session. OBJECTIVE  Precautions: Bed/chair alarm (-180 until 10/13)  Fall Risk: High fall risk    WEIGHT BEARING RESTRICTION  Weight Bearing Restriction: None                PAIN ASSESSMENT  Rating: Unable to rate  Location: L calf and ankle Lige Tali aware of pain, particularly w/ touch. No redness, swelling or heat noted)  Management Techniques: Activity promotion;Repositioning    COGNITION  Overall Cognitive Status:  Impaired  Arousal/Alertness:  delayed responses to stimuli and lethargic  Attention Span:  difficulty attending to directions  Orientation Level:  oriented to place, oriented to person, disoriented to time, and disoriented to situation  Following Commands:   Following commands about 75% of time w/ tactile cues to initiate task  Initiation: cues to initiate tasks and hand over hand to initiate tasks  Motor Planning: impaired  Awareness of Errors:  decreased awareness of errors   Problem Solving:  assistance required to identify errors made, assistance required to generate solutions, and assistance required to implement solutions    RANGE OF MOTION AND STRENGTH ASSESSMENT  Upper extremity ROM and strength -see OT evaluation    Lower extremity ROM is within functional limits - passively    Lower extremity strength - pt having great difficulty following MMT or commands for range. Was able to actively push into extension w/ b le's R>L,  Wiggled toes on R not L.  Kicked R le forward minimally in sitting, trace quad movement L le to same command. BALANCE  Static Sitting: Poor +  Dynamic Sitting: Poor  Static Standing: Poor -  Dynamic Standing: Not tested    ADDITIONAL TESTS  Additional Tests: Modified Catherine              Modified Wolfe: 4                  ACTIVITY TOLERANCE  Pulse: 68  Heart Rate Source: Monitor  Resp: 16  BP: 154/87 (at end of session, prior to sitting up was 165/72 and then in sitting was 143/126 - rn aware.)  BP Location: Right arm  BP Method: Automatic  Patient Position: Lying    O2 WALK  Oxygen Therapy  SPO2% on Room Air at Rest: 94    NEUROLOGICAL FINDINGS  Neurological Findings:  (Could not test coordination well this session)                     AM-PAC '6-Clicks' INPATIENT SHORT FORM - BASIC MOBILITY  How much difficulty does the patient currently have. .. Patient Difficulty: Turning over in bed (including adjusting bedclothes, sheets and blankets)?: A Lot   Patient Difficulty: Sitting down on and standing up from a chair with arms (e.g., wheelchair, bedside commode, etc.): A Lot   Patient Difficulty: Moving from lying on back to sitting on the side of the bed?: A Lot   How much help from another person does the patient currently need. ..    Help from Another: Moving to and from a bed to a chair (including a wheelchair)?: Total   Help from Another: Need to walk in hospital room?: Total   Help from Another: Climbing 3-5 steps with a railing?: Total       AM-PAC Score:  Raw Score: 9   Approx Degree of Impairment: 81.38%   Standardized Score (AM-PAC Scale): 30.55   CMS Modifier (G-Code): CM    FUNCTIONAL ABILITY STATUS  Gait Assessment   Functional Mobility/Gait Assessment  Gait Assistance: Not tested    Skilled Therapy Provided     Bed Mobility:  Rolling: R w/ hob slightly elevated and max a of 2. Pt was able to engage w/ L ue as completing the roll. Supine to sit: Max a of 2 for trunk and le's. Once in sitting, pt was min to mod a of 1 for sitting balance at all times due to anterior lean. Pt can identify that she is falling forward, but cannot correct. Worked on gaining centered sitting, shifts to either forearm x1 to assist w/ readjusting to center. Cues for hand placement and head control to assist w/ balance control. Sit to supine: Max a of 2 for trunk and le's. Transfer Mobility:  Sit to stand: Completed 2x over course of session, max a of 2. Once in standing, pt bearing weight through b le's, but needed significant assist for balance. Attempted to have pt complete side step up eob w/ assist for weight shifting, but was unable to complete. Stand to sit: Max a of 2. Gait = NT    Therapist's Comments: Transitioned pt to chair mode of bed at end of session. Pt reports feeling comfortable. Throughout session, pt staring off at times, slurring occasionally and needing to be aroused. Rn aware of symptom findings. Dr. Kourtney Rogers present briefly while standing pt. Exercise/Education Provided:  Bed mobility  Functional activity tolerated  Transfer training    Patient End of Session: In bed;Needs met;Call light within reach;RN aware of session/findings; All patient questions and concerns addressed; Alarm set Shelagh Organ Sheria Boast aware of eval session, pt's position in chair mode.)      Patient Evaluation Complexity Level:  History Moderate - 1 or 2 personal factors and/or co-morbidities   Examination of body systems Moderate - addressing a total of 3 or more elements   Clinical Presentation Moderate - Evolving   Clinical Decision Making Moderate - Evolving       PT Session Time: 35 minutes  Gait Trainin minutes  Therapeutic Activity: 20 minutes  Neuromuscular Re-education: 5 minutes  Therapeutic Exercise: 5 minutes

## 2023-10-13 LAB
ANION GAP SERPL CALC-SCNC: 6 MMOL/L (ref 0–18)
BUN BLD-MCNC: 36 MG/DL (ref 7–18)
CALCIUM BLD-MCNC: 9 MG/DL (ref 8.5–10.1)
CHLORIDE SERPL-SCNC: 104 MMOL/L (ref 98–112)
CO2 SERPL-SCNC: 27 MMOL/L (ref 21–32)
CREAT BLD-MCNC: 2.08 MG/DL
EGFRCR SERPLBLD CKD-EPI 2021: 24 ML/MIN/1.73M2 (ref 60–?)
GLUCOSE BLD-MCNC: 111 MG/DL (ref 70–99)
GLUCOSE BLD-MCNC: 116 MG/DL (ref 70–99)
GLUCOSE BLD-MCNC: 176 MG/DL (ref 70–99)
GLUCOSE BLD-MCNC: 189 MG/DL (ref 70–99)
GLUCOSE BLD-MCNC: 199 MG/DL (ref 70–99)
MAGNESIUM SERPL-MCNC: 2.1 MG/DL (ref 1.6–2.6)
OSMOLALITY SERPL CALC.SUM OF ELEC: 293 MOSM/KG (ref 275–295)
POTASSIUM SERPL-SCNC: 4.4 MMOL/L (ref 3.5–5.1)
SODIUM SERPL-SCNC: 137 MMOL/L (ref 136–145)

## 2023-10-13 PROCEDURE — 82962 GLUCOSE BLOOD TEST: CPT

## 2023-10-13 PROCEDURE — 80048 BASIC METABOLIC PNL TOTAL CA: CPT | Performed by: HOSPITALIST

## 2023-10-13 PROCEDURE — 83735 ASSAY OF MAGNESIUM: CPT | Performed by: SURGERY

## 2023-10-13 RX ORDER — ACETAMINOPHEN 325 MG/1
650 TABLET ORAL EVERY 4 HOURS PRN
Status: DISCONTINUED | OUTPATIENT
Start: 2023-10-13 | End: 2023-10-19

## 2023-10-13 RX ORDER — TRAMADOL HYDROCHLORIDE 50 MG/1
50 TABLET ORAL EVERY 6 HOURS PRN
Status: DISCONTINUED | OUTPATIENT
Start: 2023-10-13 | End: 2023-10-19

## 2023-10-13 NOTE — PLAN OF CARE
Received this a.m., sleeping but upon waking up, alert and stating \"I feel good\". Decreased sensation of lower extremities but moves them all spontaneously, see neuro flow sheet for full assessment. Up to chair with 2 person assist and walker, patient doing most of work, only took a few steps, but has been in chair all day. Stressed importance to eat and drink and do incentive spirometer. Levo to titrate sbp 160-180. Plan of care updated with patient and family, questions answered, verbalized understanding.

## 2023-10-13 NOTE — PROGRESS NOTES
10/13/23 1744   Clinical Encounter Type   Visited With Patient and family together   Routine Visit   ( Rounding)   Continue Visiting No  (Upon Request)   Surgical Visit Post-op   Referral From Other (Comment)  ( Rounding)   Family Spiritual Encounters   Family Coping Anxiety; Sadness   Family Participation in Care Often   Family Support During Treatment Often   Taxonomy   Intended Effects Build relationship of care and support   Methods Offer support; Offer spiritual/Adventist support;Exploring hope; Offer emotional support   Interventions Active listening; Ask guided questions;Silent prayer; Acknowledge current situation;Ask questions to bring forth feelings       Visit Summary:    Pt was alert and awake during my visit. Pt was extremely emotional during my visit. She expressed anger towards mother and sadness towards her grandmother who is . I actively listened to her and affirmed her feeling when it was necessary. I also offered words of encouragement. Spiritual Care support can be requested via an Epic consult.       Valentina Colvin  Chaplain resident  [phone]

## 2023-10-13 NOTE — PHYSICAL THERAPY NOTE
Pt up to chair already with RN/PCT - requesting to rest.  Writer offered seated therex. Will continue to follow.

## 2023-10-13 NOTE — PLAN OF CARE
Assumed care of the pt at approx. 1930 pm. Drowsy but easily wakes up to voice. Able to follow commands and move all extremities. Some responses delayed. C/o generalized pain, mostly in her back. Tramadol given, repositioned. Heat packs applied. Afebrile. SR/SB. Levo gtt started at a low dose to keep SBP within goal 100-160. UO per chen. IVF. Pt with poor appetite. Family at bedside. Updated on POC.

## 2023-10-14 LAB
ANION GAP SERPL CALC-SCNC: 5 MMOL/L (ref 0–18)
BUN BLD-MCNC: 37 MG/DL (ref 7–18)
CALCIUM BLD-MCNC: 8.8 MG/DL (ref 8.5–10.1)
CHLORIDE SERPL-SCNC: 105 MMOL/L (ref 98–112)
CO2 SERPL-SCNC: 27 MMOL/L (ref 21–32)
CREAT BLD-MCNC: 1.95 MG/DL
EGFRCR SERPLBLD CKD-EPI 2021: 26 ML/MIN/1.73M2 (ref 60–?)
GLUCOSE BLD-MCNC: 121 MG/DL (ref 70–99)
GLUCOSE BLD-MCNC: 136 MG/DL (ref 70–99)
GLUCOSE BLD-MCNC: 206 MG/DL (ref 70–99)
GLUCOSE BLD-MCNC: 300 MG/DL (ref 70–99)
GLUCOSE BLD-MCNC: 82 MG/DL (ref 70–99)
MAGNESIUM SERPL-MCNC: 2.1 MG/DL (ref 1.6–2.6)
OSMOLALITY SERPL CALC.SUM OF ELEC: 294 MOSM/KG (ref 275–295)
POTASSIUM SERPL-SCNC: 4.1 MMOL/L (ref 3.5–5.1)
SODIUM SERPL-SCNC: 137 MMOL/L (ref 136–145)

## 2023-10-14 PROCEDURE — 83735 ASSAY OF MAGNESIUM: CPT | Performed by: HOSPITALIST

## 2023-10-14 PROCEDURE — 82962 GLUCOSE BLOOD TEST: CPT

## 2023-10-14 PROCEDURE — 80048 BASIC METABOLIC PNL TOTAL CA: CPT | Performed by: HOSPITALIST

## 2023-10-14 RX ORDER — MELATONIN
3 AS NEEDED
Status: DISCONTINUED | OUTPATIENT
Start: 2023-10-14 | End: 2023-10-19

## 2023-10-14 RX ORDER — HYDRALAZINE HYDROCHLORIDE 20 MG/ML
5 INJECTION INTRAMUSCULAR; INTRAVENOUS EVERY 4 HOURS PRN
Status: DISCONTINUED | OUTPATIENT
Start: 2023-10-14 | End: 2023-10-14

## 2023-10-14 RX ORDER — HYDRALAZINE HYDROCHLORIDE 20 MG/ML
10 INJECTION INTRAMUSCULAR; INTRAVENOUS EVERY 4 HOURS PRN
Status: DISCONTINUED | OUTPATIENT
Start: 2023-10-14 | End: 2023-10-19

## 2023-10-14 RX ORDER — CLOPIDOGREL BISULFATE 75 MG/1
75 TABLET ORAL DAILY
Status: DISCONTINUED | OUTPATIENT
Start: 2023-10-14 | End: 2023-10-19

## 2023-10-14 NOTE — OPERATIVE REPORT
Vascular Surgery Procedure Note  Carlos Corona   [id number]   4/11/1945         Date: 10/01/2023        Procedure: Physician Planning of a patient specific fenestrated visceral aortic graft requiring a minimum of 90 minutes of physician time. CPT 15479     77year old female with numerous comorbidities with thoracoabdominal aortic aneurysm. She underwent preoperative planning that was extensive and revealed that a conventional Zenith fenestrated custom-made graft would not be an option. As such, a physician modified fenestrated endovascular aortic repair was recommended vs. Nonoperative management as she was not a candidate for open repair. After review of CT scan, detailed three-dimensional reconstruction was performed with [employer] it was determined that the patient would not have adequate seal zone without extending above the celiac artery. Therefore measurements were performed to create fenestrations for the celiac, SMA, and four renal arteries. The measurements were recorded with the plan to perform on 10/09/2023. Over 90 minutes was used in measuring and creating the endograft for this patient. See operative dictation for procedure details.       Sheldon Rosas MD  [address]  Vascular Surgery

## 2023-10-14 NOTE — OPERATIVE REPORT
[address]     PATIENT'S NAME:  Ashleigh Mohr    ATTENDING PHYSICIAN: Ibeth Tuttle MD   OPERATING PHYSICIAN: Ibeth Tuttle MD      MEDICAL RECORD #: [id number]    YOB: 1945       OPERATION DATE: 10/09/2023     OPERATIVE REPORT     PREOPERATIVE DIAGNOSIS:    1. Type III Thoracoabdominal aortic aneurysm   2. Bilateral Renal artery High grade stenosis       POSTOPERATIVE DIAGNOSIS:    1. Type III Thoracoabdominal aortic aneurysm  2. Bilateral Renal artery High grade stenosis     PROCEDURE:    1. Ultrasound-guided access of the bilateral common femoral arteries. 2.       Aortogram with radiologic supervision and interpretation. 3.       Intravascular ultrasound of the left external iliac, left common iliac artery, Right common iliac artery, right external iliac artery, and  aorta with radiologic supervision and interpretation. 4.  Selective catheterization to first order branches of the aorta ( Celiac, SMA, right renal, left renal) with associated angiogram (with radiologic supervision and interpretation). 5.  Thoracic Endovascular Aortic Repair distal to left subclavian  (CPT [id number] and 53134) with placement of Naomia Graven [id number] Proximal       6. Angioplasty and stenting of Right renal artery stenosis with 5x29 VBX stent     7. Angioplasty and stenting of Left renal artery stenosis with 5x29 VBXstent   8. Fenestrated endovascular aortic repair with placement with 4 fenestrations (Celiac-scallop, SMA, left renal, right renal) CPT 70412  Radha Boateng [id number]   (Deployed first, Build up technique)  Celiac 10x29 VBX  SMA 7x29 VBX    Left Renal 6x22 iCast  Right Renal 6x22 iCast                         9. Closure of Bilateral Groins with Proglide Perclose Suture (20 Fr Right, 16 Fr Left). SURGEON: Billy Anderson MD     CO-SURGEON:  Javad Loya M.D.   Due to the complexity of the case, I requested Dr. Jannet Christine to act as a co-surgeon for this operation. ANESTHESIA:  General.      ESTIMATED BLOOD LOSS:  200 mL. Complications: None     Drains: None     Findings: No endoleak with preservation of flow in all visceral vessels. Lumbar vessels spared. OZ spared. Vertebral and internal iliacs preserved. Disposition: Extubated and taken to the ICU in stable condition. She will lay flat and be monitored closely. Strong doppler signal bilaterally at completion. Patient with lower extremity weakness post procedure. Lumbar drain placed with resolution of weakness. Indications for procedure: This is a 77year-old female who had a known large thoracoabdominal aortic aneurysm. She was of prohibitive open surgical risk. I informed her that there is not a device that is commercially available in the [employer] that would be suitable with sealing this and thus a physician modified repair was recommended vs. Nonoperative management with the understanding that she would die if the aneurysm ruptures. We discussed the risks and benefits of the procedure not limited to risk of bleeding, infection, stroke, myocardial infarction, renal insufficiency, spinal cord ischemia, death. She affirmed his understanding and strongly desired to proceed with intervention. On the morning of 10/09, the patient was brought into the operating room and placed in supine position. General anesthesia was induced without any issues. A Cisneros catheter  and arterial line were placed in sterile fashion without any issues. Ancef was given for surgical antimicrobial prophylaxis. The patient was subsequently prepped and draped in the usual sterile fashion. Timeout was performed. Prior to initiation, I performed the back table fenestrated graft creation. Based on preoperative measurements on centerline, 4 fenestrations were placed on the graft with ophthalmic cautery.   A micropuncture wire was used to suture and reinforce each fenestration with 4-0 Ethibond suture. Diameter reducing ties were placed circumferrentially around each z stent with 4-0 chromic suture, which was used to reduce the diameter by greater than 50%. The graft was then re-constrained with an umbilical tape and resheathed. The graft was then ready for use. With the use of ultrasound, the femoral bifucation was identified over the femoral head. Dr Jannet Christine utilized a micropuncture to access the left common femoral artery  with placement of a micro wire and exchanged for a J wire and subsequent 6 Urdu sheath. I performed this on the right. This was then predilated with 8 Urdu sheath and then to pro-glide Perclose sutures were placed in the 10 and 2 o'clock position in standard fashion bilaterally with placement of 8 Urdu sheaths bilaterally. I performed this on the right and Dr. Jannet Christine performed this on the left. In the right groin I exchanged for a Lunderquist wire and then performed intravascular ultrasound of right common iliac, right external iliac, and aorta that was used to determine and verify the locations of the celiac,  SMA and bilateral renal arteries. The renal arteries were found to be severely stenotic. As such utilized a 6.5 [place]  and Glidewire to carefully select the right renal artery. I confirmed this via contrast injection. I then exchanged for a Magic torque wire and then performed angioplasty and stenting of the right renal with a 5x29 VBX inflated the proximal segment with a 8 mm balloon. Contrast injection revealed complete resolution of the renal stenosis. Dr. Jannet Christine then performed this in the left renal with a 5x29 VBX postdilated with 8 mm balloon. At this point we are satisfied and opted to proceed with the fenestrated repair. I confirmed under fluoroscopy proper orientation of the graft.   In the right groin we then predilated the tract and then I advanced the proximal stent graft with 20Fr sheath from the right groin deployed this without any issues. I unsheathed it in to the descending thoracic aorta through the visceral segment into the infrarenal aorta just above the OZ, accessory renals and lumbar vessels that were preserved. This was performed without issue. Dr. Kathy Estes then selected the graft and then advanced a 16 Fr sheath up the left  groin into the device. While the graft was constrained,  I exchanged for a vanshee 4 catheter and with a glidewire, I was able to select the SMA, confirmed cannulation via contrast angiography with placement of a Magic torque wire. I then utilized the catheter and wire to select the renal arteries as well as the celiac artery with placement of Magic torque wires. A rach wire was put in place with MOAB balloon. Dr Kathy Estes then advanced the ballon was then I inflated the balloon thereby fracturing the diameter reducing and fully deploying the graft. Two 6 Fr. sheaths were placed in each renal artery with placement of 6x22 iCast in place. I deployed the graft without any issues and exchanged for the 40mm Alpha graft that was advanced through the distal thoracic graft and this was advanced in the proximal descending thoracic aorta. Dr. Kathy Estes deployed this stent without any issues. We then exchanged for a 20 Upper sor[place] dry seal sheath and proceeded with the visceral stenting. Dr Kathy Estes  then withdrew the sheath partially in the renal and then I deployed the stent without issue. This was then post dilated with a 42lli6up balloon. Contrast injection revealed wide patency without any leak. The sheath and wire were removed. I then the sheath and the other renal and deployed the stent without issues. This was flared with 10 mm balloon and repeat contrast injection revealed wide patency without leak. The wire and catheter were removed. Dr. Kathy Estes then placed a 7Fr sheath in the SMA with 7x29 VBX.  He then withdrew the sheath in and confirmed this via contrast injection. I inflated the balloon and deployed the stent without any issues. I then flared the proximal segment with a 10 mm balloon without any issues. Repeat contrast angiogram revealed wide patency with no leak past this and thus the wire and sheath were removed. He then placed in 8 Turk[place] sheath in the celiac artery followed by 10 x 29 VBX. He then deployed a 10x29 VBX stent in the celiac and deployed this without any issues and then flared the proximal segment with a 12 mm balloon. Repeat contrast angiography revealed no further residual stenosis and wide patency and complete seal of the segment. All wires and catheters were withdrawn from this. Dr. Estrada Every then exchanged for a pigtail catheter up the left groin. An aortogram and pelvic angiogram revealed wide patency of the visceral vessels and with no endoleak with wide patency into the iliacs. At this point we were satisfied and opted to terminate the procedure. All catheters were removed. The pro-glide Perclose sutures were then cinched locked and cut thereby closing the arteriotomy sites. Manual pressure was held. Protamine was used for reversal of heparinization. Upon completion of this there is evidence of excellent hemostasis. Dermabond was applied over the puncture sies. Doppler signal was confirmed in the bilateral lower extremities consistent with preoperative baseline. The patient awoke from general anesthesia was extubated. She was found to have lower extremity paraplegia and thus a spinal drain was placed with subsequent improvement and resolution of her weakness. She was taken to the ICU in stable condition. All counts were correct at the end of the case.        Earnestine Aguirre MD

## 2023-10-14 NOTE — PLAN OF CARE
Assumed care of the pt at approx. 1930 pm. Awake & follows commands but feels tired. POST. Legs are weak with decreased sensation but pt able to move them. RA. SR/SB. Levo gtt to keep -180. Nausea, better after Zofran. Good UO per chen. Family at bedside. Updated on POC.

## 2023-10-14 NOTE — PROGRESS NOTES
Doing well  Pain controlled  Was able to transfer from bed to chair     Continue physical therapy   Discharge planning

## 2023-10-15 LAB
ANION GAP SERPL CALC-SCNC: 6 MMOL/L (ref 0–18)
BASOPHILS # BLD AUTO: 0.05 X10(3) UL (ref 0–0.2)
BASOPHILS NFR BLD AUTO: 0.7 %
BUN BLD-MCNC: 34 MG/DL (ref 7–18)
CALCIUM BLD-MCNC: 8.9 MG/DL (ref 8.5–10.1)
CHLORIDE SERPL-SCNC: 107 MMOL/L (ref 98–112)
CO2 SERPL-SCNC: 26 MMOL/L (ref 21–32)
CREAT BLD-MCNC: 1.79 MG/DL
EGFRCR SERPLBLD CKD-EPI 2021: 28 ML/MIN/1.73M2 (ref 60–?)
EOSINOPHIL # BLD AUTO: 0.2 X10(3) UL (ref 0–0.7)
EOSINOPHIL NFR BLD AUTO: 2.9 %
ERYTHROCYTE [DISTWIDTH] IN BLOOD BY AUTOMATED COUNT: 14.4 %
GLUCOSE BLD-MCNC: 108 MG/DL (ref 70–99)
GLUCOSE BLD-MCNC: 122 MG/DL (ref 70–99)
GLUCOSE BLD-MCNC: 129 MG/DL (ref 70–99)
GLUCOSE BLD-MCNC: 189 MG/DL (ref 70–99)
GLUCOSE BLD-MCNC: 88 MG/DL (ref 70–99)
HCT VFR BLD AUTO: 30 %
HCT VFR BLD AUTO: 33.8 %
HGB BLD-MCNC: 11.2 G/DL
HGB BLD-MCNC: 9.8 G/DL
IMM GRANULOCYTES # BLD AUTO: 0.13 X10(3) UL (ref 0–1)
IMM GRANULOCYTES NFR BLD: 1.9 %
LYMPHOCYTES # BLD AUTO: 1.04 X10(3) UL (ref 1–4)
LYMPHOCYTES NFR BLD AUTO: 15.1 %
MCH RBC QN AUTO: 27.8 PG (ref 26–34)
MCHC RBC AUTO-ENTMCNC: 32.7 G/DL (ref 31–37)
MCV RBC AUTO: 85.2 FL
MONOCYTES # BLD AUTO: 0.65 X10(3) UL (ref 0.1–1)
MONOCYTES NFR BLD AUTO: 9.5 %
NEUTROPHILS # BLD AUTO: 4.8 X10 (3) UL (ref 1.5–7.7)
NEUTROPHILS # BLD AUTO: 4.8 X10(3) UL (ref 1.5–7.7)
NEUTROPHILS NFR BLD AUTO: 69.9 %
OSMOLALITY SERPL CALC.SUM OF ELEC: 295 MOSM/KG (ref 275–295)
PLATELET # BLD AUTO: 206 10(3)UL (ref 150–450)
POTASSIUM SERPL-SCNC: 3.6 MMOL/L (ref 3.5–5.1)
POTASSIUM SERPL-SCNC: 4.9 MMOL/L (ref 3.5–5.1)
RBC # BLD AUTO: 3.52 X10(6)UL
SODIUM SERPL-SCNC: 139 MMOL/L (ref 136–145)
WBC # BLD AUTO: 6.9 X10(3) UL (ref 4–11)

## 2023-10-15 PROCEDURE — 97116 GAIT TRAINING THERAPY: CPT

## 2023-10-15 PROCEDURE — 85018 HEMOGLOBIN: CPT | Performed by: STUDENT IN AN ORGANIZED HEALTH CARE EDUCATION/TRAINING PROGRAM

## 2023-10-15 PROCEDURE — 80048 BASIC METABOLIC PNL TOTAL CA: CPT | Performed by: STUDENT IN AN ORGANIZED HEALTH CARE EDUCATION/TRAINING PROGRAM

## 2023-10-15 PROCEDURE — 85025 COMPLETE CBC W/AUTO DIFF WBC: CPT | Performed by: STUDENT IN AN ORGANIZED HEALTH CARE EDUCATION/TRAINING PROGRAM

## 2023-10-15 PROCEDURE — 82962 GLUCOSE BLOOD TEST: CPT

## 2023-10-15 PROCEDURE — 97530 THERAPEUTIC ACTIVITIES: CPT

## 2023-10-15 PROCEDURE — 85014 HEMATOCRIT: CPT | Performed by: STUDENT IN AN ORGANIZED HEALTH CARE EDUCATION/TRAINING PROGRAM

## 2023-10-15 PROCEDURE — 84132 ASSAY OF SERUM POTASSIUM: CPT | Performed by: SURGERY

## 2023-10-15 RX ORDER — MAGNESIUM CARB/ALUMINUM HYDROX 105-160MG
30 TABLET,CHEWABLE ORAL
Status: DISCONTINUED | OUTPATIENT
Start: 2023-10-15 | End: 2023-10-19

## 2023-10-15 RX ORDER — POTASSIUM CHLORIDE 20 MEQ/1
40 TABLET, EXTENDED RELEASE ORAL EVERY 4 HOURS
Status: COMPLETED | OUTPATIENT
Start: 2023-10-15 | End: 2023-10-15

## 2023-10-15 NOTE — PHYSICAL THERAPY NOTE
PHYSICAL THERAPY TREATMENT NOTE - INPATIENT    Room Number: 6621/8167-J     Session: 1     Number of Visits to Meet Established Goals: 7    Presenting Problem: AAA repair  Co-Morbidities : Schizophrenia, CVA 2017, s/p lumbar microdiscectomy, DM, LLE cellulitis, COPD, HTN, OA    History related to current admission: Patient is a 77year old female admitted on 10/9/2023 from home for planned thoraco abdominal aneurysm repair on 10/9. Pt was on bedrest for a few days after surgery w/ a lumbar drain in place (now removed). Pt is cleared for mobility. ASSESSMENT     Pt w/ significantly improved alertness level this session. Cont to have some confusion, decreased insight into limitations, and difficulty attending to directions. Le's remain weak. Knees buckle as pt fatigues requiring mod a of 1 and chair follow to prevent falls. Patient will cont to benefit from skilled PT to improve strength, balance and cardiopulmonary endurance to insure highest level of function prior to discharge. DISCHARGE RECOMMENDATIONS  PT Discharge Recommendations: Acute rehabilitation     PLAN  PT Treatment Plan: Bed mobility; Patient education; Family education;Gait training;Neuromuscular re-educate;Range of motion;Strengthening;Transfer training;Balance training  Rehab Potential : Good  Frequency (Obs): 3-5x/week    CURRENT GOALS     Goal #1 Patient is able to demonstrate supine - sit EOB @ level: moderate assistance met 10/15, upgrade to supervision assist      Goal #2 Patient is able to demonstrate transfers EOB to/from [facility] at assistance level: moderate assistance - met 10/15, upgrade to [address]      Goal #3 Patient is able to complete gait 150' w/ rolling walker and min a of 1. -   Goal #4     Goal #5     Goal #6     Goal Comments: Goals established on 10/12/2023  10/15/2023 all goals upgraded      SUBJECTIVE  \"I don't want to do it your way, I want to do it my way. I'm so tired. \"    OBJECTIVE  Precautions: Bed/chair alarm (SBP 160-180 until 10/13)    WEIGHT BEARING RESTRICTION  Weight Bearing Restriction: None                PAIN ASSESSMENT   Ratin  Location: Denied pain this session  Management Techniques:  (N/a)    BALANCE                                                                                                                       Static Sitting: Fair  Dynamic Sitting: Fair -           Static Standing: Poor +  Dynamic Standing: Poor    ACTIVITY TOLERANCE                         O2 WALK         AM-PAC '6-Clicks' INPATIENT SHORT FORM - BASIC MOBILITY  How much difficulty does the patient currently have. .. Patient Difficulty: Turning over in bed (including adjusting bedclothes, sheets and blankets)?: A Little   Patient Difficulty: Sitting down on and standing up from a chair with arms (e.g., wheelchair, bedside commode, etc.): A Little   Patient Difficulty: Moving from lying on back to sitting on the side of the bed?: A Little   How much help from another person does the patient currently need. .. Help from Another: Moving to and from a bed to a chair (including a wheelchair)?: A Little   Help from Another: Need to walk in hospital room?: A Lot   Help from Another: Climbing 3-5 steps with a railing?: Total       AM-PAC Score:  Raw Score: 15   Approx Degree of Impairment: 57.7%   Standardized Score (AM-PAC Scale): 39.45   CMS Modifier (G-Code): CK    FUNCTIONAL ABILITY STATUS  Gait Assessment   Functional Mobility/Gait Assessment  Gait Assistance: Moderate assistance  Distance (ft): 75'x2  Assistive Device: Rolling walker  Pattern: R Foot flat;L Foot flat;Shuffle    Skilled Therapy Provided    Bed Mobility:  Rolling: Left w/ hob flat - mod I   Supine<>Sit: NT   Sit<>Supine: Min a of 1 for le's. Transfer Mobility:  Sit<>Stand: Cues for proper hand placement - pt having difficulty following these safety techniques. Completed 3x over course of session w/ min a of 1.   Stand<>Sit: Cues for safety technique - needs extra time for processing and at times tactile cues - CGA. Gait: Pt completed gait 75'x1 w/ rw and min a of 1 for balance and cues to use walker properly - at times leaning forward on forearms. As pt fatigued at end of gait, le's buckled - mod a of 1 to maintain upright and had a chair follow. Allowed pt to rest for 5 minutes. Pt completed gait 75'x1 once again w/ min a back to room. No buckling noted second walk. Therapist's Comments: Recommend chair follow cont - rn staff aware. Patient End of Session: In bed; With [facility] staff;Needs met;Call light within reach;RN aware of session/findings; All patient questions and concerns addressed; Family present Thaisrachel Porter aware of treatment session)    PT Session Time: 30 minutes  Gait Trainin minutes  Therapeutic Activity: 8 minutes  Therapeutic Exercise: 0 minutes   Neuromuscular Re-education: 0 minutes

## 2023-10-15 NOTE — PLAN OF CARE
Assumed care of pt this am, she is sitting up in chair with c/o abdominal pain, she has not had BM in 4 day, medication given as ordered. Appetite remains fair, encouraged to eat more. She is cooperative this morning and agreeable to take medication. Ambulated in louise with PT. POC explained all questions answered.

## 2023-10-15 NOTE — PLAN OF CARE
Assumed care of the pt approx. 1930pm. Alert & oriented to self & place. Confused at times. Follows commands. Generalized pain, tramadol PRN. RA. NSR. BP goal 120-160. Hydralazine PRN. Morning dose of scheduled Lopressor given early but BP was still elevated so Cardene gtt was started. No nausea. Purewick in place, 600 ml output overnight. Up to chair in am, tolerated really well. Updated on POC.

## 2023-10-15 NOTE — PLAN OF CARE
Assumed care of pt this am, pt sitting up in chair and confused but not attempting to get up without help. She was able to ambulate in louise with walker and 2 standby assist for short distance. Had periods when she was not as confused and did well with family. Bilateral groin sites c/d/I, open to air. Per Dr Mccormack Sep will keep in CNICU overnight for close monitoring of BP. POC explained to pt and family, all questions answered.

## 2023-10-16 LAB
ANION GAP SERPL CALC-SCNC: 5 MMOL/L (ref 0–18)
BASOPHILS # BLD AUTO: 0.07 X10(3) UL (ref 0–0.2)
BASOPHILS NFR BLD AUTO: 1 %
BUN BLD-MCNC: 28 MG/DL (ref 7–18)
CALCIUM BLD-MCNC: 9.1 MG/DL (ref 8.5–10.1)
CHLORIDE SERPL-SCNC: 108 MMOL/L (ref 98–112)
CO2 SERPL-SCNC: 26 MMOL/L (ref 21–32)
CREAT BLD-MCNC: 1.73 MG/DL
EGFRCR SERPLBLD CKD-EPI 2021: 30 ML/MIN/1.73M2 (ref 60–?)
EOSINOPHIL # BLD AUTO: 0.22 X10(3) UL (ref 0–0.7)
EOSINOPHIL NFR BLD AUTO: 3.2 %
ERYTHROCYTE [DISTWIDTH] IN BLOOD BY AUTOMATED COUNT: 14.7 %
GLUCOSE BLD-MCNC: 124 MG/DL (ref 70–99)
GLUCOSE BLD-MCNC: 149 MG/DL (ref 70–99)
GLUCOSE BLD-MCNC: 209 MG/DL (ref 70–99)
GLUCOSE BLD-MCNC: 251 MG/DL (ref 70–99)
GLUCOSE BLD-MCNC: 78 MG/DL (ref 70–99)
HCT VFR BLD AUTO: 32.8 %
HGB BLD-MCNC: 10.6 G/DL
IMM GRANULOCYTES # BLD AUTO: 0.18 X10(3) UL (ref 0–1)
IMM GRANULOCYTES NFR BLD: 2.6 %
LYMPHOCYTES # BLD AUTO: 1.01 X10(3) UL (ref 1–4)
LYMPHOCYTES NFR BLD AUTO: 14.5 %
MCH RBC QN AUTO: 27.7 PG (ref 26–34)
MCHC RBC AUTO-ENTMCNC: 32.3 G/DL (ref 31–37)
MCV RBC AUTO: 85.9 FL
MONOCYTES # BLD AUTO: 0.64 X10(3) UL (ref 0.1–1)
MONOCYTES NFR BLD AUTO: 9.2 %
NEUTROPHILS # BLD AUTO: 4.84 X10 (3) UL (ref 1.5–7.7)
NEUTROPHILS # BLD AUTO: 4.84 X10(3) UL (ref 1.5–7.7)
NEUTROPHILS NFR BLD AUTO: 69.5 %
OSMOLALITY SERPL CALC.SUM OF ELEC: 295 MOSM/KG (ref 275–295)
PLATELET # BLD AUTO: 230 10(3)UL (ref 150–450)
POTASSIUM SERPL-SCNC: 4.5 MMOL/L (ref 3.5–5.1)
RBC # BLD AUTO: 3.82 X10(6)UL
SODIUM SERPL-SCNC: 139 MMOL/L (ref 136–145)
WBC # BLD AUTO: 7 X10(3) UL (ref 4–11)

## 2023-10-16 PROCEDURE — 80048 BASIC METABOLIC PNL TOTAL CA: CPT | Performed by: STUDENT IN AN ORGANIZED HEALTH CARE EDUCATION/TRAINING PROGRAM

## 2023-10-16 PROCEDURE — 85025 COMPLETE CBC W/AUTO DIFF WBC: CPT | Performed by: STUDENT IN AN ORGANIZED HEALTH CARE EDUCATION/TRAINING PROGRAM

## 2023-10-16 PROCEDURE — 82962 GLUCOSE BLOOD TEST: CPT

## 2023-10-16 PROCEDURE — 97110 THERAPEUTIC EXERCISES: CPT

## 2023-10-16 PROCEDURE — 97116 GAIT TRAINING THERAPY: CPT

## 2023-10-16 RX ORDER — AMLODIPINE BESYLATE 5 MG/1
5 TABLET ORAL DAILY
Status: DISCONTINUED | OUTPATIENT
Start: 2023-10-16 | End: 2023-10-19

## 2023-10-16 NOTE — PLAN OF CARE
Received care @ 1930  Neuro status unchanged  Bilateral puncture sites CDI; pulses  per doppler  RA; NSR on tele  Hydralazine given for SBP >160  Voiding via purewick w/ adequate output  Zofran PRN for nausea  Tramadol given for generalized/ abd pain    POC discussed w/ patient

## 2023-10-16 NOTE — CM/SW NOTE
10/16/23 1400   CM/SW Referral Data   Referral Source    Reason for Referral Discharge planning   Informant Daughter   Patient Info   Patient's Current Mental Status at Time of Assessment Oriented   Patient's [address]   Number of Levels in Home 2  (Stays on 1st floor)   Number of Stair in Home 5 steps in   Patient lives with Daughter;Grandchild   Patient Status Prior to Admission   Independent with ADLs and Mobility No   Pt. requires assistance with Housework;Driving;Meals; Bathing   Discharge Needs   Anticipated D/C needs Home health care;Subacute rehab   Choice of Post-Acute Provider   Informed patient of right to choose their preferred provider Yes     Discussed case w/PT and recommendation has changed to [facility]. Notified by RN that daughters at bedside and would like to take pt home instead. Met w/pt and her 2 daughters at the bedside. Pt lives w/daughter Macey Hickey and adult grandson who both assist with ADLs. Daughters expressed that pt would not do well in a Skilled Nursing facility and they would prefer to take her home w/HH. Provided info on caregiver support and contact info for [facility] liaison to assist with caregivers if needed. Daughter Najma Montgomery was at bedside during PT session and feels they can manage her at home. They feel they can manage without CG for now, but will consider in the future. Pt has a cane, walker and wheelchair at home. Home Health referral sent via [facility]  Orders, F2F entered    Daughters requested to have referral sent to previous provider from 5-6 years ago. Per chart review, that was [facility]. Added that provider, but current locations are in [place] and [place] so unsure if [facility] would be covered. / to remain available for support and/or discharge planning.      Joe LIZA MSN, RN CTL/  [id number]

## 2023-10-16 NOTE — PHYSICAL THERAPY NOTE
PHYSICAL THERAPY TREATMENT NOTE - INPATIENT    Room Number: 8512/0719-B     Session: 1   Number of Visits to Meet Established Goals: 7    Presenting Problem: AAA repair  Co-Morbidities : Schizophrenia, CVA 2017, s/p lumbar microdiscectomy, DM, LLE cellulitis, COPD, HTN, OA    ASSESSMENT     The pt continues to exhibit impaired aerobic endurance, impaired muscular strength and endurance, and impaired cognition--impaired memory/confusion. The continues to require Min-Mod A with ambulation, and exhibits knee buckling, which does appear to be associated with anxiousness. Pt requires frequent rest breaks secondary to fatigue and felt unable to attempt stairs this session secondary to fatigue. Given activity tolerance and motivation, anticipate pt would be most appropriate for subacute rehab upon discharge. DISCHARGE RECOMMENDATIONS  PT Discharge Recommendations: Sub-acute rehabilitation     PLAN  PT Treatment Plan: Bed mobility; Patient education; Family education;Gait training;Neuromuscular re-educate;Range of motion;Strengthening;Transfer training;Balance training  Rehab Potential : Good  Frequency (Obs): 3-5x/week    CURRENT GOALS     Goal #1 Patient is able to demonstrate supine - sit EOB @ level: moderate assistance met 10/15, upgrade to supervision assist      Goal #2 Patient is able to demonstrate transfers EOB to/from [facility] at assistance level: moderate assistance - met 10/15, upgrade to [address]      Goal #3 Patient is able to complete gait 150' w/ rolling walker and min a of 1. Goal #4     Goal #5     Goal #6     Goal Comments: Goals established on 10/12/2023    10/16/2023 all goals ongoing      SUBJECTIVE  Pt confused/forgetful events during session, unable to recall instructions provided with each standing rep for hand placement. Additionally, after returning to room, pt states she completed stairs, \"down the louise,\" although she did not.   Additionally, pt reports Dr. Nadeem Batista told her she would be in the hospital for, \"another week or so,\" but family contradicting that report. OBJECTIVE  Precautions: Bed/chair alarm (-180 until 10/13)    WEIGHT BEARING RESTRICTION  Weight Bearing Restriction: None                PAIN ASSESSMENT   Ratin  Location: Denied pain this session  Management Techniques:  (N/a)    BALANCE                                                                                                                       Static Sitting: Fair  Dynamic Sitting: Fair -           Static Standing: Poor +  Dynamic Standing: Poor      AM-PAC '6-Clicks' INPATIENT SHORT FORM - BASIC MOBILITY  How much difficulty does the patient currently have. .. Patient Difficulty: Turning over in bed (including adjusting bedclothes, sheets and blankets)?: A Little   Patient Difficulty: Sitting down on and standing up from a chair with arms (e.g., wheelchair, bedside commode, etc.): A Little   Patient Difficulty: Moving from lying on back to sitting on the side of the bed?: A Little   How much help from another person does the patient currently need. .. Help from Another: Moving to and from a bed to a chair (including a wheelchair)?: A Little   Help from Another: Need to walk in hospital room?: A Lot   Help from Another: Climbing 3-5 steps with a railing?: Total       AM-PAC Score:  Raw Score: 15   Approx Degree of Impairment: 57.7%   Standardized Score (AM-PAC Scale): 39.45   CMS Modifier (G-Code): CK    FUNCTIONAL ABILITY STATUS  Gait Assessment   Functional Mobility/Gait Assessment  Gait Assistance: Moderate assistance  Distance (ft): 30, 50  Assistive Device: Rolling walker  Pattern: R Foot flat;L Foot flat;Shuffle    Skilled Therapy Provided    Bed Mobility:  Rolling: NT   Supine<>Sit: NT   Sit<>Supine: NT     Transfer Mobility:  Sit>Stand: SBA   Stand>Sit: SBA   Gait: Mod A with RW    Gait Training: The pt was approached for therapy sitting upright in chair.   Pt completed sit>stand to RW with SBA and verbal cues.  Pt able to ambulate 25 feet with RW with CGA. After 25 feet pt stopped abruptly and stated, \"Chair! Chair! Chair! \"  Pt exhibited knee buckling and required Mod A to maintain standing and assist to chair. Pt had a seated rest break x3 minutes and then felt able to continue. Pt was able to stand and ambulate an additional 50 feet with Min A initially, but with fatigue pt required increased assistance, Mod A, and had 1 additional incident of knee buckling which required Mod A for stability. Therapeutic Activity: Pt completed sit<>stand x5 reps with verbal and manual cues for hand placement and sequencing. Pt with poor recall of instructions and requires cues with all standing reps. THERAPEUTIC EXERCISES  Lower Extremity Alternating marching  Ankle pumps  Hip AB/AD  LAQ     Upper Extremity      Position Sitting     Repetitions   x10   Sets   x1     Patient End of Session: Up in chair;Needs met;Call light within reach;Bracing education provided per handout;RN aware of session/findings; All patient questions and concerns addressed; Family present    PT Session Time: 35 minutes  Gait Training: 15 minutes  Therapeutic Activity: 10 minutes  Therapeutic Exercise: 10 minutes

## 2023-10-16 NOTE — PLAN OF CARE
Assumed patient care at 1430. Vital signs stable. Patient alert and oriented but forgetful. Patient denies pain. Up in chair. Daughter at bedside working on discharge planning with .

## 2023-10-16 NOTE — PLAN OF CARE
Received patient awake and alert, disoriented to time/day, forgetful. Teary eye at times wants to go home. PT/OT with patient recommending [facility]. Blood pressure parameters 120-180 restarted norvasc per Dr. Santi Crawley. BM x2 soft brown stool. Voiding brief on poor appetite. Daughter at bedside.    Problem: Peripheral vascular status not within defined limits  Goal: Pt will have peripheral vascular status adequate for disch  Outcome: Progressing

## 2023-10-17 LAB
ANION GAP SERPL CALC-SCNC: 6 MMOL/L (ref 0–18)
BASOPHILS # BLD AUTO: 0.05 X10(3) UL (ref 0–0.2)
BASOPHILS NFR BLD AUTO: 0.8 %
BUN BLD-MCNC: 26 MG/DL (ref 7–18)
CALCIUM BLD-MCNC: 9.6 MG/DL (ref 8.5–10.1)
CHLORIDE SERPL-SCNC: 106 MMOL/L (ref 98–112)
CO2 SERPL-SCNC: 25 MMOL/L (ref 21–32)
CREAT BLD-MCNC: 2.07 MG/DL
EGFRCR SERPLBLD CKD-EPI 2021: 24 ML/MIN/1.73M2 (ref 60–?)
EOSINOPHIL # BLD AUTO: 0.23 X10(3) UL (ref 0–0.7)
EOSINOPHIL NFR BLD AUTO: 3.5 %
ERYTHROCYTE [DISTWIDTH] IN BLOOD BY AUTOMATED COUNT: 14.7 %
GLUCOSE BLD-MCNC: 121 MG/DL (ref 70–99)
GLUCOSE BLD-MCNC: 136 MG/DL (ref 70–99)
GLUCOSE BLD-MCNC: 165 MG/DL (ref 70–99)
GLUCOSE BLD-MCNC: 182 MG/DL (ref 70–99)
GLUCOSE BLD-MCNC: 200 MG/DL (ref 70–99)
GLUCOSE BLD-MCNC: 210 MG/DL (ref 70–99)
HCT VFR BLD AUTO: 38 %
HGB BLD-MCNC: 11.7 G/DL
IMM GRANULOCYTES # BLD AUTO: 0.24 X10(3) UL (ref 0–1)
IMM GRANULOCYTES NFR BLD: 3.7 %
LYMPHOCYTES # BLD AUTO: 0.96 X10(3) UL (ref 1–4)
LYMPHOCYTES NFR BLD AUTO: 14.7 %
MCH RBC QN AUTO: 27.7 PG (ref 26–34)
MCHC RBC AUTO-ENTMCNC: 30.8 G/DL (ref 31–37)
MCV RBC AUTO: 90 FL
MONOCYTES # BLD AUTO: 0.54 X10(3) UL (ref 0.1–1)
MONOCYTES NFR BLD AUTO: 8.3 %
NEUTROPHILS # BLD AUTO: 4.51 X10 (3) UL (ref 1.5–7.7)
NEUTROPHILS # BLD AUTO: 4.51 X10(3) UL (ref 1.5–7.7)
NEUTROPHILS NFR BLD AUTO: 69 %
OSMOLALITY SERPL CALC.SUM OF ELEC: 290 MOSM/KG (ref 275–295)
PLATELET # BLD AUTO: 287 10(3)UL (ref 150–450)
POTASSIUM SERPL-SCNC: 4.2 MMOL/L (ref 3.5–5.1)
RBC # BLD AUTO: 4.22 X10(6)UL
SODIUM SERPL-SCNC: 137 MMOL/L (ref 136–145)
WBC # BLD AUTO: 6.5 X10(3) UL (ref 4–11)

## 2023-10-17 PROCEDURE — 80048 BASIC METABOLIC PNL TOTAL CA: CPT | Performed by: STUDENT IN AN ORGANIZED HEALTH CARE EDUCATION/TRAINING PROGRAM

## 2023-10-17 PROCEDURE — 85025 COMPLETE CBC W/AUTO DIFF WBC: CPT | Performed by: STUDENT IN AN ORGANIZED HEALTH CARE EDUCATION/TRAINING PROGRAM

## 2023-10-17 PROCEDURE — 82962 GLUCOSE BLOOD TEST: CPT

## 2023-10-17 NOTE — PLAN OF CARE
Pt tele status. Pt forgetful at times and needs reorientation. VSS. On RA. IS to 750. Tramadol for pain. Incontinent of urine - purewick in place. SCD's on. Bilat groins c/d/I - no hematomas. +1 PP.  Bed alarm on No family present during shift

## 2023-10-17 NOTE — OCCUPATIONAL THERAPY NOTE
Attempted to see patient for OT services this pm, however patient politely but firmly declining activity at this time due to fatigue, daughter at bedside also in agreement requesting patient be allowed to rest. Offered educational session as family declining recommendation of [facility] and planning to bring patient home, however daughter reporting education will be more appropriate tomorrow when other daughter (the one patient lives with) will be present. Daughter at bedside did confirm patient has bedside commode and wheelchair at home, and family is arranging 24-hour coverage. Will reattempt as able and as patient appropriate. RN aware of attempt.

## 2023-10-17 NOTE — CM/SW NOTE
Met with patient and daughter to discuss discharge planning--even though [facility] recommended, patient politelly declining--accepting [facility] list given to both to review--will follow up with choice tomorrow

## 2023-10-17 NOTE — PLAN OF CARE
Assumed care this morning. Pt oob in chair, eating breakfast. Appetite better. NSR on monitor, BP WNL. Room air with sats >95%. Bilateral groin sites SANTOS, intact. Ped pulses +2. Pt is alert and oriented to x3 however forgetful to situation. Drowsy and weak after activity. As been able to walk in the halls with one person assist however needs a lot of encouragement. Daughter at bedside. Pt may transfer to tele, waiting on bed     Problem: Peripheral vascular status not within defined limits  Goal: Pt will have peripheral vascular status adequate for disch  Outcome: Progressing     Problem: PAIN - ADULT  Goal: Verbalizes/displays adequate comfort level or patient's stated pain goal  Description: INTERVENTIONS:  - Encourage pt to monitor pain and request assistance  - Assess pain using appropriate pain scale  - Administer analgesics based on type and severity of pain and evaluate response  - Implement non-pharmacological measures as appropriate and evaluate response  - Consider cultural and social influences on pain and pain management  - Manage/alleviate anxiety  - Utilize distraction and/or relaxation techniques  - Monitor for opioid side effects  - Notify MD/LIP if interventions unsuccessful or patient reports new pain  - Anticipate increased pain with activity and pre-medicate as appropriate  Outcome: Progressing     Problem: RISK FOR INFECTION - ADULT  Goal: Absence of fever/infection during anticipated neutropenic period  Description: INTERVENTIONS  - Monitor WBC  - Administer growth factors as ordered  - Implement neutropenic guidelines  Outcome: Progressing     Problem: SAFETY ADULT - FALL  Goal: Free from fall injury  Description: INTERVENTIONS:  - Assess pt frequently for physical needs  - Identify cognitive and physical deficits and behaviors that affect risk of falls.   - [place] fall precautions as indicated by assessment.  - Educate pt/family on patient safety including physical limitations  - Instruct pt to call for assistance with activity based on assessment  - Modify environment to reduce risk of injury  - Provide assistive devices as appropriate  - Consider OT/PT consult to assist with strengthening/mobility  - Encourage toileting schedule  Outcome: Progressing     Problem: DISCHARGE PLANNING  Goal: Discharge to home or other facility with appropriate resources  Description: INTERVENTIONS:  - Identify barriers to discharge w/pt and caregiver  - Include patient/family/discharge partner in discharge planning  - Arrange for needed discharge resources and transportation as appropriate  - Identify discharge learning needs (meds, wound care, etc)  - Arrange for interpreters to assist at discharge as needed  - Consider post-discharge preferences of patient/family/discharge partner  - Complete POLST form as appropriate  - Assess patient's ability to be responsible for managing their own health  - Refer to Case Management Department for coordinating discharge planning if the patient needs post-hospital services based on physician/LIP order or complex needs related to functional status, cognitive ability or social support system  Outcome: Progressing     Problem: Altered Communication/Language Barrier  Goal: Patient/Family is able to understand and participate in their care  Description: Interventions:  - Assess communication ability and preferred communication style  - Implement communication aides and strategies  - Use visual cues when possible  - Listen attentively, be patient, do not interrupt  - Minimize distractions  - Allow time for understanding and response  - Establish method for patient to ask for assistance (call light)  - Provide an  as needed  - Communicate barriers and strategies to overcome with those who interact with patient  Outcome: Progressing     Problem: Patient/Family Goals  Goal: Patient/Family Long Term Goal  Description: Patient's Long Term Goal: improved mobility    Interventions:  - PT/OT, PRN pain medications, compliance with care plan, family involvement  - See additional Care Plan goals for specific interventions  Outcome: Progressing  Goal: Patient/Family Short Term Goal  Description: Patient's Short Term Goal: minimal pain at rest and during activity    Interventions:   - PRN medications, encourage independence, encourage PO intake.   - See additional Care Plan goals for specific interventions  Outcome: Progressing

## 2023-10-18 LAB
ANION GAP SERPL CALC-SCNC: 6 MMOL/L (ref 0–18)
BASOPHILS # BLD AUTO: 0.07 X10(3) UL (ref 0–0.2)
BASOPHILS NFR BLD AUTO: 1 %
BUN BLD-MCNC: 27 MG/DL (ref 7–18)
CALCIUM BLD-MCNC: 9.3 MG/DL (ref 8.5–10.1)
CHLORIDE SERPL-SCNC: 105 MMOL/L (ref 98–112)
CO2 SERPL-SCNC: 28 MMOL/L (ref 21–32)
CREAT BLD-MCNC: 2.06 MG/DL
EGFRCR SERPLBLD CKD-EPI 2021: 24 ML/MIN/1.73M2 (ref 60–?)
EOSINOPHIL # BLD AUTO: 0.21 X10(3) UL (ref 0–0.7)
EOSINOPHIL NFR BLD AUTO: 2.9 %
ERYTHROCYTE [DISTWIDTH] IN BLOOD BY AUTOMATED COUNT: 14.5 %
GLUCOSE BLD-MCNC: 108 MG/DL (ref 70–99)
GLUCOSE BLD-MCNC: 143 MG/DL (ref 70–99)
GLUCOSE BLD-MCNC: 144 MG/DL (ref 70–99)
GLUCOSE BLD-MCNC: 94 MG/DL (ref 70–99)
GLUCOSE BLD-MCNC: 98 MG/DL (ref 70–99)
HCT VFR BLD AUTO: 33.7 %
HGB BLD-MCNC: 10.8 G/DL
IMM GRANULOCYTES # BLD AUTO: 0.19 X10(3) UL (ref 0–1)
IMM GRANULOCYTES NFR BLD: 2.6 %
LYMPHOCYTES # BLD AUTO: 1.23 X10(3) UL (ref 1–4)
LYMPHOCYTES NFR BLD AUTO: 17 %
MCH RBC QN AUTO: 27.8 PG (ref 26–34)
MCHC RBC AUTO-ENTMCNC: 32 G/DL (ref 31–37)
MCV RBC AUTO: 86.9 FL
MONOCYTES # BLD AUTO: 0.63 X10(3) UL (ref 0.1–1)
MONOCYTES NFR BLD AUTO: 8.7 %
NEUTROPHILS # BLD AUTO: 4.9 X10 (3) UL (ref 1.5–7.7)
NEUTROPHILS # BLD AUTO: 4.9 X10(3) UL (ref 1.5–7.7)
NEUTROPHILS NFR BLD AUTO: 67.8 %
OSMOLALITY SERPL CALC.SUM OF ELEC: 293 MOSM/KG (ref 275–295)
PLATELET # BLD AUTO: 302 10(3)UL (ref 150–450)
POTASSIUM SERPL-SCNC: 3.9 MMOL/L (ref 3.5–5.1)
RBC # BLD AUTO: 3.88 X10(6)UL
SODIUM SERPL-SCNC: 139 MMOL/L (ref 136–145)
WBC # BLD AUTO: 7.2 X10(3) UL (ref 4–11)

## 2023-10-18 PROCEDURE — 82962 GLUCOSE BLOOD TEST: CPT

## 2023-10-18 PROCEDURE — 80048 BASIC METABOLIC PNL TOTAL CA: CPT | Performed by: STUDENT IN AN ORGANIZED HEALTH CARE EDUCATION/TRAINING PROGRAM

## 2023-10-18 PROCEDURE — 85025 COMPLETE CBC W/AUTO DIFF WBC: CPT | Performed by: STUDENT IN AN ORGANIZED HEALTH CARE EDUCATION/TRAINING PROGRAM

## 2023-10-18 RX ORDER — HALOPERIDOL 2 MG/ML
1 SOLUTION ORAL EVERY 6 HOURS PRN
Status: DISCONTINUED | OUTPATIENT
Start: 2023-10-18 | End: 2023-10-19

## 2023-10-18 NOTE — PROGRESS NOTES
Assumed care of patient at 1030. Pt. Alert to self and situation this AM. Towards this afternoon pt. Became increasingly confused and tired. Per daughter this happens when she is in the hospital for prolonged periods of time    Bilateral groin sites dry clean and intact. Pt. Has scattered bruising on abdomen and extremities. Pt. On RA O2 in the 90's. Pt. Ambulated in louise with RN. Pt. Could not walk in straight line and ran into wall. Nurse had to lead pt. Back to room. Pt. Incontinent of bowel and bladder. BP this evening elevated 200/96. Hydralazine given. MD notified.

## 2023-10-18 NOTE — HOME CARE LIAISON
[facility]. Spoke w/ patients daughter who is agreeable with [facility].  Contact information placed on AVS.

## 2023-10-18 NOTE — PLAN OF CARE
Up in chair, ambulating halls with a lot of encouragement. Spoke with daughter Dwayne Means to update, now at bedside. SR on monitors. SBP 150s. Remains on RA. Transferred to [phone] with all belongings on telemetry.

## 2023-10-18 NOTE — DISCHARGE INSTRUCTIONS
[facility] @ discharge  [phone]    ---------------------  Discontinued hydrochlorothiazide and fenofibrate. Please follow-up with your primary care doctor within 1 week and get your kidney function checked. You may resume these medications per your primary care doctor's instructions after the lab work has been obtained and reviewed.

## 2023-10-18 NOTE — PLAN OF CARE
Assumed care @ 1930. Patient is A&O x4, during the night pt with increased confusion. Call daughter to help with reorientation. On tele-NSR. On RA. SBP within parameters of 120-180. Bilateral groins soft and no hematoma, SANTOS. Pedal pulses palpable. Pt ambulated the louise with GB and walker. Accuchecks. Purewick in place. C/o leg pain, tramadol prn given per MAR. Pt with transfer orders, awaiting bed availability. Call light within reach. All needs met at this time.

## 2023-10-19 VITALS
RESPIRATION RATE: 19 BRPM | DIASTOLIC BLOOD PRESSURE: 86 MMHG | HEART RATE: 67 BPM | TEMPERATURE: 97 F | WEIGHT: 147.25 LBS | BODY MASS INDEX: 28.91 KG/M2 | SYSTOLIC BLOOD PRESSURE: 135 MMHG | OXYGEN SATURATION: 93 % | HEIGHT: 60 IN

## 2023-10-19 LAB
ANION GAP SERPL CALC-SCNC: 9 MMOL/L (ref 0–18)
BUN BLD-MCNC: 29 MG/DL (ref 7–18)
CALCIUM BLD-MCNC: 9.7 MG/DL (ref 8.5–10.1)
CHLORIDE SERPL-SCNC: 102 MMOL/L (ref 98–112)
CO2 SERPL-SCNC: 28 MMOL/L (ref 21–32)
CREAT BLD-MCNC: 2.08 MG/DL
EGFRCR SERPLBLD CKD-EPI 2021: 24 ML/MIN/1.73M2 (ref 60–?)
GLUCOSE BLD-MCNC: 110 MG/DL (ref 70–99)
GLUCOSE BLD-MCNC: 119 MG/DL (ref 70–99)
GLUCOSE BLD-MCNC: 150 MG/DL (ref 70–99)
OSMOLALITY SERPL CALC.SUM OF ELEC: 294 MOSM/KG (ref 275–295)
POTASSIUM SERPL-SCNC: 3.5 MMOL/L (ref 3.5–5.1)
SODIUM SERPL-SCNC: 139 MMOL/L (ref 136–145)

## 2023-10-19 PROCEDURE — 80048 BASIC METABOLIC PNL TOTAL CA: CPT | Performed by: STUDENT IN AN ORGANIZED HEALTH CARE EDUCATION/TRAINING PROGRAM

## 2023-10-19 PROCEDURE — 97530 THERAPEUTIC ACTIVITIES: CPT

## 2023-10-19 PROCEDURE — 82962 GLUCOSE BLOOD TEST: CPT

## 2023-10-19 PROCEDURE — 97116 GAIT TRAINING THERAPY: CPT

## 2023-10-19 RX ORDER — TRAMADOL HYDROCHLORIDE 50 MG/1
50 TABLET ORAL EVERY 12 HOURS PRN
Qty: 15 TABLET | Refills: 0 | Status: ON HOLD | OUTPATIENT
Start: 2023-10-19 | End: 2023-10-30

## 2023-10-19 RX ORDER — POTASSIUM CHLORIDE 20 MEQ/1
40 TABLET, EXTENDED RELEASE ORAL ONCE
Status: COMPLETED | OUTPATIENT
Start: 2023-10-19 | End: 2023-10-19

## 2023-10-19 RX ORDER — ASPIRIN 81 MG/1
81 TABLET, CHEWABLE ORAL DAILY
Qty: 30 TABLET | Refills: 0 | Status: SHIPPED | OUTPATIENT
Start: 2023-10-20

## 2023-10-19 RX ORDER — BISACODYL 10 MG
10 SUPPOSITORY, RECTAL RECTAL
Status: SHIPPED | COMMUNITY
Start: 2023-10-19

## 2023-10-19 NOTE — PLAN OF CARE
Patient alert and oriented x 2. Forgetful and confuse w/ poor safety awareness. Keep screaming, \"I want to go home and see my mom! \"  Re-oriented. Pulled IV out. Re-inserted. Attempting to get up unassisted. Haldol 1mg po given. Resp. regular and unlabored. On RA. SR in tele. W/ purewick in place. Hydralazine 10mg given IVP for high BP. Tramadol po given for pain. Repositioned. Bed alarm on. Call light w/in reach. Problem: Peripheral vascular status not within defined limits  Goal: Pt will have peripheral vascular status adequate for disch  Outcome: Progressing     Problem: PAIN - ADULT  Goal: Verbalizes/displays adequate comfort level or patient's stated pain goal  Description: INTERVENTIONS:  - Encourage pt to monitor pain and request assistance  - Assess pain using appropriate pain scale  - Administer analgesics based on type and severity of pain and evaluate response  - Implement non-pharmacological measures as appropriate and evaluate response  - Consider cultural and social influences on pain and pain management  - Manage/alleviate anxiety  - Utilize distraction and/or relaxation techniques  - Monitor for opioid side effects  - Notify MD/LIP if interventions unsuccessful or patient reports new pain  - Anticipate increased pain with activity and pre-medicate as appropriate  Outcome: Progressing     Problem: RISK FOR INFECTION - ADULT  Goal: Absence of fever/infection during anticipated neutropenic period  Description: INTERVENTIONS  - Monitor WBC  - Administer growth factors as ordered  - Implement neutropenic guidelines  Outcome: Progressing     Problem: SAFETY ADULT - FALL  Goal: Free from fall injury  Description: INTERVENTIONS:  - Assess pt frequently for physical needs  - Identify cognitive and physical deficits and behaviors that affect risk of falls.   - [place] fall precautions as indicated by assessment.  - Educate pt/family on patient safety including physical limitations  - Instruct pt to call for assistance with activity based on assessment  - Modify environment to reduce risk of injury  - Provide assistive devices as appropriate  - Consider OT/PT consult to assist with strengthening/mobility  - Encourage toileting schedule  Outcome: Progressing     Problem: DISCHARGE PLANNING  Goal: Discharge to home or other facility with appropriate resources  Description: INTERVENTIONS:  - Identify barriers to discharge w/pt and caregiver  - Include patient/family/discharge partner in discharge planning  - Arrange for needed discharge resources and transportation as appropriate  - Identify discharge learning needs (meds, wound care, etc)  - Arrange for interpreters to assist at discharge as needed  - Consider post-discharge preferences of patient/family/discharge partner  - Complete POLST form as appropriate  - Assess patient's ability to be responsible for managing their own health  - Refer to Case Management Department for coordinating discharge planning if the patient needs post-hospital services based on physician/LIP order or complex needs related to functional status, cognitive ability or social support system  Outcome: Progressing     Problem: Altered Communication/Language Barrier  Goal: Patient/Family is able to understand and participate in their care  Description: Interventions:  - Assess communication ability and preferred communication style  - Implement communication aides and strategies  - Use visual cues when possible  - Listen attentively, be patient, do not interrupt  - Minimize distractions  - Allow time for understanding and response  - Establish method for patient to ask for assistance (call light)  - Provide an  as needed  - Communicate barriers and strategies to overcome with those who interact with patient  Outcome: Progressing     Problem: Patient/Family Goals  Goal: Patient/Family Long Term Goal  Description: Patient's Long Term Goal: improved mobility    Interventions:  - PT/OT, PRN pain medications, compliance with care plan, family involvement  - See additional Care Plan goals for specific interventions  Outcome: Progressing  Goal: Patient/Family Short Term Goal  Description: Patient's Short Term Goal: minimal pain at rest and during activity    Interventions:   - PRN medications, encourage independence, encourage PO intake.   - See additional Care Plan goals for specific interventions  Outcome: Progressing

## 2023-10-19 NOTE — PHYSICAL THERAPY NOTE
PHYSICAL THERAPY TREATMENT NOTE - INPATIENT    Room Number: 0590/8030-R    Session: 2   Number of Visits to Meet Established Goals: 7    Presenting Problem: AAA repair  Co-Morbidities : Schizophrenia, CVA 2017, s/p lumbar microdiscectomy, DM, LLE cellulitis, COPD, HTN, OA    ASSESSMENT     Patient is progressing in mobility, able to ambulate 48' with RW and cga. Distance limited due to dizziness and upon dizziness, pt had minor lob towards R side. Patient shows decreased initiation during functional tasks, frequent cues for safety. Patient was alert and oriented to place and name. Patient emotional throughout session. At this time, pt shows dec endurance, strength, cognition, balance and mobility. DISCHARGE RECOMMENDATIONS  PT Discharge Recommendations: Sub-acute rehabilitation     PLAN  PT Treatment Plan: Bed mobility; Patient education; Family education;Gait training;Neuromuscular re-educate;Range of motion;Strengthening;Transfer training;Balance training  Rehab Potential : Good  Frequency (Obs): 3-5x/week    CURRENT GOALS     Goal #1 Patient is able to demonstrate supine - sit EOB @ level: moderate assistance met 10/15, upgrade to supervision assist      Goal #2 Patient is able to demonstrate transfers EOB to/from [facility] at assistance level: moderate assistance - met 10/15, upgrade to [address]  MET      Goal #3 Patient is able to complete gait 150' w/ rolling walker and min a of 1. Goal #4     Goal #5     Goal #6     Goal Comments: Goals established on 10/12/2023    10/19/2023 goal number 2 met. Other goals ongoing. SUBJECTIVE  \" I miss my puppy. \"  OBJECTIVE  Precautions: Bed/chair alarm (-180 until 10/13)    WEIGHT BEARING RESTRICTION  Weight Bearing Restriction: None                PAIN ASSESSMENT   Ratin  Location: Denied pain this session  Management Techniques:  (N/a)    BALANCE Static Sitting: Fair  Dynamic Sitting: Fair -           Static Standing: Poor +  Dynamic Standing: Poor      AM-PAC '6-Clicks' INPATIENT SHORT FORM - BASIC MOBILITY  How much difficulty does the patient currently have. .. Patient Difficulty: Turning over in bed (including adjusting bedclothes, sheets and blankets)?: A Little   Patient Difficulty: Sitting down on and standing up from a chair with arms (e.g., wheelchair, bedside commode, etc.): A Little   Patient Difficulty: Moving from lying on back to sitting on the side of the bed?: A Little   How much help from another person does the patient currently need. .. Help from Another: Moving to and from a bed to a chair (including a wheelchair)?: A Little   Help from Another: Need to walk in hospital room?: A Lot   Help from Another: Climbing 3-5 steps with a railing?: Total       AM-PAC Score:  Raw Score: 15   Approx Degree of Impairment: 57.7%   Standardized Score (AM-PAC Scale): 39.45   CMS Modifier (G-Code): CK    FUNCTIONAL ABILITY STATUS  Gait Assessment   Functional Mobility/Gait Assessment  Gait Assistance: Minimum assistance  Distance (ft): 50  Assistive Device: Rolling walker  Pattern: R Foot flat;L Foot flat;Shuffle    Skilled Therapy Provided    Bed Mobility: patient was received in the chair. Pt was labile, crying about her missing her puppy. Emotional support provided. Rolling: NT   Supine<>Sit: NT   Sit<>Supine: NT     Transfer Mobility:  Sit>Stand: SBA   Stand>Sit: SBA   Gait:CGA with RW. Min assist upon lob during dizziness episode. Gait deficits: slow padmini, forward lean, dec step length, tends to veer towards R side, needs assist to manage RW. During turning, pt had difficulty managing task, needed assist to turn. Therapeutic Activity: BP post activity 157/90. RN was aware of session outcome. Patient End of Session: Up in chair;Needs met;Call light within reach;RN aware of session/findings; All patient questions and concerns addressed; Alarm set    PT Session Time: 23 minutes  Gait Trainin minutes  Therapeutic Activity: 10 minutes

## 2023-10-19 NOTE — PLAN OF CARE
Pt. Alert to self. Upset this AM wanting to go home. Pt. Is on RA O2 in the low 90's. Pt. Is NSR on tele. Pt. Denies Shortness of breath or chest pain. Pt. Encouraged to eat and be up in chair. Pt. Updated on plan of care. Call light within reach. Problem: Peripheral vascular status not within defined limits  Goal: Pt will have peripheral vascular status adequate for disch  Outcome: Progressing     Problem: PAIN - ADULT  Goal: Verbalizes/displays adequate comfort level or patient's stated pain goal  Description: INTERVENTIONS:  - Encourage pt to monitor pain and request assistance  - Assess pain using appropriate pain scale  - Administer analgesics based on type and severity of pain and evaluate response  - Implement non-pharmacological measures as appropriate and evaluate response  - Consider cultural and social influences on pain and pain management  - Manage/alleviate anxiety  - Utilize distraction and/or relaxation techniques  - Monitor for opioid side effects  - Notify MD/LIP if interventions unsuccessful or patient reports new pain  - Anticipate increased pain with activity and pre-medicate as appropriate  Outcome: Progressing     Problem: RISK FOR INFECTION - ADULT  Goal: Absence of fever/infection during anticipated neutropenic period  Description: INTERVENTIONS  - Monitor WBC  - Administer growth factors as ordered  - Implement neutropenic guidelines  Outcome: Progressing     Problem: SAFETY ADULT - FALL  Goal: Free from fall injury  Description: INTERVENTIONS:  - Assess pt frequently for physical needs  - Identify cognitive and physical deficits and behaviors that affect risk of falls.   - [place] fall precautions as indicated by assessment.  - Educate pt/family on patient safety including physical limitations  - Instruct pt to call for assistance with activity based on assessment  - Modify environment to reduce risk of injury  - Provide assistive devices as appropriate  - Consider OT/PT consult to assist with strengthening/mobility  - Encourage toileting schedule  Outcome: Progressing     Problem: Altered Communication/Language Barrier  Goal: Patient/Family is able to understand and participate in their care  Description: Interventions:  - Assess communication ability and preferred communication style  - Implement communication aides and strategies  - Use visual cues when possible  - Listen attentively, be patient, do not interrupt  - Minimize distractions  - Allow time for understanding and response  - Establish method for patient to ask for assistance (call light)  - Provide an  as needed  - Communicate barriers and strategies to overcome with those who interact with patient  Outcome: Progressing     Problem: Patient/Family Goals  Goal: Patient/Family Long Term Goal  Description: Patient's Long Term Goal: improved mobility    Interventions:  - PT/OT, PRN pain medications, compliance with care plan, family involvement  - See additional Care Plan goals for specific interventions  Outcome: Progressing  Goal: Patient/Family Short Term Goal  Description: Patient's Short Term Goal: minimal pain at rest and during activity    Interventions:   - PRN medications, encourage independence, encourage PO intake.   - See additional Care Plan goals for specific interventions  Outcome: Progressing

## 2023-10-19 NOTE — DISCHARGE PLANNING
NURSING DISCHARGE NOTE    Discharged Home via Wheelchair. Accompanied by RN  Belongings Taken by patient/family. IV removed. Discharge instructions completed.  All questions answered

## 2023-10-20 ENCOUNTER — HOSPITAL ENCOUNTER (INPATIENT)
Facility: HOSPITAL | Age: 78
LOS: 9 days | Discharge: NURSING, REHAB OR LONG-TERM CARE | End: 2023-10-30
Attending: EMERGENCY MEDICINE | Admitting: HOSPITALIST
Payer: MEDICARE

## 2023-10-20 ENCOUNTER — TELEPHONE (OUTPATIENT)
Dept: PREOP | Facility: HOSPITAL | Age: 78
End: 2023-10-20

## 2023-10-20 DIAGNOSIS — G89.29 CHRONIC BILATERAL LOW BACK PAIN, UNSPECIFIED WHETHER SCIATICA PRESENT: ICD-10-CM

## 2023-10-20 DIAGNOSIS — M54.50 CHRONIC BILATERAL LOW BACK PAIN, UNSPECIFIED WHETHER SCIATICA PRESENT: ICD-10-CM

## 2023-10-20 DIAGNOSIS — R53.1 WEAKNESS: Primary | ICD-10-CM

## 2023-10-20 LAB
ALBUMIN SERPL-MCNC: 3 G/DL (ref 3.4–5)
ALBUMIN/GLOB SERPL: 0.7 {RATIO} (ref 1–2)
ALP LIVER SERPL-CCNC: 72 U/L
ALT SERPL-CCNC: 11 U/L
ANION GAP SERPL CALC-SCNC: 11 MMOL/L (ref 0–18)
AST SERPL-CCNC: 18 U/L (ref 15–37)
BASOPHILS # BLD AUTO: 0.11 X10(3) UL (ref 0–0.2)
BASOPHILS NFR BLD AUTO: 0.9 %
BILIRUB SERPL-MCNC: 0.8 MG/DL (ref 0.1–2)
BILIRUB UR QL: NEGATIVE
BUN BLD-MCNC: 36 MG/DL (ref 7–18)
BUN/CREAT SERPL: 13.8 (ref 10–20)
CALCIUM BLD-MCNC: 9.7 MG/DL (ref 8.5–10.1)
CHLORIDE SERPL-SCNC: 104 MMOL/L (ref 98–112)
CO2 SERPL-SCNC: 24 MMOL/L (ref 21–32)
COLOR UR: YELLOW
CREAT BLD-MCNC: 2.61 MG/DL
DEPRECATED RDW RBC AUTO: 46.5 FL (ref 35.1–46.3)
EGFRCR SERPLBLD CKD-EPI 2021: 18 ML/MIN/1.73M2 (ref 60–?)
EOSINOPHIL # BLD AUTO: 0.13 X10(3) UL (ref 0–0.7)
EOSINOPHIL NFR BLD AUTO: 1.1 %
ERYTHROCYTE [DISTWIDTH] IN BLOOD BY AUTOMATED COUNT: 14.7 % (ref 11–15)
GLOBULIN PLAS-MCNC: 4.1 G/DL (ref 2.8–4.4)
GLUCOSE BLD-MCNC: 183 MG/DL (ref 70–99)
GLUCOSE BLDC GLUCOMTR-MCNC: 124 MG/DL (ref 70–99)
GLUCOSE BLDC GLUCOMTR-MCNC: 143 MG/DL (ref 70–99)
GLUCOSE UR-MCNC: NORMAL MG/DL
HCT VFR BLD AUTO: 34.5 %
HGB BLD-MCNC: 11.2 G/DL
IMM GRANULOCYTES # BLD AUTO: 0.2 X10(3) UL (ref 0–1)
IMM GRANULOCYTES NFR BLD: 1.6 %
LEUKOCYTE ESTERASE UR QL STRIP.AUTO: NEGATIVE
LYMPHOCYTES # BLD AUTO: 1.03 X10(3) UL (ref 1–4)
LYMPHOCYTES NFR BLD AUTO: 8.4 %
MCH RBC QN AUTO: 28.1 PG (ref 26–34)
MCHC RBC AUTO-ENTMCNC: 32.5 G/DL (ref 31–37)
MCV RBC AUTO: 86.5 FL
MONOCYTES # BLD AUTO: 0.72 X10(3) UL (ref 0.1–1)
MONOCYTES NFR BLD AUTO: 5.9 %
NEUTROPHILS # BLD AUTO: 10.02 X10 (3) UL (ref 1.5–7.7)
NEUTROPHILS # BLD AUTO: 10.02 X10(3) UL (ref 1.5–7.7)
NEUTROPHILS NFR BLD AUTO: 82.1 %
NITRITE UR QL STRIP.AUTO: NEGATIVE
OSMOLALITY SERPL CALC.SUM OF ELEC: 301 MOSM/KG (ref 275–295)
PH UR: 5 [PH] (ref 5–8)
PLATELET # BLD AUTO: 410 10(3)UL (ref 150–450)
POTASSIUM SERPL-SCNC: 4.8 MMOL/L (ref 3.5–5.1)
PROT SERPL-MCNC: 7.1 G/DL (ref 6.4–8.2)
PROT UR-MCNC: 50 MG/DL
RBC # BLD AUTO: 3.99 X10(6)UL
SODIUM SERPL-SCNC: 139 MMOL/L (ref 136–145)
SP GR UR STRIP: 1.02 (ref 1–1.03)
UROBILINOGEN UR STRIP-ACNC: NORMAL
WBC # BLD AUTO: 12.2 X10(3) UL (ref 4–11)

## 2023-10-20 PROCEDURE — 80053 COMPREHEN METABOLIC PANEL: CPT | Performed by: EMERGENCY MEDICINE

## 2023-10-20 PROCEDURE — 36415 COLL VENOUS BLD VENIPUNCTURE: CPT

## 2023-10-20 PROCEDURE — 99285 EMERGENCY DEPT VISIT HI MDM: CPT

## 2023-10-20 PROCEDURE — 85025 COMPLETE CBC W/AUTO DIFF WBC: CPT | Performed by: EMERGENCY MEDICINE

## 2023-10-20 PROCEDURE — 82962 GLUCOSE BLOOD TEST: CPT

## 2023-10-20 PROCEDURE — 81001 URINALYSIS AUTO W/SCOPE: CPT | Performed by: INTERNAL MEDICINE

## 2023-10-20 RX ORDER — AMLODIPINE BESYLATE 5 MG/1
5 TABLET ORAL NIGHTLY
Status: DISCONTINUED | OUTPATIENT
Start: 2023-10-20 | End: 2023-10-22

## 2023-10-20 RX ORDER — METOPROLOL TARTRATE 50 MG/1
100 TABLET, FILM COATED ORAL 2 TIMES DAILY
Status: DISCONTINUED | OUTPATIENT
Start: 2023-10-20 | End: 2023-10-30

## 2023-10-20 RX ORDER — ACETAMINOPHEN 500 MG
500 TABLET ORAL EVERY 6 HOURS PRN
Status: DISCONTINUED | OUTPATIENT
Start: 2023-10-20 | End: 2023-10-27

## 2023-10-20 RX ORDER — ONDANSETRON 2 MG/ML
4 INJECTION INTRAMUSCULAR; INTRAVENOUS EVERY 6 HOURS PRN
Status: DISCONTINUED | OUTPATIENT
Start: 2023-10-20 | End: 2023-10-30

## 2023-10-20 RX ORDER — ASPIRIN 81 MG/1
81 TABLET ORAL DAILY
Status: DISCONTINUED | OUTPATIENT
Start: 2023-10-21 | End: 2023-10-30

## 2023-10-20 RX ORDER — NICOTINE POLACRILEX 4 MG
15 LOZENGE BUCCAL
Status: DISCONTINUED | OUTPATIENT
Start: 2023-10-20 | End: 2023-10-30

## 2023-10-20 RX ORDER — EZETIMIBE 10 MG/1
10 TABLET ORAL NIGHTLY
Status: DISCONTINUED | OUTPATIENT
Start: 2023-10-20 | End: 2023-10-30

## 2023-10-20 RX ORDER — NICOTINE POLACRILEX 4 MG
30 LOZENGE BUCCAL
Status: DISCONTINUED | OUTPATIENT
Start: 2023-10-20 | End: 2023-10-30

## 2023-10-20 RX ORDER — DEXTROSE MONOHYDRATE 25 G/50ML
50 INJECTION, SOLUTION INTRAVENOUS
Status: DISCONTINUED | OUTPATIENT
Start: 2023-10-20 | End: 2023-10-30

## 2023-10-20 RX ORDER — PANTOPRAZOLE SODIUM 40 MG/1
40 TABLET, DELAYED RELEASE ORAL
Status: DISCONTINUED | OUTPATIENT
Start: 2023-10-20 | End: 2023-10-30

## 2023-10-20 RX ORDER — TRAMADOL HYDROCHLORIDE 50 MG/1
50 TABLET ORAL EVERY 12 HOURS PRN
Status: DISCONTINUED | OUTPATIENT
Start: 2023-10-20 | End: 2023-10-30

## 2023-10-20 RX ORDER — CLOPIDOGREL BISULFATE 75 MG/1
75 TABLET ORAL DAILY
Status: DISCONTINUED | OUTPATIENT
Start: 2023-10-21 | End: 2023-10-30

## 2023-10-20 RX ORDER — ISOSORBIDE MONONITRATE 60 MG/1
60 TABLET, EXTENDED RELEASE ORAL DAILY
Status: DISCONTINUED | OUTPATIENT
Start: 2023-10-21 | End: 2023-10-30

## 2023-10-20 RX ORDER — HEPARIN SODIUM 5000 [USP'U]/ML
5000 INJECTION, SOLUTION INTRAVENOUS; SUBCUTANEOUS EVERY 8 HOURS SCHEDULED
Status: DISCONTINUED | OUTPATIENT
Start: 2023-10-20 | End: 2023-10-30

## 2023-10-20 RX ORDER — BISACODYL 10 MG
10 SUPPOSITORY, RECTAL RECTAL
Status: DISCONTINUED | OUTPATIENT
Start: 2023-10-20 | End: 2023-10-30

## 2023-10-20 NOTE — ED QUICK NOTES
Orders for admission, patient is aware of plan and ready to go upstairs. Any questions, please call ED RN Jennifer Mo at extension [phone].      Patient Covid vaccination status: Fully vaccinated     COVID Test Ordered in ED: None    COVID Suspicion at Admission: N/A    Running Infusions:  None    Mental Status/LOC at time of transport: Aox3, forgetfulness at times    Other pertinent information:   CIWA score: N/A   NIH score:  N/A

## 2023-10-20 NOTE — CM/SW NOTE
Diane Copeland of [facility] patient- pending Vascular Surgery okay for discharge. Carrie SAENZ, 10/20/23, 5:11 PM      [facility] referral still open- updated PT Notes added requested.

## 2023-10-20 NOTE — ED QUICK NOTES
Pt's daughter states the pt has been weak since Monday when she had surgery. Pre-surgery pt would use a walker or cane to ambulate. Per Dr. Tania Jimenez told family to bring the pt in. Daughter says she fell yesterday at home but denies hitting her head.

## 2023-10-20 NOTE — ED INITIAL ASSESSMENT (HPI)
Pt presents to the ED with c/o bilateral leg pain and inability to walk. Family reports pt was discharged out of [facility] s/p arotic stent placement. Family reports vascular surgeon wants her re-admitted. History of dementia, alert and orientated x 4 in triage.

## 2023-10-20 NOTE — TELEPHONE ENCOUNTER
Patient called post fenestrated endovascular abdominal aortic aneurysm repair  Initial plan was Skilled facility   Patient declined and then went home     Now calling complaining about difficulty getting around    Will need admission to [facility] for snf placement

## 2023-10-20 NOTE — CM/SW NOTE
Referral placed in [facility] to start SNF placement process. Per Dr. Yash Dennis patient to be admitted.

## 2023-10-21 LAB
ANION GAP SERPL CALC-SCNC: 10 MMOL/L (ref 0–18)
ANION GAP SERPL CALC-SCNC: 10 MMOL/L (ref 0–18)
BASOPHILS # BLD AUTO: 0.08 X10(3) UL (ref 0–0.2)
BASOPHILS NFR BLD AUTO: 0.9 %
BUN BLD-MCNC: 40 MG/DL (ref 7–18)
BUN BLD-MCNC: 43 MG/DL (ref 7–18)
BUN/CREAT SERPL: 14.8 (ref 10–20)
BUN/CREAT SERPL: 15.8 (ref 10–20)
CALCIUM BLD-MCNC: 9.2 MG/DL (ref 8.5–10.1)
CALCIUM BLD-MCNC: 9.7 MG/DL (ref 8.5–10.1)
CHLORIDE SERPL-SCNC: 103 MMOL/L (ref 98–112)
CHLORIDE SERPL-SCNC: 104 MMOL/L (ref 98–112)
CK SERPL-CCNC: 20 U/L
CO2 SERPL-SCNC: 24 MMOL/L (ref 21–32)
CO2 SERPL-SCNC: 25 MMOL/L (ref 21–32)
CREAT BLD-MCNC: 2.71 MG/DL
CREAT BLD-MCNC: 2.73 MG/DL
CREAT UR-SCNC: 138 MG/DL
DEPRECATED RDW RBC AUTO: 48.1 FL (ref 35.1–46.3)
EGFRCR SERPLBLD CKD-EPI 2021: 17 ML/MIN/1.73M2 (ref 60–?)
EGFRCR SERPLBLD CKD-EPI 2021: 17 ML/MIN/1.73M2 (ref 60–?)
EOSINOPHIL # BLD AUTO: 0.2 X10(3) UL (ref 0–0.7)
EOSINOPHIL NFR BLD AUTO: 2.3 %
ERYTHROCYTE [DISTWIDTH] IN BLOOD BY AUTOMATED COUNT: 14.9 % (ref 11–15)
GLUCOSE BLD-MCNC: 127 MG/DL (ref 70–99)
GLUCOSE BLD-MCNC: 377 MG/DL (ref 70–99)
GLUCOSE BLDC GLUCOMTR-MCNC: 123 MG/DL (ref 70–99)
GLUCOSE BLDC GLUCOMTR-MCNC: 136 MG/DL (ref 70–99)
GLUCOSE BLDC GLUCOMTR-MCNC: 166 MG/DL (ref 70–99)
GLUCOSE BLDC GLUCOMTR-MCNC: 400 MG/DL (ref 70–99)
GLUCOSE BLDC GLUCOMTR-MCNC: 400 MG/DL (ref 70–99)
HCT VFR BLD AUTO: 35.6 %
HGB BLD-MCNC: 11.2 G/DL
IMM GRANULOCYTES # BLD AUTO: 0.16 X10(3) UL (ref 0–1)
IMM GRANULOCYTES NFR BLD: 1.8 %
LYMPHOCYTES # BLD AUTO: 1.09 X10(3) UL (ref 1–4)
LYMPHOCYTES NFR BLD AUTO: 12.5 %
MCH RBC QN AUTO: 27.9 PG (ref 26–34)
MCHC RBC AUTO-ENTMCNC: 31.5 G/DL (ref 31–37)
MCV RBC AUTO: 88.6 FL
MONOCYTES # BLD AUTO: 0.61 X10(3) UL (ref 0.1–1)
MONOCYTES NFR BLD AUTO: 7 %
NEUTROPHILS # BLD AUTO: 6.6 X10 (3) UL (ref 1.5–7.7)
NEUTROPHILS # BLD AUTO: 6.6 X10(3) UL (ref 1.5–7.7)
NEUTROPHILS NFR BLD AUTO: 75.5 %
OSMOLALITY SERPL CALC.SUM OF ELEC: 297 MOSM/KG (ref 275–295)
OSMOLALITY SERPL CALC.SUM OF ELEC: 312 MOSM/KG (ref 275–295)
PLATELET # BLD AUTO: 363 10(3)UL (ref 150–450)
POTASSIUM SERPL-SCNC: 4.2 MMOL/L (ref 3.5–5.1)
POTASSIUM SERPL-SCNC: 4.2 MMOL/L (ref 3.5–5.1)
RBC # BLD AUTO: 4.02 X10(6)UL
SODIUM SERPL-SCNC: 138 MMOL/L (ref 136–145)
SODIUM SERPL-SCNC: 138 MMOL/L (ref 136–145)
SODIUM SERPL-SCNC: 88 MMOL/L
WBC # BLD AUTO: 8.7 X10(3) UL (ref 4–11)

## 2023-10-21 PROCEDURE — 82570 ASSAY OF URINE CREATININE: CPT | Performed by: HOSPITALIST

## 2023-10-21 PROCEDURE — 82550 ASSAY OF CK (CPK): CPT | Performed by: INTERNAL MEDICINE

## 2023-10-21 PROCEDURE — 82962 GLUCOSE BLOOD TEST: CPT

## 2023-10-21 PROCEDURE — 97162 PT EVAL MOD COMPLEX 30 MIN: CPT

## 2023-10-21 PROCEDURE — 80048 BASIC METABOLIC PNL TOTAL CA: CPT | Performed by: HOSPITALIST

## 2023-10-21 PROCEDURE — 80048 BASIC METABOLIC PNL TOTAL CA: CPT | Performed by: INTERNAL MEDICINE

## 2023-10-21 PROCEDURE — 85025 COMPLETE CBC W/AUTO DIFF WBC: CPT | Performed by: INTERNAL MEDICINE

## 2023-10-21 PROCEDURE — 97530 THERAPEUTIC ACTIVITIES: CPT

## 2023-10-21 PROCEDURE — 84300 ASSAY OF URINE SODIUM: CPT | Performed by: HOSPITALIST

## 2023-10-21 RX ORDER — DIPHENHYDRAMINE HCL 25 MG
50 CAPSULE ORAL ONCE
Status: DISCONTINUED | OUTPATIENT
Start: 2023-10-21 | End: 2023-10-30

## 2023-10-21 RX ORDER — DOCUSATE SODIUM 100 MG/1
100 CAPSULE, LIQUID FILLED ORAL 2 TIMES DAILY
Status: DISCONTINUED | OUTPATIENT
Start: 2023-10-21 | End: 2023-10-23

## 2023-10-21 RX ORDER — SODIUM CHLORIDE, SODIUM LACTATE, POTASSIUM CHLORIDE, CALCIUM CHLORIDE 600; 310; 30; 20 MG/100ML; MG/100ML; MG/100ML; MG/100ML
INJECTION, SOLUTION INTRAVENOUS CONTINUOUS
Status: DISCONTINUED | OUTPATIENT
Start: 2023-10-21 | End: 2023-10-23

## 2023-10-21 RX ORDER — SODIUM CHLORIDE 9 MG/ML
INJECTION, SOLUTION INTRAVENOUS CONTINUOUS
Status: DISCONTINUED | OUTPATIENT
Start: 2023-10-21 | End: 2023-10-21

## 2023-10-21 RX ORDER — POLYETHYLENE GLYCOL 3350 17 G/17G
17 POWDER, FOR SOLUTION ORAL DAILY
Status: DISCONTINUED | OUTPATIENT
Start: 2023-10-21 | End: 2023-10-30

## 2023-10-21 NOTE — PROGRESS NOTES
Patient was received from ER in stable condition. Patient was oriented to room and unit. Admission process was completed with help from daughter and grandson. Fall and safety precautions are in place, call light within reach. Frequent rounding performed by RN and POCT.

## 2023-10-21 NOTE — PLAN OF CARE
Problem: Patient Centered Care  Goal: Patient preferences are identified and integrated in the patient's plan of care  Description: Interventions:  - What would you like us to know as we care for you?  Pt from home with daughter and grandson  - Provide timely, complete, and accurate information to patient/family  - Incorporate patient and family knowledge, values, beliefs, and cultural backgrounds into the planning and delivery of care  - Encourage patient/family to participate in care and decision-making at the level they choose  - Honor patient and family perspectives and choices  Outcome: Progressing     Problem: Diabetes/Glucose Control  Goal: Glucose maintained within prescribed range  Description: INTERVENTIONS:  - Monitor Blood Glucose as ordered  - Assess for signs and symptoms of hyperglycemia and hypoglycemia  - Administer ordered medications to maintain glucose within target range  - Assess barriers to adequate nutritional intake and initiate nutrition consult as needed  - Instruct patient on self management of diabetes  Outcome: Progressing     Problem: Patient/Family Goals  Goal: Patient/Family Long Term Goal  Description: Patient's Long Term Goal: To improve physical condition    Interventions:  - See additional Care Plan goals for specific interventions  Outcome: Progressing  Goal: Patient/Family Short Term Goal  Description: Patient's Short Term Goal: Rehab/Home    Interventions:     - See additional Care Plan goals for specific interventions  Outcome: Progressing     Problem: PAIN - ADULT  Goal: Verbalizes/displays adequate comfort level or patient's stated pain goal  Description: INTERVENTIONS:  - Encourage pt to monitor pain and request assistance  - Assess pain using appropriate pain scale  - Administer analgesics based on type and severity of pain and evaluate response  - Implement non-pharmacological measures as appropriate and evaluate response  - Consider cultural and social influences on pain and pain management  - Manage/alleviate anxiety  - Utilize distraction and/or relaxation techniques  - Monitor for opioid side effects  - Notify MD/LIP if interventions unsuccessful or patient reports new pain  - Anticipate increased pain with activity and pre-medicate as appropriate  Outcome: Progressing     Problem: SAFETY ADULT - FALL  Goal: Free from fall injury  Description: INTERVENTIONS:  - Assess pt frequently for physical needs  - Identify cognitive and physical deficits and behaviors that affect risk of falls.   - [place] fall precautions as indicated by assessment.  - Educate pt/family on patient safety including physical limitations  - Instruct pt to call for assistance with activity based on assessment  - Modify environment to reduce risk of injury  - Provide assistive devices as appropriate  - Consider OT/PT consult to assist with strengthening/mobility  - Encourage toileting schedule  Outcome: Progressing     Problem: DISCHARGE PLANNING  Goal: Discharge to home or other facility with appropriate resources  Description: INTERVENTIONS:  - Identify barriers to discharge w/pt and caregiver  - Include patient/family/discharge partner in discharge planning  - Arrange for needed discharge resources and transportation as appropriate  - Identify discharge learning needs (meds, wound care, etc)  - Arrange for interpreters to assist at discharge as needed  - Consider post-discharge preferences of patient/family/discharge partner  - Complete POLST form as appropriate  - Assess patient's ability to be responsible for managing their own health  - Refer to Case Management Department for coordinating discharge planning if the patient needs post-hospital services based on physician/LIP order or complex needs related to functional status, cognitive ability or social support system  Outcome: Progressing     Problem: Altered Communication/Language Barrier  Goal: Patient/Family is able to understand and participate in their care  Description: Interventions:  - Assess communication ability and preferred communication style  - Implement communication aides and strategies  - Use visual cues when possible  - Listen attentively, be patient, do not interrupt  - Minimize distractions  - Allow time for understanding and response  - Establish method for patient to ask for assistance (call light)  - Provide an  as needed  - Communicate barriers and strategies to overcome with those who interact with patient  Outcome: Progressing     Problem: METABOLIC/FLUID AND ELECTROLYTES - ADULT  Goal: Glucose maintained within prescribed range  Description: INTERVENTIONS:  - Monitor Blood Glucose as ordered  - Assess for signs and symptoms of hyperglycemia and hypoglycemia  - Administer ordered medications to maintain glucose within target range  - Assess barriers to adequate nutritional intake and initiate nutrition consult as needed  - Instruct patient on self management of diabetes  Outcome: Progressing  Goal: Electrolytes maintained within normal limits  Description: INTERVENTIONS:  - Monitor labs and rhythm and assess patient for signs and symptoms of electrolyte imbalances  - Administer electrolyte replacement as ordered  - Monitor response to electrolyte replacements, including rhythm and repeat lab results as appropriate  - Fluid restriction as ordered  - Instruct patient on fluid and nutrition restrictions as appropriate  Outcome: Progressing  Goal: Hemodynamic stability and optimal renal function maintained  Description: INTERVENTIONS:  - Monitor labs and assess for signs and symptoms of volume excess or deficit  - Monitor intake, output and patient weight  - Monitor urine specific gravity, serum osmolarity and serum sodium as indicated or ordered  - Monitor response to interventions for patient's volume status, including labs, urine output, blood pressure (other measures as available)  - Encourage oral intake as appropriate  - Instruct patient on fluid and nutrition restrictions as appropriate  Outcome: Progressing     Problem: SKIN/TISSUE INTEGRITY - ADULT  Goal: Skin integrity remains intact  Description: INTERVENTIONS  - Assess and document risk factors for pressure ulcer development  - Assess and document skin integrity  - Monitor for areas of redness and/or skin breakdown  - Initiate interventions, skin care algorithm/standards of care as needed  Outcome: Progressing  Goal: Incision(s), wounds(s) or drain site(s) healing without S/S of infection  Description: INTERVENTIONS:  - Assess and document risk factors for pressure ulcer development  - Assess and document skin integrity  - Assess and document dressing/incision, wound bed, drain sites and surrounding tissue  - Implement wound care per orders  - Initiate isolation precautions as appropriate  - Initiate Pressure Ulcer prevention bundle as indicated  Outcome: Progressing

## 2023-10-21 NOTE — CM/SW NOTE
Received MDO for discharge planning/SNF placement. Patient recently at [facility] (IP 10/9-10/19) for abdominal aortic aneurysm repair. During previous admission, SNF was recommend but patient declined. Patient admitted to [address] [place] [address] under observation due to inability to walk/leg pain. Patient/family agreeable to SNF placement, ER/CM initiated referral via [facility]. Updates sent via [facility]. PASRR complete. 215pm: Spoke w/ daughter Roxy Prajapati, emailed list of SNF options to [email or web address] Prajapati to review & discuss options w/ family. SW/CM to follow. PT/OT pending, will attach when available.      Shelbie Berumen, [address]

## 2023-10-21 NOTE — PHYSICAL THERAPY NOTE
PHYSICAL THERAPY EVALUATION - INPATIENT     Room Number: 332D/083-U  Evaluation Date: 10/21/2023  Type of Evaluation: Initial        Presenting Problem: Weakness     Reason for Therapy: Mobility Dysfunction and Discharge Planning    PHYSICAL THERAPY ASSESSMENT     Patient is a 77year old female admitted 10/20/2023 for weakness. Patient underwent AAA repair earlier this month and returned home at discharge per her request.  Since then, her daughter reports a physical decline. Patient's current functional deficits include decreased strength, decreased ROM, and decreased activity tolerance, which are below the patient's pre-admission status. Bed Mobility:rolling: L: moderate assistance, R: maximum assistance  AROM of BLE in bed: RLE: WFL, but patient moving slowly due to pain and weakness. LLE: limited AROM due to pain and weakness. Patient does not allow PT to attempt PROM due to pain with touch. The patient's Approx Degree of Impairment: 92.36% has been calculated based on documentation in the [facility] '6 clicks' Inpatient Basic Mobility Short Form. Research supports that patients with this level of impairment may benefit from LTAC. However, it is predicted the patient will improve during her stay and may benefit from subacute rehab. Patient will benefit from continued IP PT services to address these deficits in preparation for discharge. DISCHARGE RECOMMENDATIONS  PT Discharge Recommendations: Sub-acute rehabilitation    PLAN  PT Treatment Plan: Bed mobility; Patient education; Family education;Gait training;Strengthening;Range of motion; Neuromuscular re-educate;Transfer training;Balance training  Rehab Potential : Good  Frequency (Obs): 3-5x/week       PHYSICAL THERAPY MEDICAL/SOCIAL HISTORY     Problem List  Principal Problem:    Weakness      HOME SITUATION  Home Situation  Type of Home: House  Home Layout: Able to live on main level  Stairs to Enter : 5  Railing: Yes  Lives With: Daughter (grandson)  Drives: No  Patient Owned Equipment: Rolling walker;Cane     Prior Level of [place]: Prior to AAA repair on 10/9, patient modified independent with RW or straight cane. She was able to cook and manage her mobility. SUBJECTIVE  Patient reports she is willing to do what she can. PHYSICAL THERAPY EXAMINATION     OBJECTIVE     Fall Risk: High fall risk      PAIN ASSESSMENT  Rating: 10  Location: left abdomen and left lower extremity  Management Techniques: Activity promotion;Repositioning    COGNITION  Overall Cognitive Status:  WFL - within functional limits    RANGE OF MOTION AND STRENGTH ASSESSMENT    Lower extremity ROM is limited bilaterally L>R. Lower extremity strength is grossly: RLE: 2/5, LLE: 2-/5    BALANCE  Static Sitting: Not tested               ACTIVITY TOLERANCE  Pulse: 69  Heart Rate Source: Monitor     BP: 144/66  BP Location: Left arm  BP Method: Automatic  Patient Position: Semi-Fernandez      AM-PAC '6-Clicks' INPATIENT SHORT FORM - BASIC MOBILITY  How much difficulty does the patient currently have. .. Patient Difficulty: Turning over in bed (including adjusting bedclothes, sheets and blankets)?: A Lot   Patient Difficulty: Sitting down on and standing up from a chair with arms (e.g., wheelchair, bedside commode, etc.): Unable   Patient Difficulty: Moving from lying on back to sitting on the side of the bed?: Unable   How much help from another person does the patient currently need. .. Help from Another: Moving to and from a bed to a chair (including a wheelchair)?: Total   Help from Another: Need to walk in hospital room?: Total   Help from Another: Climbing 3-5 steps with a railing?: Total     AM-PAC Score:  Raw Score: 7   Approx Degree of Impairment: 92.36%   Standardized Score (AM-PAC Scale): 26.42   CMS Modifier (G-Code): CM    FUNCTIONAL ABILITY STATUS    Exercise/Education Provided:  Bed mobility  Lower therapeutic exercise:   Ankle pumps  Heel slides    Patient End of Session: In bed;Needs met;Call light within reach;RN aware of session/findings; Family present; All patient questions and concerns addressed; With [facility] staff    CURRENT GOALS    Goals to be met by: 11/4/23  Patient Goal Patient's self-stated goal is: To walk   Goal #1 Patient is able to demonstrate supine - sit EOB @ level: moderate assistance     Goal #1   Current Status    Goal #2 Patient is able to demonstrate transfers Sit to/from Stand at assistance level: moderate assistance with walker - rolling     Goal #2  Current Status    Goal #3 Patient is able to ambulate 20 feet with assist device: walker - rolling at assistance level: moderate assistance   Goal #3   Current Status    Goal #4 Patient to demonstrate independence with home activity/exercise instructions provided to patient in preparation for discharge.    Goal #4   Current Status    Patient Evaluation Complexity Level:  History Moderate - 1 or 2 personal factors and/or co-morbidities   Examination of body systems Low - addressing 1-2 elements   Clinical Presentation Low - Stable   Clinical Decision Making Low Complexity         Therapeutic Activity: 15 minutes

## 2023-10-21 NOTE — PLAN OF CARE
Patient is A/0x2-3, experiences sundowning, she was very upset during her sun downing episode this morning and can be reoriented with time, she had a blood sugar spike to 400, physician notified and appropriate correctional sliding scale insulin given, she is experiencing pain in her left lower extremity, PT worked with her today and activity was not tolerated well. GFR was too high for Contrast, fluids given until the GFR improves and can handle IV contrast.     Problem: Patient Centered Care  Goal: Patient preferences are identified and integrated in the patient's plan of care  Description: Interventions:  - What would you like us to know as we care for you?  I have a dog and would like visits from the therapy dogs   Problem: PAIN - ADULT  Goal: Verbalizes/displays adequate comfort level or patient's stated pain goal  Description: INTERVENTIONS:  - Encourage pt to monitor pain and request assistance  - Assess pain using appropriate pain scale  - Administer analgesics based on type and severity of pain and evaluate response  - Implement non-pharmacological measures as appropriate and evaluate response  - Consider cultural and social influences on pain and pain management  - Manage/alleviate anxiety  - Utilize distraction and/or relaxation techniques  - Monitor for opioid side effects  - Notify MD/LIP if interventions unsuccessful or patient reports new pain  - Anticipate increased pain with activity and pre-medicate as appropriate  Outcome: Progressing     Problem: DISCHARGE PLANNING  Goal: Discharge to home or other facility with appropriate resources  Description: INTERVENTIONS:  - Identify barriers to discharge w/pt and caregiver  - Include patient/family/discharge partner in discharge planning  - Arrange for needed discharge resources and transportation as appropriate  - Identify discharge learning needs (meds, wound care, etc)  - Arrange for interpreters to assist at discharge as needed  - Consider post-discharge preferences of patient/family/discharge partner  - Complete POLST form as appropriate  - Assess patient's ability to be responsible for managing their own health  - Refer to Case Management Department for coordinating discharge planning if the patient needs post-hospital services based on physician/LIP order or complex needs related to functional status, cognitive ability or social support system  Outcome: Progressing     Problem: Altered Communication/Language Barrier  Goal: Patient/Family is able to understand and participate in their care  Description: Interventions:  - Assess communication ability and preferred communication style  - Implement communication aides and strategies  - Use visual cues when possible  - Listen attentively, be patient, do not interrupt  - Minimize distractions  - Allow time for understanding and response  - Establish method for patient to ask for assistance (call light)  - Provide an  as needed  - Communicate barriers and strategies to overcome with those who interact with patient  Outcome: Progressing     Problem: METABOLIC/FLUID AND ELECTROLYTES - ADULT  Goal: Glucose maintained within prescribed range  Description: INTERVENTIONS:  - Monitor Blood Glucose as ordered  - Assess for signs and symptoms of hyperglycemia and hypoglycemia  - Administer ordered medications to maintain glucose within target range  - Assess barriers to adequate nutritional intake and initiate nutrition consult as needed  - Instruct patient on self management of diabetes  Outcome: Progressing  Goal: Electrolytes maintained within normal limits  Description: INTERVENTIONS:  - Monitor labs and rhythm and assess patient for signs and symptoms of electrolyte imbalances  - Administer electrolyte replacement as ordered  - Monitor response to electrolyte replacements, including rhythm and repeat lab results as appropriate  - Fluid restriction as ordered  - Instruct patient on fluid and nutrition restrictions as appropriate  Outcome: Progressing  Goal: Hemodynamic stability and optimal renal function maintained  Description: INTERVENTIONS:  - Monitor labs and assess for signs and symptoms of volume excess or deficit  - Monitor intake, output and patient weight  - Monitor urine specific gravity, serum osmolarity and serum sodium as indicated or ordered  - Monitor response to interventions for patient's volume status, including labs, urine output, blood pressure (other measures as available)  - Encourage oral intake as appropriate  - Instruct patient on fluid and nutrition restrictions as appropriate  Outcome: Progressing     Problem: SKIN/TISSUE INTEGRITY - ADULT  Goal: Skin integrity remains intact  Description: INTERVENTIONS  - Assess and document risk factors for pressure ulcer development  - Assess and document skin integrity  - Monitor for areas of redness and/or skin breakdown  - Initiate interventions, skin care algorithm/standards of care as needed  Outcome: Progressing  Goal: Incision(s), wounds(s) or drain site(s) healing without S/S of infection  Description: INTERVENTIONS:  - Assess and document risk factors for pressure ulcer development  - Assess and document skin integrity  - Assess and document dressing/incision, wound bed, drain sites and surrounding tissue  - Implement wound care per orders  - Initiate isolation precautions as appropriate  - Initiate Pressure Ulcer prevention bundle as indicated  Outcome: Progressing     - Provide timely, complete, and accurate information to patient/family  - Incorporate patient and family knowledge, values, beliefs, and cultural backgrounds into the planning and delivery of care  - Encourage patient/family to participate in care and decision-making at the level they choose  - Honor patient and family perspectives and choices  Outcome: Progressing     Problem: Diabetes/Glucose Control  Goal: Glucose maintained within prescribed range  Description: INTERVENTIONS:  - Monitor Blood Glucose as ordered  - Assess for signs and symptoms of hyperglycemia and hypoglycemia  - Administer ordered medications to maintain glucose within target range  - Assess barriers to adequate nutritional intake and initiate nutrition consult as needed  - Instruct patient on self management of diabetes  Outcome: Progressing

## 2023-10-21 NOTE — PROGRESS NOTES
Patient well known for recent discharge from [facility] after fenestrated endovascular abdominal aortic aneurysm repair   Recommnended snf but she refused    Will need social work for [employer]

## 2023-10-22 ENCOUNTER — APPOINTMENT (OUTPATIENT)
Dept: GENERAL RADIOLOGY | Facility: HOSPITAL | Age: 78
End: 2023-10-22
Attending: HOSPITALIST
Payer: MEDICARE

## 2023-10-22 ENCOUNTER — APPOINTMENT (OUTPATIENT)
Dept: ULTRASOUND IMAGING | Facility: HOSPITAL | Age: 78
End: 2023-10-22
Attending: HOSPITALIST
Payer: MEDICARE

## 2023-10-22 LAB
ANION GAP SERPL CALC-SCNC: 12 MMOL/L (ref 0–18)
BUN BLD-MCNC: 37 MG/DL (ref 7–18)
BUN/CREAT SERPL: 14.9 (ref 10–20)
CALCIUM BLD-MCNC: 9.3 MG/DL (ref 8.5–10.1)
CHLORIDE SERPL-SCNC: 104 MMOL/L (ref 98–112)
CO2 SERPL-SCNC: 26 MMOL/L (ref 21–32)
CREAT BLD-MCNC: 2.49 MG/DL
EGFRCR SERPLBLD CKD-EPI 2021: 19 ML/MIN/1.73M2 (ref 60–?)
GLUCOSE BLD-MCNC: 163 MG/DL (ref 70–99)
GLUCOSE BLDC GLUCOMTR-MCNC: 175 MG/DL (ref 70–99)
GLUCOSE BLDC GLUCOMTR-MCNC: 200 MG/DL (ref 70–99)
GLUCOSE BLDC GLUCOMTR-MCNC: 221 MG/DL (ref 70–99)
GLUCOSE BLDC GLUCOMTR-MCNC: 221 MG/DL (ref 70–99)
OSMOLALITY SERPL CALC.SUM OF ELEC: 306 MOSM/KG (ref 275–295)
POTASSIUM SERPL-SCNC: 3.6 MMOL/L (ref 3.5–5.1)
SODIUM SERPL-SCNC: 142 MMOL/L (ref 136–145)

## 2023-10-22 PROCEDURE — 97166 OT EVAL MOD COMPLEX 45 MIN: CPT

## 2023-10-22 PROCEDURE — 92610 EVALUATE SWALLOWING FUNCTION: CPT

## 2023-10-22 PROCEDURE — 82962 GLUCOSE BLOOD TEST: CPT

## 2023-10-22 PROCEDURE — 97535 SELF CARE MNGMENT TRAINING: CPT

## 2023-10-22 PROCEDURE — 93975 VASCULAR STUDY: CPT | Performed by: HOSPITALIST

## 2023-10-22 PROCEDURE — 97530 THERAPEUTIC ACTIVITIES: CPT

## 2023-10-22 PROCEDURE — 80048 BASIC METABOLIC PNL TOTAL CA: CPT | Performed by: HOSPITALIST

## 2023-10-22 PROCEDURE — 73610 X-RAY EXAM OF ANKLE: CPT | Performed by: HOSPITALIST

## 2023-10-22 PROCEDURE — 73562 X-RAY EXAM OF KNEE 3: CPT | Performed by: HOSPITALIST

## 2023-10-22 PROCEDURE — 76775 US EXAM ABDO BACK WALL LIM: CPT | Performed by: HOSPITALIST

## 2023-10-22 RX ORDER — HYDROCODONE BITARTRATE AND ACETAMINOPHEN 5; 325 MG/1; MG/1
1 TABLET ORAL EVERY 6 HOURS PRN
Status: DISCONTINUED | OUTPATIENT
Start: 2023-10-22 | End: 2023-10-30

## 2023-10-22 RX ORDER — GABAPENTIN 100 MG/1
100 CAPSULE ORAL DAILY
Status: DISCONTINUED | OUTPATIENT
Start: 2023-10-22 | End: 2023-10-24

## 2023-10-22 RX ORDER — AMLODIPINE BESYLATE 10 MG/1
10 TABLET ORAL DAILY
Status: DISCONTINUED | OUTPATIENT
Start: 2023-10-22 | End: 2023-10-24

## 2023-10-22 NOTE — PROGRESS NOTES
10/22/23 0800   VISIT TYPE   SLP Inpatient Visit Type (Documentation Required) Attempted Evaluation  (Pt NPO for tests this AM)   FOLLOW UP/PLAN   Follow Up Needed (Documentation Required) Yes   SLP Follow-up Date 10/22/23  (Reschedule for PM)     Magnolia Pepe MS/CCC-SLP  Speech Language Pathologist  [address] [facility]  [place]  [id number]

## 2023-10-22 NOTE — PLAN OF CARE
Problem: Diabetes/Glucose Control  Goal: Glucose maintained within prescribed range  Description: INTERVENTIONS:  - Monitor Blood Glucose as ordered  - Assess for signs and symptoms of hyperglycemia and hypoglycemia  - Administer ordered medications to maintain glucose within target range  - Assess barriers to adequate nutritional intake and initiate nutrition consult as needed  - Instruct patient on self management of diabetes  Outcome: Progressing     Problem: PAIN - ADULT  Goal: Verbalizes/displays adequate comfort level or patient's stated pain goal  Description: INTERVENTIONS:  - Encourage pt to monitor pain and request assistance  - Assess pain using appropriate pain scale  - Administer analgesics based on type and severity of pain and evaluate response  - Implement non-pharmacological measures as appropriate and evaluate response  - Consider cultural and social influences on pain and pain management  - Manage/alleviate anxiety  - Utilize distraction and/or relaxation techniques  - Monitor for opioid side effects  - Notify MD/LIP if interventions unsuccessful or patient reports new pain  - Anticipate increased pain with activity and pre-medicate as appropriate  Outcome: Progressing     Problem: SAFETY ADULT - FALL  Goal: Free from fall injury  Description: INTERVENTIONS:  - Assess pt frequently for physical needs  - Identify cognitive and physical deficits and behaviors that affect risk of falls.   - [place] fall precautions as indicated by assessment.  - Educate pt/family on patient safety including physical limitations  - Instruct pt to call for assistance with activity based on assessment  - Modify environment to reduce risk of injury  - Provide assistive devices as appropriate  - Consider OT/PT consult to assist with strengthening/mobility  - Encourage toileting schedule  Outcome: Progressing     Problem: DISCHARGE PLANNING  Goal: Discharge to home or other facility with appropriate resources  Description: INTERVENTIONS:  - Identify barriers to discharge w/pt and caregiver  - Include patient/family/discharge partner in discharge planning  - Arrange for needed discharge resources and transportation as appropriate  - Identify discharge learning needs (meds, wound care, etc)  - Arrange for interpreters to assist at discharge as needed  - Consider post-discharge preferences of patient/family/discharge partner  - Complete POLST form as appropriate  - Assess patient's ability to be responsible for managing their own health  - Refer to Case Management Department for coordinating discharge planning if the patient needs post-hospital services based on physician/LIP order or complex needs related to functional status, cognitive ability or social support system  Outcome: Progressing     Problem: Altered Communication/Language Barrier  Goal: Patient/Family is able to understand and participate in their care  Description: Interventions:  - Assess communication ability and preferred communication style  - Implement communication aides and strategies  - Use visual cues when possible  - Listen attentively, be patient, do not interrupt  - Minimize distractions  - Allow time for understanding and response  - Establish method for patient to ask for assistance (call light)  - Provide an  as needed  - Communicate barriers and strategies to overcome with those who interact with patient  Outcome: Progressing     Problem: METABOLIC/FLUID AND ELECTROLYTES - ADULT  Goal: Glucose maintained within prescribed range  Description: INTERVENTIONS:  - Monitor Blood Glucose as ordered  - Assess for signs and symptoms of hyperglycemia and hypoglycemia  - Administer ordered medications to maintain glucose within target range  - Assess barriers to adequate nutritional intake and initiate nutrition consult as needed  - Instruct patient on self management of diabetes  Outcome: Progressing  Goal: Electrolytes maintained within normal limits  Description: INTERVENTIONS:  - Monitor labs and rhythm and assess patient for signs and symptoms of electrolyte imbalances  - Administer electrolyte replacement as ordered  - Monitor response to electrolyte replacements, including rhythm and repeat lab results as appropriate  - Fluid restriction as ordered  - Instruct patient on fluid and nutrition restrictions as appropriate  Outcome: Progressing  Goal: Hemodynamic stability and optimal renal function maintained  Description: INTERVENTIONS:  - Monitor labs and assess for signs and symptoms of volume excess or deficit  - Monitor intake, output and patient weight  - Monitor urine specific gravity, serum osmolarity and serum sodium as indicated or ordered  - Monitor response to interventions for patient's volume status, including labs, urine output, blood pressure (other measures as available)  - Encourage oral intake as appropriate  - Instruct patient on fluid and nutrition restrictions as appropriate  Outcome: Progressing     Problem: SKIN/TISSUE INTEGRITY - ADULT  Goal: Skin integrity remains intact  Description: INTERVENTIONS  - Assess and document risk factors for pressure ulcer development  - Assess and document skin integrity  - Monitor for areas of redness and/or skin breakdown  - Initiate interventions, skin care algorithm/standards of care as needed  Outcome: Progressing  Goal: Incision(s), wounds(s) or drain site(s) healing without S/S of infection  Description: INTERVENTIONS:  - Assess and document risk factors for pressure ulcer development  - Assess and document skin integrity  - Assess and document dressing/incision, wound bed, drain sites and surrounding tissue  - Implement wound care per orders  - Initiate isolation precautions as appropriate  - Initiate Pressure Ulcer prevention bundle as indicated  Outcome: Progressing  No acute changes. Patient with complaints of BLE pain, medicated with Tramadol.   Daughter at bedside overnight. Frequent rounding by nursing staff.

## 2023-10-22 NOTE — PLAN OF CARE
Problem: Patient Centered Care  Goal: Patient preferences are identified and integrated in the patient's plan of care  Description: Interventions:  - What would you like us to know as we care for you?   - Provide timely, complete, and accurate information to patient/family  - Incorporate patient and family knowledge, values, beliefs, and cultural backgrounds into the planning and delivery of care  - Encourage patient/family to participate in care and decision-making at the level they choose  - Honor patient and family perspectives and choices  Outcome: Progressing     Problem: Diabetes/Glucose Control  Goal: Glucose maintained within prescribed range  Description: INTERVENTIONS:  - Monitor Blood Glucose as ordered  - Assess for signs and symptoms of hyperglycemia and hypoglycemia  - Administer ordered medications to maintain glucose within target range  - Assess barriers to adequate nutritional intake and initiate nutrition consult as needed  - Instruct patient on self management of diabetes  Outcome: Progressing     Problem: PAIN - ADULT  Goal: Verbalizes/displays adequate comfort level or patient's stated pain goal  Description: INTERVENTIONS:  - Encourage pt to monitor pain and request assistance  - Assess pain using appropriate pain scale  - Administer analgesics based on type and severity of pain and evaluate response  - Implement non-pharmacological measures as appropriate and evaluate response  - Consider cultural and social influences on pain and pain management  - Manage/alleviate anxiety  - Utilize distraction and/or relaxation techniques  - Monitor for opioid side effects  - Notify MD/LIP if interventions unsuccessful or patient reports new pain  - Anticipate increased pain with activity and pre-medicate as appropriate  Outcome: Progressing     Problem: SAFETY ADULT - FALL  Goal: Free from fall injury  Description: INTERVENTIONS:  - Assess pt frequently for physical needs  - Identify cognitive and physical deficits and behaviors that affect risk of falls.   - [place] fall precautions as indicated by assessment.  - Educate pt/family on patient safety including physical limitations  - Instruct pt to call for assistance with activity based on assessment  - Modify environment to reduce risk of injury  - Provide assistive devices as appropriate  - Consider OT/PT consult to assist with strengthening/mobility  - Encourage toileting schedule  Outcome: Progressing     Problem: DISCHARGE PLANNING  Goal: Discharge to home or other facility with appropriate resources  Description: INTERVENTIONS:  - Identify barriers to discharge w/pt and caregiver  - Include patient/family/discharge partner in discharge planning  - Arrange for needed discharge resources and transportation as appropriate  - Identify discharge learning needs (meds, wound care, etc)  - Arrange for interpreters to assist at discharge as needed  - Consider post-discharge preferences of patient/family/discharge partner  - Complete POLST form as appropriate  - Assess patient's ability to be responsible for managing their own health  - Refer to Case Management Department for coordinating discharge planning if the patient needs post-hospital services based on physician/LIP order or complex needs related to functional status, cognitive ability or social support system  Outcome: Progressing     Problem: Altered Communication/Language Barrier  Goal: Patient/Family is able to understand and participate in their care  Description: Interventions:  - Assess communication ability and preferred communication style  - Implement communication aides and strategies  - Use visual cues when possible  - Listen attentively, be patient, do not interrupt  - Minimize distractions  - Allow time for understanding and response  - Establish method for patient to ask for assistance (call light)  - Provide an  as needed  - Communicate barriers and strategies to overcome with those who interact with patient  Outcome: Progressing     Problem: METABOLIC/FLUID AND ELECTROLYTES - ADULT  Goal: Glucose maintained within prescribed range  Description: INTERVENTIONS:  - Monitor Blood Glucose as ordered  - Assess for signs and symptoms of hyperglycemia and hypoglycemia  - Administer ordered medications to maintain glucose within target range  - Assess barriers to adequate nutritional intake and initiate nutrition consult as needed  - Instruct patient on self management of diabetes  Outcome: Progressing  Goal: Electrolytes maintained within normal limits  Description: INTERVENTIONS:  - Monitor labs and rhythm and assess patient for signs and symptoms of electrolyte imbalances  - Administer electrolyte replacement as ordered  - Monitor response to electrolyte replacements, including rhythm and repeat lab results as appropriate  - Fluid restriction as ordered  - Instruct patient on fluid and nutrition restrictions as appropriate  Outcome: Progressing  Goal: Hemodynamic stability and optimal renal function maintained  Description: INTERVENTIONS:  - Monitor labs and assess for signs and symptoms of volume excess or deficit  - Monitor intake, output and patient weight  - Monitor urine specific gravity, serum osmolarity and serum sodium as indicated or ordered  - Monitor response to interventions for patient's volume status, including labs, urine output, blood pressure (other measures as available)  - Encourage oral intake as appropriate  - Instruct patient on fluid and nutrition restrictions as appropriate  Outcome: Progressing     Problem: SKIN/TISSUE INTEGRITY - ADULT  Goal: Skin integrity remains intact  Description: INTERVENTIONS  - Assess and document risk factors for pressure ulcer development  - Assess and document skin integrity  - Monitor for areas of redness and/or skin breakdown  - Initiate interventions, skin care algorithm/standards of care as needed  Outcome: Progressing  Goal: Incision(s), wounds(s) or drain site(s) healing without S/S of infection  Description: INTERVENTIONS:  - Assess and document risk factors for pressure ulcer development  - Assess and document skin integrity  - Assess and document dressing/incision, wound bed, drain sites and surrounding tissue  - Implement wound care per orders  - Initiate isolation precautions as appropriate  - Initiate Pressure Ulcer prevention bundle as indicated  Outcome: Progressing     Patient A/Ox3, daughter at bedside. Complaining of Left knee/ankle pain manage per protocol. Scheduled for xray, and Doppler of kidney. Speech eval, recommended soft/easy to chew no straw diet, no need for video swallow eval. PT/OT eval recommended lift. Continue IV fluids. Call light within reach.

## 2023-10-22 NOTE — SLP NOTE
ADULT SWALLOWING EVALUATION    ASSESSMENT    ASSESSMENT/OVERALL IMPRESSION:    SLP BSSE orders received and acknowledged. A swallow evaluation warranted as pt presents with new onset difficulty eating per family. The pt was admitted with diagnosis of intractable weakness. Chart reviewed and collaborated with RN, [place]. Swallowing evaluation approved and RN reports pt tolerated drinking thin liquids and taking her medication in applesauce without difficulty. Per MD noted, \"77year-old female with a history of coronary artery disease, COPD, history of DVT, type 2 diabetes, hypertension, hyperlipidemia, history of ovarian cancer, peptic ulcer disease, history of stroke, AAA status post endovascular repair this month, who presents to the hospital for evaluation of weakness. Of note she was just discharged from [facility] yesterday. She lives with her daughter. They state that she was just having a lot of trouble with mobility at home. They realize that she was not able to manage at home and decided to bring her to the hospital for further evaluation. They are agreeable to have her go to subacute rehab if needed. \"  Pt seen at bedside and agreeable to participate in BSSE. Pt reports pain at the level of left knee and unable to report a pain level. RN aware of pain. Pt is alert O x 1, afebrile with clear/weak vocal quality, tolerating room air with oxygen saturation at 94% prior to the start of po trials. Family was present at the time of testing. Pt with no hx of dysphagia at [address] [place] [address], but family reports increased weakness after returning home from hospital with coughing and complaint of food sticking in her throat. Pt positioned upright to 90 degrees in bedside chair. [facility]. Face symmetrical at rest with overall mild reduction in ROM/strength with labial, lingual, and buccal movements.   Pt drowsy requiring constant cues to remain alert and active participant during evaluation. Assessed pt with po trials of puree, soft/hard solids, mildly thick liquids, and thin liquids via open cup/straw. Pt with functional oral acceptance and bilabial seal across all trials. Oral phase of swallow appears delayed with adequate bolus control and propulsion when taking bolus in controlled amounts. Pt with intact bite, prolonged mastication of solids, and delayed A-P transit with solid bolus. Minimal-mild oral stasis was cleared with a liquid wash. Pharyngeal phase of the swallow appears delayed with adequate hyolaryngeal elevation/excursion. No CXR completed at this time. Oxygen status remained >94% throughout the entire evaluation. No overt clinical signs of aspiration observed with any consistency (no coughing, no throat clearing, and vocal quality remains dry). Pt denies any globus sensation post the swallow during the swallowing evaluation. Reduced bolus control with straw drinking and loss of bolus anteriorly x1. The pt was left at bedside sitting in the chair with call light in reach. At this time, pt presents with minimal-mild oral dysphagia and probable pharyngeal dysfunction. Recommend a soft easy to chew solid diet (extra sauce/gravy) and thin liquids/no straw with strict adherence to safe swallowing compensatory strategies. Pt/family reports preferred learning style of verbal education. Results and recommendations reviewed with pt, family, and RN. Provided education via verbal and written. The pt was unable to acknowledge understanding, but the family acknowledged understanding of the education. SLP collaborated with RN for diet. VFSS orders received by MD. VFSS is not warranted at this time. VFSS to be completed, as needed, if CXR declines or overt clinical signs of aspiration observed. Collaborated with MD.  Diet recommendations and swallowing precautions/strategies posted on white board at bedside.    FCM SCORE: 5/7        PLAN:  Will follow up to ensure safety with diet and educate pt on compensatory strategies/swallow precautions. Video swallow study to be completed if CXR declines, increase in clinical signs of aspiration, and/or MD desires. MD in agreement to hold VFSS at this time. RECOMMENDATIONS   Diet Recommendations - Solids: Soft/ Easy to chew (Extra sauce and/or gravy)  Diet Recommendations - Liquids: Thin Liquids (No straw)                        Compensatory Strategies Recommended: No straws; Slow rate; Alternate consistencies;Small bites and sips; Extra sauce/gravy (1:1 feeding assistance as needed)  Aspiration Precautions: Upright position; Slow rate;Small bites and sips; No straw  Medication Administration Recommendations: Whole in puree  Treatment Plan/Recommendations: Dysphagia therapy; Aspiration precautions  Discharge Recommendations/Plan: Undetermined    HISTORY   MEDICAL HISTORY  Reason for Referral: R/O aspiration    Problem List  Principal Problem:    Weakness      Past Medical History  Past Medical History:   Diagnosis Date    Aortic aneurysm, thoracic ([address]     Back problem     Calculus of kidney     Cataract     COPD (chronic obstructive pulmonary disease) ([address]     Coronary atherosclerosis     Deep vein thrombosis ([address]     Diabetes ([address]     Diverticulosis of large intestine     Heart attack ([address]     2013    High blood pressure     High cholesterol     History of stomach ulcers     Incontinence     Osteoarthritis     Ovarian cancer ([address]     PONV (postoperative nausea and vomiting)     Renal disorder     Shortness of breath     Stroke ([address] 10/15/2017    shaking, wheelchair       Prior Living Situation: Home with support  Diet Prior to Admission: Regular; Thin liquids  Precautions: Aspiration    Patient/Family Goals: To eat safely/family goal    SWALLOWING HISTORY  Current Diet Consistency: Regular; Thin liquids  Dysphagia History: BSSE 7/14/21:  regular solids and thin liquids/no straw  Imaging Results:   No CXR at time of testing    SUBJECTIVE Pt with increased confusion. OBJECTIVE   ORAL MOTOR EXAMINATION  Dentition: Natural  Symmetry: Within Functional Limits  Strength:  (Overall reduced)     Range of Motion:  (Overall reduced)  Rate of Motion: Reduced    Voice Quality: Clear  Respiratory Status: Unlabored  Consistencies Trialed: Hard solid; Soft solid;Puree; Thin liquids; Nectar thick liquids  Method of Presentation: Self presentation;Staff/Clinician assistance;Spoon;Cup;Straw;Single sips  Patient Positioning: Upright;Midline    Oral Phase of Swallow: Impaired  Bolus Retrieval: Intact  Bilabial Seal: Intact  Bolus Formation: Intact  Bolus Propulsion: Intact  Mastication: Impaired  Retention: Impaired    Pharyngeal Phase of Swallow: Impaired  Laryngeal Elevation Timing: Appears impaired  Laryngeal Elevation Strength: Appears intact  Laryngeal Elevation Coordination: Appears intact  (Please note: Silent aspiration cannot be evaluated clinically. Videofluoroscopic Swallow Study is required to rule-out silent aspiration.)    Esophageal Phase of Swallow: Complaints consistent with possible esophageal involvement                GOALS  Goal #1 The patient will tolerate soft easy to chew consistency and thin liquids without overt signs or symptoms of aspiration with 100 % accuracy over 2 session(s). In Progress   Goal #2 The patient/family/caregiver will demonstrate understanding and implementation of aspiration precautions and swallow strategies independently over 2 session(s). In Progress   Goal #3 The patient will utilize compensatory strategies as outlined by  BSSE (clinical evaluation) including Slow rate, Small bites, Small sips, Alternate liquids/solids, No straws, Upright 90 degrees, Upright 90 degrees 30 mins after meal, Feed patient, extra sauce/gravy with mild assistance 90 % of the time across 2 sessions. In Progress     FOLLOW UP  Treatment Plan/Recommendations: Dysphagia therapy; Aspiration precautions  Number of Visits to Meet Established Goals: 3  Follow Up Needed (Documentation Required): Yes  SLP Follow-up Date: 10/23/23    Thank you for your referral.   If you have any questions, please contact     Afshin Payne MS/KAYDEN-SLP  Speech Language Pathologist  [address] [facility]  [address].  [phone]

## 2023-10-22 NOTE — CM/SW NOTE
PT family is requesting Placement at [facility] of [address] [place] [address]. Facility added to [facility] referral.  SW requested second choice from accepting providers on  list in the event [facility] cannot accept/accommodate. SW/CM to remain available for support and/or discharge planning.       ELICIA Guzman, [place]  Social Work   BBU:#[id number]

## 2023-10-23 ENCOUNTER — APPOINTMENT (OUTPATIENT)
Dept: GENERAL RADIOLOGY | Facility: HOSPITAL | Age: 78
End: 2023-10-23
Attending: HOSPITALIST
Payer: MEDICARE

## 2023-10-23 LAB
ADENOVIRUS PCR:: NOT DETECTED
ANION GAP SERPL CALC-SCNC: 9 MMOL/L (ref 0–18)
B PARAPERT DNA SPEC QL NAA+PROBE: NOT DETECTED
B PERT DNA SPEC QL NAA+PROBE: NOT DETECTED
BILIRUB UR QL: NEGATIVE
BUN BLD-MCNC: 33 MG/DL (ref 7–18)
BUN/CREAT SERPL: 14.7 (ref 10–20)
C PNEUM DNA SPEC QL NAA+PROBE: NOT DETECTED
CALCIUM BLD-MCNC: 8.9 MG/DL (ref 8.5–10.1)
CHLORIDE SERPL-SCNC: 104 MMOL/L (ref 98–112)
CO2 SERPL-SCNC: 27 MMOL/L (ref 21–32)
COLOR UR: YELLOW
CORONAVIRUS 229E PCR:: NOT DETECTED
CORONAVIRUS HKU1 PCR:: NOT DETECTED
CORONAVIRUS NL63 PCR:: NOT DETECTED
CORONAVIRUS OC43 PCR:: NOT DETECTED
CREAT BLD-MCNC: 2.24 MG/DL
EGFRCR SERPLBLD CKD-EPI 2021: 22 ML/MIN/1.73M2 (ref 60–?)
FLUAV RNA SPEC QL NAA+PROBE: NOT DETECTED
FLUBV RNA SPEC QL NAA+PROBE: NOT DETECTED
GLUCOSE BLD-MCNC: 135 MG/DL (ref 70–99)
GLUCOSE BLDC GLUCOMTR-MCNC: 164 MG/DL (ref 70–99)
GLUCOSE BLDC GLUCOMTR-MCNC: 203 MG/DL (ref 70–99)
GLUCOSE BLDC GLUCOMTR-MCNC: 255 MG/DL (ref 70–99)
GLUCOSE UR-MCNC: 30 MG/DL
HGB UR QL STRIP.AUTO: NEGATIVE
KETONES UR-MCNC: NEGATIVE MG/DL
LEUKOCYTE ESTERASE UR QL STRIP.AUTO: NEGATIVE
METAPNEUMOVIRUS PCR:: NOT DETECTED
MYCOPLASMA PNEUMONIA PCR:: NOT DETECTED
NITRITE UR QL STRIP.AUTO: NEGATIVE
OSMOLALITY SERPL CALC.SUM OF ELEC: 299 MOSM/KG (ref 275–295)
PARAINFLUENZA 1 PCR:: NOT DETECTED
PARAINFLUENZA 2 PCR:: NOT DETECTED
PARAINFLUENZA 3 PCR:: NOT DETECTED
PARAINFLUENZA 4 PCR:: NOT DETECTED
PH UR: 5.5 [PH] (ref 5–8)
POTASSIUM SERPL-SCNC: 3.6 MMOL/L (ref 3.5–5.1)
PROCALCITONIN SERPL-MCNC: 0.53 NG/ML (ref ?–0.16)
PROT UR-MCNC: 100 MG/DL
RHINOVIRUS/ENTERO PCR:: NOT DETECTED
RSV RNA SPEC QL NAA+PROBE: NOT DETECTED
SARS-COV-2 RNA NPH QL NAA+NON-PROBE: NOT DETECTED
SODIUM SERPL-SCNC: 140 MMOL/L (ref 136–145)
SP GR UR STRIP: 1.02 (ref 1–1.03)
UROBILINOGEN UR STRIP-ACNC: NORMAL

## 2023-10-23 PROCEDURE — 84145 PROCALCITONIN (PCT): CPT | Performed by: HOSPITALIST

## 2023-10-23 PROCEDURE — 0202U NFCT DS 22 TRGT SARS-COV-2: CPT | Performed by: HOSPITALIST

## 2023-10-23 PROCEDURE — 92526 ORAL FUNCTION THERAPY: CPT

## 2023-10-23 PROCEDURE — 81001 URINALYSIS AUTO W/SCOPE: CPT | Performed by: HOSPITALIST

## 2023-10-23 PROCEDURE — 82962 GLUCOSE BLOOD TEST: CPT

## 2023-10-23 PROCEDURE — 72100 X-RAY EXAM L-S SPINE 2/3 VWS: CPT | Performed by: HOSPITALIST

## 2023-10-23 PROCEDURE — 97530 THERAPEUTIC ACTIVITIES: CPT

## 2023-10-23 PROCEDURE — 71045 X-RAY EXAM CHEST 1 VIEW: CPT | Performed by: HOSPITALIST

## 2023-10-23 PROCEDURE — 80048 BASIC METABOLIC PNL TOTAL CA: CPT | Performed by: HOSPITALIST

## 2023-10-23 PROCEDURE — 87040 BLOOD CULTURE FOR BACTERIA: CPT | Performed by: HOSPITALIST

## 2023-10-23 RX ORDER — ACETAMINOPHEN 500 MG
1000 TABLET ORAL EVERY 8 HOURS
Status: DISCONTINUED | OUTPATIENT
Start: 2023-10-23 | End: 2023-10-24

## 2023-10-23 NOTE — PLAN OF CARE
Problem: Patient Centered Care  Goal: Patient preferences are identified and integrated in the patient's plan of care  Description: Interventions:  - What would you like us to know as we care for you?  Pt from home with daughter  - Provide timely, complete, and accurate information to patient/family  - Incorporate patient and family knowledge, values, beliefs, and cultural backgrounds into the planning and delivery of care  - Encourage patient/family to participate in care and decision-making at the level they choose  - Honor patient and family perspectives and choices  Outcome: Progressing     Problem: Diabetes/Glucose Control  Goal: Glucose maintained within prescribed range  Description: INTERVENTIONS:  - Monitor Blood Glucose as ordered  - Assess for signs and symptoms of hyperglycemia and hypoglycemia  - Administer ordered medications to maintain glucose within target range  - Assess barriers to adequate nutritional intake and initiate nutrition consult as needed  - Instruct patient on self management of diabetes  Outcome: Progressing     Problem: Patient/Family Goals  Goal: Patient/Family Long Term Goal  Description: Patient's Long Term Goal: to improve physical condition    Interventions:    - See additional Care Plan goals for specific interventions  Outcome: Progressing  Goal: Patient/Family Short Term Goal  Description: Patient's Short Term Goal:     Interventions:     - See additional Care Plan goals for specific interventions  Outcome: Progressing     Problem: PAIN - ADULT  Goal: Verbalizes/displays adequate comfort level or patient's stated pain goal  Description: INTERVENTIONS:  - Encourage pt to monitor pain and request assistance  - Assess pain using appropriate pain scale  - Administer analgesics based on type and severity of pain and evaluate response  - Implement non-pharmacological measures as appropriate and evaluate response  - Consider cultural and social influences on pain and pain management  - Manage/alleviate anxiety  - Utilize distraction and/or relaxation techniques  - Monitor for opioid side effects  - Notify MD/LIP if interventions unsuccessful or patient reports new pain  - Anticipate increased pain with activity and pre-medicate as appropriate  Outcome: Progressing     Problem: SAFETY ADULT - FALL  Goal: Free from fall injury  Description: INTERVENTIONS:  - Assess pt frequently for physical needs  - Identify cognitive and physical deficits and behaviors that affect risk of falls.   - [place] fall precautions as indicated by assessment.  - Educate pt/family on patient safety including physical limitations  - Instruct pt to call for assistance with activity based on assessment  - Modify environment to reduce risk of injury  - Provide assistive devices as appropriate  - Consider OT/PT consult to assist with strengthening/mobility  - Encourage toileting schedule  Outcome: Progressing     Problem: DISCHARGE PLANNING  Goal: Discharge to home or other facility with appropriate resources  Description: INTERVENTIONS:  - Identify barriers to discharge w/pt and caregiver  - Include patient/family/discharge partner in discharge planning  - Arrange for needed discharge resources and transportation as appropriate  - Identify discharge learning needs (meds, wound care, etc)  - Arrange for interpreters to assist at discharge as needed  - Consider post-discharge preferences of patient/family/discharge partner  - Complete POLST form as appropriate  - Assess patient's ability to be responsible for managing their own health  - Refer to Case Management Department for coordinating discharge planning if the patient needs post-hospital services based on physician/LIP order or complex needs related to functional status, cognitive ability or social support system  Outcome: Progressing     Problem: Altered Communication/Language Barrier  Goal: Patient/Family is able to understand and participate in their care  Description: Interventions:  - Assess communication ability and preferred communication style  - Implement communication aides and strategies  - Use visual cues when possible  - Listen attentively, be patient, do not interrupt  - Minimize distractions  - Allow time for understanding and response  - Establish method for patient to ask for assistance (call light)  - Provide an  as needed  - Communicate barriers and strategies to overcome with those who interact with patient  Outcome: Progressing     Problem: METABOLIC/FLUID AND ELECTROLYTES - ADULT  Goal: Glucose maintained within prescribed range  Description: INTERVENTIONS:  - Monitor Blood Glucose as ordered  - Assess for signs and symptoms of hyperglycemia and hypoglycemia  - Administer ordered medications to maintain glucose within target range  - Assess barriers to adequate nutritional intake and initiate nutrition consult as needed  - Instruct patient on self management of diabetes  Outcome: Progressing  Goal: Electrolytes maintained within normal limits  Description: INTERVENTIONS:  - Monitor labs and rhythm and assess patient for signs and symptoms of electrolyte imbalances  - Administer electrolyte replacement as ordered  - Monitor response to electrolyte replacements, including rhythm and repeat lab results as appropriate  - Fluid restriction as ordered  - Instruct patient on fluid and nutrition restrictions as appropriate  Outcome: Progressing  Goal: Hemodynamic stability and optimal renal function maintained  Description: INTERVENTIONS:  - Monitor labs and assess for signs and symptoms of volume excess or deficit  - Monitor intake, output and patient weight  - Monitor urine specific gravity, serum osmolarity and serum sodium as indicated or ordered  - Monitor response to interventions for patient's volume status, including labs, urine output, blood pressure (other measures as available)  - Encourage oral intake as appropriate  - Instruct patient on fluid and nutrition restrictions as appropriate  Outcome: Progressing     Problem: SKIN/TISSUE INTEGRITY - ADULT  Goal: Skin integrity remains intact  Description: INTERVENTIONS  - Assess and document risk factors for pressure ulcer development  - Assess and document skin integrity  - Monitor for areas of redness and/or skin breakdown  - Initiate interventions, skin care algorithm/standards of care as needed  Outcome: Progressing  Goal: Incision(s), wounds(s) or drain site(s) healing without S/S of infection  Description: INTERVENTIONS:  - Assess and document risk factors for pressure ulcer development  - Assess and document skin integrity  - Assess and document dressing/incision, wound bed, drain sites and surrounding tissue  - Implement wound care per orders  - Initiate isolation precautions as appropriate  - Initiate Pressure Ulcer prevention bundle as indicated  Outcome: Progressing

## 2023-10-23 NOTE — SLP NOTE
SPEECH DAILY NOTE - INPATIENT    ASSESSMENT & PLAN   ASSESSMENT  PPE REQUIRED. THIS THERAPIST WORE GLOVES, DROPLET MASK, AND GOGGLES FOR DURATION OF EVALUATION. HANDS WASHED UPON ENTRANCE/EXIT. SLP f/u for ongoing dysphagia tx/meal assessment per recommendations of easy to chew/thin per BSE. RN reports pt tolerates diet and medication well with no overt clinical s/s aspiration. Pt denies any swallowing challenges. Pt positioned upright in bed/bedside chair. Pt afebrile, tolerating 2L/Min O2NC with oxygen status 94 prior to the start of oral trials. SLP reviewed aspiration precautions and safe swallowing compensatory strategies with the patient and pt's daughter. Safe swallow guidelines remain written on the white board in purple. Diet recommendations remain on the whiteboard in the room. Patient and family v/u. Provided 1:1 feed assistance, pt tolerates easy to chew and thin liquids via open cup with no overt clinical signs/symptoms of aspiration. Oxygen status remained >93 t/o the entire session. Oral/buccal cavities clear of residue following all trials. PLAN: SLP to f/u x2 meal assessments, monitor imaging, and VFSS if clinically warranted. Diet Recommendations - Solids: Soft/ Easy to chew (Extra sauce and/or gravy)  Diet Recommendations - Liquids: Thin Liquids (No straw)    Compensatory Strategies Recommended: No straws; Slow rate; Alternate consistencies;Small bites and sips; Extra sauce/gravy (1:1 feeding assistance as needed)  Aspiration Precautions: Upright position; Slow rate;Small bites and sips; No straw  Medication Administration Recommendations: Whole in puree    Patient Experiencing Pain: Yes  Pain Rating: Unable to Rate  Pain Location: neck     Nursing Aware of Pain?: Yes    Discharge Recommendations  Discharge Recommendations/Plan: Undetermined    Treatment Plan  Treatment Plan/Recommendations: Dysphagia therapy; Aspiration precautions    Interdisciplinary Communication: Discussed with RN  Recommendations posted at bedside            GOALS  Goal #1 The patient will tolerate soft easy to chew consistency and thin liquids without overt signs or symptoms of aspiration with 100 % accuracy over 2 session(s). Pt tolerates easy to chew/thin liquids with no overt signs/symptoms of aspiration observed for 100% of trials. In Progress   Goal #2 The patient/family/caregiver will demonstrate understanding and implementation of aspiration precautions and swallow strategies independently over 2 session(s). Education provided regarding aspiration precautions and safe swallow strategies. Pt's daughter v/u to all recommendations. Pt would benefit from continued education due to cognitive deficits at baseline. In Progress   Goal #3 The patient will utilize compensatory strategies as outlined by  BSSE (clinical evaluation) including Slow rate, Small bites, Small sips, Alternate liquids/solids, No straws, Upright 90 degrees, Upright 90 degrees 30 mins after meal, Feed patient, extra sauce/gravy with mild assistance 90 % of the time across 2 sessions. SLP led 1:1 feed with slow/small bites/sips while upright in bed. No straws utilized. Liquids/solids alternated. In Progress     FOLLOW UP  Follow Up Needed (Documentation Required): Yes  SLP Follow-up Date: 10/24/23  Number of Visits to Meet Established Goals: 2    Session: 1 following BSSE    If you have any questions, please contact Hussein Gutierrez, ANA Campos  Speech Language Pathologist  Phone Number Ext. [phone]

## 2023-10-23 NOTE — PLAN OF CARE
Problem: ALTERED NUTRIENT INTAKE - ADULT  Goal: Nutrient intake appropriate for improving, restoring or maintaining nutritional needs  Description: INTERVENTIONS:  - Assess nutritional status and recommend course of action  - Monitor oral intake, labs, and treatment plans  - Recommend appropriate diets, oral nutritional supplements, and vitamin/mineral supplements  - Provide specific nutrition education as appropriate  10/23/2023 1438 by Dimitris Matson RD  Outcome: Progressing

## 2023-10-23 NOTE — CM/SW NOTE
List of accepting [facility] facilities provided to pt's daughter at bedside. Unfortunately daughter 1st choice, [facility]  did not accept in [place]. Pt's daughter has chosen [facility] as back up choice. Facility reserved in [place] and instructed to contact daughter directly for questions and concerns regarding rehab stay. Plan: [facility] pending medical clearance.     GOLDY Romero    [phone]

## 2023-10-23 NOTE — PHYSICAL THERAPY NOTE
PHYSICAL THERAPY TREATMENT NOTE - INPATIENT     Room Number: 157V/169-I       Presenting Problem: weakness, L LE pain  Co-Morbidities : AAA repair on 10/9    Problem List  Principal Problem:    Weakness      PHYSICAL THERAPY ASSESSMENT     RN cleared pt to participate in PT f/u session this morning. Pt received in bed at start, dtr at bedside. Pt lethargic but agreeable to participate. Pt notes significant pain in L knee and ankle, imaging neg for fracture but + for joint effusion. Pt also noted to be positioned with head in left rotation, difficulty to re-align to neutral position. Pt required max A to transition supine to sit with use of bed modifications. Once sitting, pt initially required min A to maintain upright posture, left LOB and lean noted. Encouraged pt to side bend trunk down to right to assist in midline re-orientation. Pt tolerated sitting EOB x 15 min. Able to sit with CGA for 1-2 min intervals and then would require min A to re-adjust posture. Attempted seated LE ROM but pt noting severe pain with attempts to move either leg. Pt returned to supine at end of session, needs left in reach and dtr present at bedside. Reviewed ROM activities with patient and dtr. Recommended dtr encourage pt to complete ankle pumps, heel slides and UE AROM in bed throughout the day. Also reviewed cervical positioning and stretching that dtr can assist with. Pt is significantly declined from her baseline level of function. Per notes, pt was ambulating about 50 ft with RW on 10/19 with physical therapy prior to discharge home. The patient's Approx Degree of Impairment: 86.62% has been calculated based on documentation in the [facility] '6 clicks' Inpatient Basic Mobility Short Form. Research supports that patients with this level of impairment may benefit from [facility]. DISCHARGE RECOMMENDATIONS  PT Discharge Recommendations: Sub-acute rehabilitation     PLAN  PT Treatment Plan: Bed mobility; Body mechanics; Energy conservation;Patient education; Family education; Neuromuscular re-educate;Range of motion;Strengthening;Transfer training;Balance training  Frequency (Obs): 3-5x/week    SUBJECTIVE  \"I don't have feet anymore. \"     OBJECTIVE  Precautions: Bed/chair alarm    WEIGHT BEARING RESTRICTION                PAIN ASSESSMENT   Rating: Unable to rate  Location: L LE, generalized whole body  Management Techniques: Activity promotion;Repositioning    BALANCE  Static Sitting: Fair -  Dynamic Sitting: Poor +          AM-PAC '6-Clicks' INPATIENT SHORT FORM - BASIC MOBILITY  How much difficulty does the patient currently have. .. Patient Difficulty: Turning over in bed (including adjusting bedclothes, sheets and blankets)?: A Lot   Patient Difficulty: Sitting down on and standing up from a chair with arms (e.g., wheelchair, bedside commode, etc.): Unable   Patient Difficulty: Moving from lying on back to sitting on the side of the bed?: A Lot   How much help from another person does the patient currently need. .. Help from Another: Moving to and from a bed to a chair (including a wheelchair)?: Total   Help from Another: Need to walk in hospital room?: Total   Help from Another: Climbing 3-5 steps with a railing?: Total     AM-PAC Score:  Raw Score: 8   Approx Degree of Impairment: 86.62%   Standardized Score (AM-PAC Scale): 28.58   CMS Modifier (G-Code): CM    FUNCTIONAL ABILITY STATUS  Functional Mobility/Gait Assessment  Gait Assistance: Not tested      Patient End of Session: In bed;Needs met;Call light within reach;RN aware of session/findings; All patient questions and concerns addressed; Family present    CURRENT GOALS   Goals to be met by: 11/4/23  Patient Goal Patient's self-stated goal is:  To walk   Goal #1 Patient is able to demonstrate supine - sit EOB @ level: moderate assistance      Goal #1   Current Status  Progressing: max A x 1 with bed modifications   Goal #2 Patient is able to demonstrate transfers Sit to/from Stand at assistance level: moderate assistance with walker - rolling      Goal #2  Current Status  NT   Goal #3 Patient is able to ambulate 20 feet with assist device: walker - rolling at assistance level: moderate assistance   Goal #3   Current Status  NT   Goal #4 Patient to demonstrate independence with home activity/exercise instructions provided to patient in preparation for discharge.    Goal #4   Current Status  Progressing     Therapeutic Activity: 27 minutes

## 2023-10-23 NOTE — PLAN OF CARE
Pain managed, pt worked with PT at side of bed, IV antibiotics continued, pt in chair for breakfast, pt comfortable in bed with daughters at bedside. SW for discharge support with family. .Call light within reach. All safety precautions in place. Frequent rounding by staff. Problem: Patient Centered Care  Goal: Patient preferences are identified and integrated in the patient's plan of care  Description: Interventions:  - What would you like us to know as we care for you?  I live at home with my daughter and grandson  - Provide timely, complete, and accurate information to patient/family  - Incorporate patient and family knowledge, values, beliefs, and cultural backgrounds into the planning and delivery of care  - Encourage patient/family to participate in care and decision-making at the level they choose  - Honor patient and family perspectives and choices  Outcome: Progressing     Problem: Diabetes/Glucose Control  Goal: Glucose maintained within prescribed range  Description: INTERVENTIONS:  - Monitor Blood Glucose as ordered  - Assess for signs and symptoms of hyperglycemia and hypoglycemia  - Administer ordered medications to maintain glucose within target range  - Assess barriers to adequate nutritional intake and initiate nutrition consult as needed  - Instruct patient on self management of diabetes  Outcome: Progressing     Problem: Patient/Family Goals  Goal: Patient/Family Long Term Goal  Description: Patient's Long Term Goal: discharge home with family    Interventions:  - See additional Care Plan goals for specific interventions  Outcome: Progressing  Goal: Patient/Family Short Term Goal  Description: Patient's Short Term Goal: feel better    Interventions:   - See additional Care Plan goals for specific interventions  Outcome: Progressing     Problem: PAIN - ADULT  Goal: Verbalizes/displays adequate comfort level or patient's stated pain goal  Description: INTERVENTIONS:  - Encourage pt to monitor pain and request assistance  - Assess pain using appropriate pain scale  - Administer analgesics based on type and severity of pain and evaluate response  - Implement non-pharmacological measures as appropriate and evaluate response  - Consider cultural and social influences on pain and pain management  - Manage/alleviate anxiety  - Utilize distraction and/or relaxation techniques  - Monitor for opioid side effects  - Notify MD/LIP if interventions unsuccessful or patient reports new pain  - Anticipate increased pain with activity and pre-medicate as appropriate  Outcome: Progressing     Problem: SAFETY ADULT - FALL  Goal: Free from fall injury  Description: INTERVENTIONS:  - Assess pt frequently for physical needs  - Identify cognitive and physical deficits and behaviors that affect risk of falls.   - [place] fall precautions as indicated by assessment.  - Educate pt/family on patient safety including physical limitations  - Instruct pt to call for assistance with activity based on assessment  - Modify environment to reduce risk of injury  - Provide assistive devices as appropriate  - Consider OT/PT consult to assist with strengthening/mobility  - Encourage toileting schedule  Outcome: Progressing     Problem: DISCHARGE PLANNING  Goal: Discharge to home or other facility with appropriate resources  Description: INTERVENTIONS:  - Identify barriers to discharge w/pt and caregiver  - Include patient/family/discharge partner in discharge planning  - Arrange for needed discharge resources and transportation as appropriate  - Identify discharge learning needs (meds, wound care, etc)  - Arrange for interpreters to assist at discharge as needed  - Consider post-discharge preferences of patient/family/discharge partner  - Complete POLST form as appropriate  - Assess patient's ability to be responsible for managing their own health  - Refer to Case Management Department for coordinating discharge planning if the patient needs post-hospital services based on physician/LIP order or complex needs related to functional status, cognitive ability or social support system  Outcome: Progressing     Problem: Altered Communication/Language Barrier  Goal: Patient/Family is able to understand and participate in their care  Description: Interventions:  - Assess communication ability and preferred communication style  - Implement communication aides and strategies  - Use visual cues when possible  - Listen attentively, be patient, do not interrupt  - Minimize distractions  - Allow time for understanding and response  - Establish method for patient to ask for assistance (call light)  - Provide an  as needed  - Communicate barriers and strategies to overcome with those who interact with patient  Outcome: Progressing     Problem: METABOLIC/FLUID AND ELECTROLYTES - ADULT  Goal: Glucose maintained within prescribed range  Description: INTERVENTIONS:  - Monitor Blood Glucose as ordered  - Assess for signs and symptoms of hyperglycemia and hypoglycemia  - Administer ordered medications to maintain glucose within target range  - Assess barriers to adequate nutritional intake and initiate nutrition consult as needed  - Instruct patient on self management of diabetes  Outcome: Progressing  Goal: Electrolytes maintained within normal limits  Description: INTERVENTIONS:  - Monitor labs and rhythm and assess patient for signs and symptoms of electrolyte imbalances  - Administer electrolyte replacement as ordered  - Monitor response to electrolyte replacements, including rhythm and repeat lab results as appropriate  - Fluid restriction as ordered  - Instruct patient on fluid and nutrition restrictions as appropriate  Outcome: Progressing  Goal: Hemodynamic stability and optimal renal function maintained  Description: INTERVENTIONS:  - Monitor labs and assess for signs and symptoms of volume excess or deficit  - Monitor intake, output and patient weight  - Monitor urine specific gravity, serum osmolarity and serum sodium as indicated or ordered  - Monitor response to interventions for patient's volume status, including labs, urine output, blood pressure (other measures as available)  - Encourage oral intake as appropriate  - Instruct patient on fluid and nutrition restrictions as appropriate  Outcome: Progressing     Problem: SKIN/TISSUE INTEGRITY - ADULT  Goal: Skin integrity remains intact  Description: INTERVENTIONS  - Assess and document risk factors for pressure ulcer development  - Assess and document skin integrity  - Monitor for areas of redness and/or skin breakdown  - Initiate interventions, skin care algorithm/standards of care as needed  Outcome: Progressing  Goal: Incision(s), wounds(s) or drain site(s) healing without S/S of infection  Description: INTERVENTIONS:  - Assess and document risk factors for pressure ulcer development  - Assess and document skin integrity  - Assess and document dressing/incision, wound bed, drain sites and surrounding tissue  - Implement wound care per orders  - Initiate isolation precautions as appropriate  - Initiate Pressure Ulcer prevention bundle as indicated  Outcome: Progressing

## 2023-10-24 LAB
ANION GAP SERPL CALC-SCNC: 8 MMOL/L (ref 0–18)
BUN BLD-MCNC: 38 MG/DL (ref 7–18)
BUN/CREAT SERPL: 15 (ref 10–20)
CALCIUM BLD-MCNC: 8.9 MG/DL (ref 8.5–10.1)
CHLORIDE SERPL-SCNC: 105 MMOL/L (ref 98–112)
CO2 SERPL-SCNC: 27 MMOL/L (ref 21–32)
CREAT BLD-MCNC: 2.54 MG/DL
EGFRCR SERPLBLD CKD-EPI 2021: 19 ML/MIN/1.73M2 (ref 60–?)
GLUCOSE BLD-MCNC: 148 MG/DL (ref 70–99)
GLUCOSE BLDC GLUCOMTR-MCNC: 128 MG/DL (ref 70–99)
GLUCOSE BLDC GLUCOMTR-MCNC: 148 MG/DL (ref 70–99)
GLUCOSE BLDC GLUCOMTR-MCNC: 160 MG/DL (ref 70–99)
GLUCOSE BLDC GLUCOMTR-MCNC: 186 MG/DL (ref 70–99)
OSMOLALITY SERPL CALC.SUM OF ELEC: 302 MOSM/KG (ref 275–295)
POTASSIUM SERPL-SCNC: 3.8 MMOL/L (ref 3.5–5.1)
SODIUM SERPL-SCNC: 140 MMOL/L (ref 136–145)

## 2023-10-24 PROCEDURE — 92526 ORAL FUNCTION THERAPY: CPT

## 2023-10-24 PROCEDURE — 80048 BASIC METABOLIC PNL TOTAL CA: CPT | Performed by: HOSPITALIST

## 2023-10-24 PROCEDURE — 82962 GLUCOSE BLOOD TEST: CPT

## 2023-10-24 RX ORDER — AMLODIPINE BESYLATE 10 MG/1
10 TABLET ORAL DAILY
Status: DISCONTINUED | OUTPATIENT
Start: 2023-10-25 | End: 2023-10-30

## 2023-10-24 RX ORDER — SODIUM CHLORIDE 9 MG/ML
INJECTION, SOLUTION INTRAVENOUS CONTINUOUS
Status: DISCONTINUED | OUTPATIENT
Start: 2023-10-24 | End: 2023-10-27

## 2023-10-24 RX ORDER — GABAPENTIN 100 MG/1
200 CAPSULE ORAL DAILY
Status: DISCONTINUED | OUTPATIENT
Start: 2023-10-25 | End: 2023-10-30

## 2023-10-24 RX ORDER — LORAZEPAM 2 MG/ML
0.5 INJECTION INTRAMUSCULAR ONCE AS NEEDED
Status: ACTIVE | OUTPATIENT
Start: 2023-10-24 | End: 2023-10-24

## 2023-10-24 NOTE — PLAN OF CARE
Problem: Patient Centered Care  Goal: Patient preferences are identified and integrated in the patient's plan of care  Description: Interventions:  - What would you like us to know as we care for you?  From home with family  - Provide timely, complete, and accurate information to patient/family  - Incorporate patient and family knowledge, values, beliefs, and cultural backgrounds into the planning and delivery of care  - Encourage patient/family to participate in care and decision-making at the level they choose  - Honor patient and family perspectives and choices  Outcome: Progressing     Problem: Diabetes/Glucose Control  Goal: Glucose maintained within prescribed range  Description: INTERVENTIONS:  - Monitor Blood Glucose as ordered  - Assess for signs and symptoms of hyperglycemia and hypoglycemia  - Administer ordered medications to maintain glucose within target range  - Assess barriers to adequate nutritional intake and initiate nutrition consult as needed  - Instruct patient on self management of diabetes  Outcome: Progressing     Problem: PAIN - ADULT  Goal: Verbalizes/displays adequate comfort level or patient's stated pain goal  Description: INTERVENTIONS:  - Encourage pt to monitor pain and request assistance  - Assess pain using appropriate pain scale  - Administer analgesics based on type and severity of pain and evaluate response  - Implement non-pharmacological measures as appropriate and evaluate response  - Consider cultural and social influences on pain and pain management  - Manage/alleviate anxiety  - Utilize distraction and/or relaxation techniques  - Monitor for opioid side effects  - Notify MD/LIP if interventions unsuccessful or patient reports new pain  - Anticipate increased pain with activity and pre-medicate as appropriate  Outcome: Progressing     Problem: SAFETY ADULT - FALL  Goal: Free from fall injury  Description: INTERVENTIONS:  - Assess pt frequently for physical needs  - Identify cognitive and physical deficits and behaviors that affect risk of falls.   - House fall precautions as indicated by assessment.  - Educate pt/family on patient safety including physical limitations  - Instruct pt to call for assistance with activity based on assessment  - Modify environment to reduce risk of injury  - Provide assistive devices as appropriate  - Consider OT/PT consult to assist with strengthening/mobility  - Encourage toileting schedule  Outcome: Progressing     Problem: DISCHARGE PLANNING  Goal: Discharge to home or other facility with appropriate resources  Description: INTERVENTIONS:  - Identify barriers to discharge w/pt and caregiver  - Include patient/family/discharge partner in discharge planning  - Arrange for needed discharge resources and transportation as appropriate  - Identify discharge learning needs (meds, wound care, etc)  - Arrange for interpreters to assist at discharge as needed  - Consider post-discharge preferences of patient/family/discharge partner  - Complete POLST form as appropriate  - Assess patient's ability to be responsible for managing their own health  - Refer to Case Management Department for coordinating discharge planning if the patient needs post-hospital services based on physician/LIP order or complex needs related to functional status, cognitive ability or social support system  Outcome: Progressing     Problem: Altered Communication/Language Barrier  Goal: Patient/Family is able to understand and participate in their care  Description: Interventions:  - Assess communication ability and preferred communication style  - Implement communication aides and strategies  - Use visual cues when possible  - Listen attentively, be patient, do not interrupt  - Minimize distractions  - Allow time for understanding and response  - Establish method for patient to ask for assistance (call light)  - Provide an  as needed  - Communicate barriers and strategies to overcome with those who interact with patient  Outcome: Progressing     Problem: METABOLIC/FLUID AND ELECTROLYTES - ADULT  Goal: Glucose maintained within prescribed range  Description: INTERVENTIONS:  - Monitor Blood Glucose as ordered  - Assess for signs and symptoms of hyperglycemia and hypoglycemia  - Administer ordered medications to maintain glucose within target range  - Assess barriers to adequate nutritional intake and initiate nutrition consult as needed  - Instruct patient on self management of diabetes  Outcome: Progressing  Goal: Electrolytes maintained within normal limits  Description: INTERVENTIONS:  - Monitor labs and rhythm and assess patient for signs and symptoms of electrolyte imbalances  - Administer electrolyte replacement as ordered  - Monitor response to electrolyte replacements, including rhythm and repeat lab results as appropriate  - Fluid restriction as ordered  - Instruct patient on fluid and nutrition restrictions as appropriate  Outcome: Progressing  Goal: Hemodynamic stability and optimal renal function maintained  Description: INTERVENTIONS:  - Monitor labs and assess for signs and symptoms of volume excess or deficit  - Monitor intake, output and patient weight  - Monitor urine specific gravity, serum osmolarity and serum sodium as indicated or ordered  - Monitor response to interventions for patient's volume status, including labs, urine output, blood pressure (other measures as available)  - Encourage oral intake as appropriate  - Instruct patient on fluid and nutrition restrictions as appropriate  Outcome: Progressing     Problem: SKIN/TISSUE INTEGRITY - ADULT  Goal: Skin integrity remains intact  Description: INTERVENTIONS  - Assess and document risk factors for pressure ulcer development  - Assess and document skin integrity  - Monitor for areas of redness and/or skin breakdown  - Initiate interventions, skin care algorithm/standards of care as needed  Outcome: Progressing  Goal: Incision(s), wounds(s) or drain site(s) healing without S/S of infection  Description: INTERVENTIONS:  - Assess and document risk factors for pressure ulcer development  - Assess and document skin integrity  - Assess and document dressing/incision, wound bed, drain sites and surrounding tissue  - Implement wound care per orders  - Initiate isolation precautions as appropriate  - Initiate Pressure Ulcer prevention bundle as indicated  Outcome: Progressing   No acute changes, patient in no acute distress, verbalizes pain is adequately controlled, denies shortness of breath, unlabored respirations.  Plan of care reviewed, safety precautions in place

## 2023-10-24 NOTE — SLP NOTE
SPEECH DAILY NOTE - INPATIENT    ASSESSMENT & PLAN   ASSESSMENT  PPE REQUIRED. THIS THERAPIST WORE GLOVES AND MASK FOR DURATION OF EVALUATION. HANDS WASHED UPON ENTRANCE/EXIT. SLP f/u for ongoing dysphagia tx/meal assessment per recommendations of soft easy to chew/thin liquids per BSE. RN reports pt tolerates diet and medication well with no overt clinical s/s aspiration. Pt denies any swallowing challenges. Pt positioned upright in bedside chair, alert/cooperative. Pt afebrile, tolerating room air with oxygen status 90% prior to the start of oral trials. SLP reviewed aspiration precautions and safe swallowing compensatory strategies with the patient. Safe swallow guidelines remain written on the white board in purple. Patient v/u, family not present. Provided no assistance, pt tolerates soft easy to chew consistency and thin liquids via cup with no overt clinical signs/symptoms of aspiration. Oxygen status remained stable t/o the entire session. Current diet remains appropriate. No further swallow services warranted at this time. Please re consult if needed. RN alerted with results and recommendations. MOST RECENT CXR 10/23  CONCLUSION:   Mild cardiomegaly. Increased interstitial markings bilaterally, suggestive of pulmonary interstitial edema. Right upper lung and left basilar airspace opacities are present. These could be secondary to a combination of atelectasis, alveolar edema, and/or infection. Correlate with clinical findings. Hazy layering opacity at the left lung base, suggestive of small to moderate left pleural effusion   Changes of prior aortic arch and descending thoracic aortic aneurysm repair with stent graft present. Diet Recommendations - Solids: Soft/ Easy to chew (Extra sauce and/or gravy)  Diet Recommendations - Liquids: Thin Liquids (No straw)    Compensatory Strategies Recommended: No straws; Slow rate; Alternate consistencies;Small bites and sips; Extra sauce/gravy (1:1 feeding assistance as needed)  Aspiration Precautions: Upright position; Slow rate;Small bites and sips; No straw  Medication Administration Recommendations: Whole in puree    Patient Experiencing Pain: Yes  Pain Rating: Unable to Rate  Pain Location: neck     Nursing Aware of Pain?: Yes    Discharge Recommendations  Discharge Recommendations/Plan: Undetermined    Treatment Plan  Treatment Plan/Recommendations: No further inpatient SLP service warranted    Interdisciplinary Communication: Plan posted at bedside            GOALS  Goal #1 The patient will tolerate soft easy to chew consistency and thin liquids without overt signs or symptoms of aspiration with 100 % accuracy over 2 session(s). Pt tolerates easy to chew/thin liquids with no overt signs/symptoms of aspiration observed for 100% of trials. Goal Met 10/24   Goal #2 The patient/family/caregiver will demonstrate understanding and implementation of aspiration precautions and swallow strategies independently over 2 session(s). Education provided regarding aspiration precautions and safe swallow strategies. Goal Met 10/24   Goal #3 The patient will utilize compensatory strategies as outlined by  BSSE (clinical evaluation) including Slow rate, Small bites, Small sips, Alternate liquids/solids, No straws, Upright 90 degrees, Upright 90 degrees 30 mins after meal, Feed patient, extra sauce/gravy with mild assistance 90 % of the time across 2 sessions. Pt utilized compensatory strategies as outlined above. Pt was able to self feed. No straws utilized. Liquids/solids alternated.    Goal Met 10/24     FOLLOW UP  Follow Up Needed (Documentation Required): No  SLP Follow-up Date: 10/24/23  Number of Visits to Meet Established Goals: 2    Session: 2    If you have any questions, please contact Elissa Kline, ANA Timmons. Preston Owens  Phone Number Ext. [phone]

## 2023-10-25 ENCOUNTER — APPOINTMENT (OUTPATIENT)
Dept: MRI IMAGING | Facility: HOSPITAL | Age: 78
End: 2023-10-25
Attending: HOSPITALIST
Payer: MEDICARE

## 2023-10-25 LAB
ANION GAP SERPL CALC-SCNC: 7 MMOL/L (ref 0–18)
BASOPHILS # BLD AUTO: 0.07 X10(3) UL (ref 0–0.2)
BASOPHILS NFR BLD AUTO: 1.1 %
BUN BLD-MCNC: 33 MG/DL (ref 7–18)
BUN/CREAT SERPL: 13.8 (ref 10–20)
CALCIUM BLD-MCNC: 8.9 MG/DL (ref 8.5–10.1)
CHLORIDE SERPL-SCNC: 108 MMOL/L (ref 98–112)
CO2 SERPL-SCNC: 25 MMOL/L (ref 21–32)
CREAT BLD-MCNC: 2.39 MG/DL
DEPRECATED RDW RBC AUTO: 46.5 FL (ref 35.1–46.3)
EGFRCR SERPLBLD CKD-EPI 2021: 20 ML/MIN/1.73M2 (ref 60–?)
EOSINOPHIL # BLD AUTO: 0.32 X10(3) UL (ref 0–0.7)
EOSINOPHIL NFR BLD AUTO: 4.9 %
ERYTHROCYTE [DISTWIDTH] IN BLOOD BY AUTOMATED COUNT: 14.3 % (ref 11–15)
GLUCOSE BLD-MCNC: 126 MG/DL (ref 70–99)
GLUCOSE BLDC GLUCOMTR-MCNC: 132 MG/DL (ref 70–99)
GLUCOSE BLDC GLUCOMTR-MCNC: 165 MG/DL (ref 70–99)
GLUCOSE BLDC GLUCOMTR-MCNC: 177 MG/DL (ref 70–99)
GLUCOSE BLDC GLUCOMTR-MCNC: 183 MG/DL (ref 70–99)
HCT VFR BLD AUTO: 29 %
HGB BLD-MCNC: 9.2 G/DL
IMM GRANULOCYTES # BLD AUTO: 0.05 X10(3) UL (ref 0–1)
IMM GRANULOCYTES NFR BLD: 0.8 %
LYMPHOCYTES # BLD AUTO: 0.83 X10(3) UL (ref 1–4)
LYMPHOCYTES NFR BLD AUTO: 12.7 %
MCH RBC QN AUTO: 28.5 PG (ref 26–34)
MCHC RBC AUTO-ENTMCNC: 31.7 G/DL (ref 31–37)
MCV RBC AUTO: 89.8 FL
MONOCYTES # BLD AUTO: 0.44 X10(3) UL (ref 0.1–1)
MONOCYTES NFR BLD AUTO: 6.7 %
NEUTROPHILS # BLD AUTO: 4.84 X10 (3) UL (ref 1.5–7.7)
NEUTROPHILS # BLD AUTO: 4.84 X10(3) UL (ref 1.5–7.7)
NEUTROPHILS NFR BLD AUTO: 73.8 %
OSMOLALITY SERPL CALC.SUM OF ELEC: 299 MOSM/KG (ref 275–295)
PLATELET # BLD AUTO: 446 10(3)UL (ref 150–450)
POTASSIUM SERPL-SCNC: 3.8 MMOL/L (ref 3.5–5.1)
RBC # BLD AUTO: 3.23 X10(6)UL
SODIUM SERPL-SCNC: 140 MMOL/L (ref 136–145)
WBC # BLD AUTO: 6.6 X10(3) UL (ref 4–11)

## 2023-10-25 PROCEDURE — 72148 MRI LUMBAR SPINE W/O DYE: CPT | Performed by: HOSPITALIST

## 2023-10-25 PROCEDURE — 80048 BASIC METABOLIC PNL TOTAL CA: CPT | Performed by: HOSPITALIST

## 2023-10-25 PROCEDURE — 82962 GLUCOSE BLOOD TEST: CPT

## 2023-10-25 PROCEDURE — 85025 COMPLETE CBC W/AUTO DIFF WBC: CPT | Performed by: HOSPITALIST

## 2023-10-25 RX ORDER — TROLAMINE SALICYLATE 10 G/100G
CREAM TOPICAL 3 TIMES DAILY PRN
Status: DISCONTINUED | OUTPATIENT
Start: 2023-10-25 | End: 2023-10-30

## 2023-10-25 RX ORDER — LORAZEPAM 2 MG/ML
0.5 INJECTION INTRAMUSCULAR ONCE
Status: COMPLETED | OUTPATIENT
Start: 2023-10-25 | End: 2023-10-25

## 2023-10-25 NOTE — DISCHARGE SUMMARY
[facility]  Discharge Summary    Ashleigh Mohr Patient Status:  Inpatient    1945 MRN [id number]   [facility] 2NE-A Attending No att. 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  Thoraco abdominal aneurysm [facility])    Discharge Diagnosis: Patient Active Problem List:     Spondylolisthesis of lumbar region     S/P lumbar microdiscectomy     Hyperglycemia     Hypertensive crisis     Hemispheric carotid artery syndrome     Cerebrovascular accident (CVA) due to embolism of left carotid artery (HCC)     Episodic mood disorder (HCC)     Schizophrenia ([facility] [address]     Hypokalemia     Left leg cellulitis     Chronic kidney disease, unspecified CKD stage     Thoraco abdominal aneurysm ([facility] [address]     Weakness      Reason for Admission: Thoracoabdominal aortic aneurysm. Hospital Course: 76-year-old female who underwent endovascular repair of large thoracoabdominal aortic aneurysm. Procedure was uneventful. Post procedure she was noted to have spinal cord ischemia which she underwent immediate postoperative drain placement. She had complete restoration of her neurological function. Her spinal drain was removed 48 hours post procedure. She had some fatigue and failure to thrive but this eventually resolved. She was recommended for acute inpatient rehab versus subacute rehab and she was adamantly against this. She was discharged home with home therapy tolerating p.o., ambulating with assistance and voiding without any issues. Consultations: PT/OT, Hospitalist, PMR    Procedures: TEVAR and FEVAR    Complications: Spinal cord ischemia- resolved with spinal drain.     Disposition: Home Health Care Services    Discharge Condition: Stable    Discharge Medications: Discharge Medication List as of 10/19/2023  3:51 PM    START taking these medications    aspirin 81 MG Oral Chew Tab  Chew 1 tablet (81 mg total) by mouth daily. , Normal, Disp-30 tablet, R-0    bisacodyl 10 MG Rectal Suppos  Place 1 suppository (10 mg total) rectally daily as needed., Historical      CONTINUE these medications which have CHANGED    traMADol 50 MG Oral Tab  Take 1 tablet (50 mg total) by mouth every 12 (twelve) hours as needed. , Print Script, Disp-15 tablet, R-0      CONTINUE these medications which have NOT CHANGED    metoprolol tartrate 100 MG Oral Tab  Take 1 tablet (100 mg total) by mouth 2 (two) times daily. , Historical    nitroglycerin 0.4 MG Sublingual SL Tab  Place 1 tablet (0.4 mg total) under the tongue every 5 (five) minutes as needed for Chest pain., Historical    pantoprazole 40 MG Oral Tab EC  Take 1 tablet (40 mg total) by mouth 2 (two) times daily before meals. , Normal, Disp-180 tablet, R-3    metFORMIN 500 MG Oral Tab  Take 1 tablet (500 mg total) by mouth in the morning, at noon, in the evening, and at bedtime. , Historical    Potassium Chloride ER 10 MEQ Oral Tab CR  Take 1 tablet (10 mEq total) by mouth daily with food., Historical    isosorbide mononitrate ER 30 MG Oral Tablet 24 Hr  Take 2 tablets (60 mg total) by mouth daily. , Historical    amLODIPine Besylate 5 MG Oral Tab  Take 1 tablet (5 mg total) by mouth at bedtime. , Historical    Clopidogrel Bisulfate 75 MG Oral Tab  Take 1 tablet (75 mg total) by mouth daily. , Script printed, Disp-30 tablet, R-0    ezetimibe 10 MG Oral Tab  Take 1 tablet (10 mg total) by mouth nightly., Historical    ONETOUCH DELICA LANCETS 48F Does not apply Misc  OneTouch Delica Lancets 33 gauge   TEST BLOOD GLUCOSE TWO TIMES A DAY AS DIRECTED, Historical      STOP taking these medications    predniSONE 50 MG Oral Tab    diphenhydrAMINE HCl 50 MG Oral Tab    fenofibrate 48 MG Oral Tab    hydroCHLOROthiazide 25 MG Oral Tab          Follow up Visits: Follow-up with Dr Killian Morocho in 2 weeks.       [address] [place] [address], MD  10/24/2023  7:18 PM

## 2023-10-25 NOTE — PLAN OF CARE
Problem: Patient Centered Care  Goal: Patient preferences are identified and integrated in the patient's plan of care  Description: Interventions:  - What would you like us to know as we care for you?   - Provide timely, complete, and accurate information to patient/family  - Incorporate patient and family knowledge, values, beliefs, and cultural backgrounds into the planning and delivery of care  - Encourage patient/family to participate in care and decision-making at the level they choose  - Honor patient and family perspectives and choices  Outcome: Progressing     Problem: Diabetes/Glucose Control  Goal: Glucose maintained within prescribed range  Description: INTERVENTIONS:  - Monitor Blood Glucose as ordered  - Assess for signs and symptoms of hyperglycemia and hypoglycemia  - Administer ordered medications to maintain glucose within target range  - Assess barriers to adequate nutritional intake and initiate nutrition consult as needed  - Instruct patient on self management of diabetes  Outcome: Progressing     Problem: PAIN - ADULT  Goal: Verbalizes/displays adequate comfort level or patient's stated pain goal  Description: INTERVENTIONS:  - Encourage pt to monitor pain and request assistance  - Assess pain using appropriate pain scale  - Administer analgesics based on type and severity of pain and evaluate response  - Implement non-pharmacological measures as appropriate and evaluate response  - Consider cultural and social influences on pain and pain management  - Manage/alleviate anxiety  - Utilize distraction and/or relaxation techniques  - Monitor for opioid side effects  - Notify MD/LIP if interventions unsuccessful or patient reports new pain  - Anticipate increased pain with activity and pre-medicate as appropriate  Outcome: Progressing     Problem: SAFETY ADULT - FALL  Goal: Free from fall injury  Description: INTERVENTIONS:  - Assess pt frequently for physical needs  - Identify cognitive and physical deficits and behaviors that affect risk of falls.   - [place] fall precautions as indicated by assessment.  - Educate pt/family on patient safety including physical limitations  - Instruct pt to call for assistance with activity based on assessment  - Modify environment to reduce risk of injury  - Provide assistive devices as appropriate  - Consider OT/PT consult to assist with strengthening/mobility  - Encourage toileting schedule  Outcome: Progressing     Problem: DISCHARGE PLANNING  Goal: Discharge to home or other facility with appropriate resources  Description: INTERVENTIONS:  - Identify barriers to discharge w/pt and caregiver  - Include patient/family/discharge partner in discharge planning  - Arrange for needed discharge resources and transportation as appropriate  - Identify discharge learning needs (meds, wound care, etc)  - Arrange for interpreters to assist at discharge as needed  - Consider post-discharge preferences of patient/family/discharge partner  - Complete POLST form as appropriate  - Assess patient's ability to be responsible for managing their own health  - Refer to Case Management Department for coordinating discharge planning if the patient needs post-hospital services based on physician/LIP order or complex needs related to functional status, cognitive ability or social support system  Outcome: Progressing     Problem: Altered Communication/Language Barrier  Goal: Patient/Family is able to understand and participate in their care  Description: Interventions:  - Assess communication ability and preferred communication style  - Implement communication aides and strategies  - Use visual cues when possible  - Listen attentively, be patient, do not interrupt  - Minimize distractions  - Allow time for understanding and response  - Establish method for patient to ask for assistance (call light)  - Provide an  as needed  - Communicate barriers and strategies to overcome with those who interact with patient  Outcome: Progressing     Problem: METABOLIC/FLUID AND ELECTROLYTES - ADULT  Goal: Glucose maintained within prescribed range  Description: INTERVENTIONS:  - Monitor Blood Glucose as ordered  - Assess for signs and symptoms of hyperglycemia and hypoglycemia  - Administer ordered medications to maintain glucose within target range  - Assess barriers to adequate nutritional intake and initiate nutrition consult as needed  - Instruct patient on self management of diabetes  Outcome: Progressing  Goal: Electrolytes maintained within normal limits  Description: INTERVENTIONS:  - Monitor labs and rhythm and assess patient for signs and symptoms of electrolyte imbalances  - Administer electrolyte replacement as ordered  - Monitor response to electrolyte replacements, including rhythm and repeat lab results as appropriate  - Fluid restriction as ordered  - Instruct patient on fluid and nutrition restrictions as appropriate  Outcome: Progressing  Goal: Hemodynamic stability and optimal renal function maintained  Description: INTERVENTIONS:  - Monitor labs and assess for signs and symptoms of volume excess or deficit  - Monitor intake, output and patient weight  - Monitor urine specific gravity, serum osmolarity and serum sodium as indicated or ordered  - Monitor response to interventions for patient's volume status, including labs, urine output, blood pressure (other measures as available)  - Encourage oral intake as appropriate  - Instruct patient on fluid and nutrition restrictions as appropriate  Outcome: Progressing     Problem: SKIN/TISSUE INTEGRITY - ADULT  Goal: Skin integrity remains intact  Description: INTERVENTIONS  - Assess and document risk factors for pressure ulcer development  - Assess and document skin integrity  - Monitor for areas of redness and/or skin breakdown  - Initiate interventions, skin care algorithm/standards of care as needed  Outcome: Progressing  Goal: Incision(s), wounds(s) or drain site(s) healing without S/S of infection  Description: INTERVENTIONS:  - Assess and document risk factors for pressure ulcer development  - Assess and document skin integrity  - Assess and document dressing/incision, wound bed, drain sites and surrounding tissue  - Implement wound care per orders  - Initiate isolation precautions as appropriate  - Initiate Pressure Ulcer prevention bundle as indicated  Outcome: Progressing       Patient A/Ox2-3, forgetful resting in bed, Encourage activity, up in chair for meals with max assist and use of lift. Patient feels more groggy today due to the ativan dose for MRI. MRI scheduled and Pain consult recommended. Pain manage per protocol. Fall precaution. Poor appetite, family encourages patient to eat. Call light within reach.

## 2023-10-25 NOTE — H&P
The above referenced H&P was reviewed by Simon Kim MD. The patient was examined and no significant changes have occurred in the patient's condition since the H&P was performed. Risks and benefits were discussed, proceed with procedure as planned.       Simon Kim MD  [address]  Vascular Surgery

## 2023-10-25 NOTE — PLAN OF CARE
Problem: Patient Centered Care  Goal: Patient preferences are identified and integrated in the patient's plan of care  Description: Interventions:  - What would you like us to know as we care for you?  I live at home with family  - Provide timely, complete, and accurate information to patient/family  - Incorporate patient and family knowledge, values, beliefs, and cultural backgrounds into the planning and delivery of care  - Encourage patient/family to participate in care and decision-making at the level they choose  - Honor patient and family perspectives and choices  Outcome: Progressing     Problem: Diabetes/Glucose Control  Goal: Glucose maintained within prescribed range  Description: INTERVENTIONS:  - Monitor Blood Glucose as ordered  - Assess for signs and symptoms of hyperglycemia and hypoglycemia  - Administer ordered medications to maintain glucose within target range  - Assess barriers to adequate nutritional intake and initiate nutrition consult as needed  - Instruct patient on self management of diabetes  Outcome: Progressing     Problem: PAIN - ADULT  Goal: Verbalizes/displays adequate comfort level or patient's stated pain goal  Description: INTERVENTIONS:  - Encourage pt to monitor pain and request assistance  - Assess pain using appropriate pain scale  - Administer analgesics based on type and severity of pain and evaluate response  - Implement non-pharmacological measures as appropriate and evaluate response  - Consider cultural and social influences on pain and pain management  - Manage/alleviate anxiety  - Utilize distraction and/or relaxation techniques  - Monitor for opioid side effects  - Notify MD/LIP if interventions unsuccessful or patient reports new pain  - Anticipate increased pain with activity and pre-medicate as appropriate  Outcome: Progressing     Problem: SAFETY ADULT - FALL  Goal: Free from fall injury  Description: INTERVENTIONS:  - Assess pt frequently for physical needs  - Identify cognitive and physical deficits and behaviors that affect risk of falls.   - [place] fall precautions as indicated by assessment.  - Educate pt/family on patient safety including physical limitations  - Instruct pt to call for assistance with activity based on assessment  - Modify environment to reduce risk of injury  - Provide assistive devices as appropriate  - Consider OT/PT consult to assist with strengthening/mobility  - Encourage toileting schedule  Outcome: Progressing     Problem: DISCHARGE PLANNING  Goal: Discharge to home or other facility with appropriate resources  Description: INTERVENTIONS:  - Identify barriers to discharge w/pt and caregiver  - Include patient/family/discharge partner in discharge planning  - Arrange for needed discharge resources and transportation as appropriate  - Identify discharge learning needs (meds, wound care, etc)  - Arrange for interpreters to assist at discharge as needed  - Consider post-discharge preferences of patient/family/discharge partner  - Complete POLST form as appropriate  - Assess patient's ability to be responsible for managing their own health  - Refer to Case Management Department for coordinating discharge planning if the patient needs post-hospital services based on physician/LIP order or complex needs related to functional status, cognitive ability or social support system  Outcome: Progressing     Problem: Altered Communication/Language Barrier  Goal: Patient/Family is able to understand and participate in their care  Description: Interventions:  - Assess communication ability and preferred communication style  - Implement communication aides and strategies  - Use visual cues when possible  - Listen attentively, be patient, do not interrupt  - Minimize distractions  - Allow time for understanding and response  - Establish method for patient to ask for assistance (call light)  - Provide an  as needed  - Communicate barriers and strategies to overcome with those who interact with patient  Outcome: Progressing     Problem: METABOLIC/FLUID AND ELECTROLYTES - ADULT  Goal: Glucose maintained within prescribed range  Description: INTERVENTIONS:  - Monitor Blood Glucose as ordered  - Assess for signs and symptoms of hyperglycemia and hypoglycemia  - Administer ordered medications to maintain glucose within target range  - Assess barriers to adequate nutritional intake and initiate nutrition consult as needed  - Instruct patient on self management of diabetes  Outcome: Progressing  Goal: Electrolytes maintained within normal limits  Description: INTERVENTIONS:  - Monitor labs and rhythm and assess patient for signs and symptoms of electrolyte imbalances  - Administer electrolyte replacement as ordered  - Monitor response to electrolyte replacements, including rhythm and repeat lab results as appropriate  - Fluid restriction as ordered  - Instruct patient on fluid and nutrition restrictions as appropriate  Outcome: Progressing  Goal: Hemodynamic stability and optimal renal function maintained  Description: INTERVENTIONS:  - Monitor labs and assess for signs and symptoms of volume excess or deficit  - Monitor intake, output and patient weight  - Monitor urine specific gravity, serum osmolarity and serum sodium as indicated or ordered  - Monitor response to interventions for patient's volume status, including labs, urine output, blood pressure (other measures as available)  - Encourage oral intake as appropriate  - Instruct patient on fluid and nutrition restrictions as appropriate  Outcome: Progressing     Problem: SKIN/TISSUE INTEGRITY - ADULT  Goal: Skin integrity remains intact  Description: INTERVENTIONS  - Assess and document risk factors for pressure ulcer development  - Assess and document skin integrity  - Monitor for areas of redness and/or skin breakdown  - Initiate interventions, skin care algorithm/standards of care as needed  Outcome: Progressing  Goal: Incision(s), wounds(s) or drain site(s) healing without S/S of infection  Description: INTERVENTIONS:  - Assess and document risk factors for pressure ulcer development  - Assess and document skin integrity  - Assess and document dressing/incision, wound bed, drain sites and surrounding tissue  - Implement wound care per orders  - Initiate isolation precautions as appropriate  - Initiate Pressure Ulcer prevention bundle as indicated  Outcome: Progressing   No acute changes, patient in no acute distress, verbalizes pain is adequately controlled, no shortness of breath, respirations unlabored.  Plan of care reviewed, safety precautions in place

## 2023-10-26 LAB
ALBUMIN SERPL-MCNC: 2.3 G/DL (ref 3.4–5)
ANION GAP SERPL CALC-SCNC: 6 MMOL/L (ref 0–18)
BUN BLD-MCNC: 29 MG/DL (ref 7–18)
BUN/CREAT SERPL: 12.9 (ref 10–20)
CALCIUM BLD-MCNC: 9.6 MG/DL (ref 8.5–10.1)
CHLORIDE SERPL-SCNC: 109 MMOL/L (ref 98–112)
CO2 SERPL-SCNC: 28 MMOL/L (ref 21–32)
CREAT BLD-MCNC: 2.25 MG/DL
EGFRCR SERPLBLD CKD-EPI 2021: 22 ML/MIN/1.73M2 (ref 60–?)
GLUCOSE BLD-MCNC: 142 MG/DL (ref 70–99)
GLUCOSE BLDC GLUCOMTR-MCNC: 159 MG/DL (ref 70–99)
GLUCOSE BLDC GLUCOMTR-MCNC: 176 MG/DL (ref 70–99)
GLUCOSE BLDC GLUCOMTR-MCNC: 194 MG/DL (ref 70–99)
GLUCOSE BLDC GLUCOMTR-MCNC: 216 MG/DL (ref 70–99)
OSMOLALITY SERPL CALC.SUM OF ELEC: 304 MOSM/KG (ref 275–295)
PHOSPHATE SERPL-MCNC: 2.6 MG/DL (ref 2.5–4.9)
POTASSIUM SERPL-SCNC: 3.7 MMOL/L (ref 3.5–5.1)
SODIUM SERPL-SCNC: 143 MMOL/L (ref 136–145)

## 2023-10-26 PROCEDURE — 82962 GLUCOSE BLOOD TEST: CPT

## 2023-10-26 PROCEDURE — 97530 THERAPEUTIC ACTIVITIES: CPT

## 2023-10-26 PROCEDURE — 80069 RENAL FUNCTION PANEL: CPT | Performed by: INTERNAL MEDICINE

## 2023-10-26 RX ORDER — METHYLPREDNISOLONE 4 MG/1
4 TABLET ORAL
Status: DISCONTINUED | OUTPATIENT
Start: 2023-10-31 | End: 2023-10-30

## 2023-10-26 RX ORDER — METHYLPREDNISOLONE 4 MG/1
12 TABLET ORAL
Status: COMPLETED | OUTPATIENT
Start: 2023-10-26 | End: 2023-10-26

## 2023-10-26 RX ORDER — MAGNESIUM OXIDE 400 MG (241.3 MG MAGNESIUM) TABLET
2 TABLET NIGHTLY
Status: DISCONTINUED | OUTPATIENT
Start: 2023-10-26 | End: 2023-10-30

## 2023-10-26 RX ORDER — METHYLPREDNISOLONE 4 MG/1
4 TABLET ORAL
Status: COMPLETED | OUTPATIENT
Start: 2023-10-30 | End: 2023-10-30

## 2023-10-26 RX ORDER — METHYLPREDNISOLONE 4 MG/1
4 TABLET ORAL NIGHTLY
Status: DISCONTINUED | OUTPATIENT
Start: 2023-10-30 | End: 2023-10-30

## 2023-10-26 RX ORDER — QUETIAPINE FUMARATE 25 MG/1
25 TABLET, FILM COATED ORAL NIGHTLY PRN
Status: DISCONTINUED | OUTPATIENT
Start: 2023-10-26 | End: 2023-10-27

## 2023-10-26 RX ORDER — METHYLPREDNISOLONE 4 MG/1
8 TABLET ORAL
Status: COMPLETED | OUTPATIENT
Start: 2023-10-27 | End: 2023-10-27

## 2023-10-26 RX ORDER — METHYLPREDNISOLONE 4 MG/1
4 TABLET ORAL
Status: COMPLETED | OUTPATIENT
Start: 2023-10-28 | End: 2023-10-28

## 2023-10-26 RX ORDER — METHYLPREDNISOLONE 4 MG/1
4 TABLET ORAL
Status: COMPLETED | OUTPATIENT
Start: 2023-10-29 | End: 2023-10-29

## 2023-10-26 RX ORDER — METHYLPREDNISOLONE 4 MG/1
4 TABLET ORAL
Status: COMPLETED | OUTPATIENT
Start: 2023-10-27 | End: 2023-10-27

## 2023-10-26 NOTE — PLAN OF CARE
Problem: Patient Centered Care  Goal: Patient preferences are identified and integrated in the patient's plan of care  Description: Interventions:  - What would you like us to know as we care for you?  - Provide timely, complete, and accurate information to patient/family  - Incorporate patient and family knowledge, values, beliefs, and cultural backgrounds into the planning and delivery of care  - Encourage patient/family to participate in care and decision-making at the level they choose  - Honor patient and family perspectives and choices  Outcome: Progressing     Problem: Diabetes/Glucose Control  Goal: Glucose maintained within prescribed range  Description: INTERVENTIONS:  - Monitor Blood Glucose as ordered  - Assess for signs and symptoms of hyperglycemia and hypoglycemia  - Administer ordered medications to maintain glucose within target range  - Assess barriers to adequate nutritional intake and initiate nutrition consult as needed  - Instruct patient on self management of diabetes  Outcome: Progressing     Problem: Patient/Family Goals  Goal: Patient/Family Long Term Goal  Description: Patient's Long Term Goal:     Interventions:  - See additional Care Plan goals for specific interventions  Outcome: Progressing  Goal: Patient/Family Short Term Goal  Description: Patient's Short Term Goal:     Interventions:   - See additional Care Plan goals for specific interventions  Outcome: Progressing     Problem: PAIN - ADULT  Goal: Verbalizes/displays adequate comfort level or patient's stated pain goal  Description: INTERVENTIONS:  - Encourage pt to monitor pain and request assistance  - Assess pain using appropriate pain scale  - Administer analgesics based on type and severity of pain and evaluate response  - Implement non-pharmacological measures as appropriate and evaluate response  - Consider cultural and social influences on pain and pain management  - Manage/alleviate anxiety  - Utilize distraction and/or relaxation techniques  - Monitor for opioid side effects  - Notify MD/LIP if interventions unsuccessful or patient reports new pain  - Anticipate increased pain with activity and pre-medicate as appropriate  Outcome: Progressing     Problem: SAFETY ADULT - FALL  Goal: Free from fall injury  Description: INTERVENTIONS:  - Assess pt frequently for physical needs  - Identify cognitive and physical deficits and behaviors that affect risk of falls.   - [place] fall precautions as indicated by assessment.  - Educate pt/family on patient safety including physical limitations  - Instruct pt to call for assistance with activity based on assessment  - Modify environment to reduce risk of injury  - Provide assistive devices as appropriate  - Consider OT/PT consult to assist with strengthening/mobility  - Encourage toileting schedule  Outcome: Progressing     Problem: DISCHARGE PLANNING  Goal: Discharge to home or other facility with appropriate resources  Description: INTERVENTIONS:  - Identify barriers to discharge w/pt and caregiver  - Include patient/family/discharge partner in discharge planning  - Arrange for needed discharge resources and transportation as appropriate  - Identify discharge learning needs (meds, wound care, etc)  - Arrange for interpreters to assist at discharge as needed  - Consider post-discharge preferences of patient/family/discharge partner  - Complete POLST form as appropriate  - Assess patient's ability to be responsible for managing their own health  - Refer to Case Management Department for coordinating discharge planning if the patient needs post-hospital services based on physician/LIP order or complex needs related to functional status, cognitive ability or social support system  Outcome: Progressing     Problem: Altered Communication/Language Barrier  Goal: Patient/Family is able to understand and participate in their care  Description: Interventions:  - Assess communication ability and preferred communication style  - Implement communication aides and strategies  - Use visual cues when possible  - Listen attentively, be patient, do not interrupt  - Minimize distractions  - Allow time for understanding and response  - Establish method for patient to ask for assistance (call light)  - Provide an  as needed  - Communicate barriers and strategies to overcome with those who interact with patient  Outcome: Progressing     Problem: METABOLIC/FLUID AND ELECTROLYTES - ADULT  Goal: Glucose maintained within prescribed range  Description: INTERVENTIONS:  - Monitor Blood Glucose as ordered  - Assess for signs and symptoms of hyperglycemia and hypoglycemia  - Administer ordered medications to maintain glucose within target range  - Assess barriers to adequate nutritional intake and initiate nutrition consult as needed  - Instruct patient on self management of diabetes  Outcome: Progressing  Goal: Electrolytes maintained within normal limits  Description: INTERVENTIONS:  - Monitor labs and rhythm and assess patient for signs and symptoms of electrolyte imbalances  - Administer electrolyte replacement as ordered  - Monitor response to electrolyte replacements, including rhythm and repeat lab results as appropriate  - Fluid restriction as ordered  - Instruct patient on fluid and nutrition restrictions as appropriate  Outcome: Progressing  Goal: Hemodynamic stability and optimal renal function maintained  Description: INTERVENTIONS:  - Monitor labs and assess for signs and symptoms of volume excess or deficit  - Monitor intake, output and patient weight  - Monitor urine specific gravity, serum osmolarity and serum sodium as indicated or ordered  - Monitor response to interventions for patient's volume status, including labs, urine output, blood pressure (other measures as available)  - Encourage oral intake as appropriate  - Instruct patient on fluid and nutrition restrictions as appropriate  Outcome: Progressing     Problem: SKIN/TISSUE INTEGRITY - ADULT  Goal: Skin integrity remains intact  Description: INTERVENTIONS  - Assess and document risk factors for pressure ulcer development  - Assess and document skin integrity  - Monitor for areas of redness and/or skin breakdown  - Initiate interventions, skin care algorithm/standards of care as needed  Outcome: Progressing  Goal: Incision(s), wounds(s) or drain site(s) healing without S/S of infection  Description: INTERVENTIONS:  - Assess and document risk factors for pressure ulcer development  - Assess and document skin integrity  - Assess and document dressing/incision, wound bed, drain sites and surrounding tissue  - Implement wound care per orders  - Initiate isolation precautions as appropriate  - Initiate Pressure Ulcer prevention bundle as indicated  Outcome: Progressing      Patient is alert and oriented X2-3, forgetful at times. Vitals signs are stable at this time. Pain assessed and treated with PRN medication. Daughter, Katey Donaldson, at bedside updated on plan of care. Otherwise no acute changes at this time. Bed low, locked, and all safety measures in place. Patient communicates understanding of fall precautions. Frequent rounding, call light within reach.

## 2023-10-26 NOTE — PLAN OF CARE
Problem: Patient Centered Care  Goal: Patient preferences are identified and integrated in the patient's plan of care  Description: Interventions:  - What would you like us to know as we care for you?  I live with family  - Provide timely, complete, and accurate information to patient/family  - Incorporate patient and family knowledge, values, beliefs, and cultural backgrounds into the planning and delivery of care  - Encourage patient/family to participate in care and decision-making at the level they choose  - Honor patient and family perspectives and choices  Outcome: Progressing     Problem: Diabetes/Glucose Control  Goal: Glucose maintained within prescribed range  Description: INTERVENTIONS:  - Monitor Blood Glucose as ordered  - Assess for signs and symptoms of hyperglycemia and hypoglycemia  - Administer ordered medications to maintain glucose within target range  - Assess barriers to adequate nutritional intake and initiate nutrition consult as needed  - Instruct patient on self management of diabetes  Outcome: Progressing    Problem: PAIN - ADULT  Goal: Verbalizes/displays adequate comfort level or patient's stated pain goal  Description: INTERVENTIONS:  - Encourage pt to monitor pain and request assistance  - Assess pain using appropriate pain scale  - Administer analgesics based on type and severity of pain and evaluate response  - Implement non-pharmacological measures as appropriate and evaluate response  - Consider cultural and social influences on pain and pain management  - Manage/alleviate anxiety  - Utilize distraction and/or relaxation techniques  - Monitor for opioid side effects  - Notify MD/LIP if interventions unsuccessful or patient reports new pain  - Anticipate increased pain with activity and pre-medicate as appropriate  Outcome: Progressing     Problem: SAFETY ADULT - FALL  Goal: Free from fall injury  Description: INTERVENTIONS:  - Assess pt frequently for physical needs  - Identify cognitive and physical deficits and behaviors that affect risk of falls.   - [place] fall precautions as indicated by assessment.  - Educate pt/family on patient safety including physical limitations  - Instruct pt to call for assistance with activity based on assessment  - Modify environment to reduce risk of injury  - Provide assistive devices as appropriate  - Consider OT/PT consult to assist with strengthening/mobility  - Encourage toileting schedule  Outcome: Progressing     Problem: DISCHARGE PLANNING  Goal: Discharge to home or other facility with appropriate resources  Description: INTERVENTIONS:  - Identify barriers to discharge w/pt and caregiver  - Include patient/family/discharge partner in discharge planning  - Arrange for needed discharge resources and transportation as appropriate  - Identify discharge learning needs (meds, wound care, etc)  - Arrange for interpreters to assist at discharge as needed  - Consider post-discharge preferences of patient/family/discharge partner  - Complete POLST form as appropriate  - Assess patient's ability to be responsible for managing their own health  - Refer to Case Management Department for coordinating discharge planning if the patient needs post-hospital services based on physician/LIP order or complex needs related to functional status, cognitive ability or social support system  Outcome: Progressing     Problem: Altered Communication/Language Barrier  Goal: Patient/Family is able to understand and participate in their care  Description: Interventions:  - Assess communication ability and preferred communication style  - Implement communication aides and strategies  - Use visual cues when possible  - Listen attentively, be patient, do not interrupt  - Minimize distractions  - Allow time for understanding and response  - Establish method for patient to ask for assistance (call light)  - Provide an  as needed  - Communicate barriers and strategies to overcome with those who interact with patient  Outcome: Progressing     Problem: METABOLIC/FLUID AND ELECTROLYTES - ADULT  Goal: Glucose maintained within prescribed range  Description: INTERVENTIONS:  - Monitor Blood Glucose as ordered  - Assess for signs and symptoms of hyperglycemia and hypoglycemia  - Administer ordered medications to maintain glucose within target range  - Assess barriers to adequate nutritional intake and initiate nutrition consult as needed  - Instruct patient on self management of diabetes  Outcome: Progressing  Goal: Electrolytes maintained within normal limits  Description: INTERVENTIONS:  - Monitor labs and rhythm and assess patient for signs and symptoms of electrolyte imbalances  - Administer electrolyte replacement as ordered  - Monitor response to electrolyte replacements, including rhythm and repeat lab results as appropriate  - Fluid restriction as ordered  - Instruct patient on fluid and nutrition restrictions as appropriate  Outcome: Progressing  Goal: Hemodynamic stability and optimal renal function maintained  Description: INTERVENTIONS:  - Monitor labs and assess for signs and symptoms of volume excess or deficit  - Monitor intake, output and patient weight  - Monitor urine specific gravity, serum osmolarity and serum sodium as indicated or ordered  - Monitor response to interventions for patient's volume status, including labs, urine output, blood pressure (other measures as available)  - Encourage oral intake as appropriate  - Instruct patient on fluid and nutrition restrictions as appropriate  Outcome: Progressing     Problem: SKIN/TISSUE INTEGRITY - ADULT  Goal: Skin integrity remains intact  Description: INTERVENTIONS  - Assess and document risk factors for pressure ulcer development  - Assess and document skin integrity  - Monitor for areas of redness and/or skin breakdown  - Initiate interventions, skin care algorithm/standards of care as needed  Outcome: Progressing  Goal: Incision(s), wounds(s) or drain site(s) healing without S/S of infection  Description: INTERVENTIONS:  - Assess and document risk factors for pressure ulcer development  - Assess and document skin integrity  - Assess and document dressing/incision, wound bed, drain sites and surrounding tissue  - Implement wound care per orders  - Initiate isolation precautions as appropriate  - Initiate Pressure Ulcer prevention bundle as indicated  Outcome: Progressing   No acute changes, patient verbalizes adequate pain relief with current treatment, no shortness of breath, unlabored respirations.  Plan of care reviewed, safety precautions in place

## 2023-10-26 NOTE — PHYSICAL THERAPY NOTE
PHYSICAL THERAPY TREATMENT NOTE - INPATIENT     Room Number: 257J/339-Z       Presenting Problem: weakness, L LE pain    Problem List  Principal Problem:    Weakness      PHYSICAL THERAPY ASSESSMENT   Chart reviewed. RN Daniel Chiang approved participation in physical therapy. PPE worn by therapist: mask and gloves. Patient was not wearing a mask during session. Patient presented in bed and alarm activated with unrated pain. Patient with fair  progress towards goals during this session. Education provided on Physical therapy plan of care. Patient with fair carryover. Coordinated session with OT to maximize pt outcomes. Pt agreeable to therapy, gait belt donned for mobility. Daughter at bedside. Pt pleasantly confused during session but able to recall season, self, and place. Reporting back pain during session and demos poor activity tolerance with functional mobility. Pt requesting bed pan at end of session. On track to discharge to [facility] once medically cleared. Bed mobility: Mod assist x2 supine to sit, Max A x2 sit to supine and boosting towards HOB. Pt able to sit EOB about 12 mins with initial Min A/CGA progressing to close SBA to maintain static sitting balance. Transfers: Mod assist x2 for STS transfers with RW and elevated bed height x3 trials. Only able to tolerate standing posture about 10 seconds each time 2/2 weakness and back pain. Attempted to take side steps at EOB but pt required sitting break and bed pan. Gait Assistance: Not tested                   Patient was left in bed and alarm activated at end of session with all needs in reach. The patient's Approx Degree of Impairment: 81.38% has been calculated based on documentation in the [facility] '6 clicks' Inpatient Basic Mobility Short Form. Research supports that patients with this level of impairment may benefit from Subacute Rehab. RN aware of patient status post session.     DISCHARGE RECOMMENDATIONS  PT Discharge Recommendations: Sub-acute rehabilitation     PLAN  PT Treatment Plan: Bed mobility; Body mechanics; Energy conservation;Patient education; Family education; Neuromuscular re-educate;Range of motion;Strengthening;Transfer training;Balance training    SUBJECTIVE  I think I'm broken! OBJECTIVE  Precautions: Bed/chair alarm    WEIGHT BEARING RESTRICTION  Weight Bearing Restriction: None                PAIN ASSESSMENT   Rating: Unable to rate  Location: L LE, back  Management Techniques: Activity promotion; Body mechanics    BALANCE                                                                                                                       Static Sitting: Fair  Dynamic Sitting: Poor +           Static Standing: Poor  Dynamic Standing: Poor -    ACTIVITY TOLERANCE  Pulse: 71  Heart Rate Source: Monitor     BP: (!) 166/71  BP Location: Right arm  BP Method: Automatic  Patient Position: Semi-Fernandez    O2 WALK  Oxygen Therapy  SPO2% on Room Air at Rest: 92    AM-PAC '6-Clicks' INPATIENT SHORT FORM - BASIC MOBILITY  How much difficulty does the patient currently have. .. Patient Difficulty: Turning over in bed (including adjusting bedclothes, sheets and blankets)?: A Lot   Patient Difficulty: Sitting down on and standing up from a chair with arms (e.g., wheelchair, bedside commode, etc.): A Lot   Patient Difficulty: Moving from lying on back to sitting on the side of the bed?: A Lot   How much help from another person does the patient currently need. .. Help from Another: Moving to and from a bed to a chair (including a wheelchair)?: Total   Help from Another: Need to walk in hospital room?: Total   Help from Another: Climbing 3-5 steps with a railing?: Total     AM-PAC Score:  Raw Score: 9   Approx Degree of Impairment: 81.38%   Standardized Score (AM-PAC Scale): 30.55   CMS Modifier (G-Code): CM        Patient End of Session: In bed;Needs met;Call light within reach;RN aware of session/findings; Alarm set; Family present    CURRENT GOALS Goals to be met by: 11/4/23  Patient Goal Patient's self-stated goal is: To walk   Goal #1 Patient is able to demonstrate supine - sit EOB @ level: moderate assistance      Goal #1   Current Status  Progressing: mod 2 with bed modifications   Goal #2 Patient is able to demonstrate transfers Sit to/from Stand at assistance level: moderate assistance with walker - rolling      Goal #2  Current Status  Mod A x2 with RW and elevated bed height   Goal #3 Patient is able to ambulate 20 feet with assist device: walker - rolling at assistance level: moderate assistance   Goal #3   Current Status  NT   Goal #4 Patient to demonstrate independence with home activity/exercise instructions provided to patient in preparation for discharge.    Goal #4   Current Status  Progressing       Therapeutic Activity: 16 minutes    Leon [address] [place]  [phone]

## 2023-10-27 LAB
ANION GAP SERPL CALC-SCNC: 9 MMOL/L (ref 0–18)
BUN BLD-MCNC: 26 MG/DL (ref 7–18)
BUN/CREAT SERPL: 11.6 (ref 10–20)
CALCIUM BLD-MCNC: 9.5 MG/DL (ref 8.5–10.1)
CHLORIDE SERPL-SCNC: 111 MMOL/L (ref 98–112)
CO2 SERPL-SCNC: 25 MMOL/L (ref 21–32)
CREAT BLD-MCNC: 2.24 MG/DL
EGFRCR SERPLBLD CKD-EPI 2021: 22 ML/MIN/1.73M2 (ref 60–?)
GLUCOSE BLD-MCNC: 192 MG/DL (ref 70–99)
GLUCOSE BLDC GLUCOMTR-MCNC: 200 MG/DL (ref 70–99)
GLUCOSE BLDC GLUCOMTR-MCNC: 243 MG/DL (ref 70–99)
GLUCOSE BLDC GLUCOMTR-MCNC: 274 MG/DL (ref 70–99)
GLUCOSE BLDC GLUCOMTR-MCNC: 278 MG/DL (ref 70–99)
OSMOLALITY SERPL CALC.SUM OF ELEC: 310 MOSM/KG (ref 275–295)
POTASSIUM SERPL-SCNC: 4 MMOL/L (ref 3.5–5.1)
SODIUM SERPL-SCNC: 145 MMOL/L (ref 136–145)

## 2023-10-27 PROCEDURE — 80048 BASIC METABOLIC PNL TOTAL CA: CPT | Performed by: HOSPITALIST

## 2023-10-27 PROCEDURE — 82962 GLUCOSE BLOOD TEST: CPT

## 2023-10-27 RX ORDER — ACETAMINOPHEN 500 MG
500 TABLET ORAL EVERY 8 HOURS
Status: DISCONTINUED | OUTPATIENT
Start: 2023-10-27 | End: 2023-10-30

## 2023-10-27 RX ORDER — METHOCARBAMOL 500 MG/1
500 TABLET, FILM COATED ORAL 3 TIMES DAILY PRN
Status: DISCONTINUED | OUTPATIENT
Start: 2023-10-27 | End: 2023-10-30

## 2023-10-27 RX ORDER — HYDRALAZINE HYDROCHLORIDE 20 MG/ML
10 INJECTION INTRAMUSCULAR; INTRAVENOUS EVERY 8 HOURS PRN
Status: DISCONTINUED | OUTPATIENT
Start: 2023-10-27 | End: 2023-10-30

## 2023-10-27 RX ORDER — QUETIAPINE FUMARATE 25 MG/1
25 TABLET, FILM COATED ORAL NIGHTLY
Status: DISCONTINUED | OUTPATIENT
Start: 2023-10-27 | End: 2023-10-30

## 2023-10-27 NOTE — PLAN OF CARE
Pt alert and oriented x 2 and on room air. Monitoring vital signs- stable at this time. Receiving 0.9% NaCl and IV Zosyn  fluids per MD order. Monitoring blood glucose levels per order. Comfort and supportive care provided. All pt questions and needs met. Fall precautions maintained- bed alarm on, bed locked in lowest position, call light and personal belongings within reach, non-skid socks in place to bilateral feet. Frequent rounding by nursing staff.     Problem: Patient Centered Care  Goal: Patient preferences are identified and integrated in the patient's plan of care  Description: Interventions:  - What would you like us to know as we care for you?   - Provide timely, complete, and accurate information to patient/family  - Incorporate patient and family knowledge, values, beliefs, and cultural backgrounds into the planning and delivery of care  - Encourage patient/family to participate in care and decision-making at the level they choose  - Honor patient and family perspectives and choices  Outcome: Progressing     Problem: Diabetes/Glucose Control  Goal: Glucose maintained within prescribed range  Description: INTERVENTIONS:  - Monitor Blood Glucose as ordered  - Assess for signs and symptoms of hyperglycemia and hypoglycemia  - Administer ordered medications to maintain glucose within target range  - Assess barriers to adequate nutritional intake and initiate nutrition consult as needed  - Instruct patient on self management of diabetes  Outcome: Progressing     Problem: Patient/Family Goals  Goal: Patient/Family Long Term Goal  Description: Patient's Long Term Goal:     Interventions:  - See additional Care Plan goals for specific interventions  Outcome: Progressing  Goal: Patient/Family Short Term Goal  Description: Patient's Short Term Goal:     Interventions:   - See additional Care Plan goals for specific interventions  Outcome: Progressing     Problem: PAIN - ADULT  Goal: Verbalizes/displays adequate comfort level or patient's stated pain goal  Description: INTERVENTIONS:  - Encourage pt to monitor pain and request assistance  - Assess pain using appropriate pain scale  - Administer analgesics based on type and severity of pain and evaluate response  - Implement non-pharmacological measures as appropriate and evaluate response  - Consider cultural and social influences on pain and pain management  - Manage/alleviate anxiety  - Utilize distraction and/or relaxation techniques  - Monitor for opioid side effects  - Notify MD/LIP if interventions unsuccessful or patient reports new pain  - Anticipate increased pain with activity and pre-medicate as appropriate  Outcome: Progressing     Problem: SAFETY ADULT - FALL  Goal: Free from fall injury  Description: INTERVENTIONS:  - Assess pt frequently for physical needs  - Identify cognitive and physical deficits and behaviors that affect risk of falls.   - [place] fall precautions as indicated by assessment.  - Educate pt/family on patient safety including physical limitations  - Instruct pt to call for assistance with activity based on assessment  - Modify environment to reduce risk of injury  - Provide assistive devices as appropriate  - Consider OT/PT consult to assist with strengthening/mobility  - Encourage toileting schedule  Outcome: Progressing     Problem: DISCHARGE PLANNING  Goal: Discharge to home or other facility with appropriate resources  Description: INTERVENTIONS:  - Identify barriers to discharge w/pt and caregiver  - Include patient/family/discharge partner in discharge planning  - Arrange for needed discharge resources and transportation as appropriate  - Identify discharge learning needs (meds, wound care, etc)  - Arrange for interpreters to assist at discharge as needed  - Consider post-discharge preferences of patient/family/discharge partner  - Complete POLST form as appropriate  - Assess patient's ability to be responsible for managing their own health  - Refer to Case Management Department for coordinating discharge planning if the patient needs post-hospital services based on physician/LIP order or complex needs related to functional status, cognitive ability or social support system  Outcome: Progressing     Problem: Altered Communication/Language Barrier  Goal: Patient/Family is able to understand and participate in their care  Description: Interventions:  - Assess communication ability and preferred communication style  - Implement communication aides and strategies  - Use visual cues when possible  - Listen attentively, be patient, do not interrupt  - Minimize distractions  - Allow time for understanding and response  - Establish method for patient to ask for assistance (call light)  - Provide an  as needed  - Communicate barriers and strategies to overcome with those who interact with patient  Outcome: Not Progressing     Problem: METABOLIC/FLUID AND ELECTROLYTES - ADULT  Goal: Glucose maintained within prescribed range  Description: INTERVENTIONS:  - Monitor Blood Glucose as ordered  - Assess for signs and symptoms of hyperglycemia and hypoglycemia  - Administer ordered medications to maintain glucose within target range  - Assess barriers to adequate nutritional intake and initiate nutrition consult as needed  - Instruct patient on self management of diabetes  Outcome: Progressing  Goal: Electrolytes maintained within normal limits  Description: INTERVENTIONS:  - Monitor labs and rhythm and assess patient for signs and symptoms of electrolyte imbalances  - Administer electrolyte replacement as ordered  - Monitor response to electrolyte replacements, including rhythm and repeat lab results as appropriate  - Fluid restriction as ordered  - Instruct patient on fluid and nutrition restrictions as appropriate  Outcome: Progressing  Goal: Hemodynamic stability and optimal renal function maintained  Description: INTERVENTIONS:  - Monitor labs and assess for signs and symptoms of volume excess or deficit  - Monitor intake, output and patient weight  - Monitor urine specific gravity, serum osmolarity and serum sodium as indicated or ordered  - Monitor response to interventions for patient's volume status, including labs, urine output, blood pressure (other measures as available)  - Encourage oral intake as appropriate  - Instruct patient on fluid and nutrition restrictions as appropriate  Outcome: Progressing     Problem: SKIN/TISSUE INTEGRITY - ADULT  Goal: Skin integrity remains intact  Description: INTERVENTIONS  - Assess and document risk factors for pressure ulcer development  - Assess and document skin integrity  - Monitor for areas of redness and/or skin breakdown  - Initiate interventions, skin care algorithm/standards of care as needed  Outcome: Progressing  Goal: Incision(s), wounds(s) or drain site(s) healing without S/S of infection  Description: INTERVENTIONS:  - Assess and document risk factors for pressure ulcer development  - Assess and document skin integrity  - Assess and document dressing/incision, wound bed, drain sites and surrounding tissue  - Implement wound care per orders  - Initiate isolation precautions as appropriate  - Initiate Pressure Ulcer prevention bundle as indicated  Outcome: Progressing

## 2023-10-27 NOTE — PLAN OF CARE
Patient is A+O x3-4 and experiencing tears/agitation through out the shift (possible sundowning). Hydralazine given for increased blood pressure (see MAR). PT/OT suggested dangling patient legs off the side of the bed. Daughter and MD notified of patients agitation. Problem: Patient Centered Care  Goal: Patient preferences are identified and integrated in the patient's plan of care  Description: Interventions:  - What would you like us to know as we care for you?  From home with daughter, discharging with [facility]   - Provide timely, complete, and accurate information to patient/family  - Incorporate patient and family knowledge, values, beliefs, and cultural backgrounds into the planning and delivery of care  - Encourage patient/family to participate in care and decision-making at the level they choose  - Honor patient and family perspectives and choices  Outcome: Progressing     Problem: Diabetes/Glucose Control  Goal: Glucose maintained within prescribed range  Description: INTERVENTIONS:  - Monitor Blood Glucose as ordered  - Assess for signs and symptoms of hyperglycemia and hypoglycemia  - Administer ordered medications to maintain glucose within target range  - Assess barriers to adequate nutritional intake and initiate nutrition consult as needed  - Instruct patient on self management of diabetes  Outcome: Progressing     Problem: Patient/Family Goals  Goal: Patient/Family Long Term Goal  Description: Patient's Long Term Goal: Discharge to [facility] then home with daughter    Interventions:  - Monitor vitals  - Physical assessments  - Provide comfort and safety  - See additional Care Plan goals for specific interventions  Outcome: Progressing  Goal: Patient/Family Short Term Goal  Description: Patient's Short Term Goal: Manage shortness of breath and pain    Interventions:   - Ambulation   - Medications PRN  - Incentive Spirometry   - See additional Care Plan goals for specific interventions  Outcome: Progressing Problem: PAIN - ADULT  Goal: Verbalizes/displays adequate comfort level or patient's stated pain goal  Description: INTERVENTIONS:  - Encourage pt to monitor pain and request assistance  - Assess pain using appropriate pain scale  - Administer analgesics based on type and severity of pain and evaluate response  - Implement non-pharmacological measures as appropriate and evaluate response  - Consider cultural and social influences on pain and pain management  - Manage/alleviate anxiety  - Utilize distraction and/or relaxation techniques  - Monitor for opioid side effects  - Notify MD/LIP if interventions unsuccessful or patient reports new pain  - Anticipate increased pain with activity and pre-medicate as appropriate  Outcome: Not Progressing     Problem: SAFETY ADULT - FALL  Goal: Free from fall injury  Description: INTERVENTIONS:  - Assess pt frequently for physical needs  - Identify cognitive and physical deficits and behaviors that affect risk of falls.   - [place] fall precautions as indicated by assessment.  - Educate pt/family on patient safety including physical limitations  - Instruct pt to call for assistance with activity based on assessment  - Modify environment to reduce risk of injury  - Provide assistive devices as appropriate  - Consider OT/PT consult to assist with strengthening/mobility  - Encourage toileting schedule  Outcome: Progressing     Problem: DISCHARGE PLANNING  Goal: Discharge to home or other facility with appropriate resources  Description: INTERVENTIONS:  - Identify barriers to discharge w/pt and caregiver  - Include patient/family/discharge partner in discharge planning  - Arrange for needed discharge resources and transportation as appropriate  - Identify discharge learning needs (meds, wound care, etc)  - Arrange for interpreters to assist at discharge as needed  - Consider post-discharge preferences of patient/family/discharge partner  - Complete POLST form as appropriate  - Assess patient's ability to be responsible for managing their own health  - Refer to Case Management Department for coordinating discharge planning if the patient needs post-hospital services based on physician/LIP order or complex needs related to functional status, cognitive ability or social support system  Outcome: Progressing     Problem: Altered Communication/Language Barrier  Goal: Patient/Family is able to understand and participate in their care  Description: Interventions:  - Assess communication ability and preferred communication style  - Implement communication aides and strategies  - Use visual cues when possible  - Listen attentively, be patient, do not interrupt  - Minimize distractions  - Allow time for understanding and response  - Establish method for patient to ask for assistance (call light)  - Provide an  as needed  - Communicate barriers and strategies to overcome with those who interact with patient  Outcome: Progressing     Problem: METABOLIC/FLUID AND ELECTROLYTES - ADULT  Goal: Glucose maintained within prescribed range  Description: INTERVENTIONS:  - Monitor Blood Glucose as ordered  - Assess for signs and symptoms of hyperglycemia and hypoglycemia  - Administer ordered medications to maintain glucose within target range  - Assess barriers to adequate nutritional intake and initiate nutrition consult as needed  - Instruct patient on self management of diabetes  Outcome: Progressing  Goal: Electrolytes maintained within normal limits  Description: INTERVENTIONS:  - Monitor labs and rhythm and assess patient for signs and symptoms of electrolyte imbalances  - Administer electrolyte replacement as ordered  - Monitor response to electrolyte replacements, including rhythm and repeat lab results as appropriate  - Fluid restriction as ordered  - Instruct patient on fluid and nutrition restrictions as appropriate  Outcome: Progressing  Goal: Hemodynamic stability and optimal renal function maintained  Description: INTERVENTIONS:  - Monitor labs and assess for signs and symptoms of volume excess or deficit  - Monitor intake, output and patient weight  - Monitor urine specific gravity, serum osmolarity and serum sodium as indicated or ordered  - Monitor response to interventions for patient's volume status, including labs, urine output, blood pressure (other measures as available)  - Encourage oral intake as appropriate  - Instruct patient on fluid and nutrition restrictions as appropriate  Outcome: Progressing     Problem: SKIN/TISSUE INTEGRITY - ADULT  Goal: Skin integrity remains intact  Description: INTERVENTIONS  - Assess and document risk factors for pressure ulcer development  - Assess and document skin integrity  - Monitor for areas of redness and/or skin breakdown  - Initiate interventions, skin care algorithm/standards of care as needed  Outcome: Progressing  Goal: Incision(s), wounds(s) or drain site(s) healing without S/S of infection  Description: INTERVENTIONS:  - Assess and document risk factors for pressure ulcer development  - Assess and document skin integrity  - Assess and document dressing/incision, wound bed, drain sites and surrounding tissue  - Implement wound care per orders  - Initiate isolation precautions as appropriate  - Initiate Pressure Ulcer prevention bundle as indicated  Outcome: Progressing

## 2023-10-28 LAB
ANION GAP SERPL CALC-SCNC: 8 MMOL/L (ref 0–18)
BUN BLD-MCNC: 33 MG/DL (ref 7–18)
BUN/CREAT SERPL: 15.3 (ref 10–20)
CALCIUM BLD-MCNC: 10 MG/DL (ref 8.5–10.1)
CHLORIDE SERPL-SCNC: 108 MMOL/L (ref 98–112)
CO2 SERPL-SCNC: 27 MMOL/L (ref 21–32)
CREAT BLD-MCNC: 2.16 MG/DL
EGFRCR SERPLBLD CKD-EPI 2021: 23 ML/MIN/1.73M2 (ref 60–?)
GLUCOSE BLD-MCNC: 247 MG/DL (ref 70–99)
GLUCOSE BLDC GLUCOMTR-MCNC: 177 MG/DL (ref 70–99)
GLUCOSE BLDC GLUCOMTR-MCNC: 221 MG/DL (ref 70–99)
GLUCOSE BLDC GLUCOMTR-MCNC: 343 MG/DL (ref 70–99)
GLUCOSE BLDC GLUCOMTR-MCNC: 352 MG/DL (ref 70–99)
OSMOLALITY SERPL CALC.SUM OF ELEC: 312 MOSM/KG (ref 275–295)
POTASSIUM SERPL-SCNC: 3.8 MMOL/L (ref 3.5–5.1)
SODIUM SERPL-SCNC: 143 MMOL/L (ref 136–145)

## 2023-10-28 PROCEDURE — 80048 BASIC METABOLIC PNL TOTAL CA: CPT | Performed by: HOSPITALIST

## 2023-10-28 PROCEDURE — 82962 GLUCOSE BLOOD TEST: CPT

## 2023-10-28 NOTE — PLAN OF CARE
Problem: Patient Centered Care  Goal: Patient preferences are identified and integrated in the patient's plan of care  Description: Interventions:  - What would you like us to know as we care for you?  From home with daughter  - Provide timely, complete, and accurate information to patient/family  - Incorporate patient and family knowledge, values, beliefs, and cultural backgrounds into the planning and delivery of care  - Encourage patient/family to participate in care and decision-making at the level they choose  - Honor patient and family perspectives and choices  10/28/2023 1728 by Matty Shaver RN  Outcome: Progressing  10/28/2023 1727 by Matty Shaver RN  Outcome: Progressing  10/28/2023 1727 by Matty Shaver RN  Outcome: Progressing     Problem: Diabetes/Glucose Control  Goal: Glucose maintained within prescribed range  Description: INTERVENTIONS:  - Monitor Blood Glucose as ordered  - Assess for signs and symptoms of hyperglycemia and hypoglycemia  - Administer ordered medications to maintain glucose within target range  - Assess barriers to adequate nutritional intake and initiate nutrition consult as needed  - Instruct patient on self management of diabetes  10/28/2023 1728 by Matty Shaver RN  Outcome: Progressing  10/28/2023 1727 by Matty Shaver RN  Outcome: Progressing  10/28/2023 1727 by Matty Shaver RN  Outcome: Progressing     Problem: Patient/Family Goals  Goal: Patient/Family Long Term Goal  Description: Patient's Long Term Goal: Discharge from hospital    Interventions:  - Monitor vital signs  - Monitor appropriate labs  - Monitor blood glucose levels  - Pain management  - Oxygen and respiratory intervention as needed  - Administer medications per order  - Follow MD orders  - Diagnostics per order  - Update / inform patient and family on plan of care  - Discharge planning      - See additional Care Plan goals for specific interventions  10/28/2023 1728 by Matty Shaver RN  Outcome: Progressing  10/28/2023 1727 by Ivory Damian RN  Outcome: Progressing  10/28/2023 1727 by Ivory Damian RN  Outcome: Progressing  Goal: Patient/Family Short Term Goal  Description: Patient's Short Term Goal: Improve weakness    Interventions:   - Monitor vital signs  - Monitor appropriate labs  - Monitor blood glucose levels  - Pain management  - Oxygen and respiratory intervention as needed  - Administer medications per order  - Follow MD orders  - Diagnostics per order  - Update / inform patient and family on plan of care  - Discharge planning  - See additional Care Plan goals for specific interventions  10/28/2023 1728 by Ivory Damian RN  Outcome: Progressing  10/28/2023 1727 by Ivory Damian RN  Outcome: Progressing  10/28/2023 1727 by Ivory Damian RN  Outcome: Progressing     Problem: PAIN - ADULT  Goal: Verbalizes/displays adequate comfort level or patient's stated pain goal  Description: INTERVENTIONS:  - Encourage pt to monitor pain and request assistance  - Assess pain using appropriate pain scale  - Administer analgesics based on type and severity of pain and evaluate response  - Implement non-pharmacological measures as appropriate and evaluate response  - Consider cultural and social influences on pain and pain management  - Manage/alleviate anxiety  - Utilize distraction and/or relaxation techniques  - Monitor for opioid side effects  - Notify MD/LIP if interventions unsuccessful or patient reports new pain  - Anticipate increased pain with activity and pre-medicate as appropriate  10/28/2023 1728 by Ivory Damian RN  Outcome: Progressing  10/28/2023 1727 by Ivory Damian RN  Outcome: Progressing  10/28/2023 1727 by Ivory Damian RN  Outcome: Progressing     Problem: SAFETY ADULT - FALL  Goal: Free from fall injury  Description: INTERVENTIONS:  - Assess pt frequently for physical needs  - Identify cognitive and physical deficits and behaviors that affect risk of falls.   - [place] fall precautions as indicated by assessment.  - Educate pt/family on patient safety including physical limitations  - Instruct pt to call for assistance with activity based on assessment  - Modify environment to reduce risk of injury  - Provide assistive devices as appropriate  - Consider OT/PT consult to assist with strengthening/mobility  - Encourage toileting schedule  10/28/2023 1728 by Anh Mcnally RN  Outcome: Progressing  10/28/2023 1727 by Anh Mcnally RN  Outcome: Progressing  10/28/2023 1727 by Anh Mcnally RN  Outcome: Progressing     Problem: DISCHARGE PLANNING  Goal: Discharge to home or other facility with appropriate resources  Description: INTERVENTIONS:  - Identify barriers to discharge w/pt and caregiver  - Include patient/family/discharge partner in discharge planning  - Arrange for needed discharge resources and transportation as appropriate  - Identify discharge learning needs (meds, wound care, etc)  - Arrange for interpreters to assist at discharge as needed  - Consider post-discharge preferences of patient/family/discharge partner  - Complete POLST form as appropriate  - Assess patient's ability to be responsible for managing their own health  - Refer to Case Management Department for coordinating discharge planning if the patient needs post-hospital services based on physician/LIP order or complex needs related to functional status, cognitive ability or social support system  10/28/2023 1728 by Anh Mcnally RN  Outcome: Progressing  10/28/2023 1727 by Anh Mcnally RN  Outcome: Progressing  10/28/2023 1727 by Anh Mcnally RN  Outcome: Progressing     Problem: Altered Communication/Language Barrier  Goal: Patient/Family is able to understand and participate in their care  Description: Interventions:  - Assess communication ability and preferred communication style  - Implement communication aides and strategies  - Use visual cues when possible  - Listen attentively, be patient, do not interrupt  - Minimize distractions  - Allow time for understanding and response  - Establish method for patient to ask for assistance (call light)  - Provide an  as needed  - Communicate barriers and strategies to overcome with those who interact with patient  10/28/2023 1728 by Gisselle Zhong RN  Outcome: Progressing  10/28/2023 1727 by Gisselle Zhong RN  Outcome: Progressing  10/28/2023 1727 by Gisselle Zhong RN  Outcome: Progressing     Problem: METABOLIC/FLUID AND ELECTROLYTES - ADULT  Goal: Glucose maintained within prescribed range  Description: INTERVENTIONS:  - Monitor Blood Glucose as ordered  - Assess for signs and symptoms of hyperglycemia and hypoglycemia  - Administer ordered medications to maintain glucose within target range  - Assess barriers to adequate nutritional intake and initiate nutrition consult as needed  - Instruct patient on self management of diabetes  10/28/2023 1728 by Gisselle Zhong RN  Outcome: Progressing  10/28/2023 1727 by Gisselle Zhong RN  Outcome: Progressing  10/28/2023 1727 by Gisselle Zhong RN  Outcome: Progressing  Goal: Electrolytes maintained within normal limits  Description: INTERVENTIONS:  - Monitor labs and rhythm and assess patient for signs and symptoms of electrolyte imbalances  - Administer electrolyte replacement as ordered  - Monitor response to electrolyte replacements, including rhythm and repeat lab results as appropriate  - Fluid restriction as ordered  - Instruct patient on fluid and nutrition restrictions as appropriate  10/28/2023 1728 by Gisselle Zhong RN  Outcome: Progressing  10/28/2023 1727 by Gisselle Zhong RN  Outcome: Progressing  10/28/2023 1727 by Gisselle Zhong RN  Outcome: Progressing  Goal: Hemodynamic stability and optimal renal function maintained  Description: INTERVENTIONS:  - Monitor labs and assess for signs and symptoms of volume excess or deficit  - Monitor intake, output and patient weight  - Monitor urine specific gravity, serum osmolarity and serum sodium as indicated or ordered  - Monitor response to interventions for patient's volume status, including labs, urine output, blood pressure (other measures as available)  - Encourage oral intake as appropriate  - Instruct patient on fluid and nutrition restrictions as appropriate  10/28/2023 1728 by Jorje Ramos RN  Outcome: Progressing  10/28/2023 1727 by Jorje Ramos RN  Outcome: Progressing  10/28/2023 1727 by Jorje Ramos RN  Outcome: Progressing     Problem: SKIN/TISSUE INTEGRITY - ADULT  Goal: Skin integrity remains intact  Description: INTERVENTIONS  - Assess and document risk factors for pressure ulcer development  - Assess and document skin integrity  - Monitor for areas of redness and/or skin breakdown  - Initiate interventions, skin care algorithm/standards of care as needed  10/28/2023 1728 by Jorje Ramos RN  Outcome: Progressing  10/28/2023 1727 by Jorje Ramos RN  Outcome: Progressing  10/28/2023 1727 by Jorje Ramos RN  Outcome: Progressing  Goal: Incision(s), wounds(s) or drain site(s) healing without S/S of infection  Description: INTERVENTIONS:  - Assess and document risk factors for pressure ulcer development  - Assess and document skin integrity  - Assess and document dressing/incision, wound bed, drain sites and surrounding tissue  - Implement wound care per orders  - Initiate isolation precautions as appropriate  - Initiate Pressure Ulcer prevention bundle as indicated  10/28/2023 1728 by Jorje Ramos RN  Outcome: Progressing  10/28/2023 1727 by Jorje Ramos RN  Outcome: Progressing  10/28/2023 1727 by Jorje Ramos RN  Outcome: Progressing    Monitoring vital signs per unit routine. Monitoring blood glucose, insulin administered per order. No acute changes noted at this moment. Safety and fall precautions maintained - bed alarm on, bed locked in lowest position, call light within reach. Frequent rounding by nursing staff.

## 2023-10-28 NOTE — PLAN OF CARE
Patient blood pressure elevated, BP medications given. IV Zosyn given. PRN Tramadol given for pain relief, PRN Zofran given for nausea. Family member at bedside. Mild sundowning during shift. No acute changes. Problem: Patient Centered Care  Goal: Patient preferences are identified and integrated in the patient's plan of care  Description: Interventions:  - What would you like us to know as we care for you?  From home with daughter.  - Provide timely, complete, and accurate information to patient/family  - Incorporate patient and family knowledge, values, beliefs, and cultural backgrounds into the planning and delivery of care  - Encourage patient/family to participate in care and decision-making at the level they choose  - Honor patient and family perspectives and choices  Outcome: Progressing     Problem: Diabetes/Glucose Control  Goal: Glucose maintained within prescribed range  Description: INTERVENTIONS:  - Monitor Blood Glucose as ordered  - Assess for signs and symptoms of hyperglycemia and hypoglycemia  - Administer ordered medications to maintain glucose within target range  - Assess barriers to adequate nutritional intake and initiate nutrition consult as needed  - Instruct patient on self management of diabetes  Outcome: Progressing      Problem: PAIN - ADULT  Goal: Verbalizes/displays adequate comfort level or patient's stated pain goal  Description: INTERVENTIONS:  - Encourage pt to monitor pain and request assistance  - Assess pain using appropriate pain scale  - Administer analgesics based on type and severity of pain and evaluate response  - Implement non-pharmacological measures as appropriate and evaluate response  - Consider cultural and social influences on pain and pain management  - Manage/alleviate anxiety  - Utilize distraction and/or relaxation techniques  - Monitor for opioid side effects  - Notify MD/LIP if interventions unsuccessful or patient reports new pain  - Anticipate increased pain with activity and pre-medicate as appropriate  Outcome: Progressing     Problem: SAFETY ADULT - FALL  Goal: Free from fall injury  Description: INTERVENTIONS:  - Assess pt frequently for physical needs  - Identify cognitive and physical deficits and behaviors that affect risk of falls.   - [place] fall precautions as indicated by assessment.  - Educate pt/family on patient safety including physical limitations  - Instruct pt to call for assistance with activity based on assessment  - Modify environment to reduce risk of injury  - Provide assistive devices as appropriate  - Consider OT/PT consult to assist with strengthening/mobility  - Encourage toileting schedule  Outcome: Progressing     Problem: DISCHARGE PLANNING  Goal: Discharge to home or other facility with appropriate resources  Description: INTERVENTIONS:  - Identify barriers to discharge w/pt and caregiver  - Include patient/family/discharge partner in discharge planning  - Arrange for needed discharge resources and transportation as appropriate  - Identify discharge learning needs (meds, wound care, etc)  - Arrange for interpreters to assist at discharge as needed  - Consider post-discharge preferences of patient/family/discharge partner  - Complete POLST form as appropriate  - Assess patient's ability to be responsible for managing their own health  - Refer to Case Management Department for coordinating discharge planning if the patient needs post-hospital services based on physician/LIP order or complex needs related to functional status, cognitive ability or social support system  Outcome: Progressing     Problem: Altered Communication/Language Barrier  Goal: Patient/Family is able to understand and participate in their care  Description: Interventions:  - Assess communication ability and preferred communication style  - Implement communication aides and strategies  - Use visual cues when possible  - Listen attentively, be patient, do not interrupt  - Minimize distractions  - Allow time for understanding and response  - Establish method for patient to ask for assistance (call light)  - Provide an  as needed  - Communicate barriers and strategies to overcome with those who interact with patient  Outcome: Progressing     Problem: METABOLIC/FLUID AND ELECTROLYTES - ADULT  Goal: Glucose maintained within prescribed range  Description: INTERVENTIONS:  - Monitor Blood Glucose as ordered  - Assess for signs and symptoms of hyperglycemia and hypoglycemia  - Administer ordered medications to maintain glucose within target range  - Assess barriers to adequate nutritional intake and initiate nutrition consult as needed  - Instruct patient on self management of diabetes  Outcome: Progressing  Goal: Electrolytes maintained within normal limits  Description: INTERVENTIONS:  - Monitor labs and rhythm and assess patient for signs and symptoms of electrolyte imbalances  - Administer electrolyte replacement as ordered  - Monitor response to electrolyte replacements, including rhythm and repeat lab results as appropriate  - Fluid restriction as ordered  - Instruct patient on fluid and nutrition restrictions as appropriate  Outcome: Progressing  Goal: Hemodynamic stability and optimal renal function maintained  Description: INTERVENTIONS:  - Monitor labs and assess for signs and symptoms of volume excess or deficit  - Monitor intake, output and patient weight  - Monitor urine specific gravity, serum osmolarity and serum sodium as indicated or ordered  - Monitor response to interventions for patient's volume status, including labs, urine output, blood pressure (other measures as available)  - Encourage oral intake as appropriate  - Instruct patient on fluid and nutrition restrictions as appropriate  Outcome: Progressing     Problem: SKIN/TISSUE INTEGRITY - ADULT  Goal: Skin integrity remains intact  Description: INTERVENTIONS  - Assess and document risk factors for pressure ulcer development  - Assess and document skin integrity  - Monitor for areas of redness and/or skin breakdown  - Initiate interventions, skin care algorithm/standards of care as needed  Outcome: Progressing  Goal: Incision(s), wounds(s) or drain site(s) healing without S/S of infection  Description: INTERVENTIONS:  - Assess and document risk factors for pressure ulcer development  - Assess and document skin integrity  - Assess and document dressing/incision, wound bed, drain sites and surrounding tissue  - Implement wound care per orders  - Initiate isolation precautions as appropriate  - Initiate Pressure Ulcer prevention bundle as indicated  Outcome: Progressing

## 2023-10-29 LAB
ANION GAP SERPL CALC-SCNC: 5 MMOL/L (ref 0–18)
BUN BLD-MCNC: 47 MG/DL (ref 7–18)
BUN/CREAT SERPL: 20.6 (ref 10–20)
CALCIUM BLD-MCNC: 9.7 MG/DL (ref 8.5–10.1)
CHLORIDE SERPL-SCNC: 109 MMOL/L (ref 98–112)
CO2 SERPL-SCNC: 29 MMOL/L (ref 21–32)
CREAT BLD-MCNC: 2.28 MG/DL
EGFRCR SERPLBLD CKD-EPI 2021: 21 ML/MIN/1.73M2 (ref 60–?)
GLUCOSE BLD-MCNC: 233 MG/DL (ref 70–99)
GLUCOSE BLDC GLUCOMTR-MCNC: 235 MG/DL (ref 70–99)
GLUCOSE BLDC GLUCOMTR-MCNC: 263 MG/DL (ref 70–99)
GLUCOSE BLDC GLUCOMTR-MCNC: 272 MG/DL (ref 70–99)
GLUCOSE BLDC GLUCOMTR-MCNC: 277 MG/DL (ref 70–99)
GLUCOSE BLDC GLUCOMTR-MCNC: 400 MG/DL (ref 70–99)
OSMOLALITY SERPL CALC.SUM OF ELEC: 316 MOSM/KG (ref 275–295)
POTASSIUM SERPL-SCNC: 4.3 MMOL/L (ref 3.5–5.1)
SODIUM SERPL-SCNC: 143 MMOL/L (ref 136–145)

## 2023-10-29 PROCEDURE — 97530 THERAPEUTIC ACTIVITIES: CPT

## 2023-10-29 PROCEDURE — 82962 GLUCOSE BLOOD TEST: CPT

## 2023-10-29 PROCEDURE — 80048 BASIC METABOLIC PNL TOTAL CA: CPT | Performed by: HOSPITALIST

## 2023-10-29 NOTE — PHYSICAL THERAPY NOTE
PHYSICAL THERAPY TREATMENT NOTE - INPATIENT     Room Number: 146H/263-L       Presenting Problem: weakness, L LE pain  Co-Morbidities : AAA repair on 10/9    Problem List  Principal Problem:    Weakness      PHYSICAL THERAPY ASSESSMENT     Pt ok to be seen per RN Zuri Holloway. Instructed pt in supine therex. RN present for functional mobility, pt had a BM and participated in rolling. Pt modA initially for rolling, Annika to maintain position in sidelying. Pt able to hold rail. Pt modA for sup to sidely to sit EOB. Pt able to maintain sitting position with Annika vs retro lean. Pt able to respond to cueing to correct posture, but not enough to maintain balance. Pt initially endorsed being dizzy sitting EOB, but passed with time. Pt modA for sit to stand, modA to hold upright standing for pulling up brief, maxA for steps to pivot to chair. Pt with knee give way x1, therapist lowered pt to sit in chair. Pt will benefit from continued inpt PT to facil return to PLOF. Rec d/c to [facility] as pt is performing below baseline and has rehab potential.    The patient's Approx Degree of Impairment: 76.75% has been calculated based on documentation in the [facility] '6 clicks' Inpatient Basic Mobility Short Form. Research supports that patients with this level of impairment may benefit from LTC; rec d/c to [facility] as pt has rehab potential and is performing below baseline. Rajesh Javier DISCHARGE RECOMMENDATIONS  PT Discharge Recommendations: Sub-acute rehabilitation     PLAN  PT Treatment Plan: Bed mobility; Body mechanics; Energy conservation;Patient education;Gait training;Strengthening;Transfer training;Balance training  Frequency (Obs): 3-5x/week    SUBJECTIVE  \"Usually my stomach gurgles before. This was the silence of the lambs\" (referring to BM)    OBJECTIVE  Precautions: Bed/chair alarm    WEIGHT BEARING RESTRICTION                PAIN ASSESSMENT   Ratin  Location: groin  Management Techniques:  Activity promotion;Repositioning    BALANCE  Static Sitting: Fair -  Dynamic Sitting: Poor +  Static Standing: Poor  Dynamic Standing: Poor -    ACTIVITY TOLERANCE                          O2 WALK       AM-PAC '6-Clicks' INPATIENT SHORT FORM - BASIC MOBILITY  How much difficulty does the patient currently have. .. Patient Difficulty: Turning over in bed (including adjusting bedclothes, sheets and blankets)?: A Lot   Patient Difficulty: Sitting down on and standing up from a chair with arms (e.g., wheelchair, bedside commode, etc.): A Lot   Patient Difficulty: Moving from lying on back to sitting on the side of the bed?: A Lot   How much help from another person does the patient currently need. .. Help from Another: Moving to and from a bed to a chair (including a wheelchair)?: A Lot   Help from Another: Need to walk in hospital room?: Total   Help from Another: Climbing 3-5 steps with a railing?: Total     AM-PAC Score:  Raw Score: 10   Approx Degree of Impairment: 76.75%   Standardized Score (AM-PAC Scale): 32.29   CMS Modifier (G-Code): CL    FUNCTIONAL ABILITY STATUS  Functional Mobility/Gait Assessment  Gait Assistance: Not tested  Distance (ft): 0      THERAPEUTIC EXERCISES  Lower Extremity Ankle pumps  Heel slides     Position Supine       Patient End of Session: Up in chair;Needs met;Call light within reach; All patient questions and concerns addressed; Alarm set    CURRENT GOALS   Goals to be met by: 11/4/23  Patient Goal Patient's self-stated goal is:  To walk   Goal #1 Patient is able to demonstrate supine - sit EOB @ level: moderate assistance      Goal #1   Current Status  Progressing: modA   Goal #2 Patient is able to demonstrate transfers Sit to/from Stand at assistance level: moderate assistance with walker - rolling      Goal #2  Current Status  Mod A x1 with HHA   Goal #3 Patient is able to ambulate 20 feet with assist device: walker - rolling at assistance level: moderate assistance   Goal #3   Current Status  NT   Goal #4 Patient to demonstrate independence with home activity/exercise instructions provided to patient in preparation for discharge.    Goal #4   Current Status In progress       Therapeutic Activity: 25 minutes

## 2023-10-29 NOTE — PLAN OF CARE
Problem: Patient Centered Care  Goal: Patient preferences are identified and integrated in the patient's plan of care  Description: Interventions:  - What would you like us to know as we care for you? I normally live at home with my daughter, but I'm going to go to rehab first for strengthening before I go home.   - Provide timely, complete, and accurate information to patient/family  - Incorporate patient and family knowledge, values, beliefs, and cultural backgrounds into the planning and delivery of care  - Encourage patient/family to participate in care and decision-making at the level they choose  - Honor patient and family perspectives and choices  Outcome: Progressing     Problem: Diabetes/Glucose Control  Goal: Glucose maintained within prescribed range  Description: INTERVENTIONS:  - Monitor Blood Glucose as ordered  - Assess for signs and symptoms of hyperglycemia and hypoglycemia  - Administer ordered medications to maintain glucose within target range  - Assess barriers to adequate nutritional intake and initiate nutrition consult as needed  - Instruct patient on self management of diabetes  Outcome: Progressing     Problem: PAIN - ADULT  Goal: Verbalizes/displays adequate comfort level or patient's stated pain goal  Description: INTERVENTIONS:  - Encourage pt to monitor pain and request assistance  - Assess pain using appropriate pain scale  - Administer analgesics based on type and severity of pain and evaluate response  - Implement non-pharmacological measures as appropriate and evaluate response  - Consider cultural and social influences on pain and pain management  - Manage/alleviate anxiety  - Utilize distraction and/or relaxation techniques  - Monitor for opioid side effects  - Notify MD/LIP if interventions unsuccessful or patient reports new pain  - Anticipate increased pain with activity and pre-medicate as appropriate  Outcome: Progressing     Problem: SAFETY ADULT - FALL  Goal: Free from fall injury  Description: INTERVENTIONS:  - Assess pt frequently for physical needs  - Identify cognitive and physical deficits and behaviors that affect risk of falls.   - [place] fall precautions as indicated by assessment.  - Educate pt/family on patient safety including physical limitations  - Instruct pt to call for assistance with activity based on assessment  - Modify environment to reduce risk of injury  - Provide assistive devices as appropriate  - Consider OT/PT consult to assist with strengthening/mobility  - Encourage toileting schedule  Outcome: Progressing     Problem: DISCHARGE PLANNING  Goal: Discharge to home or other facility with appropriate resources  Description: INTERVENTIONS:  - Identify barriers to discharge w/pt and caregiver  - Include patient/family/discharge partner in discharge planning  - Arrange for needed discharge resources and transportation as appropriate  - Identify discharge learning needs (meds, wound care, etc)  - Arrange for interpreters to assist at discharge as needed  - Consider post-discharge preferences of patient/family/discharge partner  - Complete POLST form as appropriate  - Assess patient's ability to be responsible for managing their own health  - Refer to Case Management Department for coordinating discharge planning if the patient needs post-hospital services based on physician/LIP order or complex needs related to functional status, cognitive ability or social support system  Outcome: Progressing     Problem: Altered Communication/Language Barrier  Goal: Patient/Family is able to understand and participate in their care  Description: Interventions:  - Assess communication ability and preferred communication style  - Implement communication aides and strategies  - Use visual cues when possible  - Listen attentively, be patient, do not interrupt  - Minimize distractions  - Allow time for understanding and response  - Establish method for patient to ask for assistance (call light)  - Provide an  as needed  - Communicate barriers and strategies to overcome with those who interact with patient  Outcome: Progressing     Problem: METABOLIC/FLUID AND ELECTROLYTES - ADULT  Goal: Glucose maintained within prescribed range  Description: INTERVENTIONS:  - Monitor Blood Glucose as ordered  - Assess for signs and symptoms of hyperglycemia and hypoglycemia  - Administer ordered medications to maintain glucose within target range  - Assess barriers to adequate nutritional intake and initiate nutrition consult as needed  - Instruct patient on self management of diabetes  Outcome: Progressing  Goal: Electrolytes maintained within normal limits  Description: INTERVENTIONS:  - Monitor labs and rhythm and assess patient for signs and symptoms of electrolyte imbalances  - Administer electrolyte replacement as ordered  - Monitor response to electrolyte replacements, including rhythm and repeat lab results as appropriate  - Fluid restriction as ordered  - Instruct patient on fluid and nutrition restrictions as appropriate  Outcome: Progressing  Goal: Hemodynamic stability and optimal renal function maintained  Description: INTERVENTIONS:  - Monitor labs and assess for signs and symptoms of volume excess or deficit  - Monitor intake, output and patient weight  - Monitor urine specific gravity, serum osmolarity and serum sodium as indicated or ordered  - Monitor response to interventions for patient's volume status, including labs, urine output, blood pressure (other measures as available)  - Encourage oral intake as appropriate  - Instruct patient on fluid and nutrition restrictions as appropriate  Outcome: Progressing     Problem: SKIN/TISSUE INTEGRITY - ADULT  Goal: Skin integrity remains intact  Description: INTERVENTIONS  - Assess and document risk factors for pressure ulcer development  - Assess and document skin integrity  - Monitor for areas of redness and/or skin breakdown  - Initiate interventions, skin care algorithm/standards of care as needed  Outcome: Progressing  Goal: Incision(s), wounds(s) or drain site(s) healing without S/S of infection  Description: INTERVENTIONS:  - Assess and document risk factors for pressure ulcer development  - Assess and document skin integrity  - Assess and document dressing/incision, wound bed, drain sites and surrounding tissue  - Implement wound care per orders  - Initiate isolation precautions as appropriate  - Initiate Pressure Ulcer prevention bundle as indicated  Outcome: Progressing

## 2023-10-29 NOTE — PLAN OF CARE
Patient resting in bed at this time. Son at bedside. Call light within reach, bed in lowest position, safety and fall precautions in place. Frequent rounding performed by nursing staff. Medications and treatments given as ordered, all needs attended. Problem: Patient Centered Care  Goal: Patient preferences are identified and integrated in the patient's plan of care  Description: Interventions:  - What would you like us to know as we care for you?  Patient slightly hard of hearing  - Provide timely, complete, and accurate information to patient/family  - Incorporate patient and family knowledge, values, beliefs, and cultural backgrounds into the planning and delivery of care  - Encourage patient/family to participate in care and decision-making at the level they choose  - Honor patient and family perspectives and choices  Outcome: Progressing     Problem: Diabetes/Glucose Control  Goal: Glucose maintained within prescribed range  Description: INTERVENTIONS:  - Monitor Blood Glucose as ordered  - Assess for signs and symptoms of hyperglycemia and hypoglycemia  - Administer ordered medications to maintain glucose within target range  - Assess barriers to adequate nutritional intake and initiate nutrition consult as needed  - Instruct patient on self management of diabetes  Outcome: Progressing     Problem: Patient/Family Goals  Goal: Patient/Family Long Term Goal  Description: Patient's Long Term Goal: discharge    Interventions:  - monitor labs/vitals, adhere to medication and treatment regimen  - See additional Care Plan goals for specific interventions  Outcome: Progressing  Goal: Patient/Family Short Term Goal  Description: Patient's Short Term Goal: feel better    Interventions:   - monitor labs/vitals, adhere to medication and treatment regimen  - See additional Care Plan goals for specific interventions  Outcome: Progressing     Problem: PAIN - ADULT  Goal: Verbalizes/displays adequate comfort level or patient's stated pain goal  Description: INTERVENTIONS:  - Encourage pt to monitor pain and request assistance  - Assess pain using appropriate pain scale  - Administer analgesics based on type and severity of pain and evaluate response  - Implement non-pharmacological measures as appropriate and evaluate response  - Consider cultural and social influences on pain and pain management  - Manage/alleviate anxiety  - Utilize distraction and/or relaxation techniques  - Monitor for opioid side effects  - Notify MD/LIP if interventions unsuccessful or patient reports new pain  - Anticipate increased pain with activity and pre-medicate as appropriate  Outcome: Progressing     Problem: SAFETY ADULT - FALL  Goal: Free from fall injury  Description: INTERVENTIONS:  - Assess pt frequently for physical needs  - Identify cognitive and physical deficits and behaviors that affect risk of falls.   - [place] fall precautions as indicated by assessment.  - Educate pt/family on patient safety including physical limitations  - Instruct pt to call for assistance with activity based on assessment  - Modify environment to reduce risk of injury  - Provide assistive devices as appropriate  - Consider OT/PT consult to assist with strengthening/mobility  - Encourage toileting schedule  Outcome: Progressing     Problem: DISCHARGE PLANNING  Goal: Discharge to home or other facility with appropriate resources  Description: INTERVENTIONS:  - Identify barriers to discharge w/pt and caregiver  - Include patient/family/discharge partner in discharge planning  - Arrange for needed discharge resources and transportation as appropriate  - Identify discharge learning needs (meds, wound care, etc)  - Arrange for interpreters to assist at discharge as needed  - Consider post-discharge preferences of patient/family/discharge partner  - Complete POLST form as appropriate  - Assess patient's ability to be responsible for managing their own health  - Refer to Case Management Department for coordinating discharge planning if the patient needs post-hospital services based on physician/LIP order or complex needs related to functional status, cognitive ability or social support system  Outcome: Progressing     Problem: Altered Communication/Language Barrier  Goal: Patient/Family is able to understand and participate in their care  Description: Interventions:  - Assess communication ability and preferred communication style  - Implement communication aides and strategies  - Use visual cues when possible  - Listen attentively, be patient, do not interrupt  - Minimize distractions  - Allow time for understanding and response  - Establish method for patient to ask for assistance (call light)  - Provide an  as needed  - Communicate barriers and strategies to overcome with those who interact with patient  Outcome: Progressing     Problem: METABOLIC/FLUID AND ELECTROLYTES - ADULT  Goal: Glucose maintained within prescribed range  Description: INTERVENTIONS:  - Monitor Blood Glucose as ordered  - Assess for signs and symptoms of hyperglycemia and hypoglycemia  - Administer ordered medications to maintain glucose within target range  - Assess barriers to adequate nutritional intake and initiate nutrition consult as needed  - Instruct patient on self management of diabetes  Outcome: Progressing  Goal: Electrolytes maintained within normal limits  Description: INTERVENTIONS:  - Monitor labs and rhythm and assess patient for signs and symptoms of electrolyte imbalances  - Administer electrolyte replacement as ordered  - Monitor response to electrolyte replacements, including rhythm and repeat lab results as appropriate  - Fluid restriction as ordered  - Instruct patient on fluid and nutrition restrictions as appropriate  Outcome: Progressing  Goal: Hemodynamic stability and optimal renal function maintained  Description: INTERVENTIONS:  - Monitor labs and assess for signs and symptoms of volume excess or deficit  - Monitor intake, output and patient weight  - Monitor urine specific gravity, serum osmolarity and serum sodium as indicated or ordered  - Monitor response to interventions for patient's volume status, including labs, urine output, blood pressure (other measures as available)  - Encourage oral intake as appropriate  - Instruct patient on fluid and nutrition restrictions as appropriate  Outcome: Progressing     Problem: SKIN/TISSUE INTEGRITY - ADULT  Goal: Skin integrity remains intact  Description: INTERVENTIONS  - Assess and document risk factors for pressure ulcer development  - Assess and document skin integrity  - Monitor for areas of redness and/or skin breakdown  - Initiate interventions, skin care algorithm/standards of care as needed  Outcome: Progressing  Goal: Incision(s), wounds(s) or drain site(s) healing without S/S of infection  Description: INTERVENTIONS:  - Assess and document risk factors for pressure ulcer development  - Assess and document skin integrity  - Assess and document dressing/incision, wound bed, drain sites and surrounding tissue  - Implement wound care per orders  - Initiate isolation precautions as appropriate  - Initiate Pressure Ulcer prevention bundle as indicated  Outcome: Progressing

## 2023-10-30 VITALS
TEMPERATURE: 98 F | OXYGEN SATURATION: 96 % | HEART RATE: 62 BPM | SYSTOLIC BLOOD PRESSURE: 148 MMHG | WEIGHT: 137.19 LBS | RESPIRATION RATE: 16 BRPM | BODY MASS INDEX: 26.93 KG/M2 | HEIGHT: 60 IN | DIASTOLIC BLOOD PRESSURE: 68 MMHG

## 2023-10-30 LAB
ALBUMIN SERPL-MCNC: 2.6 G/DL (ref 3.4–5)
ANION GAP SERPL CALC-SCNC: 7 MMOL/L (ref 0–18)
ANION GAP SERPL CALC-SCNC: 8 MMOL/L (ref 0–18)
BUN BLD-MCNC: 49 MG/DL (ref 7–18)
BUN BLD-MCNC: 51 MG/DL (ref 7–18)
BUN/CREAT SERPL: 22.4 (ref 10–20)
BUN/CREAT SERPL: 23.8 (ref 10–20)
CALCIUM BLD-MCNC: 10.2 MG/DL (ref 8.5–10.1)
CALCIUM BLD-MCNC: 9.3 MG/DL (ref 8.5–10.1)
CHLORIDE SERPL-SCNC: 105 MMOL/L (ref 98–112)
CHLORIDE SERPL-SCNC: 106 MMOL/L (ref 98–112)
CO2 SERPL-SCNC: 26 MMOL/L (ref 21–32)
CO2 SERPL-SCNC: 27 MMOL/L (ref 21–32)
CREAT BLD-MCNC: 2.14 MG/DL
CREAT BLD-MCNC: 2.19 MG/DL
EGFRCR SERPLBLD CKD-EPI 2021: 22 ML/MIN/1.73M2 (ref 60–?)
EGFRCR SERPLBLD CKD-EPI 2021: 23 ML/MIN/1.73M2 (ref 60–?)
GLUCOSE BLD-MCNC: 172 MG/DL (ref 70–99)
GLUCOSE BLD-MCNC: 307 MG/DL (ref 70–99)
GLUCOSE BLDC GLUCOMTR-MCNC: 193 MG/DL (ref 70–99)
GLUCOSE BLDC GLUCOMTR-MCNC: 233 MG/DL (ref 70–99)
GLUCOSE BLDC GLUCOMTR-MCNC: 340 MG/DL (ref 70–99)
OSMOLALITY SERPL CALC.SUM OF ELEC: 307 MOSM/KG (ref 275–295)
OSMOLALITY SERPL CALC.SUM OF ELEC: 313 MOSM/KG (ref 275–295)
PHOSPHATE SERPL-MCNC: 3.9 MG/DL (ref 2.5–4.9)
POTASSIUM SERPL-SCNC: 3.8 MMOL/L (ref 3.5–5.1)
POTASSIUM SERPL-SCNC: 4.6 MMOL/L (ref 3.5–5.1)
SODIUM SERPL-SCNC: 139 MMOL/L (ref 136–145)
SODIUM SERPL-SCNC: 140 MMOL/L (ref 136–145)

## 2023-10-30 PROCEDURE — 80048 BASIC METABOLIC PNL TOTAL CA: CPT | Performed by: HOSPITALIST

## 2023-10-30 PROCEDURE — 80069 RENAL FUNCTION PANEL: CPT | Performed by: HOSPITALIST

## 2023-10-30 PROCEDURE — 82962 GLUCOSE BLOOD TEST: CPT

## 2023-10-30 RX ORDER — MELATONIN
Status: SHIPPED | COMMUNITY
Start: 2023-10-30

## 2023-10-30 RX ORDER — TRAMADOL HYDROCHLORIDE 50 MG/1
50 TABLET ORAL EVERY 12 HOURS PRN
Qty: 8 TABLET | Refills: 0 | Status: SHIPPED | OUTPATIENT
Start: 2023-10-30

## 2023-10-30 RX ORDER — ACETAMINOPHEN 500 MG
500 TABLET ORAL EVERY 8 HOURS
Status: SHIPPED | COMMUNITY
Start: 2023-10-30

## 2023-10-30 RX ORDER — GABAPENTIN 100 MG/1
200 CAPSULE ORAL DAILY
Status: SHIPPED | COMMUNITY
Start: 2023-10-31

## 2023-10-30 RX ORDER — POLYETHYLENE GLYCOL 3350 17 G/17G
17 POWDER, FOR SOLUTION ORAL DAILY
Status: SHIPPED | COMMUNITY
Start: 2023-10-31

## 2023-10-30 NOTE — PLAN OF CARE
No acute changes in the patient's status overnight. Possible discharge to [facility] later today. Problem: Patient Centered Care  Goal: Patient preferences are identified and integrated in the patient's plan of care  Description: Interventions:  - What would you like us to know as we care for you? My daughter lives at home with me. I have 3 children, 2 girls and 1 boy.   - Provide timely, complete, and accurate information to patient/family  - Incorporate patient and family knowledge, values, beliefs, and cultural backgrounds into the planning and delivery of care  - Encourage patient/family to participate in care and decision-making at the level they choose  - Honor patient and family perspectives and choices  Outcome: Progressing     Problem: Diabetes/Glucose Control  Goal: Glucose maintained within prescribed range  Description: INTERVENTIONS:  - Monitor Blood Glucose as ordered  - Assess for signs and symptoms of hyperglycemia and hypoglycemia  - Administer ordered medications to maintain glucose within target range  - Assess barriers to adequate nutritional intake and initiate nutrition consult as needed  - Instruct patient on self management of diabetes  Outcome: Progressing    Problem: PAIN - ADULT  Goal: Verbalizes/displays adequate comfort level or patient's stated pain goal  Description: INTERVENTIONS:  - Encourage pt to monitor pain and request assistance  - Assess pain using appropriate pain scale  - Administer analgesics based on type and severity of pain and evaluate response  - Implement non-pharmacological measures as appropriate and evaluate response  - Consider cultural and social influences on pain and pain management  - Manage/alleviate anxiety  - Utilize distraction and/or relaxation techniques  - Monitor for opioid side effects  - Notify MD/LIP if interventions unsuccessful or patient reports new pain  - Anticipate increased pain with activity and pre-medicate as appropriate  Outcome: Progressing     Problem: SAFETY ADULT - FALL  Goal: Free from fall injury  Description: INTERVENTIONS:  - Assess pt frequently for physical needs  - Identify cognitive and physical deficits and behaviors that affect risk of falls.   - [place] fall precautions as indicated by assessment.  - Educate pt/family on patient safety including physical limitations  - Instruct pt to call for assistance with activity based on assessment  - Modify environment to reduce risk of injury  - Provide assistive devices as appropriate  - Consider OT/PT consult to assist with strengthening/mobility  - Encourage toileting schedule  Outcome: Progressing     Problem: Altered Communication/Language Barrier  Goal: Patient/Family is able to understand and participate in their care  Description: Interventions:  - Assess communication ability and preferred communication style  - Implement communication aides and strategies  - Use visual cues when possible  - Listen attentively, be patient, do not interrupt  - Minimize distractions  - Allow time for understanding and response  - Establish method for patient to ask for assistance (call light)  - Provide an  as needed  - Communicate barriers and strategies to overcome with those who interact with patient  Outcome: Progressing     Problem: METABOLIC/FLUID AND ELECTROLYTES - ADULT  Goal: Glucose maintained within prescribed range  Description: INTERVENTIONS:  - Monitor Blood Glucose as ordered  - Assess for signs and symptoms of hyperglycemia and hypoglycemia  - Administer ordered medications to maintain glucose within target range  - Assess barriers to adequate nutritional intake and initiate nutrition consult as needed  - Instruct patient on self management of diabetes  Outcome: Progressing  Goal: Electrolytes maintained within normal limits  Description: INTERVENTIONS:  - Monitor labs and rhythm and assess patient for signs and symptoms of electrolyte imbalances  - Administer electrolyte replacement as ordered  - Monitor response to electrolyte replacements, including rhythm and repeat lab results as appropriate  - Fluid restriction as ordered  - Instruct patient on fluid and nutrition restrictions as appropriate  Outcome: Progressing  Goal: Hemodynamic stability and optimal renal function maintained  Description: INTERVENTIONS:  - Monitor labs and assess for signs and symptoms of volume excess or deficit  - Monitor intake, output and patient weight  - Monitor urine specific gravity, serum osmolarity and serum sodium as indicated or ordered  - Monitor response to interventions for patient's volume status, including labs, urine output, blood pressure (other measures as available)  - Encourage oral intake as appropriate  - Instruct patient on fluid and nutrition restrictions as appropriate  Outcome: Progressing     Problem: SKIN/TISSUE INTEGRITY - ADULT  Goal: Skin integrity remains intact  Description: INTERVENTIONS  - Assess and document risk factors for pressure ulcer development  - Assess and document skin integrity  - Monitor for areas of redness and/or skin breakdown  - Initiate interventions, skin care algorithm/standards of care as needed  Outcome: Progressing  Goal: Incision(s), wounds(s) or drain site(s) healing without S/S of infection  Description: INTERVENTIONS:  - Assess and document risk factors for pressure ulcer development  - Assess and document skin integrity  - Assess and document dressing/incision, wound bed, drain sites and surrounding tissue  - Implement wound care per orders  - Initiate isolation precautions as appropriate  - Initiate Pressure Ulcer prevention bundle as indicated  Outcome: Progressing

## 2023-10-30 NOTE — CM/SW NOTE
10/30/23 1336   Discharge disposition   Expected discharge disposition subacute   Post Acute Care Provider   ([facility])   Discharge transportation [address] [facility]     Call for report to: Phone: [phone]       Transportation provider- [address] [facility] Time:  9:10PM, CM requested earlier  time to supervisor      Lisa Staff aware of dc plan: Patient discussed in care rounds. PASRR completed  PCS form completed    Discharge Planner Follow up Needed: earlier medicar  time pending    Received earlier  for 5:30pm, Eladia Lloyd RN notified, RN Lieutenant Lundborg notified. DC planner/CM to remain available for support and/or discharge planning.     Megan Ryder RN, [facility]    Ext.  [phone]

## 2023-10-30 NOTE — PROGRESS NOTES
Pt is anticipating having a series of test in order to determine the cause for weakness and severe and loose stool

## 2023-10-30 NOTE — DISCHARGE SUMMARY
General Medicine Discharge Summary     Patient ID:  Cony Bundy  77year old  4/11/1945    Admit date: 10/20/2023    Discharge date and time: 10/30/2023    Attending Physician: Lamine Nowak MD     Consults: IP CONSULT TO SOCIAL WORK  IP CONSULT TO SOCIAL WORK  IP CONSULT TO SOCIAL WORK  IP CONSULT TO [address]    Primary Care Physician: Nemo Nunez     Reason for admission: Weakness    Risk For Readmission: Low    Discharge Diagnoses: Weakness [R53.1]  See Additional Discharge Diagnoses in Hospital Course    Discharged Condition: good    Follow-up with labs/images appointments:   Vascular surgery, PCP, nephrology in 1 to 2 weeks. Check CBC and BMP in 5 to 7 days while at rehab. Exam  Gen: No acute distress  Pulm: Lungs clear, normal respiratory effort  CV: Heart with regular rate and rhythm  Abd: Abdomen soft,   EXT: no edema     HPI:   Per Dr. Aysha Starkey:  History of Present Illness:   67-year-old female with a history of coronary artery disease, COPD, history of DVT, type 2 diabetes, hypertension, hyperlipidemia, history of ovarian cancer, peptic ulcer disease, history of stroke, AAA status post endovascular repair this month, who presents to the hospital for evaluation of weakness. Of note she was just discharged from [facility] yesterday. She lives with her daughter. They state that she was just having a lot of trouble with mobility at home. They realize that she was not able to manage at home and decided to bring her to the hospital for further evaluation. They are agreeable to have her go to subacute rehab if needed.       Hospital Course:   Ms. Nikki Babcock is a 67-year-old female with a history of coronary artery disease, COPD, history of DVT, type 2 diabetes, hypertension, hyperlipidemia, history of ovarian cancer, peptic ulcer disease, history of stroke, AAA status post endovascular repair this month, who presents to the hospital for evaluation of weakness. Found to have MARYANNE. Also with new PNA dx on 10/23 and acute on chronic lower back pain with MRI on 10/25/23 reporting stabiity from MRI in 2021. Pain service consult, pain improved with medrol dose pack, renal function improved, cleared by nephrology, vascular surgery, pain service for discharge back to [address] [place] [address].  And for follow-up with vascular surgery, nephrology, PCP on discharge. MARYANNE on CKD- improved  - Cr 2.6 on admit, recently 2.0 on discharge 1 day prior, baseline Cr around 1.5  - renal consulted  - renal US with dopplers with patent renal arteries  - s/p IVF, creatinine 2.14 on discharge  -Repeat BMP in 5 to 7 days, follow-up with nephrology     LE pain now lower back pain and some neck pain  Spinal stenosis  - hypersensitivity +/- peripheral neuropathy, states only started after surgery but it appears patient has had issues before she has a lumbar spine MRI from 2021 and the daughter reports she has had back pain in the past.  - trial gabapentin, low dose due to renal   -Patient has had lower back pain before, only mild neck pain, did not hit her neck or lower back during the fall per the patient. X-ray with degenerative changes. Still with a lot of pain, MRI ordered  -MRI without acute findings  -gabapentin increased to 200mg daily on 10/25, cont  tramadol, did discuss possible steroid burst.  Pt with recent endovascular repair so will not hold aspirin/plavix. Family would like to talk with pain clinic for other pain management (would prefer to not escalate on narcotics if possible or if increased, minimal)   - medrol dose pack-> with improvement   -Derm, tramadol on discharge       Pneumonia  Fever,  temp of 100.8 10/23/2023  - Chest x-ray with pulmonary edema with possible pneumonia. Procalcitonin is elevated. Started on Zosyn. Patient looks clinically improved on 10/24/2023 and no further fevers.   -UA not consistent with infection, respiratory viral panel negative, BC NGTD  -Completed course of antibiotics for pneumonia     Delirium, metabolic encephalopathy- waxing and waning  - Patient has prolonged course both here and at [facility]. Has had intermittent confusion since. Monitor mental status on Norco.  Pain seems much improved and patient seems much more relaxed today. We will need to balance pain again side effects.  -today oriented to self, family but not type of place and said Iwnter Gains for year,  pleasant and cooperative, near recent baseline since acute hospitalization at [facility]  - improved with melatonin      L knee and ankle pain  - started after fall at home  - knee and ankle XR ordered  - lidoderm patch     AAA status post endovascular repair this month on 10/9/23  - s/p endovascular abdominal aortic aneurysm stent graft repair with fenestrated device. -Vascular surgery consulted ok with discharge follow-up outpatient  -asa     Anemia  - Hemoglobin 9.2  -No active bleeding  -CBC in 1 week     Rash  -Resolved     HLD  -zetia  -fenofibrate     HTN  -sbp stable  -metoprolol     CAD hx  -asa  -metoprolol      Hx CVA  -asa     GERD  Hx Stomach ulcers  -pantoprazole      Type 2 DM with hyperglycemia  -Resume home metformin  -Was elevated in the hospital due to C/ Nnamdi Lovell 19, this will be discontinued on discharge     Weakness  -PT/OT eval  -[facility] at [facility]    Operative Procedures:      Imaging: No results found.       Disposition: SNF    Activity: activity as tolerated  Diet: regular diet  Wound Care: as directed  Code Status: Full Code  O2: none    Home Medication Changes:     Med list     Medication List        START taking these medications      acetaminophen 500 MG Tabs  Commonly known as: Tylenol Extra Strength     gabapentin 100 MG Caps  Commonly known as: Neurontin  Start taking on: October 31, 2023     lidocaine-menthol 4-1 % Ptch  Start taking on: October 31, 2023  Notes to patient: ON for 12 hours, OFF for 12 hours     melatonin 3 MG Tabs Polyethylene Glycol 3350 17 g Pack  Commonly known as: MIRALAX  Start taking on: October 31, 2023            Tomy Dow taking these medications      amLODIPine 5 MG Tabs  Commonly known as: Norvasc     aspirin 81 MG Chew  Chew 1 tablet (81 mg total) by mouth daily. bisacodyl 10 MG Supp  Commonly known as: Dulcolax     clopidogrel 75 MG Tabs  Commonly known as: Plavix  Take 1 tablet (75 mg total) by mouth daily. ezetimibe 10 MG Tabs  Commonly known as: Zetia     isosorbide mononitrate ER 30 MG Tb24  Commonly known as: Imdur     metFORMIN 500 MG Tabs  Commonly known as: Glucophage     metoprolol tartrate 100 MG Tabs  Commonly known as: Lopressor     nitroglycerin 0.4 MG Subl  Commonly known as: Nitrostat     OneTouch Delica Lancets 33U Misc     pantoprazole 40 MG Tbec  Commonly known as: Protonix  Take 1 tablet (40 mg total) by mouth 2 (two) times daily before meals. traMADol 50 MG Tabs  Commonly known as: Ultram  Take 1 tablet (50 mg total) by mouth every 12 (twelve) hours as needed. STOP taking these medications      Potassium Chloride ER 10 MEQ Tbcr  Commonly known as: K-DUR               Where to Get Your Medications        You can get these medications from any pharmacy    Bring a paper prescription for each of these medications  traMADol 50 MG Tabs         FU   Follow-up Information       William Banegas Schedule an appointment as soon as possible for a visit in 1 week(s). Specialty: Internal Medicine  Contact information:  [address] [facility]  [phone]               Trinidad Avery MD. Schedule an appointment as soon as possible for a visit in 1 week(s). Specialty: NEPHROLOGY  Contact information:  [address] [address]  [phone]               Randy Diaz MD. Schedule an appointment as soon as possible for a visit in 2 week(s).     Specialty: SURGERY, VASCULAR  Contact information:  [facility] [address] [place] [address]  [phone]                             DC instructions: Other Discharge Instructions:         Vascular surgery, PCP, nephrology in 1 to 2 weeks. Check CBC and BMP in 5 to 7 days while at rehab. I reconciled current and discharge medications on day of discharge, discussed changes with patient and noted changes above.        Total Time Coordinating Care: Greater than 30 minutes    Patient had opportunity to ask questions and state understand and agree with therapeutic plan as outlined    Thank You,    Dulce Maria Canales MD   Hospitalist with [facility]  and Care

## 2023-10-30 NOTE — OPERATIVE REPORT
[facility]    PATIENT'S NAME: Divya Means   ATTENDING PHYSICIAN: Yasir Mclain. MD Parag   OPERATING PHYSICIAN: Richard Huang M.D. PATIENT ACCOUNT#:   [de-identified]    LOCATION:  [address] [place] [address]  MEDICAL RECORD #:   [id number]       YOB: 1945  ADMISSION DATE:       10/09/2023      OPERATION DATE:  10/09/2023    OPERATIVE REPORT    PREOPERATIVE DIAGNOSIS:    1. Type 3 thoracoabdominal aortic aneurysm. 2.   Bilateral renal artery high-grade stenoses. POSTOPERATIVE DIAGNOSIS:    1. Type 3 thoracoabdominal aortic aneurysm. 2.   Bilateral renal artery high-grade stenoses. PROCEDURE:    1. Ultrasound-guided percutaneous access, bilateral common femoral arteries. 2.   Aortogram with radiologic supervision and interpretation. 3.   Intravascular ultrasound of left external iliac, left common iliac, right common iliac, right external iliac, and aorta with radiologic supervision and interpretation. 4.   Selective catheterization first order branch of the aorta, celiac, SMA, right renal, and left renal with associated angiography. 5.   Thoracic endovascular aortic repair distal to the left subclavian (CPT 81621 and 28505) with placement of a Cook Alpha 40 x 36 x 217 proximal.    6.   Angioplasty and stenting of the right renal artery with a 5 x 29 VBX. 7.   Angioplasty and stenting of the left renal artery with a 5 x 29 VBX. 8.   Fenestrated endovascular aortic repair with placement of 4 fenestrations of celiac scallop, superior mesenteric artery, left renal artery, right renal artery (CPT 39270); Fara Doctor 34 x 26 x 194 deployed first build up technique, celiac artery 10 x 29 VBX, superior mesenteric artery 7 x 29 VBX, left renal artery 6 x 22 iCast, right renal artery 6 x 22 iCast.  9.   Closure of bilateral groins with Perclose devices, 20-Icelandic right and 16-Icelandic left. CO-SURGEONS:  Richard Huang MD, and Yasir Mclain.  MD Parag.  Due to the complexity of the case, Dr. Dale Ferraro requested I participate as co-surgeon. ANESTHESIA:  General.    ESTIMATED BLOOD LOSS:  200 mL. COMPLICATIONS:  None. DRAINS:  None. INDICATIONS:  This is a 67-year-old female who had a known large thoracoabdominal aortic aneurysm. She had had issues of prohibitive open surgical risk. It was discussed in the office there is no suitable device commercially available, and we discussed nonoperative management versus physician-modified repair. We are proceeding with physician-modified repair as described below. FINDINGS:  No endoleak at completion. OPERATIVE TECHNIQUE:  On the morning of 10/09, the patient was brought to the operating room and placed in supine position. General anesthesia was induced without any issues. Cisneros catheter and arterial line were placed in sterile fashion without any issues. Prior to initiation, we performed a back table fenestrated graft creation. Based on preoperative measurements on center line, 4 fenestrations were placed on the graft with ophthalmic cautery. Micropuncture wire was used to suture and reinforce each fenestration with 4-0 Ethibond suture. Diameter-reducing ties were placed circumferentially around each Z stent with a 4-0 chromic suture which was then used to reduce the diameter by greater than 50%. Graft was re-constrained with an umbilical tape and re-sheathed. Graft was then ready for use. Using ultrasound, femoral bifurcation was identified over the femoral head. We then performed preclose technique with 2 Perclose devices and then placed an 8-[place] citizen sheath. I performed this on the patient's left. Dr. Dale Ferraro performed this on the right. In the right groin, Dr. Dale Ferraro then exchanged over a Kumpe the J-wire for a Lunderquist wire and positioned the tip of the Lunderquist wire in the ascending aorta.   He then performed intravascular ultrasound of the right common iliac, right external iliac, and aorta to determine and verify the location of the celiac, SMA, and both renal arteries. Renal arteries were found to be severely stenotic. We confirmed this with a contrast angiogram.  We then used a steerable sheath and then used a Magic Torque wire and performed angioplasty and stenting of the right renal artery with a 5 x 29 VBX with placement of an 8 mm balloon to postdilate the ostium, and then contrast angiogram revealed complete resolution. I then redirected the TourGuide sheath into the left renal artery and then placed a 5 x 29 VBX stent and postdilated it with an 8 mm at the ostium. Satisfied with this, we then proceeded with the fenestrated repair. Graft was brought onto the field. Under fluoroscopy, we confirmed orientation of the graft. In the right groin, we then predilated the tract and then advanced the proximal stent graft with a 20-Finnish sheath from the right groin and deployed this without any issues. The graft was unsheathed in the descending thoracic aorta through the visceral segment into the infrarenal aorta just above the OZ. Accessory renal and lumbar arteries were preserved. This was performed without issue. I then selected the graft in the left and then exchanged the 8-Finnish sheath on the left for a 16-Finnish sheath of the left groin into the device. While the graft was still constrained, Dr. Génesis Quinn then used a [employer]Dining[employer]le Offer 4 catheter and with a Glidewire was able to select the superior mesenteric artery, confirming cannulation via contrast angiogram, and subsequently placed a Magic Torque wire. He then utilized the catheter and wire to select the renal arteries as well as celiac artery with placement of Magic Torque wires in all 4 vessels. A rach wire was then put in place and with a MOAB balloon, we then advanced the MOAB balloon and then fractured the diameter-reducing ties, fully deploying the graft.   We then placed two 6-Finnish sheaths in each renal artery with subsequent placement of iCast stent ready to be deployed. We then advanced the additional 40 mm Alpha thoracic stent graft across the rach wire, through the distal thoracic aorta, and this was advanced into the proximal descending thoracic aorta where the stent was deployed without any issues. We then exchanged for a 20-Botswanan DrySeal on the right and proceeded with visceral stenting. We then partially withdrew the sheath from the right renal artery and deployed the renal stent without any issue. We then postdilated with a 10 x 2 balloon. Contrast angiogram revealed wide patency of the renal artery fenestration stent. Sheath and wire were then removed. We then turned our attention to the left renal artery and with a 6 x 22 iCast already in place, we then exposed by pulling back the sheath and then deploying the stent. We then postdilated with a 10 x 2 balloon. Repeat contrast angiogram revealed wide patency with no evidence of endoleak. Wire and catheter were then removed. I then placed a 7-Botswanan sheath in the superior mesenteric artery with a 7 x 29 VBX. I then withdrew the sheath and confirmed via contrast angiogram and then deployed the stent. We then postdilated the stent at its proximal edge with a 10 x 2 balloon again. Repeat contrast angiogram revealed wide patency with no evidence of an endoleak, and we removed the sheath and the wire. I then placed an 8-Botswanan sheath in the celiac artery followed by a 10 x 29 VBX. I then deployed a 10 x 29 VBX stent in the celiac artery and deployed this without issue. We then flared it with a 12 mm balloon. Repeat contrast angiogram revealed no further residual stenosis and wide patency and complete seal of the segment. All wires and catheters were removed. I then exchanged for a pigtail catheter up the left groin. Aortogram and pelvic angiogram revealed wide patency of the visceral vessels with no evidence of endoleak with wide patency of the iliacs.   At this point, we were satisfied and elected to terminate the procedure. All catheters and wires were removed. Perclose devices were secured, and manual pressure was held. Protamine was used for heparin reversal.  The patient had excellent hemostasis. The patient was awakened from anesthesia, and we were concerned that she was not moving her bilateral lower extremities from her baseline. She was found to have significant paraplegia. Anesthesia then rapidly placed a spinal drain with subsequent improvement and resolution of her weakness. She was then taken to ICU in stable condition. All counts were correct at the end the case.     Dictated By Jaylin Bernal M.D.  d: 10/29/2023 22:29:24  t: 10/30/2023 09:37:45  Job [id number]  [facility]/ show

## 2023-10-31 NOTE — PLAN OF CARE
Problem: Patient Centered Care  Goal: Patient preferences are identified and integrated in the patient's plan of care  Description: Interventions:  - What would you like us to know as we care for you?  From home with daughter  - Provide timely, complete, and accurate information to patient/family  - Incorporate patient and family knowledge, values, beliefs, and cultural backgrounds into the planning and delivery of care  - Encourage patient/family to participate in care and decision-making at the level they choose  - Honor patient and family perspectives and choices  Outcome: Adequate for Discharge     Problem: Diabetes/Glucose Control  Goal: Glucose maintained within prescribed range  Description: INTERVENTIONS:  - Monitor Blood Glucose as ordered  - Assess for signs and symptoms of hyperglycemia and hypoglycemia  - Administer ordered medications to maintain glucose within target range  - Assess barriers to adequate nutritional intake and initiate nutrition consult as needed  - Instruct patient on self management of diabetes  Outcome: Adequate for Discharge     Problem: Patient/Family Goals  Goal: Patient/Family Long Term Goal  Description: Patient's Long Term Goal: Discharge from hospital    Interventions:  - Monitor vital signs  - Monitor appropriate labs  - Monitor blood glucose levels  - Pain management  - Administer medications per order  - Follow MD orders  - Diagnostics per order  - Update / inform patient and family on plan of care  - Discharge planning     - See additional Care Plan goals for specific interventions  Outcome: Adequate for Discharge  Goal: Patient/Family Short Term Goal  Description: Patient's Short Term Goal: improve weakness    Interventions:   - Monitor vital signs  - Monitor appropriate labs  - Monitor blood glucose levels  - Pain management  - Administer medications per order  - Follow MD orders  - Diagnostics per order  - Update / inform patient and family on plan of care  - Discharge planning  - See additional Care Plan goals for specific interventions  Outcome: Adequate for Discharge     Problem: PAIN - ADULT  Goal: Verbalizes/displays adequate comfort level or patient's stated pain goal  Description: INTERVENTIONS:  - Encourage pt to monitor pain and request assistance  - Assess pain using appropriate pain scale  - Administer analgesics based on type and severity of pain and evaluate response  - Implement non-pharmacological measures as appropriate and evaluate response  - Consider cultural and social influences on pain and pain management  - Manage/alleviate anxiety  - Utilize distraction and/or relaxation techniques  - Monitor for opioid side effects  - Notify MD/LIP if interventions unsuccessful or patient reports new pain  - Anticipate increased pain with activity and pre-medicate as appropriate  Outcome: Adequate for Discharge     Problem: SAFETY ADULT - FALL  Goal: Free from fall injury  Description: INTERVENTIONS:  - Assess pt frequently for physical needs  - Identify cognitive and physical deficits and behaviors that affect risk of falls.   - [place] fall precautions as indicated by assessment.  - Educate pt/family on patient safety including physical limitations  - Instruct pt to call for assistance with activity based on assessment  - Modify environment to reduce risk of injury  - Provide assistive devices as appropriate  - Consider OT/PT consult to assist with strengthening/mobility  - Encourage toileting schedule  Outcome: Adequate for Discharge     Problem: DISCHARGE PLANNING  Goal: Discharge to home or other facility with appropriate resources  Description: INTERVENTIONS:  - Identify barriers to discharge w/pt and caregiver  - Include patient/family/discharge partner in discharge planning  - Arrange for needed discharge resources and transportation as appropriate  - Identify discharge learning needs (meds, wound care, etc)  - Arrange for interpreters to assist at discharge as needed  - Consider post-discharge preferences of patient/family/discharge partner  - Complete POLST form as appropriate  - Assess patient's ability to be responsible for managing their own health  - Refer to Case Management Department for coordinating discharge planning if the patient needs post-hospital services based on physician/LIP order or complex needs related to functional status, cognitive ability or social support system  Outcome: Adequate for Discharge     Problem: Altered Communication/Language Barrier  Goal: Patient/Family is able to understand and participate in their care  Description: Interventions:  - Assess communication ability and preferred communication style  - Implement communication aides and strategies  - Use visual cues when possible  - Listen attentively, be patient, do not interrupt  - Minimize distractions  - Allow time for understanding and response  - Establish method for patient to ask for assistance (call light)  - Provide an  as needed  - Communicate barriers and strategies to overcome with those who interact with patient  Outcome: Adequate for Discharge     Problem: METABOLIC/FLUID AND ELECTROLYTES - ADULT  Goal: Glucose maintained within prescribed range  Description: INTERVENTIONS:  - Monitor Blood Glucose as ordered  - Assess for signs and symptoms of hyperglycemia and hypoglycemia  - Administer ordered medications to maintain glucose within target range  - Assess barriers to adequate nutritional intake and initiate nutrition consult as needed  - Instruct patient on self management of diabetes  Outcome: Adequate for Discharge  Goal: Electrolytes maintained within normal limits  Description: INTERVENTIONS:  - Monitor labs and rhythm and assess patient for signs and symptoms of electrolyte imbalances  - Administer electrolyte replacement as ordered  - Monitor response to electrolyte replacements, including rhythm and repeat lab results as appropriate  - Fluid restriction as ordered  - Instruct patient on fluid and nutrition restrictions as appropriate  Outcome: Adequate for Discharge  Goal: Hemodynamic stability and optimal renal function maintained  Description: INTERVENTIONS:  - Monitor labs and assess for signs and symptoms of volume excess or deficit  - Monitor intake, output and patient weight  - Monitor urine specific gravity, serum osmolarity and serum sodium as indicated or ordered  - Monitor response to interventions for patient's volume status, including labs, urine output, blood pressure (other measures as available)  - Encourage oral intake as appropriate  - Instruct patient on fluid and nutrition restrictions as appropriate  Outcome: Adequate for Discharge     Problem: SKIN/TISSUE INTEGRITY - ADULT  Goal: Skin integrity remains intact  Description: INTERVENTIONS  - Assess and document risk factors for pressure ulcer development  - Assess and document skin integrity  - Monitor for areas of redness and/or skin breakdown  - Initiate interventions, skin care algorithm/standards of care as needed  Outcome: Adequate for Discharge  Goal: Incision(s), wounds(s) or drain site(s) healing without S/S of infection  Description: INTERVENTIONS:  - Assess and document risk factors for pressure ulcer development  - Assess and document skin integrity  - Assess and document dressing/incision, wound bed, drain sites and surrounding tissue  - Implement wound care per orders  - Initiate isolation precautions as appropriate  - Initiate Pressure Ulcer prevention bundle as indicated  Outcome: Adequate for Discharge     Discharge education and after visit summary provided at bedside. Patient and family verbalized understanding. IV was removed at bedside. Full nurse to nurse report given to JOVANNA Kilpatrick from [facility]. Patient discharged transportation provided by [employer].

## 2023-11-01 ENCOUNTER — INITIAL APN SNF VISIT (OUTPATIENT)
Dept: INTERNAL MEDICINE CLINIC | Facility: SKILLED NURSING FACILITY | Age: 78
End: 2023-11-01

## 2023-11-01 ENCOUNTER — EXTERNAL FACILITY (OUTPATIENT)
Dept: PULMONOLOGY | Facility: CLINIC | Age: 78
End: 2023-11-01

## 2023-11-01 VITALS
WEIGHT: 138 LBS | RESPIRATION RATE: 16 BRPM | BODY MASS INDEX: 27 KG/M2 | HEART RATE: 74 BPM | SYSTOLIC BLOOD PRESSURE: 138 MMHG | DIASTOLIC BLOOD PRESSURE: 65 MMHG | TEMPERATURE: 98 F | OXYGEN SATURATION: 96 %

## 2023-11-01 DIAGNOSIS — Z86.711 HISTORY OF PULMONARY EMBOLISM: ICD-10-CM

## 2023-11-01 DIAGNOSIS — Z87.891 HISTORY OF TOBACCO USE: ICD-10-CM

## 2023-11-01 DIAGNOSIS — R68.89 FORGETFULNESS: ICD-10-CM

## 2023-11-01 DIAGNOSIS — R53.1 GENERALIZED WEAKNESS: ICD-10-CM

## 2023-11-01 DIAGNOSIS — E11.9 TYPE 2 DIABETES MELLITUS WITHOUT COMPLICATION, WITHOUT LONG-TERM CURRENT USE OF INSULIN (HCC): ICD-10-CM

## 2023-11-01 DIAGNOSIS — R07.89 CHEST PRESSURE: ICD-10-CM

## 2023-11-01 DIAGNOSIS — J44.9 CHRONIC OBSTRUCTIVE PULMONARY DISEASE, UNSPECIFIED COPD TYPE (HCC): Primary | ICD-10-CM

## 2023-11-01 DIAGNOSIS — R91.1 PULMONARY NODULE: ICD-10-CM

## 2023-11-01 DIAGNOSIS — R13.10 DYSPHAGIA, UNSPECIFIED TYPE: ICD-10-CM

## 2023-11-01 DIAGNOSIS — E87.5 HIGH POTASSIUM: ICD-10-CM

## 2023-11-01 PROCEDURE — 1111F DSCHRG MED/CURRENT MED MERGE: CPT | Performed by: NURSE PRACTITIONER

## 2023-11-01 PROCEDURE — 1111F DSCHRG MED/CURRENT MED MERGE: CPT | Performed by: PHYSICIAN ASSISTANT

## 2023-11-01 PROCEDURE — 99306 1ST NF CARE HIGH MDM 50: CPT | Performed by: PHYSICIAN ASSISTANT

## 2023-11-01 PROCEDURE — 99310 SBSQ NF CARE HIGH MDM 45: CPT | Performed by: NURSE PRACTITIONER

## 2023-11-01 NOTE — PROGRESS NOTES
Pulmonary Consult Note  [facility] External Facility - [address]    History of Present Illness:   Rosalinda Youssef is a(n) 77year old female with PMH notable for COPD, DVT, AAA s/p endovascular repair, DM2, CKD, HTN, ovarian cancer, who I am now evaluating for COPD at [facility]. Two of patient's daughter are present at bedside. Pt recently underwent endovascular repair of AAA. She was discharged home and then presented to [address] [place] [address] with MARYANNE and pneumonia. Chest x-ray with mild pulmonary edema, small left pleural effusions, and RUL and LLL opacities. She completed course of IV antibiotics. She was seen by speech therapy and recommended soft/easy to chew diet with thin liquids. Pt has COPD with tobacco history and used albuterol at home a few times per day with improvement. No prior maintenance inhaler use. She quit smoking 1 month ago before AAA repair. Prior to this, she had smoked 1 ppd for 45 years. She admits to dyspnea on exertion which is worse than her baseline. No SOB at rest. She has an occasional nonproductive cough which is improving. She has dysphagia. She has intermittent sharp chest pain which is ongoing for the last year and improves with nitroglycerin. She has history of DVT previously on Eliquis.      Past Medical History:  Past Medical History:   Diagnosis Date    Aortic aneurysm, thoracic ([address]     Back problem     Calculus of kidney     Cataract     COPD (chronic obstructive pulmonary disease) (HCC)     Coronary atherosclerosis     Deep vein thrombosis (HCC)     Diabetes ([address]     Diverticulosis of large intestine     Heart attack ([address]     2013    High blood pressure     High cholesterol     History of stomach ulcers     Incontinence     Osteoarthritis     Ovarian cancer ([address]     PONV (postoperative nausea and vomiting)     Renal disorder     Shortness of breath     Stroke ([address] 10/15/2017    shaking, wheelchair     Past Surgical History:  Past Surgical History:   Procedure Laterality Date APPENDECTOMY      ARTHROSCOPY OF JOINT UNLISTED Right     Rotator cuff surgery    BACK SURGERY  07/21/2017    L3-4 LUIS and hemilaminectomies    CATARACT      CATH DRUG ELUTING STENT      2013    ENDOVAS AAA REPR W/3-P PART      EP LOOP RECORDER IMPLANT Left 2017    FOOT SURGERY Bilateral     FRACTURE SURGERY Right     right clavicle    HYSTERECTOMY      PRATEEK LOCALIZATION WIRE 1 SITE RIGHT (CPT=19281)      benign-20 years ago    SPINE SURGERY PROCEDURE UNLISTED       Social History:  -Tobacco: Former smoker, quit 1 month ago after having smoked 1 ppd for 45 years  -Alcohol: None    Allergies: Codeine, Enalapril, HYDROmorphone, Iodine I 131 Tositumomab, Sulfa Antibiotics, Shell Fish, Nyquil Severe Cold - Flu     Medications: Reviewed on SNF EMR    Review of Systems: Vision notable for double vision. Ears nose and throat normal. Bowel notable for incontinence. Bladder function notable for incontinence. No thyroid disease. Depression. No rash. Muscles and joints unremarkable. Weight loss. Physical Exam:  /65, HR 74, RR 16, T 97.7, sat 96%   Constitutional: Obese, awake, alert, NAD  HEENT: Head NC/AT, PEERL, MMM  Cardio: RRR, S1S2, no murmurs  Respiratory: Thorax symmetrical with no labored breathing. Clear to ausculation bilaterally with symmetrical breath sounds. No wheezing, rhonchi, or crackles. Extremities: No clubbing or cyanosis. No LE edema. No calf tenderness. Neurologic: A&Ox3. Generalized weakness. No gross motor deficits. Skin: Warm, dry. Psych: Calm, cooperative. Pleasant affect. Results:  -Chest x-ray 10/23/23: Mild cardiomegaly. Increased interstitial markings bilaterally, suggestive of pulmonary interstitial edema. Right upper lung and left basilar airspace opacities are present. These could be secondary to a combination of atelectasis, alveolar edema, and/or infection. Correlate with clinical findings.       Hazy layering opacity at the left lung base, suggestive of small to moderate left pleural effusion. -CTA chest 10/2/2023: Small volume of nonocclusive thrombus within proximal segmental pulmonary arterial branch supplying RLL c/w PE, chronicity indeterminate. Ground-glass pulmonary nodule in RUL measures 6 mm. Assessment and Plan:  1. COPD with history of tobacco use - stable. May benefit from maintenance inhaler therapy. Plan:  -Start LAMA/LABA (Anoro)  -Albuterol MDI as needed  -Outpatient PFTs  -Ongoing tobacco abstinence    2. Pulmonary nodule - 6 mm ground glass nodule RUL. Former smoker. Plan:  -Recommend f/u CT chest in 6-12 months    3. History of DVT/PE - previously on Eliquis for DVT. CTA chest 10/2/23 with incidentally discovered nonocclusive segmental PE of unknown chronicity. Plan:  -Patient is not on anticoagulation as she is on dual anti-platelets for AAA stent    4. Recent pneumonia - improved. Plan:  -Completed antibiotics    5. Dysphagia - seen by speech. Plan:  -Modified diet per speech  -Speech therapy  -Aspiration precautions    6. AAA s/p endovascular repair    Plan:  -Dual anti-platelet therapy  -F/u vascular surgery    Thank you for allowing me to participate in [address] [place] [address]. 45 min spent in d/w patient/patient's daughters, review of medical records, and documentation.     SARA Hill-C  Pulmonary Medicine

## 2023-11-02 ENCOUNTER — SNF VISIT (OUTPATIENT)
Dept: INTERNAL MEDICINE CLINIC | Facility: SKILLED NURSING FACILITY | Age: 78
End: 2023-11-02

## 2023-11-02 VITALS
RESPIRATION RATE: 18 BRPM | DIASTOLIC BLOOD PRESSURE: 70 MMHG | TEMPERATURE: 98 F | WEIGHT: 141.5 LBS | BODY MASS INDEX: 28 KG/M2 | HEART RATE: 81 BPM | SYSTOLIC BLOOD PRESSURE: 136 MMHG | OXYGEN SATURATION: 97 %

## 2023-11-02 DIAGNOSIS — R42 DIZZINESS: Primary | ICD-10-CM

## 2023-11-02 DIAGNOSIS — R11.2 NAUSEA AND VOMITING, UNSPECIFIED VOMITING TYPE: ICD-10-CM

## 2023-11-02 DIAGNOSIS — E63.9 POOR NUTRITION: ICD-10-CM

## 2023-11-02 DIAGNOSIS — R53.1 GENERALIZED WEAKNESS: ICD-10-CM

## 2023-11-02 PROCEDURE — 99310 SBSQ NF CARE HIGH MDM 45: CPT | Performed by: NURSE PRACTITIONER

## 2023-11-02 PROCEDURE — 1111F DSCHRG MED/CURRENT MED MERGE: CPT | Performed by: NURSE PRACTITIONER

## 2023-11-06 ENCOUNTER — SNF VISIT (OUTPATIENT)
Dept: INTERNAL MEDICINE CLINIC | Facility: SKILLED NURSING FACILITY | Age: 78
End: 2023-11-06

## 2023-11-06 VITALS
TEMPERATURE: 97 F | RESPIRATION RATE: 20 BRPM | HEART RATE: 75 BPM | BODY MASS INDEX: 28 KG/M2 | OXYGEN SATURATION: 99 % | SYSTOLIC BLOOD PRESSURE: 118 MMHG | DIASTOLIC BLOOD PRESSURE: 82 MMHG | WEIGHT: 141.5 LBS

## 2023-11-06 DIAGNOSIS — R53.1 GENERALIZED WEAKNESS: ICD-10-CM

## 2023-11-06 DIAGNOSIS — R42 DIZZINESS: Primary | ICD-10-CM

## 2023-11-06 DIAGNOSIS — R11.0 NAUSEA: ICD-10-CM

## 2023-11-06 DIAGNOSIS — Z98.890 H/O HEART SURGERY: ICD-10-CM

## 2023-11-08 ENCOUNTER — EXTERNAL FACILITY (OUTPATIENT)
Dept: PULMONOLOGY | Facility: CLINIC | Age: 78
End: 2023-11-08

## 2023-11-08 ENCOUNTER — EXTERNAL FACILITY (OUTPATIENT)
Facility: CLINIC | Age: 78
End: 2023-11-08

## 2023-11-08 DIAGNOSIS — Z86.711 HISTORY OF PULMONARY EMBOLISM: ICD-10-CM

## 2023-11-08 DIAGNOSIS — R91.1 PULMONARY NODULE: ICD-10-CM

## 2023-11-08 DIAGNOSIS — R53.1 WEAKNESS: ICD-10-CM

## 2023-11-08 DIAGNOSIS — K59.00 CONSTIPATION, UNSPECIFIED CONSTIPATION TYPE: Primary | ICD-10-CM

## 2023-11-08 DIAGNOSIS — J44.9 CHRONIC OBSTRUCTIVE PULMONARY DISEASE, UNSPECIFIED COPD TYPE (HCC): ICD-10-CM

## 2023-11-08 DIAGNOSIS — R13.10 DYSPHAGIA, UNSPECIFIED TYPE: ICD-10-CM

## 2023-11-08 DIAGNOSIS — Z87.891 HISTORY OF TOBACCO USE: ICD-10-CM

## 2023-11-08 DIAGNOSIS — R10.11 RIGHT UPPER QUADRANT ABDOMINAL PAIN: Primary | ICD-10-CM

## 2023-11-08 DIAGNOSIS — E11.9 TYPE 2 DIABETES MELLITUS WITHOUT COMPLICATION, WITHOUT LONG-TERM CURRENT USE OF INSULIN (HCC): ICD-10-CM

## 2023-11-08 DIAGNOSIS — N17.9 ACUTE RENAL FAILURE, UNSPECIFIED ACUTE RENAL FAILURE TYPE (HCC): ICD-10-CM

## 2023-11-08 PROCEDURE — 99309 SBSQ NF CARE MODERATE MDM 30: CPT | Performed by: PHYSICIAN ASSISTANT

## 2023-11-08 PROCEDURE — 1111F DSCHRG MED/CURRENT MED MERGE: CPT | Performed by: PHYSICIAN ASSISTANT

## 2023-11-09 ENCOUNTER — SNF VISIT (OUTPATIENT)
Dept: INTERNAL MEDICINE CLINIC | Facility: SKILLED NURSING FACILITY | Age: 78
End: 2023-11-09

## 2023-11-09 VITALS
HEART RATE: 65 BPM | WEIGHT: 141.5 LBS | OXYGEN SATURATION: 96 % | TEMPERATURE: 97 F | SYSTOLIC BLOOD PRESSURE: 129 MMHG | DIASTOLIC BLOOD PRESSURE: 64 MMHG | RESPIRATION RATE: 18 BRPM | BODY MASS INDEX: 28 KG/M2

## 2023-11-09 DIAGNOSIS — E11.9 TYPE 2 DIABETES MELLITUS WITHOUT COMPLICATION, WITHOUT LONG-TERM CURRENT USE OF INSULIN (HCC): ICD-10-CM

## 2023-11-09 DIAGNOSIS — J44.9 CHRONIC OBSTRUCTIVE PULMONARY DISEASE, UNSPECIFIED COPD TYPE (HCC): ICD-10-CM

## 2023-11-09 DIAGNOSIS — R53.1 GENERALIZED WEAKNESS: ICD-10-CM

## 2023-11-09 DIAGNOSIS — R10.9 ABDOMINAL PAIN, UNSPECIFIED ABDOMINAL LOCATION: Primary | ICD-10-CM

## 2023-11-09 PROCEDURE — 99309 SBSQ NF CARE MODERATE MDM 30: CPT | Performed by: NURSE PRACTITIONER

## 2023-11-09 PROCEDURE — 1111F DSCHRG MED/CURRENT MED MERGE: CPT | Performed by: NURSE PRACTITIONER

## 2023-11-09 NOTE — PROGRESS NOTES
Pulmonary Progress Note  SNF External [address]     History of Present Illness:   Loulou Camara is a(n) 77year old female with PMH notable for COPD, DVT, AAA s/p endovascular repair, DM2, CKD, HTN, ovarian cancer, who I am seeing for follow up at [facility]. Daughter at bedside. Pt recently underwent endovascular repair of AAA. She was discharged home and then presented to [address] [place] [address] with MARYANNE and pneumonia. Pt states she does not feel well today. She complains of constipation and per d/w RN had fecal impaction. Pt complains of abdominal discomfort and nausea. Also c/o dysuria and increased frequency x1 day. No vomiting. No fever or chills. She has occasional dyspnea. No cough or wheezing. Review of Systems: Vision notable for double vision. Ears nose and throat normal. Bowel notable for constipation. Bladder function notable for dysuria. No thyroid disease. Depression. No rash. Muscles and joints notable for weakness. Weight loss. Physical Exam:  /67, HR 78, RR 16, T 98.6, sat 99%   Constitutional: Obese, awake, alert, NAD  HEENT: Head NC/AT, PEERL, MMM  Cardio: RRR, S1S2, no murmurs  Respiratory: Thorax symmetrical with no labored breathing. Clear to ausculation bilaterally with symmetrical breath sounds. No wheezing, rhonchi, or crackles. Abdomen: Resolving ecchymosis. NABS, soft, +RUQ TTP and guarding. Extremities: No clubbing or cyanosis. No LE edema. No calf tenderness. Neurologic: A&Ox3. Generalized weakness. No gross motor deficits. Skin: Warm, dry. Psych: Calm, cooperative. Pleasant affect. Results:  -Labs 11/8/2023: wbc 4.68, hgb 9.5, plt 344, cr 2.45, bun 42, na 142, k 4.3, co2 28    -Chest x-ray 10/23/23: Mild cardiomegaly. Increased interstitial markings bilaterally, suggestive of pulmonary interstitial edema. Right upper lung and left basilar airspace opacities are present.   These could be secondary to a combination of atelectasis, alveolar edema, and/or infection. Correlate with clinical findings. Hazy layering opacity at the left lung base, suggestive of small to moderate left pleural effusion. -CTA chest 10/2/2023: Small volume of nonocclusive thrombus within proximal segmental pulmonary arterial branch supplying RLL c/w PE, chronicity indeterminate. Ground-glass pulmonary nodule in RUL measures 6 mm. Assessment and Plan:  1. Abdominal pain - RUQ tenderness and guarding on exam. C/o constipation and dysuria. D/w primary care APRN - will check UA/culture and KUB. Plan:  -UA and urine culture  -KUB    2. COPD with history of tobacco use - stable. Plan:  -LAMA/LABA (Anoro)  -Albuterol MDI as needed  -Outpatient PFTs  -Ongoing tobacco abstinence     3. Pulmonary nodule - 6 mm ground glass nodule RUL. Former smoker. Plan:  -Recommend f/u CT chest in 6-12 months     4. History of DVT/PE - previously on Eliquis for DVT. CTA chest 10/2/23 with incidentally discovered nonocclusive segmental PE of unknown chronicity. Plan:  -Patient is not on anticoagulation as she is on dual anti-platelets for AAA stent     5. Dysphagia - seen by speech. Plan:  -Modified diet per speech  -Speech therapy  -Aspiration precautions     6. AAA s/p endovascular repair     Plan:  -Dual anti-platelet therapy  -F/u vascular surgery    7. MARYANNE on CKD    Plan:  -F/u renal    D/w patient's daughter, RN, and APRN.      Urmila Urbie PA-C  Pulmonary Medicine

## 2023-11-14 ENCOUNTER — SNF VISIT (OUTPATIENT)
Dept: INTERNAL MEDICINE CLINIC | Facility: SKILLED NURSING FACILITY | Age: 78
End: 2023-11-14

## 2023-11-14 VITALS
HEART RATE: 76 BPM | RESPIRATION RATE: 16 BRPM | TEMPERATURE: 99 F | BODY MASS INDEX: 28 KG/M2 | SYSTOLIC BLOOD PRESSURE: 132 MMHG | OXYGEN SATURATION: 100 % | DIASTOLIC BLOOD PRESSURE: 67 MMHG | WEIGHT: 141.5 LBS

## 2023-11-14 DIAGNOSIS — R11.0 NAUSEA: ICD-10-CM

## 2023-11-14 DIAGNOSIS — R53.1 GENERALIZED WEAKNESS: Primary | ICD-10-CM

## 2023-11-14 DIAGNOSIS — R42 DIZZINESS: ICD-10-CM

## 2023-11-14 DIAGNOSIS — Z98.890 H/O VASCULAR SURGERY: ICD-10-CM

## 2023-11-14 DIAGNOSIS — J18.9 PNEUMONIA DUE TO INFECTIOUS ORGANISM, UNSPECIFIED LATERALITY, UNSPECIFIED PART OF LUNG: ICD-10-CM

## 2023-11-14 PROCEDURE — 99309 SBSQ NF CARE MODERATE MDM 30: CPT | Performed by: NURSE PRACTITIONER

## 2023-11-14 PROCEDURE — 1111F DSCHRG MED/CURRENT MED MERGE: CPT | Performed by: NURSE PRACTITIONER

## 2023-11-15 ENCOUNTER — EXTERNAL FACILITY (OUTPATIENT)
Facility: CLINIC | Age: 78
End: 2023-11-15

## 2023-11-15 ENCOUNTER — EXTERNAL FACILITY (OUTPATIENT)
Dept: PULMONOLOGY | Facility: CLINIC | Age: 78
End: 2023-11-15

## 2023-11-15 DIAGNOSIS — N17.9 ACUTE RENAL FAILURE, UNSPECIFIED ACUTE RENAL FAILURE TYPE (HCC): ICD-10-CM

## 2023-11-15 DIAGNOSIS — R53.1 WEAKNESS: Primary | ICD-10-CM

## 2023-11-15 DIAGNOSIS — R91.1 PULMONARY NODULE: ICD-10-CM

## 2023-11-15 DIAGNOSIS — I63.9 CEREBROVASCULAR ACCIDENT (CVA), UNSPECIFIED MECHANISM (HCC): ICD-10-CM

## 2023-11-15 DIAGNOSIS — J44.9 CHRONIC OBSTRUCTIVE PULMONARY DISEASE, UNSPECIFIED COPD TYPE (HCC): Primary | ICD-10-CM

## 2023-11-15 DIAGNOSIS — Z87.891 HISTORY OF TOBACCO USE: ICD-10-CM

## 2023-11-15 PROCEDURE — 99309 SBSQ NF CARE MODERATE MDM 30: CPT | Performed by: PHYSICIAN ASSISTANT

## 2023-11-15 PROCEDURE — 1111F DSCHRG MED/CURRENT MED MERGE: CPT | Performed by: PHYSICIAN ASSISTANT

## 2023-11-15 NOTE — PROGRESS NOTES
Pulmonary Progress Note  SNF External [address]     History of Present Illness:   Rick Ngo is a(n) 77year old female with PMH notable for COPD, PE, AAA s/p endovascular repair, DM2, CKD, HTN, ovarian cancer, who I am seeing for follow up at [facility]. Pt recently underwent endovascular repair of AAA. Pt states she is doing well. No complaints. No cough, shortness of breath, or wheezing. No fever or chills. Review of Systems: Vision notable for double vision. Ears nose and throat normal. Bowel notable for constipation. Bladder function normal. No thyroid disease. Depression. No rash. Muscles and joints notable for weakness. Physical Exam:  /68, HR 66, RR 16, T 97.9, sat 98% on room air  Constitutional: Obese, awake, alert, NAD  HEENT: Head NC/AT, PEERL, MMM  Cardio: RRR, S1S2, no murmurs  Respiratory: Thorax symmetrical with no labored breathing. Clear to ausculation bilaterally with symmetrical breath sounds. No wheezing, rhonchi, or crackles. Extremities: No clubbing or cyanosis. No LE edema. No calf tenderness. Neurologic: A&Ox3. No gross motor deficits. Skin: Warm, dry. Psych: Calm, cooperative. Pleasant affect. Results:  -CTA chest 10/2/2023: Small volume of nonocclusive thrombus within proximal segmental pulmonary arterial branch supplying RLL c/w PE, chronicity indeterminate. Ground-glass pulmonary nodule in RUL measures 6 mm. Assessment and Plan:  1. COPD with history of tobacco use - stable. Plan:  -LAMA/LABA (Anoro)  -Albuterol MDI as needed  -Outpatient PFTs  -Ongoing tobacco abstinence     2. Pulmonary nodule - 6 mm ground glass nodule RUL. Former smoker. Plan:  -Recommend f/u CT chest in 6-12 months     3. History of DVT/PE - previously on Eliquis for DVT. CTA chest 10/2/23 with incidentally discovered nonocclusive segmental PE of unknown chronicity. Plan:  -Prior decision not to pursue anticoagulation     4.  Dysphagia - seen by speech. Plan:  -Speech therapy  -Aspiration precautions     5.  AAA s/p endovascular repair     Plan:  -Dual anti-platelet therapy  -F/u vascular surgery    Jordana Stroud PA-C  Pulmonary Medicine

## 2023-11-19 ENCOUNTER — EXTERNAL FACILITY (OUTPATIENT)
Facility: CLINIC | Age: 78
End: 2023-11-19

## 2023-11-19 DIAGNOSIS — E11.9 TYPE 2 DIABETES MELLITUS WITHOUT COMPLICATION, WITHOUT LONG-TERM CURRENT USE OF INSULIN (HCC): ICD-10-CM

## 2023-11-19 DIAGNOSIS — R53.1 GENERALIZED WEAKNESS: Primary | ICD-10-CM

## 2023-11-19 DIAGNOSIS — I10 HYPERTENSION, UNSPECIFIED TYPE: ICD-10-CM

## 2023-11-19 DIAGNOSIS — N17.9 ACUTE RENAL FAILURE, UNSPECIFIED ACUTE RENAL FAILURE TYPE (HCC): ICD-10-CM

## 2023-11-19 NOTE — PROGRESS NOTES
Location [facility]  Date of service Nov 8 , 2023. Slowly getting better. Reports constipation. Denies any chest pain. Breathing is at baseline. Pain is reasonably controlled. Family is requesting for cardiology consultation     PHYSICAL EXAM:  Vital signs reviewed  GENERAL HEALTH: well developed, well nourished, in no apparent distress  EYES: Anicteric sclera  HENT: normocephalic  NECK: supple  RESPIRATORY: Chest bilaterally clear  CARDIOVASCULAR: S1, S2 normal, RRR. No pathological murmur  ABDOMEN:  normal active BS+, soft, nondistended; no organomegaly  EXTREMITIES-no significant edema  NEUROLOGIC: Alert orient x3. Motor strength 5 out of 5 bilaterally  PSYCHIATRIC: Affect appropriate    Assessment and plan    Constipation-will do bowel regimen. If no improvement, will consider enema. Generalized weakness from recent hospitalization and comorbidities continue PT OT    Acute renal failure creatinine at the time of discharge was 2. Monitor kidney functions in rehab.   Labs reviewed    Chronic low back pain from spinal stenosis-optimize pain continue PT OT    Diabetes-monitor blood sugars    Dyslipidemia-continue fenofibrate and Zetia    History of venous thromboembolic thromboembolism-continue Eliquis    AAA s/p repair    Coronary disease status post PCI-stable    Peptic ulcer disease continue PPI    History of CVA     history of ovarian cancer    History of COPD    Labs-creatinine 2.14, hemoglobin 9.2    Plan  Bowel regimen  Monitor labs especially kidney functions and hemoglobin  Continue PT OT  Monitor blood pressure and blood sugars optimize pain

## 2023-11-19 NOTE — PROGRESS NOTES
Location [facility]  Date of service October 31, 2023. Admission summary    76-year-old with extensive complicated medical history who underwent endovascular repair of AAA was brought into the hospital for weakness. Patient was discharged from [facility] recently. She is not able to move around at home. Denies any fever or chills at the time of admission. Denied any bleeding at the time of admission. Patient was found to have acute renal failure and also pneumonia. Patient was seen by pain clinic for chronic low back pain. Medrol Dosepak was given. Kidney function got stabilized. Subsequently patient was discharged to [facility] for rehabilitation. I saw patient in [place]. Daughter was at bedside. Denied any chest pain. She had an episode of anxiety prior to my arrival.  Patient was quiet and calm when I saw her. Past medical history COPD, history of DVT, diabetes, dyslipidemia, history of ovarian cancer, CVA, AAA s/p repair, coronary artery disease status post PCI, CVA.       History copied from [address] [place] [address] records    Past Medical History:   Diagnosis Date    Aortic aneurysm, thoracic ([address]      Back problem      Calculus of kidney      Cataract      COPD (chronic obstructive pulmonary disease) (HCC)      Coronary atherosclerosis      Deep vein thrombosis (HCC)      Diabetes ([address]      Diverticulosis of large intestine      Heart attack ([address]       2013    High blood pressure      High cholesterol      History of stomach ulcers      Incontinence      Osteoarthritis      Ovarian cancer ([address]      PONV (postoperative nausea and vomiting)      Renal disorder      Shortness of breath      Stroke ([address] 10/15/2017     shaking, wheelchair            Past Surgical History:   Procedure Laterality Date    APPENDECTOMY        ARTHROSCOPY OF JOINT UNLISTED Right       Rotator cuff surgery    BACK SURGERY   07/21/2017     L3-4 LUIS and hemilaminectomies    CATARACT        CATH DRUG ELUTING STENT         2013    EP LOOP RECORDER IMPLANT Left 2017    FOOT SURGERY Bilateral      FRACTURE SURGERY Right       right clavicle    HYSTERECTOMY        PRATEEK LOCALIZATION WIRE 1 SITE RIGHT (CPT=19281)         benign-20 years ago    SPINE SURGERY PROCEDURE UNLISTED                Family History   Problem Relation Age of Onset    Cancer Father      Diabetes Father      Diabetes Mother      Cancer Daughter      Breast Cancer Maternal Aunt           x2 in 66's    Ovarian Cancer Self        Social History            Socioeconomic History    Marital status:     Tobacco Use    Smoking status: Every Day       Packs/day: 0.50       Years: 35.00       Additional pack years: 0.00       Total pack years: 17.50       Types: Cigarettes    Smokeless tobacco: Former    Tobacco comments:       half pack a day   Vaping Use    Vaping Use: Never used   Substance and Sexual Activity    Alcohol use: No    Drug use: No   Social Determinants of Health  Food Insecurity: No Food Insecurity (10/20/2023)      Food Insecurity          Food Insecurity: Never true  Transportation Needs: No Transportation Needs (10/20/2023)      Transportation Needs          Lack of Transportation: No  Housing Stability: Low Risk  (10/20/2023)      Housing Stability          Housing Instability: No     ALLERGIES:     Codeine                 SWELLING, OTHER (SEE COMMENTS)    Comment:Face and body get puffy/swell, headache             Per pt, has tolerated morphine and Norco             previously with no adverse effects  Enalapril Maleate-F*    SHORTNESS OF BREATH  Nyquil Severe Cold-*    PALPITATIONS  Radiology Contrast *    ANAPHYLAXIS  Shellfish-Derived P*      Dilaudid [[employer]*    OTHER (SEE COMMENTS)    Comment:PATIENT'S DAUGHTER HAD A VERY BAD REACTION FROM             DILAUDID (MENTALLY)  Sulfa Antibiotics       RASH, OTHER (SEE COMMENTS)    Comment:Rash     CODE STATUS:  Full Code      REVIEW OF SYSTEMS:  GENERAL HEALTH:feels well otherwise  SKIN: denies any unusual skin lesions or rashes  WOUNDS: none  EYES:no visual complaints or deficits  HENT: denies nasal congestion, sinus pain or sore throat;  RESPIRATORY: denies shortness of breath, wheezing or cough   CARDIOVASCULAR:denies chest pain, no palpitations   GI: denies nausea, vomiting, constipation, diarrhea; no rectal bleeding; no heartburn  :no dysuria, urgency or frequency; no vaginal discharge; no urinary incontinence; no hematuria  MUSCULOSKELETAL:generalized weakness   NEURO:no sensory or motor complaint  PSYCHE: no symptoms of depression or anxiety  HEMATOLOGY:denies hx anemia  ENDOCRINE: denies excessive thirst or urination; denies unexpected wt gain or wt loss  ALLERGY/IMM.: denies food or seasonal allergies        PHYSICAL EXAM:  Vital signs reviewed  GENERAL HEALTH: well developed, well nourished, in no apparent distress  EYES: Anicteric sclera  HENT: normocephalic  NECK: supple  RESPIRATORY: Chest bilaterally clear  CARDIOVASCULAR: S1, S2 normal, RRR. No pathology murmur  ABDOMEN:  normal active BS+, soft, nondistended; no organomegaly  EXTREMITIES-no significant edema  NEUROLOGIC: Alert orient x3. Motor strength 5 out of 5 bilaterally  PSYCHIATRIC: Affect appropriate    Assessment and plan    Generalized weakness from recent hospitalization and comorbidities continue PT OT    Acute renal failure creatinine at the time of discharge was 2. Monitor kidney functions in rehab.     Chronic low back pain from spinal stenosis-optimize pain continue PT OT    Diabetes-monitor blood sugars    Dyslipidemia-continue fenofibrate and Zetia    History of venous thromboembolic thromboembolism-continue Eliquis    AAA s/p repair    Coronary disease status post PCI-stable    Peptic ulcer disease continue PPI    History of CVA     history of ovarian cancer    History of COPD    Labs-creatinine 2.14, hemoglobin 9.2    Plan  Continue PT OT  Monitor blood pressure and blood sugars optimize pain  Fall precautions, aspiration precautions, decubitus prevention as per rehab protocol    Discussed with the daughter at bedside

## 2023-11-19 NOTE — PROGRESS NOTES
Location [facility]  Date of service Nov 15  , 2023. Progressing slowly. No complaints. Bowel movements present. Eating okay. PHYSICAL EXAM:  Vital signs reviewed  GENERAL HEALTH: Resting in the bed comfortably. HENT: normocephalic  NECK: supple  RESPIRATORY: Chest bilaterally clear  CARDIOVASCULAR: S1, S2 normal, RRR. No pathological murmur  ABDOMEN:  normal active BS+, soft, nondistended; no organomegaly  EXTREMITIES-no significant edema  NEUROLOGIC: Alert orient x3. Motor strength 5 out of 5 bilaterally  PSYCHIATRIC: Affect appropriate    Assessment and plan    Constipation-resolved. Continue the bowel regimen. Generalized weakness from recent hospitalization and comorbidities continue PT OT    Acute renal failure creatinine at the time of discharge was 2. Monitor kidney functions in rehab.      Chronic low back pain from spinal stenosis-optimize pain continue PT OT    Diabetes-monitor blood sugars    Dyslipidemia-continue fenofibrate and Zetia    History of venous thromboembolic thromboembolism-continue Eliquis    AAA s/p repair    Coronary disease status post PCI-stable    Peptic ulcer disease continue PPI    History of CVA     history of ovarian cancer    History of COPD    Labs-creatinine 2.14, hemoglobin 9.2    Plan  Bowel regimen  Monitor labs especially kidney functions and hemoglobin  Continue PT OT  Monitor blood pressure and blood sugars optimize pain

## 2023-11-20 ENCOUNTER — SNF VISIT (OUTPATIENT)
Dept: INTERNAL MEDICINE CLINIC | Facility: SKILLED NURSING FACILITY | Age: 78
End: 2023-11-20

## 2023-11-20 ENCOUNTER — EXTERNAL FACILITY (OUTPATIENT)
Dept: PULMONOLOGY | Facility: CLINIC | Age: 78
End: 2023-11-20

## 2023-11-20 VITALS
OXYGEN SATURATION: 97 % | DIASTOLIC BLOOD PRESSURE: 62 MMHG | BODY MASS INDEX: 28 KG/M2 | SYSTOLIC BLOOD PRESSURE: 118 MMHG | HEART RATE: 72 BPM | RESPIRATION RATE: 18 BRPM | TEMPERATURE: 98 F | WEIGHT: 142 LBS

## 2023-11-20 DIAGNOSIS — J44.9 CHRONIC OBSTRUCTIVE PULMONARY DISEASE, UNSPECIFIED COPD TYPE (HCC): Primary | ICD-10-CM

## 2023-11-20 DIAGNOSIS — D64.9 ANEMIA, UNSPECIFIED TYPE: ICD-10-CM

## 2023-11-20 DIAGNOSIS — Z86.711 HISTORY OF PULMONARY EMBOLISM: ICD-10-CM

## 2023-11-20 DIAGNOSIS — R91.1 PULMONARY NODULE: ICD-10-CM

## 2023-11-20 DIAGNOSIS — R13.10 DYSPHAGIA, UNSPECIFIED TYPE: ICD-10-CM

## 2023-11-20 DIAGNOSIS — R53.1 GENERALIZED WEAKNESS: Primary | ICD-10-CM

## 2023-11-20 DIAGNOSIS — J44.9 CHRONIC OBSTRUCTIVE PULMONARY DISEASE, UNSPECIFIED COPD TYPE (HCC): ICD-10-CM

## 2023-11-20 DIAGNOSIS — Z02.9 DISCHARGE PLANNING ISSUES: ICD-10-CM

## 2023-11-20 DIAGNOSIS — R73.9 HYPERGLYCEMIA: ICD-10-CM

## 2023-11-20 DIAGNOSIS — R42 DIZZINESS: ICD-10-CM

## 2023-11-20 DIAGNOSIS — Z87.891 HISTORY OF TOBACCO USE: ICD-10-CM

## 2023-11-20 PROCEDURE — 99310 SBSQ NF CARE HIGH MDM 45: CPT | Performed by: PHYSICIAN ASSISTANT

## 2023-11-20 PROCEDURE — 1111F DSCHRG MED/CURRENT MED MERGE: CPT | Performed by: PHYSICIAN ASSISTANT

## 2023-11-20 NOTE — PROGRESS NOTES
Pulmonary Progress Note  SNF External [address]     History of Present Illness:   Geoff Smith is a(n) 77year old female with PMH notable for COPD, PE, AAA s/p endovascular repair, DM2, CKD, HTN, ovarian cancer, who I am seeing for follow up at [facility]. Daughter at bedside. Pt underwent endovascular repair of AAA on 10/9. Pt states she is doing well and has no complaints. No cough, shortness of breath, wheezing, or chest pain. No fever or chills. Review of Systems: Vision notable for double vision. Ears nose and throat normal. Bowel normal. Bladder function normal. No thyroid disease. Depression. No rash. Muscles and joints notable for weakness. Physical Exam:  /68, HR 70, RR 16, T 97.0, sat 100% on room air  Constitutional: Obese, awake, alert, NAD  HEENT: Head NC/AT, PEERL, MMM  Cardio: RRR, S1S2, no murmurs  Respiratory: Thorax symmetrical with no labored breathing. Clear to ausculation bilaterally with symmetrical breath sounds. No wheezing, rhonchi, or crackles. Extremities: No clubbing or cyanosis. No LE edema. No calf tenderness. Neurologic: A&Ox3. No gross motor deficits. Skin: Warm, dry. Psych: Calm, cooperative. Pleasant affect. Results:  -CTA chest 10/2/2023: Small volume of nonocclusive thrombus within proximal segmental pulmonary arterial branch supplying RLL c/w PE, chronicity indeterminate. Ground-glass pulmonary nodule in RUL measures 6 mm.    -Labs 11/14/2023: wbc 5.73, hgb 10.4, plt 315, cr 1.76, bun 38, na 143, k 4.1, co2 29    -Iron studies 11/16/2023: Iron 25 (low), TIBC 259, % sat 10% (low), ferritin 93     Assessment and Plan:  1. COPD with history of tobacco use - stable. Plan:  -LAMA/LABA (Anoro)  -Albuterol MDI as needed  -Outpatient PFTs  -Ongoing tobacco abstinence     2. Pulmonary nodule - 6 mm ground glass nodule RUL. Former smoker. Plan:  -Recommend f/u CT chest in 12 months (due Oct 2024)     3.  History of DVT/PE - had been on Eliquis in the past for DVT. CTA chest 10/2/23 with nonspecific filling defect, unknown chronicity. Case and imaging reviewed with Dr. Tila Faye. Patient at risk of bleeding with recent AAA repair and on dual-antiplatelets with mild anemia. Recommends VQ scan. Not able to repeat CTA chest due to renal impairment. I attempted to call patient's daughter to discuss risks vs benefits and left voicemail. Will order VQ scan and await daughter's call back. Plan:  -Recommend VQ scan  -If high probability PE, recommend Eliquis     4. Dysphagia - seen by speech. Plan:  -Speech therapy  -Aspiration precautions     5. AAA s/p endovascular repair     Plan:  -Dual anti-platelet therapy with Plavix and ASA 81 mg  -F/u vascular surgery    6. Anemia - post-op. Stable. Labs suggest iron deficiency. Plan:  -Start ferrous sulfate 325 mg daily    D/w Dr. Tila Faye. D/w primary APRN. Left voicemail for patient's daughter Melly Chen to call back.      Herberth Augustine PA-C  Pulmonary Medicine

## 2023-11-21 ENCOUNTER — SNF VISIT (OUTPATIENT)
Dept: INTERNAL MEDICINE CLINIC | Facility: SKILLED NURSING FACILITY | Age: 78
End: 2023-11-21

## 2023-11-21 ENCOUNTER — TELEPHONE (OUTPATIENT)
Dept: PULMONOLOGY | Facility: CLINIC | Age: 78
End: 2023-11-21

## 2023-11-21 VITALS
BODY MASS INDEX: 28 KG/M2 | HEART RATE: 66 BPM | OXYGEN SATURATION: 97 % | WEIGHT: 142 LBS | DIASTOLIC BLOOD PRESSURE: 64 MMHG | SYSTOLIC BLOOD PRESSURE: 127 MMHG | RESPIRATION RATE: 18 BRPM | TEMPERATURE: 98 F

## 2023-11-21 DIAGNOSIS — R68.89 FORGETFULNESS: ICD-10-CM

## 2023-11-21 DIAGNOSIS — R53.1 GENERALIZED WEAKNESS: ICD-10-CM

## 2023-11-21 DIAGNOSIS — J44.9 CHRONIC OBSTRUCTIVE PULMONARY DISEASE, UNSPECIFIED COPD TYPE (HCC): ICD-10-CM
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
## 2023-11-21 DIAGNOSIS — E11.9 TYPE 2 DIABETES MELLITUS WITHOUT COMPLICATION, WITHOUT LONG-TERM CURRENT USE OF INSULIN (HCC): ICD-10-CM

## 2023-11-21 DIAGNOSIS — Z91.81 AT MODERATE RISK FOR FALL: ICD-10-CM

## 2023-11-21 NOTE — TELEPHONE ENCOUNTER
I spoke with the patient's daughter, Alma Bautistar. RN: I placed order for VQ scan. Patient's daughter is asking if prior Gretchen Gray is required. Does not want to pursue if not covered. Are you able to assist with this?

## 2023-11-26 ENCOUNTER — EXTERNAL FACILITY (OUTPATIENT)
Dept: INTERNAL MEDICINE CLINIC | Facility: CLINIC | Age: 78
End: 2023-11-26

## 2023-11-26 DIAGNOSIS — N17.9 ACUTE RENAL FAILURE, UNSPECIFIED ACUTE RENAL FAILURE TYPE (HCC): ICD-10-CM

## 2023-11-26 DIAGNOSIS — R53.1 WEAKNESS: Primary | ICD-10-CM

## 2023-11-26 PROCEDURE — 99308 SBSQ NF CARE LOW MDM 20: CPT | Performed by: INTERNAL MEDICINE

## 2023-11-27 ENCOUNTER — SNF VISIT (OUTPATIENT)
Dept: INTERNAL MEDICINE CLINIC | Facility: SKILLED NURSING FACILITY | Age: 78
End: 2023-11-27

## 2023-11-27 VITALS
BODY MASS INDEX: 28 KG/M2 | TEMPERATURE: 98 F | SYSTOLIC BLOOD PRESSURE: 153 MMHG | HEART RATE: 89 BPM | WEIGHT: 142.5 LBS | RESPIRATION RATE: 18 BRPM | OXYGEN SATURATION: 96 % | DIASTOLIC BLOOD PRESSURE: 82 MMHG

## 2023-11-27 DIAGNOSIS — Z02.9 DISCHARGE PLANNING ISSUES: Primary | ICD-10-CM

## 2023-11-27 DIAGNOSIS — J44.9 CHRONIC OBSTRUCTIVE PULMONARY DISEASE, UNSPECIFIED COPD TYPE (HCC): ICD-10-CM

## 2023-11-27 DIAGNOSIS — G47.00 INSOMNIA, UNSPECIFIED TYPE: ICD-10-CM

## 2023-11-27 DIAGNOSIS — R68.89 FORGETFULNESS: ICD-10-CM

## 2023-11-27 DIAGNOSIS — E11.9 TYPE 2 DIABETES MELLITUS WITHOUT COMPLICATION, WITHOUT LONG-TERM CURRENT USE OF INSULIN (HCC): ICD-10-CM

## 2023-11-27 DIAGNOSIS — R53.1 GENERALIZED WEAKNESS: ICD-10-CM

## 2023-11-27 NOTE — TELEPHONE ENCOUNTER
Spoke with Bradley Gloria in [place] states Medicare does not require prior authorization for VQ scan. Spoke with patient's daughter, informed her of Leia's message, daughter verbalized understanding and will coordinate with [place].

## 2023-11-27 NOTE — TELEPHONE ENCOUNTER
Left message with Andra Hoyosocent in [facility] to call pulmo office back, per referral #[id number] notes, no auth needed per health plan.

## 2023-11-29 ENCOUNTER — TELEPHONE (OUTPATIENT)
Dept: PULMONOLOGY | Facility: CLINIC | Age: 78
End: 2023-11-29

## 2023-11-29 NOTE — TELEPHONE ENCOUNTER
Received fax from [employer] portable x-ray service requesting signature for abdomen xray DOS 11/9/23 done at [facility]. Order signed by Italo Santiago and faxed back with confirmation.

## 2023-11-30 ENCOUNTER — SNF VISIT (OUTPATIENT)
Dept: INTERNAL MEDICINE CLINIC | Facility: SKILLED NURSING FACILITY | Age: 78
End: 2023-11-30

## 2023-11-30 VITALS
HEART RATE: 64 BPM | DIASTOLIC BLOOD PRESSURE: 77 MMHG | RESPIRATION RATE: 18 BRPM | SYSTOLIC BLOOD PRESSURE: 140 MMHG | OXYGEN SATURATION: 97 % | TEMPERATURE: 98 F

## 2023-11-30 DIAGNOSIS — R41.0 CONFUSION: ICD-10-CM

## 2023-11-30 DIAGNOSIS — J44.9 CHRONIC OBSTRUCTIVE PULMONARY DISEASE, UNSPECIFIED COPD TYPE (HCC): ICD-10-CM

## 2023-11-30 DIAGNOSIS — Z91.81 AT MODERATE RISK FOR FALL: ICD-10-CM

## 2023-11-30 DIAGNOSIS — R42 DIZZINESS: ICD-10-CM

## 2023-11-30 DIAGNOSIS — R53.1 GENERALIZED WEAKNESS: Primary | ICD-10-CM

## 2023-11-30 DIAGNOSIS — Z02.9 DISCHARGE PLANNING ISSUES: ICD-10-CM

## 2023-11-30 DIAGNOSIS — R73.9 HYPERGLYCEMIA: ICD-10-CM

## 2023-11-30 PROCEDURE — 99309 SBSQ NF CARE MODERATE MDM 30: CPT | Performed by: NURSE PRACTITIONER

## 2023-12-03 ENCOUNTER — EXTERNAL FACILITY (OUTPATIENT)
Dept: INTERNAL MEDICINE CLINIC | Facility: CLINIC | Age: 78
End: 2023-12-03

## 2023-12-03 DIAGNOSIS — I10 HYPERTENSION, UNSPECIFIED TYPE: ICD-10-CM

## 2023-12-03 DIAGNOSIS — R53.1 WEAKNESS: Primary | ICD-10-CM

## 2023-12-03 PROCEDURE — 99308 SBSQ NF CARE LOW MDM 20: CPT | Performed by: INTERNAL MEDICINE

## 2023-12-06 ENCOUNTER — EXTERNAL FACILITY (OUTPATIENT)
Dept: PULMONOLOGY | Facility: CLINIC | Age: 78
End: 2023-12-06

## 2023-12-06 DIAGNOSIS — J44.9 CHRONIC OBSTRUCTIVE PULMONARY DISEASE, UNSPECIFIED COPD TYPE (HCC): ICD-10-CM

## 2023-12-06 DIAGNOSIS — R91.1 PULMONARY NODULE: ICD-10-CM

## 2023-12-06 DIAGNOSIS — Z86.711 HISTORY OF PULMONARY EMBOLISM: ICD-10-CM

## 2023-12-06 DIAGNOSIS — Z87.891 HISTORY OF TOBACCO USE: ICD-10-CM

## 2023-12-06 DIAGNOSIS — J06.9 UPPER RESPIRATORY TRACT INFECTION, UNSPECIFIED TYPE: Primary | ICD-10-CM

## 2023-12-06 PROCEDURE — 99309 SBSQ NF CARE MODERATE MDM 30: CPT | Performed by: PHYSICIAN ASSISTANT

## 2023-12-07 NOTE — PROGRESS NOTES
Pulmonary Progress Note  SNF External [address]     History of Present Illness:   Loulou Camara is a(n) 77year old female with PMH notable for COPD, PE, AAA s/p endovascular repair, DM2, CKD, HTN, ovarian cancer, who I am seeing for follow up at [facility]. Daughter at bedside. Pt underwent endovascular repair of AAA on 10/9. After d/w patient's daughter, decision made to pursue VQ scan to evaluate for PE which is scheduled for tomorrow. Pt complains of nasal congestion, sneezing, and occasional cough for the last few days. Covid-19 testing was negative. No fever or chills. No shortness of breath or wheezing. Review of Systems: Vision notable for double vision. Ears nose and throat normal. Bowel normal. Bladder function normal. No thyroid disease. Depression. No rash. Muscles and joints notable for weakness. Physical Exam:  /65, HR 70, RR 14, T 97.8, sat 95% on room air  Constitutional: Obese, awake, alert, NAD  HEENT: Head NC/AT, PEERL, MMM  Cardio: RRR, S1S2, no murmurs  Respiratory: Thorax symmetrical with no labored breathing. Clear to ausculation bilaterally with symmetrical breath sounds. No wheezing, rhonchi, or crackles. Extremities: No clubbing or cyanosis. No LE edema. No calf tenderness. Neurologic: A&Ox2 to person and year. No gross motor deficits. Skin: Warm, dry. Psych: Calm, cooperative. Pleasant affect. Results:  -CTA chest 10/2/2023: Small volume of nonocclusive thrombus within proximal segmental pulmonary arterial branch supplying RLL c/w PE, chronicity indeterminate. Ground-glass pulmonary nodule in RUL measures 6 mm. Assessment and Plan:  1. URI - Covid-19 negative. Plan:  -Supportive care    2. COPD with history of tobacco use - stable. Plan:  -LAMA/LABA (Anoro)  -Albuterol MDI as needed  -Outpatient PFTs  -Ongoing tobacco abstinence     3. Pulmonary nodule - 6 mm ground glass nodule RUL. Former smoker.      Plan:  -Recommend f/u CT chest in 12 months (due Oct 2024)     4. History of DVT/PE - had been on Eliquis in the past for DVT. CTA chest 10/2/23 with nonspecific filling defect, unknown chronicity. Case and imaging reviewed with Dr. Rehan Oreilly. Patient at risk of bleeding with recent AAA repair and on dual-antiplatelets with mild anemia. Recommends VQ scan. Not able to repeat CTA chest due to renal impairment. Plan:  -Recommend VQ scan  -If high probability PE, recommend Eliquis     5. Dysphagia - seen by speech. Plan:  -Speech therapy  -Aspiration precautions     6.  AAA s/p endovascular repair     Plan:  -Dual anti-platelet therapy with Plavix and ASA 81 mg  -F/u vascular surgery     Queenie Campos PA-C  Pulmonary Medicine

## 2023-12-08 ENCOUNTER — SNF VISIT (OUTPATIENT)
Dept: INTERNAL MEDICINE CLINIC | Facility: SKILLED NURSING FACILITY | Age: 78
End: 2023-12-08

## 2023-12-08 DIAGNOSIS — G93.41 METABOLIC ENCEPHALOPATHY: ICD-10-CM

## 2023-12-08 DIAGNOSIS — J18.9 PNEUMONIA DUE TO INFECTIOUS ORGANISM, UNSPECIFIED LATERALITY, UNSPECIFIED PART OF LUNG: ICD-10-CM

## 2023-12-08 DIAGNOSIS — F39 EPISODIC MOOD DISORDER (HCC): ICD-10-CM

## 2023-12-08 DIAGNOSIS — R41.0 DELIRIUM: ICD-10-CM

## 2023-12-08 DIAGNOSIS — N18.9 CHRONIC KIDNEY DISEASE, UNSPECIFIED CKD STAGE: Primary | ICD-10-CM

## 2023-12-08 PROCEDURE — 99309 SBSQ NF CARE MODERATE MDM 30: CPT | Performed by: NURSE PRACTITIONER

## 2023-12-08 NOTE — PROGRESS NOTES
Rosaura Cabral  : 1945  Age 77year old  female patient is admitted to [address] [place] [address] for rehabilitation and strengthening     Chief complaint: Weakness,  AAA , dizziness, nausea , complicated dc plan       [facility] Admission : 10/20/23 to 10/30/23      HPI   65-year-old female with a history of coronary artery disease, COPD, history of DVT, type 2 diabetes, hypertension, hyperlipidemia, history of ovarian cancer, peptic ulcer disease, history of stroke, AAA status post endovascular repair early October , who presented to the hospital for evaluation of weakness 10/20/23 . Found to have MARYANNE. Also with new PNA dx on 10/23 and acute on chronic lower back pain with MRI on 10/25/23 reporting stabiity from MRI in . Pain service consulted, pain improved with medrol dose pack, renal function improved, cleared by nephrology, vascular surgery, pain service for discharge back to [address] [place] [address].  And for follow-up with vascular surgery, nephrology, PCP on discharge when home. Patient seen in follow up, she is doing better today. Saw her earlier this week and she wasn't feeling well but that has resolved. She is hoping for home soon and I am told likely next week. No shortness of breath or chest pain, no nausea.        ALLERGIES:  Allergies   Allergen Reactions    Codeine SWELLING and OTHER (SEE COMMENTS)     Face and body get puffy/swell, headache  Per pt, has tolerated morphine and Norco previously with no adverse effects    Enalapril Maleate-Felodipine SHORTNESS OF BREATH    Nyquil Severe Cold-Flu [Gnp Multi-Symptom Cold] PALPITATIONS    Radiology Contrast Iodinated Dyes ANAPHYLAXIS    Shellfish-Derived Products     Dilaudid [Hydromorphone] OTHER (SEE COMMENTS)     PATIENT'S DAUGHTER HAD A VERY BAD REACTION FROM DILAUDID (MENTALLY)    Sulfa Antibiotics RASH and OTHER (SEE COMMENTS)     Rash         IMMUNIZATIONS  Immunization History   Administered Date(s) Administered    [employer]  mcg/0.3 ml 02/10/2021, 03/03/2021        CODE STATUS:  Full Code    ADVANCED CARE PLANNING TEAM: Will need family care plan    CURRENT MEDICATIONS - reviewed and updated on SNF EMR     SUBJECTIVE    Patient seen in follow up, she is doing better today. Saw her earlier this week and she wasn't feeling well but that has resolved. She is hoping for home soon and I am told likely next week. No shortness of breath or chest pain, no nausea. PHYSICAL EXAM:    GENERAL HEALTH: well developed, well nourished, in no apparent distress  LINES, TUBES, DRAINS:  none  SKIN: no rashes, no suspicious lesions  WOUND: none  EYES: PERRLA, EOMI, sclera anicteric, conjunctiva normal; there is no nystagmus, no drainage from eyes  HENT: normocephalic; normal nose, no nasal drainage, mucous membranes pink, moist, pharynx no exudate, no visible cerumen. NECK: supple; FROM; no JVD, no TMG, no carotid bruits  BREAST: deferred  RESPIRATORY:clear to percussion and auscultation  CARDIOVASCULAR: S1, S2 normal, RRR; no S3, no S4;   ABDOMEN:  normal active BS+, soft, nondistended; no organomegaly, no masses; no bruits; nontender, no guarding, no rebound tenderness.   Nauseous with small emesis   :no suprapubic distension  LYMPHATIC:no lymphedema  MUSCULOSKELETAL: no acute synovitis upper or lower extremity  EXTREMITIES/VASCULAR:no cyanosis, clubbing or edema  NEUROLOGIC: follows commands, dizziness with getting up fro therapies , started on meclizine, vss  PSYCHIATRIC: alert and oriented x 3; affect appropriate, although forgetful      SEE PLAN BELOW  Cough, headache, achy - resolved  - Covid 19 rapid on 12/4, negative  - started tessalon pearls TID  - started tylenol 650mg Q6PRN  - STAT cbc and bmp on 12/4  - monitor      Episodes of dizziness and nauseous with small emesis - resolved   - Qshift vitals  - continue meclizine 25mg po q 8 prn   - continue Zofran 8mg odt q 6 prn   - has been drinking fluids but and eating fair   - bun Stored[employer] trending down . -reports she is going slow in therapy due to the occasional dizzy spells  - monitor      MARYANNE on CKD- improved  - Cr 2.6 on admit, recently 2.0 on discharge 1 day prior, baseline Cr around 1.5  - renal consulted and to follow up this week   - renal US with dopplers with patent renal arteries  - s/p IVF, creatinine 2.14 on discharge  - CBC and BMP weekly and PRN       LE pain now lower back pain and some neck pain  Spinal stenosis- improving   - hypersensitivity +/- peripheral neuropathy, states only started after surgery but it appears patient has had issues before she has a lumbar spine MRI from 2021 and the daughter reports she has had back pain in the past.  - Patient has had lower back pain before, only mild neck pain, did not hit her neck or lower back during the fall per the patient. X-ray with degenerative changes. Still with a lot of pain, MRI ordered  - MRI without acute findings  - continue gabapentin 100mg BID  - continue tramadol 50mg Q12PRN  - continue lidocain patch to back   - continue tylenol 500mg Q8PRN  - monitor       Pneumonia- resolved   Fever,  temp of 100.8 10/23/2023  - Chest x-ray with pulmonary edema with possible pneumonia. Procalcitonin is elevated. Started on Zosyn. Patient looks clinically improved on 10/24/2023 and no further fevers. - UA not consistent with infection, respiratory viral panel negative  - BC NGTD  - Completed course of antibiotics for pneumonia  - cxr ordered 11/1/23  neg   - Pulm and RT following in rehab  - monitor     Delirium, metabolic encephalopathy- waxing and waning  - Patient has prolonged course both here and at [facility]. Has had intermittent confusion since. Monitor mental status on Norco.  Pain seems much improved and patient seems much more relaxed today. We will need to balance pain again side effects.   - oriented to self, family but not type of place and said 0 for year,  pleasant and cooperative, near recent baseline since acute hospitalization at [place]  - improved with melatonin 3mg po q hs   - continue to wax and wean but this afternoon very alert and talkative, oriented x 3   -was seen by Psych and started back on Seroquel at hs    -is much better and less confused when family present     L knee and ankle pain- improving slowly   - started after fall at home  - knee and ankle XR ordered  - lidoderm patch 4% q day ( 12 hrs on 12hrs off)   - cont PT/OT      AAA status post endovascular repair this month on 10/9/23  - s/p endovascular abdominal aortic aneurysm stent graft repair with fenestrated device. -Vascular surgery consulted ok with discharge from hospital - follow-up outpatient  - continue asa 81mg po q day   - continue Plavix 75mg po q day   - Continue nitroglycerin . 4mg q 5 min x 5 prn chest pain   - continue tramadol 50mg po q 12   - continue tylenol 500 m gpo q 8 prn   - cardiology to follow in rehab  -saw cardiologist last week   - monitor      Anemia  - Hemoglobin 9.2  - No active bleeding  - CBC and bmp weekly in rehab , stable 9.1 11/24/23  - monitor      HLD  - continue zetia 10mg po daily   - presently not on fenofibrate  - monitor      HTN  CAD  Hx of CVA  - Qshift vitals   - continue metoprolol tartrate 100mg po bid   - continue isosorbide mononitrate er 60mg po q day   - continue amlodipine 5mg po q hs   - monitor         GERD  Hx Stomach ulcers  - continue protonix 40mg po bid      Type 2 DM with hyperglycemia  - continue metformin 500mg po qid   - accu check BID  - monitor      Weakness  - PT/OT /Speech   - still very weak, progressing very slowly in rehab   - see hpi , reproted fall or a lowering of herself to ground when table moved this am   -no injuries noted and no changes on neuro exam     COPD - stable   - hx of recent pneumonia in hospital , treated with antibx   - cxr 11/1/23 - reviewed, neg   - continue albuterol sulfate inhaler 2 puffs q 4 prn   - continue anoro ellipta one puff q day   - Pulm and RT following  in rehab   - monitor         FOLLOW UP APPOINTMENTS  Future Appointments   Date Time Provider Hadley Chambers   12/19/2023  2:30 PM Lisa Phelan, [address]        This is a 25 minute visit and greater than 50% of the time was spent counseling the patient and/or coordinating care. Note to patient: The [facility] makes medical notes like these available to patients in the interest of transparency. However, this is a medical document intended as peer to peer communication. It is written in medical language and may contain abbreviations or verbiage that are unfamiliar. It may appear blunt or direct. Medical documents are intended to carry relevant information, facts as evident, and the clinical opinion of the practitioner who signs the document.      Tate Mcdonnell, KEVAN  12/08/23

## 2023-12-11 ENCOUNTER — SNF VISIT (OUTPATIENT)
Dept: INTERNAL MEDICINE CLINIC | Facility: SKILLED NURSING FACILITY | Age: 78
End: 2023-12-11

## 2023-12-11 VITALS
HEART RATE: 76 BPM | OXYGEN SATURATION: 97 % | BODY MASS INDEX: 26 KG/M2 | DIASTOLIC BLOOD PRESSURE: 70 MMHG | RESPIRATION RATE: 18 BRPM | SYSTOLIC BLOOD PRESSURE: 135 MMHG | WEIGHT: 135 LBS | TEMPERATURE: 98 F

## 2023-12-11 DIAGNOSIS — R42 DIZZINESS: ICD-10-CM

## 2023-12-11 DIAGNOSIS — Z91.81 AT MODERATE RISK FOR FALL: ICD-10-CM

## 2023-12-11 DIAGNOSIS — R53.1 GENERALIZED WEAKNESS: ICD-10-CM

## 2023-12-11 DIAGNOSIS — Z02.9 PROBLEM RELATED TO DISCHARGE PLANNING: ICD-10-CM
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
## 2023-12-13 ENCOUNTER — SNF DISCHARGE (OUTPATIENT)
Dept: INTERNAL MEDICINE CLINIC | Facility: SKILLED NURSING FACILITY | Age: 78
End: 2023-12-13

## 2023-12-13 VITALS
SYSTOLIC BLOOD PRESSURE: 131 MMHG | HEART RATE: 70 BPM | BODY MASS INDEX: 26 KG/M2 | WEIGHT: 135 LBS | RESPIRATION RATE: 18 BRPM | DIASTOLIC BLOOD PRESSURE: 68 MMHG | TEMPERATURE: 98 F | OXYGEN SATURATION: 97 %

## 2023-12-13 DIAGNOSIS — J44.9 CHRONIC OBSTRUCTIVE PULMONARY DISEASE, UNSPECIFIED COPD TYPE (HCC): Primary | ICD-10-CM

## 2023-12-13 DIAGNOSIS — R11.0 NAUSEA: ICD-10-CM

## 2023-12-13 DIAGNOSIS — Z91.81 AT MODERATE RISK FOR FALL: ICD-10-CM

## 2023-12-13 DIAGNOSIS — R42 DIZZINESS: ICD-10-CM

## 2023-12-13 DIAGNOSIS — I71.40 ABDOMINAL AORTIC ANEURYSM (AAA), UNSPECIFIED PART, UNSPECIFIED WHETHER RUPTURED (HCC): ICD-10-CM

## 2023-12-13 PROCEDURE — 99310 SBSQ NF CARE HIGH MDM 45: CPT | Performed by: NURSE PRACTITIONER

## 2023-12-13 NOTE — PROGRESS NOTES
Bettye Rome, 4/11/1945, 77year old, female is being discharged from [facility] 12/14/23 to home with Daughter and [facility]/PT/OT /RN      DISCHARGE SUMMARY    Date of Admission emh: 10/20/23 to 10/30/23      Date of Discharge [address] [facility] : 10/30/23 to 12/13/23                                Admitting Diagnoses: Weakness,  AAA , dizziness, nausea , complicated dc plan , fall risk       Reason for Admission:  41-year-old female with a history of coronary artery disease, COPD, history of DVT, type 2 diabetes, hypertension, hyperlipidemia, history of ovarian cancer, peptic ulcer disease, history of stroke, AAA status post endovascular repair early October , who presented to the hospital for evaluation of weakness 10/20/23 . Found to have MARYANNE. Also with new PNA dx on 10/23 and acute on chronic lower back pain with MRI on 10/25/23 reporting stabiity from MRI in 2021. Pain service consulted, pain improved with medrol dose pack, renal function improved, cleared by nephrology, vascular surgery, pain service for discharge back to [address] [place] [address].  And for follow-up with vascular surgery, nephrology, PCP on discharge when home. Patient seen in follow up, she is doing good . Daughter at the bedside and reports she just feels ready to go home in am .  She is happy to go  home in am with Home Health PT/OT/RN. No shortness of breath or chest pain, no nausea. Denies dizziness, no other new issues or complaints. REVIEW OF SYSTEMS:  Patient seen in follow up, she is doing better , ready to go home . Daughter at the bedside . No shortness of breath or chest pain, no nausea. No reported fevers or chills, no other new complaints or issues. Is at nursing station due to crying when daughter leaves the building .      PHYSICAL EXAM:     GENERAL HEALTH: well developed, well nourished, in no apparent distress  LINES, TUBES, DRAINS:  none  SKIN: no rashes, no suspicious lesions  WOUND: none  EYES: PERRLA, EOMI, sclera anicteric, conjunctiva normal; there is no nystagmus, no drainage from eyes  HENT: normocephalic; normal nose, no nasal drainage, mucous membranes pink, moist, pharynx no exudate, no visible cerumen. NECK: supple; FROM; no JVD, no TMG, no carotid bruits  BREAST: deferred  RESPIRATORY:clear to percussion and auscultation  CARDIOVASCULAR: S1, S2 normal, RRR; no S3, no S4;   ABDOMEN:  normal active BS+, soft, nondistended; no organomegaly, no masses; no bruits; nontender, no guarding, no rebound tenderness. Nauseous with small emesis   :no suprapubic distension  LYMPHATIC:no lymphedema  MUSCULOSKELETAL: no acute synovitis upper or lower extremity  EXTREMITIES/VASCULAR:no cyanosis, clubbing or edema  NEUROLOGIC: follows commands, dizziness with getting up fro therapies , started on meclizine, vss  PSYCHIATRIC: alert and oriented x 3; affect appropriate, although forgetful and crying off an don during the day         SEE PLAN BELOW  Cough, headache, achy - resolved  - Covid 19 rapid on 12/4, negative  - started tessalon pearls TID  - started tylenol 650mg Q6PRN  - STAT cbc and bmp on 12/4  - monitor      Episodes of dizziness and nauseous with small emesis - resolved   - Qshift vitals  - continue meclizine 25mg po q 8 prn   - continue Zofran 8mg odt q 6 prn   - has been drinking fluids but and eating fair   - bun /creat trending down .   -reports she is going slow in therapy due to the occasional dizzy spells  - monitor      MARYANNE on CKD- improved  - Cr 2.6 on admit, recently 2.0 on discharge 1 day prior, baseline Cr around 1.5  - renal consulted and to follow up this week   - renal US with dopplers with patent renal arteries  - s/p IVF, creatinine 2.14 on discharge  - CBC and BMP weekly and PRN       LE pain now lower back pain and some neck pain  Spinal stenosis- improving   - hypersensitivity +/- peripheral neuropathy, states only started after surgery but it appears patient has had issues before she has a lumbar spine MRI from 2021 and the daughter reports she has had back pain in the past.  - Patient has had lower back pain before, only mild neck pain, did not hit her neck or lower back during the fall per the patient. X-ray with degenerative changes. Still with a lot of pain, MRI ordered  - MRI without acute findings  - continue gabapentin 100mg BID  - continue tramadol 50mg Q12PRN  - continue lidocain patch to back   - continue tylenol 500mg Q8PRN  - monitor       Pneumonia- resolved   Fever,  temp of 100.8 10/23/2023  - Chest x-ray with pulmonary edema with possible pneumonia. Procalcitonin is elevated. Started on Zosyn. Patient looks clinically improved on 10/24/2023 and no further fevers. - UA not consistent with infection, respiratory viral panel negative  - BC NGTD  - Completed course of antibiotics for pneumonia  - cxr ordered 11/1/23  neg   - Pulm and RT following in rehab  - monitor     Delirium, metabolic encephalopathy- waxing and waning  - Patient has prolonged course both here and at [facility]. Has had intermittent confusion since. Monitor mental status on Norco.  Pain seems much improved and patient seems much more relaxed today. We will need to balance pain again side effects.   - oriented to self, family but not type of place and said 0 for year,  pleasant and cooperative, near recent baseline since acute hospitalization at [place]  - improved with melatonin 3mg po q hs   - continue to wax and wean but this afternoon very alert and talkative, oriented x 3   -was seen by Psych and started back on Seroquel at     -is much better and less confused when family present, reprots having an off day today      L knee and ankle pain- improving slowly   - started after fall at home  - knee and ankle XR ordered  - lidoderm patch 4% q day ( 12 hrs on 12hrs off)   - cont PT/OT      AAA status post endovascular repair this month on 10/9/23  - s/p endovascular abdominal aortic aneurysm stent graft repair with fenestrated device. -Vascular surgery consulted ok with discharge from hospital - follow-up outpatient  - continue asa 81mg po q day   - continue Plavix 75mg po q day   - Continue nitroglycerin . 4mg q 5 min x 5 prn chest pain   - continue tramadol 50mg po q 12   - continue tylenol 500 m gpo q 8 prn   - cardiology to follow in rehab  -saw cardiologist last week   - monitor      Anemia  - Hemoglobin 9.2  - No active bleeding  - CBC and bmp weekly in rehab , stable 9.1 11/24/23  - monitor      HLD  - continue zetia 10mg po daily   - presently not on fenofibrate  - monitor      HTN  CAD  Hx of CVA  - Qshift vitals   - continue metoprolol tartrate 100mg po bid   - continue isosorbide mononitrate er 60mg po q day   - continue amlodipine 5mg po q hs   - monitor         GERD  Hx Stomach ulcers  - continue protonix 40mg po bid      Type 2 DM with hyperglycemia  - continue metformin 500mg po qid   - accu check BID, BS 170s   - monitor      Weakness  - PT/OT /Speech   - still very weak, progressing very slowly in rehab   - see hpi , reproted fall next to bed, like a lowering to floor  this  early am , she has a hx of doing this in rehab , no injuries noted om exam   -no injuries noted and no changes on neuro exam- daughter reports she wasn't here yet when happened but she tends to place herself on floor when she things she is going to fall     COPD - stable   - hx of recent pneumonia in hospital , treated with antibx   - cxr 11/1/23 - reviewed, neg   - continue albuterol sulfate inhaler 2 puffs q 4 prn   - continue anoro ellipta one puff q day   - Pulm and RT  following  in rehab   - monitor        Discharge plan   -plan is for dc 12/14/23 home with daughter with [facility]/PT/OT/RN   -Patient and daughter a little apprehensive , she does wax and wean   -reports no dizziness to day just feeling blah and tired   -reviewed meds, no scripts needed      FOLLOW UP APPOINTMENTS              Future Appointments   Date Time Provider Hadley Chambers   12/19/2023  2:30 PM KEVAN Dimas [facility] [place]      This is a 35 minute visit and greater than 50% of the time was spent counseling the patient and/or coordinating care.     KEVAN Ortiz  12/13/2023  12:46 PM

## 2023-12-19 ENCOUNTER — HOSPITAL ENCOUNTER (EMERGENCY)
Facility: HOSPITAL | Age: 78
Discharge: HOME | End: 2023-12-20
Attending: EMERGENCY MEDICINE
Payer: MEDICARE

## 2023-12-19 DIAGNOSIS — K56.41 FECAL IMPACTION IN RECTUM (HCC): Primary | ICD-10-CM

## 2023-12-19 LAB
ALBUMIN SERPL-MCNC: 4.3 G/DL (ref 3.2–4.8)
ALBUMIN/GLOB SERPL: 1.5 {RATIO} (ref 1–2)
ALP LIVER SERPL-CCNC: 94 U/L
ALT SERPL-CCNC: <7 U/L
ANION GAP SERPL CALC-SCNC: 5 MMOL/L (ref 0–18)
ANTIBODY SCREEN: NEGATIVE
AST SERPL-CCNC: 15 U/L (ref ?–34)
BASOPHILS # BLD AUTO: 0.08 X10(3) UL (ref 0–0.2)
BASOPHILS NFR BLD AUTO: 0.9 %
BILIRUB SERPL-MCNC: 0.4 MG/DL (ref 0.2–1.1)
BUN BLD-MCNC: 23 MG/DL (ref 9–23)
BUN/CREAT SERPL: 13.5 (ref 10–20)
CALCIUM BLD-MCNC: 10.5 MG/DL (ref 8.7–10.4)
CHLORIDE SERPL-SCNC: 105 MMOL/L (ref 98–112)
CO2 SERPL-SCNC: 26 MMOL/L (ref 21–32)
CREAT BLD-MCNC: 1.7 MG/DL
DEPRECATED RDW RBC AUTO: 42 FL (ref 35.1–46.3)
EGFRCR SERPLBLD CKD-EPI 2021: 30 ML/MIN/1.73M2 (ref 60–?)
EOSINOPHIL # BLD AUTO: 0.15 X10(3) UL (ref 0–0.7)
EOSINOPHIL NFR BLD AUTO: 1.7 %
ERYTHROCYTE [DISTWIDTH] IN BLOOD BY AUTOMATED COUNT: 13.9 % (ref 11–15)
GLOBULIN PLAS-MCNC: 2.9 G/DL (ref 2.8–4.4)
GLUCOSE BLD-MCNC: 218 MG/DL (ref 70–99)
HCT VFR BLD AUTO: 37.5 %
HGB BLD-MCNC: 11.8 G/DL
IMM GRANULOCYTES # BLD AUTO: 0.05 X10(3) UL (ref 0–1)
IMM GRANULOCYTES NFR BLD: 0.6 %
LYMPHOCYTES # BLD AUTO: 1.33 X10(3) UL (ref 1–4)
LYMPHOCYTES NFR BLD AUTO: 15.1 %
MCH RBC QN AUTO: 26.2 PG (ref 26–34)
MCHC RBC AUTO-ENTMCNC: 31.5 G/DL (ref 31–37)
MCV RBC AUTO: 83.3 FL
MONOCYTES # BLD AUTO: 0.44 X10(3) UL (ref 0.1–1)
MONOCYTES NFR BLD AUTO: 5 %
NEUTROPHILS # BLD AUTO: 6.74 X10 (3) UL (ref 1.5–7.7)
NEUTROPHILS # BLD AUTO: 6.74 X10(3) UL (ref 1.5–7.7)
NEUTROPHILS NFR BLD AUTO: 76.7 %
OSMOLALITY SERPL CALC.SUM OF ELEC: 292 MOSM/KG (ref 275–295)
PLATELET # BLD AUTO: 481 10(3)UL (ref 150–450)
POTASSIUM SERPL-SCNC: 4 MMOL/L (ref 3.5–5.1)
PROT SERPL-MCNC: 7.2 G/DL (ref 5.7–8.2)
RBC # BLD AUTO: 4.5 X10(6)UL
RH BLOOD TYPE: POSITIVE
SODIUM SERPL-SCNC: 136 MMOL/L (ref 136–145)
WBC # BLD AUTO: 8.8 X10(3) UL (ref 4–11)

## 2023-12-19 PROCEDURE — 86850 RBC ANTIBODY SCREEN: CPT

## 2023-12-19 PROCEDURE — 86900 BLOOD TYPING SEROLOGIC ABO: CPT

## 2023-12-19 PROCEDURE — 86900 BLOOD TYPING SEROLOGIC ABO: CPT | Performed by: EMERGENCY MEDICINE

## 2023-12-19 PROCEDURE — 86901 BLOOD TYPING SEROLOGIC RH(D): CPT | Performed by: EMERGENCY MEDICINE

## 2023-12-19 PROCEDURE — 86901 BLOOD TYPING SEROLOGIC RH(D): CPT

## 2023-12-19 PROCEDURE — 85025 COMPLETE CBC W/AUTO DIFF WBC: CPT

## 2023-12-19 PROCEDURE — 99284 EMERGENCY DEPT VISIT MOD MDM: CPT

## 2023-12-19 PROCEDURE — 86850 RBC ANTIBODY SCREEN: CPT | Performed by: EMERGENCY MEDICINE

## 2023-12-19 PROCEDURE — 85025 COMPLETE CBC W/AUTO DIFF WBC: CPT | Performed by: EMERGENCY MEDICINE

## 2023-12-19 PROCEDURE — 99283 EMERGENCY DEPT VISIT LOW MDM: CPT

## 2023-12-19 PROCEDURE — 80053 COMPREHEN METABOLIC PANEL: CPT | Performed by: EMERGENCY MEDICINE

## 2023-12-19 PROCEDURE — 36415 COLL VENOUS BLD VENIPUNCTURE: CPT

## 2023-12-19 PROCEDURE — 80053 COMPREHEN METABOLIC PANEL: CPT

## 2023-12-19 RX ORDER — LIDOCAINE HYDROCHLORIDE 20 MG/ML
10 JELLY TOPICAL ONCE
Status: COMPLETED | OUTPATIENT
Start: 2023-12-19 | End: 2023-12-20

## 2023-12-20 VITALS
RESPIRATION RATE: 22 BRPM | HEART RATE: 98 BPM | SYSTOLIC BLOOD PRESSURE: 139 MMHG | BODY MASS INDEX: 31.41 KG/M2 | WEIGHT: 160 LBS | DIASTOLIC BLOOD PRESSURE: 74 MMHG | OXYGEN SATURATION: 95 % | TEMPERATURE: 100 F | HEIGHT: 60 IN

## 2023-12-20 NOTE — ED INITIAL ASSESSMENT (HPI)
Pt arrives through triage with complaints of constipation for a couple days. Per daughter pt had maroon BM. Was advised to come in. Denies dizziness.  Reports abdominal pain        Hx of quintin A- in Oct- pt on Plavix and aspirin

## 2024-01-15 NOTE — PROGRESS NOTES
Location [facility]  Date of service November 26, 2023.     I saw the patient as follow-up.  Overall she is doing better.  She would like to go home soon.  Denies any chest pain or shortness of breath.  Still feels fatigue.        PHYSICAL EXAM:  Vital signs reviewed  GENERAL HEALTH: well developed, well nourished,  HENT: normocephalic  NECK: supple  RESPIRATORY: Chest bilaterally clear  CARDIOVASCULAR: S1, S2 normal, RRR.  No pathology murmur  ABDOMEN:  normal active BS+, soft, nondistended; no organomegaly  EXTREMITIES-no significant edema  NEUROLOGIC: Alert orient x3.  Motor strength 5 out of 5 bilaterally  PSYCHIATRIC: Affect appropriate     Assessment and plan     Generalized weakness from recent hospitalization and comorbidities continue PT OT     Acute renal failure resolved     Chronic low back pain from spinal stenosis-optimize pain continue PT OT     Diabetes-monitor blood sugars     Dyslipidemia-continue fenofibrate and Zetia     History of venous thromboembolic thromboembolism-continue Eliquis     AAA s/p repair     Coronary disease status post PCI-stable     Peptic ulcer disease continue PPI     History of CVA      history of ovarian cancer     History of COPD     Labs-creatinine 2.14, hemoglobin 9.2     Plan  Continue PT OT  Monitor blood pressure and blood sugars optimize pain  Monitor labs

## 2024-01-15 NOTE — PROGRESS NOTES
Location [facility]  Date of service December 3 2023.     No complaints.  Discharge planning is in progress.  Ambulated in the hallway with a walker.        PHYSICAL EXAM:  Vital signs reviewed  GENERAL HEALTH: well developed, well nourished,  HENT: normocephalic  NECK: supple  RESPIRATORY: Chest bilaterally clear  CARDIOVASCULAR: S1, S2 normal, RRR.  No pathology murmur  ABDOMEN:  normal active BS+, soft, nondistended; no organomegaly  EXTREMITIES-no significant edema  NEUROLOGIC: Alert orient x3.  Motor strength 5 out of 5 bilaterally  PSYCHIATRIC: Affect appropriate     Assessment and plan     Generalized weakness from recent hospitalization and comorbidities continue PT OT getting better.     Chronic low back pain from spinal stenosis-optimize pain continue PT OT     Diabetes-monitor blood sugars-no hypoglycemic events     hypertension-monitor blood pressure.  Kidney functions got normalized.     Dyslipidemia-continue fenofibrate and Zetia     History of venous thromboembolic thromboembolism-continue Eliquis     AAA s/p repair     Coronary disease status post PCI-stable     Peptic ulcer disease continue PPI     History of CVA      history of ovarian cancer     History of COPD     Labs-creatinine 2.14, hemoglobin 9.2     Plan  Continue PT OT  Monitor blood pressure and blood sugars optimize pain  Monitor labs

## 2024-01-31 ENCOUNTER — HOSPITAL ENCOUNTER (EMERGENCY)
Facility: HOSPITAL | Age: 79
Discharge: HOME | End: 2024-01-31
Attending: EMERGENCY MEDICINE
Payer: MEDICARE

## 2024-01-31 ENCOUNTER — APPOINTMENT (OUTPATIENT)
Dept: GENERAL RADIOLOGY | Facility: HOSPITAL | Age: 79
End: 2024-01-31
Attending: EMERGENCY MEDICINE
Payer: MEDICARE

## 2024-01-31 ENCOUNTER — APPOINTMENT (OUTPATIENT)
Dept: CT IMAGING | Facility: HOSPITAL | Age: 79
End: 2024-01-31
Attending: EMERGENCY MEDICINE
Payer: MEDICARE

## 2024-01-31 VITALS
DIASTOLIC BLOOD PRESSURE: 78 MMHG | SYSTOLIC BLOOD PRESSURE: 110 MMHG | OXYGEN SATURATION: 97 % | TEMPERATURE: 99 F | HEART RATE: 65 BPM | RESPIRATION RATE: 15 BRPM
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
## 2024-01-31 DIAGNOSIS — N17.9 AKI (ACUTE KIDNEY INJURY) (HCC): ICD-10-CM

## 2024-01-31 DIAGNOSIS — E86.0 DEHYDRATION: ICD-10-CM

## 2024-01-31 LAB
ANION GAP SERPL CALC-SCNC: 9 MMOL/L (ref 0–18)
BASOPHILS # BLD AUTO: 0.07 X10(3) UL (ref 0–0.2)
BASOPHILS NFR BLD AUTO: 1.1 %
BILIRUB UR QL: NEGATIVE
BUN BLD-MCNC: 34 MG/DL (ref 9–23)
BUN/CREAT SERPL: 15.3 (ref 10–20)
CALCIUM BLD-MCNC: 9.8 MG/DL (ref 8.7–10.4)
CHLORIDE SERPL-SCNC: 106 MMOL/L (ref 98–112)
CLARITY UR: CLEAR
CO2 SERPL-SCNC: 28 MMOL/L (ref 21–32)
CREAT BLD-MCNC: 2.22 MG/DL
DEPRECATED RDW RBC AUTO: 44.5 FL (ref 35.1–46.3)
EGFRCR SERPLBLD CKD-EPI 2021: 22 ML/MIN/1.73M2 (ref 60–?)
EOSINOPHIL # BLD AUTO: 0.29 X10(3) UL (ref 0–0.7)
EOSINOPHIL NFR BLD AUTO: 4.6 %
ERYTHROCYTE [DISTWIDTH] IN BLOOD BY AUTOMATED COUNT: 14.6 % (ref 11–15)
GLUCOSE BLD-MCNC: 231 MG/DL (ref 70–99)
GLUCOSE UR-MCNC: 30 MG/DL
HCT VFR BLD AUTO: 35.1 %
HGB BLD-MCNC: 11.1 G/DL
HGB UR QL STRIP.AUTO: NEGATIVE
IMM GRANULOCYTES # BLD AUTO: 0.02 X10(3) UL (ref 0–1)
IMM GRANULOCYTES NFR BLD: 0.3 %
KETONES UR-MCNC: NEGATIVE MG/DL
LEUKOCYTE ESTERASE UR QL STRIP.AUTO: NEGATIVE
LYMPHOCYTES # BLD AUTO: 1.72 X10(3) UL (ref 1–4)
LYMPHOCYTES NFR BLD AUTO: 27.3 %
MCH RBC QN AUTO: 26.5 PG (ref 26–34)
MCHC RBC AUTO-ENTMCNC: 31.6 G/DL (ref 31–37)
MCV RBC AUTO: 83.8 FL
MONOCYTES # BLD AUTO: 0.4 X10(3) UL (ref 0.1–1)
MONOCYTES NFR BLD AUTO: 6.3 %
NEUTROPHILS # BLD AUTO: 3.8 X10 (3) UL (ref 1.5–7.7)
NEUTROPHILS # BLD AUTO: 3.8 X10(3) UL (ref 1.5–7.7)
NEUTROPHILS NFR BLD AUTO: 60.4 %
NITRITE UR QL STRIP.AUTO: NEGATIVE
OSMOLALITY SERPL CALC.SUM OF ELEC: 311 MOSM/KG (ref 275–295)
PH UR: 5.5 [PH] (ref 5–8)
PLATELET # BLD AUTO: 300 10(3)UL (ref 150–450)
POTASSIUM SERPL-SCNC: 3.6 MMOL/L (ref 3.5–5.1)
PROT UR-MCNC: 30 MG/DL
RBC # BLD AUTO: 4.19 X10(6)UL
SODIUM SERPL-SCNC: 143 MMOL/L (ref 136–145)
SP GR UR STRIP: 1.02 (ref 1–1.03)
UROBILINOGEN UR STRIP-ACNC: NORMAL
WBC # BLD AUTO: 6.3 X10(3) UL (ref 4–11)

## 2024-01-31 PROCEDURE — 73130 X-RAY EXAM OF HAND: CPT | Performed by: EMERGENCY MEDICINE

## 2024-01-31 PROCEDURE — 80048 BASIC METABOLIC PNL TOTAL CA: CPT | Performed by: EMERGENCY MEDICINE

## 2024-01-31 PROCEDURE — 81001 URINALYSIS AUTO W/SCOPE: CPT | Performed by: EMERGENCY MEDICINE

## 2024-01-31 PROCEDURE — 96360 HYDRATION IV INFUSION INIT: CPT

## 2024-01-31 PROCEDURE — 99284 EMERGENCY DEPT VISIT MOD MDM: CPT

## 2024-01-31 PROCEDURE — 70450 CT HEAD/BRAIN W/O DYE: CPT | Performed by: EMERGENCY MEDICINE

## 2024-01-31 PROCEDURE — 71045 X-RAY EXAM CHEST 1 VIEW: CPT | Performed by: EMERGENCY MEDICINE

## 2024-01-31 PROCEDURE — 99285 EMERGENCY DEPT VISIT HI MDM: CPT

## 2024-01-31 PROCEDURE — 85025 COMPLETE CBC W/AUTO DIFF WBC: CPT | Performed by: EMERGENCY MEDICINE

## 2024-02-01 NOTE — ED PROVIDER NOTES
Patient Seen in: [facility] Emergency Department      History     Chief Complaint   Patient presents with    Fall    Head Neck Injury     Stated Complaint: Fall on thinners, hit head, blurry vision    Subjective:   HPI        Objective:   Past Medical History:   Diagnosis Date    Aortic aneurysm, thoracic (HCC)     Back problem     Calculus of kidney     Cataract     COPD (chronic obstructive pulmonary disease) (HCC)     Coronary atherosclerosis     Deep vein thrombosis (HCC)     Diabetes (HCC)     Diverticulosis of large intestine     Heart attack (HCC)     2013    High blood pressure     High cholesterol     History of stomach ulcers     Incontinence     Osteoarthritis     Ovarian cancer (HCC)     PONV (postoperative nausea and vomiting)     Renal disorder     Shortness of breath     Stroke ([facility]) 10/15/2017    shaking, wheelchair              Past Surgical History:   Procedure Laterality Date    APPENDECTOMY      ARTHROSCOPY OF JOINT UNLISTED Right     Rotator cuff surgery    BACK SURGERY  07/21/2017    L3-4 LUIS and hemilaminectomies    CATARACT      CATH DRUG ELUTING STENT      2013    ENDOVAS AAA REPR W/3-P PART      EP LOOP RECORDER IMPLANT Left 2017    FOOT SURGERY Bilateral     FRACTURE SURGERY Right     right clavicle    HYSTERECTOMY      PRATEEK LOCALIZATION WIRE 1 SITE RIGHT (CPT=19281)      benign-20 years ago    SPINE SURGERY PROCEDURE UNLISTED                  Social History     Socioeconomic History    Marital status:    Tobacco Use    Smoking status: Former     Packs/day: 0.50     Years: 35.00     Additional pack years: 0.00     Total pack years: 17.50     Types: Cigarettes    Smokeless tobacco: Former    Tobacco comments:     half pack a day   Vaping Use    Vaping Use: Never used   Substance and Sexual Activity    Alcohol use: No    Drug use: No     Social Determinants of Health     Food Insecurity: No Food Insecurity (10/20/2023)    Food Insecurity     Food Insecurity: Never true    Transportation Needs: No Transportation Needs (10/20/2023)    Transportation Needs     Lack of Transportation: No   Housing Stability: Low Risk  (10/20/2023)    Housing Stability     Housing Instability: No              Review of Systems    Positive for stated complaint: Fall on thinners, hit head, blurry vision  Other systems are as noted in HPI.  Constitutional and vital signs reviewed.      All other systems reviewed and negative except as noted above.    Physical Exam     ED Triage Vitals   BP 01/31/24 1913 149/70   Pulse 01/31/24 1913 63   Resp 01/31/24 1911 16   Temp 01/31/24 1911 99.1 °F (37.3 °C)   Temp src 01/31/24 1911 Temporal   SpO2 01/31/24 1913 98 %   O2 Device 01/31/24 1913 None (Room air)       Current:/78   Pulse 65   Temp 99.1 °F (37.3 °C) (Temporal)   Resp 15   SpO2 97%         Physical Exam          ED Course     Labs Reviewed   BASIC METABOLIC PANEL (8) - Abnormal; Notable for the following components:       Result Value    Glucose 231 (*)     BUN 34 (*)     Creatinine 2.22 (*)     Calculated Osmolality 311 (*)     eGFR-Cr 22 (*)     All other components within normal limits   URINALYSIS WITH CULTURE REFLEX - Abnormal; Notable for the following components:    Glucose Urine 30 (*)     Protein Urine 30 (*)     Bacteria Urine Rare (*)     Squamous Epi. Cells Few (*)     All other components within normal limits   CBC W/ DIFFERENTIAL - Abnormal; Notable for the following components:    HGB 11.1 (*)     All other components within normal limits   CBC WITH DIFFERENTIAL WITH PLATELET    Narrative:     The following orders were created for panel order CBC With Differential With Platelet.  Procedure                               Abnormality         Status                     ---------                               -----------         ------                     CBC W/ DIFFERENTIAL[[id number]]          Abnormal            Final result                 Please view results for these tests on the  individual orders.                      MDM      78-year-old female with history of COPD, hypertension, CAD, DVT, CVA presents today after a fall.  Patient and family report that she slipped from the edge of the bed today and did strike her head on a wooden chest.  Patient currently on anticoagulation.  Also suffered a skin tear on the dorsum of the right hand.  Patient states she has been feeling somewhat weak over the last few days and there is some concern for pain with urination and a history of UTIs.  Denies chest pain, palpitations, lightheadedness, or other new symptoms.  No new medications.    On exam, vitals normal, nontoxic-appearing, no external evidence of head injury.  Skin tear on the right hand without active bleeding.  No bony deformity.  No stigmata of infection.  Soft and nontender abdomen.    Differential: Fall, dehydration, UTI, pneumonia, viral illness    Patient evaluated with labs which showed a likely prerenal MARYANNE, no leukocytosis, no evidence of UTI, chest x-ray negative, CT brain negative    Patient given IV fluids in the ED and on reexamination felt better and continued to have normal vital signs.  She has follow-up with her nephrologist this coming week.  Patient encouraged to focus on hydration and given careful return precautions and discharged.                                   MDM    Disposition and Plan     Clinical Impression:  1. Fall, initial encounter    2. MARYANNE (acute kidney injury) (HCC)    3. Dehydration         Disposition:  Discharge  1/31/2024 10:12 pm    Follow-up:  your nephrologist    Follow up      We recommend that you schedule follow up care with a primary care provider within the next three months to obtain basic health screening including reassessment of your blood pressure.      Medications Prescribed:  Current Discharge Medication List

## 2024-02-01 NOTE — ED INITIAL ASSESSMENT (HPI)
Pt to ED for a fall and head injury. Per pt, she was sitting on edge of the bed and slipped off and hit head on wood chest near bed. Pt denies any LOC. Pt is on blood thinners. Pt also has skin tear to right hand. Pt complaining of double vision and dizziness.

## 2024-02-23 ENCOUNTER — OFFICE VISIT (OUTPATIENT)
Dept: NEPHROLOGY | Facility: CLINIC | Age: 79
End: 2024-02-23
Payer: MEDICARE

## 2024-02-23 VITALS
BODY MASS INDEX: 31 KG/M2 | SYSTOLIC BLOOD PRESSURE: 134 MMHG | HEART RATE: 53 BPM | DIASTOLIC BLOOD PRESSURE: 75 MMHG | HEIGHT: 60 IN

## 2024-02-23 DIAGNOSIS — N18.32 STAGE 3B CHRONIC KIDNEY DISEASE (HCC): Primary | ICD-10-CM

## 2024-02-23 PROCEDURE — 99214 OFFICE O/P EST MOD 30 MIN: CPT | Performed by: INTERNAL MEDICINE

## 2024-02-24 NOTE — PROGRESS NOTES
02/24/24        Patient: Jillian Yip   YOB: 1945   Date of Visit: 2/23/2024       Dear  Dr. Banegas,      Thank you for referring Jillian Yip to my practice.  Please find my assessment and plan below.      As you know she is a 78-year-old female with a history of coronary artery disease, COPD, history of DVTs, adult onset diabetes mellitus, hypertension, hypercholesterolemia, status post CVA, history of recurrent renal calculi, stress incontinence and a former cigarette smoker who I now the pleasure of seeing for follow-up of chronic kidney disease stage III-IV.  I really have not seen her since August 2019.  At that time she had a creatinine in the 1.4 range with an estimated GFR of 36 cc/min.  According to the daughter her kidney function has remained fairly stable but she had undergone recent abdominal aortic aneurysm endovascular repair earlier on October 9, 2023.  She then presented to the hospital again from October 20 through October 30, 2023 with generalized weakness.  She was also found to have acute on chronic renal insufficiency.  She was diagnosed with pneumonia and had metabolic encephalopathy.      During that hospitalization her creatinine initially was 2.6 but was down to 2.14 at time of discharge on October 30, 2023.  Follow-up labs show that on December 19, 2023 her creatinine was down to 1.70 but then on January 31, 2024 the creatinine crept up to 2.22 now with an estimated GFR 22 cc/min and as result the daughter brought her in for further workup and evaluation.  This lab was actually done in the emergency room after she tripped and fell.  CT scan of the head and x-rays were nonrevealing and as result the patient was sent home.    The patient had been in [facility] rehab.  She is now back home.  She is still weak and debilitated but able to ambulate with a walker.  Eating fair.  Tendency towards constipation.  No urinary tract symptoms.    On physical exam her blood  pressure is 134/75 with a pulse of 53.  Her neck was supple without JVD.  Lungs were clear.  Heart revealed a regular rate and rhythm with an S4 but no gallops, murmurs or rubs.  Abdomen soft, flat, nontender without organomegaly, masses or bruits.  Extremities reveal no clubbing, cyanosis or edema.    I therefore reviewed her laboratory studies with her daughter.  Her daughter feels she does not really do a good job at drinking fluids.  Reinforced the importance of maintaining adequate hydration.  Avoid nonsteroidals.  Not totally clear if her blood sugars are under adequate control or not.  She will try drink more fluids.  Repeat labs in a couple of weeks.  If stable will continue quarterly.  Will see again in 6 months for follow-up or sooner if clinically indicated.    Thank you again for allowing me to participate in the care of your patient.  If you have any questions please feel free to call.           Sincerely,   Tab Thayer MD   [facility], [facility]  [address]  [facility] [address]    Document electronically generated by:  Tab Thayer MD

## 2024-02-27 ENCOUNTER — TELEPHONE (OUTPATIENT)
Dept: NEPHROLOGY | Facility: CLINIC | Age: 79
End: 2024-02-27

## 2024-03-04 ENCOUNTER — LAB ENCOUNTER (OUTPATIENT)
Dept: LAB | Age: 79
End: 2024-03-04
Attending: INTERNAL MEDICINE
Payer: MEDICARE

## 2024-03-04 DIAGNOSIS — N18.32 STAGE 3B CHRONIC KIDNEY DISEASE (HCC): ICD-10-CM

## 2024-03-04 LAB
ALBUMIN SERPL-MCNC: 4.4 G/DL (ref 3.2–4.8)
ANION GAP SERPL CALC-SCNC: 9 MMOL/L (ref 0–18)
BASOPHILS # BLD AUTO: 0.07 X10(3) UL (ref 0–0.2)
BASOPHILS NFR BLD AUTO: 1.1 %
BUN BLD-MCNC: 42 MG/DL (ref 9–23)
BUN/CREAT SERPL: 21.3 (ref 10–20)
CALCIUM BLD-MCNC: 9.9 MG/DL (ref 8.7–10.4)
CHLORIDE SERPL-SCNC: 107 MMOL/L (ref 98–112)
CO2 SERPL-SCNC: 28 MMOL/L (ref 21–32)
CREAT BLD-MCNC: 1.97 MG/DL
DEPRECATED RDW RBC AUTO: 45.7 FL (ref 35.1–46.3)
EGFRCR SERPLBLD CKD-EPI 2021: 25 ML/MIN/1.73M2 (ref 60–?)
EOSINOPHIL # BLD AUTO: 0.28 X10(3) UL (ref 0–0.7)
EOSINOPHIL NFR BLD AUTO: 4.5 %
ERYTHROCYTE [DISTWIDTH] IN BLOOD BY AUTOMATED COUNT: 14.8 % (ref 11–15)
GLUCOSE BLD-MCNC: 240 MG/DL (ref 70–99)
HCT VFR BLD AUTO: 35.6 %
HGB BLD-MCNC: 10.9 G/DL
IMM GRANULOCYTES # BLD AUTO: 0.01 X10(3) UL (ref 0–1)
IMM GRANULOCYTES NFR BLD: 0.2 %
LYMPHOCYTES # BLD AUTO: 1.55 X10(3) UL (ref 1–4)
LYMPHOCYTES NFR BLD AUTO: 24.8 %
MCH RBC QN AUTO: 26 PG (ref 26–34)
MCHC RBC AUTO-ENTMCNC: 30.6 G/DL (ref 31–37)
MCV RBC AUTO: 85 FL
MONOCYTES # BLD AUTO: 0.42 X10(3) UL (ref 0.1–1)
MONOCYTES NFR BLD AUTO: 6.7 %
NEUTROPHILS # BLD AUTO: 3.93 X10 (3) UL (ref 1.5–7.7)
NEUTROPHILS # BLD AUTO: 3.93 X10(3) UL (ref 1.5–7.7)
NEUTROPHILS NFR BLD AUTO: 62.7 %
OSMOLALITY SERPL CALC.SUM OF ELEC: 316 MOSM/KG (ref 275–295)
PHOSPHATE SERPL-MCNC: 4.2 MG/DL (ref 2.4–5.1)
PLATELET # BLD AUTO: 270 10(3)UL (ref 150–450)
POTASSIUM SERPL-SCNC: 3.7 MMOL/L (ref 3.5–5.1)
RBC # BLD AUTO: 4.19 X10(6)UL
SODIUM SERPL-SCNC: 144 MMOL/L (ref 136–145)
WBC # BLD AUTO: 6.3 X10(3) UL (ref 4–11)

## 2024-03-04 PROCEDURE — 80069 RENAL FUNCTION PANEL: CPT

## 2024-03-04 PROCEDURE — 36415 COLL VENOUS BLD VENIPUNCTURE: CPT

## 2024-03-04 PROCEDURE — 85025 COMPLETE CBC W/AUTO DIFF WBC: CPT

## 2024-03-11 ENCOUNTER — TELEPHONE (OUTPATIENT)
Dept: NEPHROLOGY | Facility: CLINIC | Age: 79
End: 2024-03-11

## 2024-03-11 NOTE — TELEPHONE ENCOUNTER
----- Message from Tab Thayer MD sent at 3/9/2024 10:52 AM CST -----  Please call patient regarding test results.  Not reading [employer]

## 2024-04-10 ENCOUNTER — TELEPHONE (OUTPATIENT)
Dept: NEPHROLOGY | Facility: CLINIC | Age: 79
End: 2024-04-10

## 2024-04-10 DIAGNOSIS — N18.32 STAGE 3B CHRONIC KIDNEY DISEASE (HCC): Primary | ICD-10-CM

## 2024-04-10 DIAGNOSIS — R30.0 DYSURIA: ICD-10-CM

## 2024-04-10 NOTE — TELEPHONE ENCOUNTER
Patients daughter Keaton calling for patient that has kidney pain and problems urinating. Please call at [phone],thanks.

## 2024-04-10 NOTE — TELEPHONE ENCOUNTER
Do a urinalysis and urine culture but if she is having any fevers, chills or severe flank pain go to the ER.

## 2024-04-10 NOTE — TELEPHONE ENCOUNTER
Dr. Thayer, spoke to pt's dtr. States pt has been c/o pain on urination. And pt's son states pt is having kidney pain. States she has chronic kidney stones. Has not seen a urologist in a while. Advised if it is a stone, she will probably need to see a urologist. Is doing labs this week sometime, fasting. Did you want to add any urine tests?

## 2024-04-11 NOTE — TELEPHONE ENCOUNTER
Spoke to pt's dtr (/prasanna confirmed). Will do urine testing. Advised we would let know what results show and go from there. Verbalized understanding.

## 2024-04-18 ENCOUNTER — OFFICE VISIT (OUTPATIENT)
Dept: FAMILY MEDICINE CLINIC | Facility: CLINIC | Age: 79
End: 2024-04-18
Payer: MEDICARE

## 2024-04-18 VITALS
RESPIRATION RATE: 18 BRPM | HEIGHT: 60.24 IN | OXYGEN SATURATION: 95 % | HEART RATE: 67 BPM | WEIGHT: 142 LBS | BODY MASS INDEX: 27.52 KG/M2 | SYSTOLIC BLOOD PRESSURE: 125 MMHG | DIASTOLIC BLOOD PRESSURE: 68 MMHG | TEMPERATURE: 98 F

## 2024-04-18 DIAGNOSIS — E11.22 TYPE 2 DIABETES MELLITUS WITH STAGE 4 CHRONIC KIDNEY DISEASE, WITHOUT LONG-TERM CURRENT USE OF INSULIN (HCC): Primary | ICD-10-CM

## 2024-04-18 DIAGNOSIS — R41.3 MEMORY LOSS: ICD-10-CM

## 2024-04-18 DIAGNOSIS — N18.4 TYPE 2 DIABETES MELLITUS WITH STAGE 4 CHRONIC KIDNEY DISEASE, WITHOUT LONG-TERM CURRENT USE OF INSULIN (HCC): Primary | ICD-10-CM

## 2024-04-18 PROCEDURE — 99204 OFFICE O/P NEW MOD 45 MIN: CPT | Performed by: FAMILY MEDICINE

## 2024-04-18 RX ORDER — UMECLIDINIUM BROMIDE AND VILANTEROL TRIFENATATE 62.5; 25 UG/1; UG/1
1 POWDER RESPIRATORY (INHALATION) DAILY
Qty: 1 EACH | Refills: 1 | Status: SHIPPED | OUTPATIENT
Start: 2024-04-18

## 2024-04-18 RX ORDER — ALBUTEROL SULFATE 90 UG/1
2 AEROSOL, METERED RESPIRATORY (INHALATION) EVERY 4 HOURS PRN
Qty: 1 EACH | Refills: 1 | Status: SHIPPED | OUTPATIENT
Start: 2024-04-18

## 2024-04-18 RX ORDER — PANTOPRAZOLE SODIUM 40 MG/1
40 TABLET, DELAYED RELEASE ORAL
Qty: 180 TABLET | Refills: 3 | Status: SHIPPED | OUTPATIENT
Start: 2024-04-18

## 2024-04-18 NOTE — PROGRESS NOTES
HPI: Jillian is a 79 year old female who presents new to clinic for establishment of care. Lives with daughter and grandson.  Walks but uses assistive devices at times. Hired caregiver.   -Had previous heart attack and stroke.  Had large stroke about 7 years ago. Sees Dr. Minor.   -Had aortic aneurysm repair at [facility] in 10/23 by Dr. Tuttle. Had pneumonia and then went to rehab for a month and a half.   -Has dementia. Daughter states this is getting worse. She cannot be left alone.  Struggles with ADLs. Has increased depression and anxiety.  She has some paranoia. Anxious and struggles with sleep. Has tried Seroquel but this increased her anxiety.   -Has chronic back issues.  Saw pain clinic and has had injections. Takes Tramadol for pain. Had a minor surgeries years ago.   -Has diabetes. Diagnosed about 10 years ago.  Used to be on Metformin but rehab facility switched her to insulin. Daughter does not have dosing. Average blood sugars are about 200.   -Sees Dr. Thayer for CKD. Had chronic kidney stones. Last GFR was 25.   -Has HTN  -Has COPD.  Was a smoker. Quit smoking in October. Had smoked for 30 years. On Albuterol as needed. Started on Ellipta in rehab center.  Uses Albuterol 5 times a week.  Worse with weather change.   -Daughter, Brigida Maldonado,  needs intermittent FMLA to care for mother. Has had FMLA for the past few years.  Needs to go to doctors visits. Has to take off when she is not feeling well.   1-4 episodes per month lasting 1-2 days  4 doctors' visits per month  Needs it faxed to   -Started on Gabapentin in the rehab center. Does not think it is helping.     PMH:    Past Medical History:    Aortic aneurysm, thoracic (HCC)    Back problem    Calculus of kidney    Cataract    COPD (chronic obstructive pulmonary disease) (HCC)    Coronary atherosclerosis    Deep vein thrombosis (HCC)    Diabetes (HCC)    Diverticulosis of large intestine    Heart attack (HCC)    2013    High blood pressure     High cholesterol    History of stomach ulcers    Incontinence    Osteoarthritis    Ovarian cancer (HCC)    PONV (postoperative nausea and vomiting)    Renal disorder    Shortness of breath    Stroke (HCC)    shaking, wheelchair      Alg:  Codeine, Enalapril maleate-felodipine, Nyquil severe cold-flu [gnp multi-symptom cold], Radiology contrast iodinated dyes, Shellfish-derived products, Dilaudid [hydromorphone], and Sulfa antibiotics   Meds:   Current Outpatient Medications on File Prior to Visit   Medication Sig Dispense Refill    traMADol 50 MG Oral Tab Take 1 tablet (50 mg total) by mouth every 12 (twelve) hours as needed. 8 tablet 0    acetaminophen 500 MG Oral Tab Take 1 tablet (500 mg total) by mouth every 8 (eight) hours.      gabapentin 100 MG Oral Cap Take 1 capsule (100 mg total) by mouth daily.      lidocaine-menthol 4-1 % External Patch Place 2 patches onto the skin daily.      aspirin 81 MG Oral Chew Tab Chew 1 tablet (81 mg total) by mouth daily. 30 tablet 0    bisacodyl 10 MG Rectal Suppos Place 1 suppository (10 mg total) rectally daily as needed.      metoprolol tartrate 100 MG Oral Tab Take 1 tablet (100 mg total) by mouth 2 (two) times daily.      nitroglycerin 0.4 MG Sublingual SL Tab Place 1 tablet (0.4 mg total) under the tongue every 5 (five) minutes as needed for Chest pain.      pantoprazole 40 MG Oral Tab EC Take 1 tablet (40 mg total) by mouth 2 (two) times daily before meals. 180 tablet 3    amLODIPine Besylate 5 MG Oral Tab Take 1 tablet (5 mg total) by mouth at bedtime.      ONETOUCH DELICA LANCETS 33G Does not apply Misc OneTouch Delica Lancets 33 gauge   TEST BLOOD GLUCOSE TWO TIMES A DAY AS DIRECTED      Clopidogrel Bisulfate 75 MG Oral Tab Take 1 tablet (75 mg total) by mouth daily. 30 tablet 0    ezetimibe 10 MG Oral Tab Take 1 tablet (10 mg total) by mouth nightly.       No current facility-administered medications on file prior to visit.      Tobacco Use: no    Objective:   Gen:  AOx3. NAD.  /68   Pulse 67   Temp 97.6 °F (36.4 °C) (Temporal)   Resp 18   Ht 5' 0.24\" (1.53 m)   Wt 142 lb (64.4 kg)   BMI 27.52 kg/m²   CV:  Regular rate and rhythm; no murmurs  Lungs:  Clear to ausculation; good aeration               No wheezes, rales or rhonchi      Assessment:/Plan:  Encounter Diagnoses   Name Primary?    Type 2 diabetes mellitus with stage 4 chronic kidney disease, without long-term current use of insulin (HCC)    Needs insulin refill.  Will send discharge medication list.  Will refer to Endocrinology to review medications.  Pt does not like insulin and she may be better off taking something different.    Yes    Memory loss    Daughter states memory loss has been significant and worsening.  Has paranoia.  ?Alzheimer's. Will refer to Neurology as diagnosis is needed.  FMLA for daughter will be done.           Colleen Weiler, DO

## 2024-04-19 ENCOUNTER — TELEPHONE (OUTPATIENT)
Dept: FAMILY MEDICINE CLINIC | Facility: CLINIC | Age: 79
End: 2024-04-19

## 2024-04-19 NOTE — TELEPHONE ENCOUNTER
Keaton , patient's daughter, is calling to confirm if PCP's office received the discharge papers from July [facility].   The information was faxed today, 4/19/24.   Please advise.

## 2024-04-21 ENCOUNTER — LAB ENCOUNTER (OUTPATIENT)
Dept: LAB | Facility: HOSPITAL | Age: 79
End: 2024-04-21
Attending: INTERNAL MEDICINE
Payer: MEDICARE

## 2024-04-21 DIAGNOSIS — R30.0 DYSURIA: ICD-10-CM

## 2024-04-21 DIAGNOSIS — N18.32 STAGE 3B CHRONIC KIDNEY DISEASE (HCC): ICD-10-CM

## 2024-04-21 LAB
BILIRUB UR QL: NEGATIVE
CLARITY UR: CLEAR
COLOR UR: COLORLESS
GLUCOSE UR-MCNC: 50 MG/DL
HGB UR QL STRIP.AUTO: NEGATIVE
KETONES UR-MCNC: NEGATIVE MG/DL
LEUKOCYTE ESTERASE UR QL STRIP.AUTO: 75
NITRITE UR QL STRIP.AUTO: NEGATIVE
PH UR: 6.5 [PH] (ref 5–8)
SP GR UR STRIP: 1.01 (ref 1–1.03)
UROBILINOGEN UR STRIP-ACNC: NORMAL

## 2024-04-21 PROCEDURE — 81001 URINALYSIS AUTO W/SCOPE: CPT

## 2024-04-21 PROCEDURE — 87086 URINE CULTURE/COLONY COUNT: CPT

## 2024-04-23 ENCOUNTER — TELEPHONE (OUTPATIENT)
Dept: NEPHROLOGY | Facility: CLINIC | Age: 79
End: 2024-04-23

## 2024-04-23 NOTE — TELEPHONE ENCOUNTER
Although the urinalysis did suggest a UTI the urine culture was negative.  If she is still having UTI symptoms refer to urology for further workup and evaluation.

## 2024-04-23 NOTE — TELEPHONE ENCOUNTER
Informed patient daughter Keaton (prasanna verified)of note below  and verbalized understanding reported  symptoms is better  but still has pain that comes and goes.

## 2024-04-24 RX ORDER — ONDANSETRON 8 MG/1
8 TABLET, ORALLY DISINTEGRATING ORAL EVERY 8 HOURS PRN
COMMUNITY

## 2024-04-24 RX ORDER — POLYETHYLENE GLYCOL 3350 17 G/17G
17 POWDER, FOR SOLUTION ORAL DAILY
COMMUNITY

## 2024-04-24 RX ORDER — INSULIN LISPRO 100 [IU]/ML
INJECTION, SOLUTION INTRAVENOUS; SUBCUTANEOUS
Qty: 3 ML | Refills: 1 | Status: SHIPPED | OUTPATIENT
Start: 2024-04-24

## 2024-04-24 RX ORDER — MECLIZINE HYDROCHLORIDE 25 MG/1
25 TABLET ORAL 3 TIMES DAILY PRN
COMMUNITY

## 2024-04-24 RX ORDER — SIMETHICONE 80 MG
80 TABLET,CHEWABLE ORAL
COMMUNITY

## 2024-04-24 RX ORDER — MELATONIN
3 NIGHTLY
COMMUNITY

## 2024-04-24 RX ORDER — ISOSORBIDE MONONITRATE 30 MG/1
30 TABLET, EXTENDED RELEASE ORAL DAILY
COMMUNITY

## 2024-04-24 RX ORDER — QUETIAPINE FUMARATE 25 MG/1
25 TABLET, FILM COATED ORAL NIGHTLY
COMMUNITY

## 2024-04-24 RX ORDER — ASCORBIC ACID 500 MG
500 TABLET ORAL DAILY
COMMUNITY

## 2024-04-24 RX ORDER — GABAPENTIN 100 MG/1
100 CAPSULE ORAL 2 TIMES DAILY
COMMUNITY

## 2024-04-25 DIAGNOSIS — N18.4 TYPE 2 DIABETES MELLITUS WITH STAGE 4 CHRONIC KIDNEY DISEASE, WITHOUT LONG-TERM CURRENT USE OF INSULIN (HCC): Primary | ICD-10-CM

## 2024-04-25 DIAGNOSIS — E11.22 TYPE 2 DIABETES MELLITUS WITH STAGE 4 CHRONIC KIDNEY DISEASE, WITHOUT LONG-TERM CURRENT USE OF INSULIN (HCC): Primary | ICD-10-CM

## 2024-04-26 ENCOUNTER — TELEPHONE (OUTPATIENT)
Dept: ENDOCRINOLOGY | Facility: HOSPITAL | Age: 79
End: 2024-04-26

## 2024-04-26 RX ORDER — LANCETS 33 GAUGE
1 EACH MISCELLANEOUS 3 TIMES DAILY
Qty: 300 EACH | Refills: 3 | Status: SHIPPED | OUTPATIENT
Start: 2024-04-26 | End: 2025-04-26

## 2024-04-26 RX ORDER — BLOOD SUGAR DIAGNOSTIC
STRIP MISCELLANEOUS
Qty: 300 STRIP | Refills: 3 | Status: SHIPPED | OUTPATIENT
Start: 2024-04-26 | End: 2025-04-26

## 2024-04-26 NOTE — TELEPHONE ENCOUNTER
Orders signed.      Per diabetes navigator Maryse XAVIER, pharmacy is asking for max daily dose of insulin before they can dispense.  Called pharmacy.  Advised that max dose per day would be 15 units.

## 2024-05-13 ENCOUNTER — LAB ENCOUNTER (OUTPATIENT)
Dept: LAB | Age: 79
End: 2024-05-13

## 2024-05-13 ENCOUNTER — PATIENT MESSAGE (OUTPATIENT)
Dept: ENDOCRINOLOGY CLINIC | Facility: CLINIC | Age: 79
End: 2024-05-13

## 2024-05-13 ENCOUNTER — OFFICE VISIT (OUTPATIENT)
Dept: ENDOCRINOLOGY CLINIC | Facility: CLINIC | Age: 79
End: 2024-05-13
Payer: MEDICARE

## 2024-05-13 VITALS
RESPIRATION RATE: 18 BRPM | SYSTOLIC BLOOD PRESSURE: 144 MMHG | HEART RATE: 50 BPM | BODY MASS INDEX: 27.52 KG/M2 | WEIGHT: 142 LBS | HEIGHT: 60.24 IN | DIASTOLIC BLOOD PRESSURE: 70 MMHG

## 2024-05-13 DIAGNOSIS — N18.32 STAGE 3B CHRONIC KIDNEY DISEASE (HCC): ICD-10-CM

## 2024-05-13 DIAGNOSIS — E11.65 UNCONTROLLED TYPE 2 DIABETES MELLITUS WITH HYPERGLYCEMIA (HCC): ICD-10-CM

## 2024-05-13 DIAGNOSIS — E11.65 UNCONTROLLED TYPE 2 DIABETES MELLITUS WITH HYPERGLYCEMIA (HCC): Primary | ICD-10-CM

## 2024-05-13 LAB
ALBUMIN SERPL-MCNC: 4 G/DL (ref 3.2–4.8)
ANION GAP SERPL CALC-SCNC: 7 MMOL/L (ref 0–18)
BASOPHILS # BLD AUTO: 0.1 X10(3) UL (ref 0–0.2)
BASOPHILS NFR BLD AUTO: 1.3 %
BUN BLD-MCNC: 49 MG/DL (ref 9–23)
BUN/CREAT SERPL: 22.4 (ref 10–20)
CALCIUM BLD-MCNC: 9.9 MG/DL (ref 8.7–10.4)
CARTRIDGE LOT#: ABNORMAL NUMERIC
CHLORIDE SERPL-SCNC: 104 MMOL/L (ref 98–112)
CHOLEST SERPL-MCNC: 189 MG/DL (ref ?–200)
CO2 SERPL-SCNC: 29 MMOL/L (ref 21–32)
CREAT BLD-MCNC: 2.19 MG/DL
DEPRECATED RDW RBC AUTO: 44.5 FL (ref 35.1–46.3)
EGFRCR SERPLBLD CKD-EPI 2021: 22 ML/MIN/1.73M2 (ref 60–?)
EOSINOPHIL # BLD AUTO: 0.3 X10(3) UL (ref 0–0.7)
EOSINOPHIL NFR BLD AUTO: 3.8 %
ERYTHROCYTE [DISTWIDTH] IN BLOOD BY AUTOMATED COUNT: 14.3 % (ref 11–15)
FASTING PATIENT LIPID ANSWER: YES
GLUCOSE BLD-MCNC: 186 MG/DL (ref 70–99)
GLUCOSE BLOOD: 194
HCT VFR BLD AUTO: 34.7 %
HDLC SERPL-MCNC: 34 MG/DL (ref 40–59)
HEMOGLOBIN A1C: 8.1 % (ref 4.3–5.6)
HGB BLD-MCNC: 11.3 G/DL
IMM GRANULOCYTES # BLD AUTO: 0.03 X10(3) UL (ref 0–1)
IMM GRANULOCYTES NFR BLD: 0.4 %
LDLC SERPL CALC-MCNC: 116 MG/DL (ref ?–100)
LYMPHOCYTES # BLD AUTO: 1.49 X10(3) UL (ref 1–4)
LYMPHOCYTES NFR BLD AUTO: 18.8 %
MCH RBC QN AUTO: 27.7 PG (ref 26–34)
MCHC RBC AUTO-ENTMCNC: 32.6 G/DL (ref 31–37)
MCV RBC AUTO: 85 FL
MONOCYTES # BLD AUTO: 0.58 X10(3) UL (ref 0.1–1)
MONOCYTES NFR BLD AUTO: 7.3 %
NEUTROPHILS # BLD AUTO: 5.44 X10 (3) UL (ref 1.5–7.7)
NEUTROPHILS # BLD AUTO: 5.44 X10(3) UL (ref 1.5–7.7)
NEUTROPHILS NFR BLD AUTO: 68.4 %
NONHDLC SERPL-MCNC: 155 MG/DL (ref ?–130)
OSMOLALITY SERPL CALC.SUM OF ELEC: 308 MOSM/KG (ref 275–295)
PHOSPHATE SERPL-MCNC: 4.4 MG/DL (ref 2.4–5.1)
PLATELET # BLD AUTO: 277 10(3)UL (ref 150–450)
POTASSIUM SERPL-SCNC: 4.1 MMOL/L (ref 3.5–5.1)
RBC # BLD AUTO: 4.08 X10(6)UL
SODIUM SERPL-SCNC: 140 MMOL/L (ref 136–145)
TEST STRIP LOT #: NORMAL NUMERIC
TRIGL SERPL-MCNC: 222 MG/DL (ref 30–149)
VLDLC SERPL CALC-MCNC: 39 MG/DL (ref 0–30)
WBC # BLD AUTO: 7.9 X10(3) UL (ref 4–11)

## 2024-05-13 PROCEDURE — 36415 COLL VENOUS BLD VENIPUNCTURE: CPT

## 2024-05-13 PROCEDURE — 80069 RENAL FUNCTION PANEL: CPT

## 2024-05-13 PROCEDURE — 85025 COMPLETE CBC W/AUTO DIFF WBC: CPT

## 2024-05-13 PROCEDURE — 80061 LIPID PANEL: CPT

## 2024-05-13 RX ORDER — BLOOD-GLUCOSE METER
1 EACH MISCELLANEOUS DAILY
Qty: 1 KIT | Refills: 0 | Status: SHIPPED | OUTPATIENT
Start: 2024-05-13

## 2024-05-13 RX ORDER — BLOOD SUGAR DIAGNOSTIC
1 STRIP MISCELLANEOUS DAILY
Qty: 100 STRIP | Refills: 3 | Status: SHIPPED | OUTPATIENT
Start: 2024-05-13

## 2024-05-13 NOTE — PROGRESS NOTES
Name: Jillian Yip  Date: 5/13/2024    Referring Physician: No ref. provider found    HISTORY OF PRESENT ILLNESS   Jillian Yip is a 79 year old female who presents for consult for diabetes mellitus.     She was diagnosed with diabetes about 10 years ago during hospitalization for MI. Currently living with her daughter and grandson and she also has a caregiver Tuesdays- Fridays.   She does have dementia which is worsening per daughter. She was hospitalized a few months ago following aortic aneurysm repair and then developed pneumonia. Was discharged from hospital to rehab facility where diabetes was managed with insulin per CF scale. Was on Metformin prior to this but stopped due to CKD and decline in kidney function. They have been using the same insulin CF scale at home since her discharge from rehab but note significant variability in glucose levels.     Diabetes History:  Diagnosed- 10 years ago   Patient has not had hospitalizations for blood sugar issues    Prior glycohemoglobin were:  8.1% POC today   Glucose in clinic today - 197 mg/dl --> fasting     Dietary compliance: Good- trying to increase protein      Recall:  Breakfast- toast with coffee and occ. sausage  Lunch- skips or tuna sandwich, lean cuisine, soup   Dinner- protein with vegetable and starch, or takeout 1-2 days weekly, chicken soup    Snack- potato chips, or sweets   Beverages- soda, has cut back fairly significantly     Exercise: No  Polyuria/polydipsia: No  Blurred vision: No    Episodes of hypoglycemia: No  Blood Glucose:  Checking 1-3 times per day    Fasting- 100-200's   Before lunch- 200's   Before dinner- 300's     Previous DM therapies:  Metformin - stopped due CKD     Current DM Regimen:  Humalog - 1-5 units TID with meals per CF scale (1: 40>200 with max dose of 5 units)    Modifying factors:  Medication adherence: Yes   Recent steroids, illness or infections: No       REVIEW OF SYSTEMS  Eyes: Diabetic retinopathy present:  No, hx of cataract             Most recent visit to eye doctor in last 12 months: No- is due for appt. Has seen Dr. Rose in the past.     CV: Cardiovascular disease present: Yes, MI- 2013         Hypertension present: Yes         Hyperlipidemia present: Yes         Peripheral Vascular Disease present: No       Cerebrovascular: CVA - 7 years     : Nephropathy present: CKD- following with nephrology- Dr. Thayer.     Neuro: Neuropathy present: Mild symptoms- seeing podiatry.    Skin: Infection or ulceration: No     Osteoporosis: No    Thyroid disease: No       Medications:     Current Outpatient Medications:     Blood Glucose Monitoring Suppl (ACCU-CHEK GUIDE) w/Device Does not apply Kit, 1 each daily., Disp: 1 kit, Rfl: 0    linaGLIPtin 5 mg Oral Tab, Take 1 tablet (5 mg total) by mouth daily., Disp: 30 tablet, Rfl: 3    Glucose Blood (ACCU-CHEK GUIDE) In Vitro Strip, 1 each by In Vitro route daily., Disp: 100 strip, Rfl: 3    Insulin Lispro, 1 Unit Dial, (HUMALOG KWIKPEN) 100 UNIT/ML Subcutaneous Solution Pen-injector, Inject as per sliding scale: If 160-200= 1 unit, 201-240= 2 units, 241-280= 3 units, 281-320= 4 units, 321-360= 5 units, call MD if <70 or >360 Subcutaneously with meals for DM, Disp: 3 mL, Rfl: 1    hydrocortisone 2.5 % External Cream, Apply topically 2 (two) times daily., Disp: , Rfl:     Zinc Oxide 10 % External Ointment, Apply topically., Disp: , Rfl:     Sennosides-Docusate Sodium (SENNA PLUS OR), Take by mouth., Disp: , Rfl:     simethicone 80 MG Oral Chew Tab, Chew 1 tablet (80 mg total) by mouth 4 (four) times daily with meals and nightly., Disp: , Rfl:     pantoprazole 40 MG Oral Tab EC, Take 1 tablet (40 mg total) by mouth 2 (two) times daily before meals., Disp: 180 tablet, Rfl: 3    albuterol 108 (90 Base) MCG/ACT Inhalation Aero Soln, Inhale 2 puffs into the lungs every 4 (four) hours as needed., Disp: 1 each, Rfl: 1    traMADol 50 MG Oral Tab, Take 1 tablet (50 mg total) by mouth every  12 (twelve) hours as needed., Disp: 8 tablet, Rfl: 0    acetaminophen 500 MG Oral Tab, Take 1 tablet (500 mg total) by mouth every 8 (eight) hours., Disp: , Rfl:     lidocaine-menthol 4-1 % External Patch, Place 2 patches onto the skin daily., Disp: , Rfl:     aspirin 81 MG Oral Chew Tab, Chew 1 tablet (81 mg total) by mouth daily., Disp: 30 tablet, Rfl: 0    bisacodyl 10 MG Rectal Suppos, Place 1 suppository (10 mg total) rectally daily as needed., Disp: , Rfl:     metoprolol tartrate 100 MG Oral Tab, Take 1 tablet (100 mg total) by mouth 2 (two) times daily., Disp: , Rfl:     nitroglycerin 0.4 MG Sublingual SL Tab, Place 1 tablet (0.4 mg total) under the tongue every 5 (five) minutes as needed for Chest pain., Disp: , Rfl:     amLODIPine Besylate 5 MG Oral Tab, Take 1 tablet (5 mg total) by mouth at bedtime., Disp: , Rfl:     Clopidogrel Bisulfate 75 MG Oral Tab, Take 1 tablet (75 mg total) by mouth daily., Disp: 30 tablet, Rfl: 0    ezetimibe 10 MG Oral Tab, Take 1 tablet (10 mg total) by mouth nightly., Disp: , Rfl:     Blood Glucose Monitoring Suppl (ONETOUCH VERIO IQ SYSTEM) w/Device Does not apply Kit, 1 Device by Other route 3 (three) times daily. Use as directed. (Patient not taking: Reported on 5/13/2024), Disp: 1 kit, Rfl: 0    Glucose Blood (ONETOUCH VERIO) In Vitro Strip, Use as directed. (Patient not taking: Reported on 5/13/2024), Disp: 300 strip, Rfl: 3    OneTouch Delica Lancets 33G Does not apply Misc, 1 Lancet by Finger stick route 3 (three) times daily. (Patient not taking: Reported on 5/13/2024), Disp: 300 each, Rfl: 3    Vitamin C 500 MG Oral Tab, Take 1 tablet (500 mg total) by mouth daily. (Patient not taking: Reported on 5/13/2024), Disp: , Rfl:     gabapentin 100 MG Oral Cap, Take 1 capsule (100 mg total) by mouth 2 (two) times daily. (Patient not taking: Reported on 5/13/2024), Disp: , Rfl:     umeclidinium-vilanterol (ANORO ELLIPTA) 62.5-25 MCG/ACT Inhalation Aerosol Powder, Breath  Activated, Inhale 1 puff into the lungs daily. (Patient not taking: Reported on 5/13/2024), Disp: 1 each, Rfl: 1    ONETOUCH DELICA LANCETS 33G Does not apply Misc, OneTouch Delica Lancets 33 gauge  TEST BLOOD GLUCOSE TWO TIMES A DAY AS DIRECTED (Patient not taking: Reported on 5/13/2024), Disp: , Rfl:      Allergies:   Allergies   Allergen Reactions    Codeine SWELLING and OTHER (SEE COMMENTS)     Face and body get puffy/swell, headache  Per pt, has tolerated morphine and Norco previously with no adverse effects    Enalapril Maleate-Felodipine SHORTNESS OF BREATH    Nyquil Severe Cold-Flu [Gnp Multi-Symptom Cold] PALPITATIONS    Radiology Contrast Iodinated Dyes ANAPHYLAXIS    Shellfish-Derived Products     Dilaudid [Hydromorphone] OTHER (SEE COMMENTS)     PATIENT'S DAUGHTER HAD A VERY BAD REACTION FROM DILAUDID (MENTALLY)    Sulfa Antibiotics RASH and OTHER (SEE COMMENTS)     Rash         Social History:   Social History     Socioeconomic History    Marital status:    Tobacco Use    Smoking status: Former     Current packs/day: 0.50     Average packs/day: 0.5 packs/day for 35.0 years (17.5 ttl pk-yrs)     Types: Cigarettes     Passive exposure: Never    Smokeless tobacco: Former    Tobacco comments:     half pack a day   Vaping Use    Vaping status: Never Used   Substance and Sexual Activity    Alcohol use: No    Drug use: No       Medical History:   Past Medical History:    Aortic aneurysm, thoracic (HCC)    Back problem    Calculus of kidney    Cataract    COPD (chronic obstructive pulmonary disease) (HCC)    Coronary atherosclerosis    Deep vein thrombosis (HCC)    Diabetes (HCC)    Diverticulosis of large intestine    Heart attack (HCC)    2013    High blood pressure    High cholesterol    History of stomach ulcers    Incontinence    Osteoarthritis    Ovarian cancer (HCC)    PONV (postoperative nausea and vomiting)    Renal disorder    Shortness of breath    Stroke (HCC)    shaking, wheelchair        Surgical history:   Past Surgical History:   Procedure Laterality Date    Appendectomy      Arthroscopy of joint unlisted Right     Rotator cuff surgery    Back surgery  07/21/2017    L3-4 LUIS and hemilaminectomies    Cataract      Cath drug eluting stent      2013    Endovas aaa repr w/3-p part      Ep loop recorder implant Left 2017    Foot surgery Bilateral     Fracture surgery Right     right clavicle    Hysterectomy      Theodora localization wire 1 site right (cpt=19281)      benign-20 years ago    Spine surgery procedure unlisted           PHYSICAL EXAM  Vitals:    05/13/24 0906   BP: 144/70   Pulse: 50   Resp: 18   Weight: 142 lb (64.4 kg)   Height: 5' 0.24\" (1.53 m)       General Appearance:  alert, well developed, in no acute distress  Neck: Trachea midline: Normal  Back: no kyphosis or back tenderness  Lymph Nodes:  No abnormal nodes noted  Musculoskeletal:  normal muscle strength and tone  Skin:  normal moisture and skin texture  Hair & Nails:  normal scalp hair     Hematologic:  no excessive bruising  Neuro:  sensory grossly intact and motor grossly intact.  Psychiatric:  oriented to time, self, and place  Nutritional:  no abnormal weight gain or loss      ASSESSMENT/PLAN:    Diabetes mellitus type 2   -HgA1c- 8.1% POC today   -Reviewed target of 7.5% or below.   - Reviewed pathogenesis of diabetes.   - Reviewed importance of good glycemic control to prevent microvascular and macrovascular complications including nephropathy, neuropathy, retinopathy, and cardiovascular disease.  - Reviewed importance of SBGM- check glucose 2-3 times daily   - Reviewed target glucose goals for patient  fasting and <200 post prandially   - Reviewed importance of following diabetic diet- recommended 135 grams of CHO per day or 45 grams per meal.   - Provided patient education materials    - Reviewed importance of limiting soda and sweetened beverages with meals.     - Will try to stop insulin given very small dose.    -Start Tradjenta 5mg PO daily. Reviewed side effects and risks vs benefits of medication. Did discuss that this medication can be costly so daughter will let me know if this medication is not affordable.     -normotensive   - Lipids at goal 2/2023- repeat lab before next visit.   - + nephropathy- following with nephrology   - Needs optho appt and daughter is aware- will schedule appt   - Following with podiatry- has appt next month, last foot exam 3/2024.   - Check glucose 1-2 times daily- fasting and evening Reviewed glucose targets.       RTC in 3 months, but call sooner if glucose levels outside of targets as discussed.   5/13/2024  KEVAN Ibrahim    A total of  30 minutes was spent on obtaining history, reviewing pertinent imaging/labs and specialists notes, evaluating patient, providing multiple treatment options, reinforcing diet/exercise and compliance, and completing documentation.

## 2024-05-15 RX ORDER — INSULIN GLARGINE 100 [IU]/ML
6 INJECTION, SOLUTION SUBCUTANEOUS NIGHTLY
Qty: 15 ML | Refills: 1 | Status: SHIPPED | OUTPATIENT
Start: 2024-05-15

## 2024-05-15 NOTE — TELEPHONE ENCOUNTER
From: Jillian Yip  To: Anali Cruz  Sent: 5/13/2024 4:05 PM CDT  Subject: New meds     Just spoke with the pharmacist the new meds are not covered by insurance and would be 600 dollars a month

## 2024-05-16 RX ORDER — PEN NEEDLE, DIABETIC 32GX 5/32"
NEEDLE, DISPOSABLE MISCELLANEOUS
Qty: 100 EACH | Refills: 3 | Status: SHIPPED | OUTPATIENT
Start: 2024-05-16

## 2024-07-10 ENCOUNTER — LAB ENCOUNTER (OUTPATIENT)
Dept: LAB | Facility: HOSPITAL | Age: 79
End: 2024-07-10
Attending: Other
Payer: MEDICARE

## 2024-07-10 DIAGNOSIS — F02.80 MIXED ALZHEIMER'S AND VASCULAR DEMENTIA (HCC): ICD-10-CM

## 2024-07-10 DIAGNOSIS — G30.9 MIXED ALZHEIMER'S AND VASCULAR DEMENTIA (HCC): ICD-10-CM

## 2024-07-10 DIAGNOSIS — F01.50 MIXED ALZHEIMER'S AND VASCULAR DEMENTIA (HCC): ICD-10-CM

## 2024-07-10 LAB
FOLATE SERPL-MCNC: 23.5 NG/ML (ref 5.4–?)
VIT B12 SERPL-MCNC: 381 PG/ML (ref 211–911)

## 2024-07-10 PROCEDURE — 84425 ASSAY OF VITAMIN B-1: CPT

## 2024-07-10 PROCEDURE — 83921 ORGANIC ACID SINGLE QUANT: CPT

## 2024-07-10 PROCEDURE — 82746 ASSAY OF FOLIC ACID SERUM: CPT

## 2024-07-10 PROCEDURE — 82607 VITAMIN B-12: CPT

## 2024-07-10 PROCEDURE — 84446 ASSAY OF VITAMIN E: CPT

## 2024-07-10 PROCEDURE — 36415 COLL VENOUS BLD VENIPUNCTURE: CPT

## 2024-07-10 PROCEDURE — 84207 ASSAY OF VITAMIN B-6: CPT

## 2024-07-14 LAB — VITAMIN B1 WHOLE BLD: 144.4 NMOL/L

## 2024-07-15 LAB — VITAMIN B6: 12.7 UG/L

## 2024-07-16 ENCOUNTER — HOSPITAL ENCOUNTER (EMERGENCY)
Facility: HOSPITAL | Age: 79
Discharge: HOME | End: 2024-07-16
Attending: EMERGENCY MEDICINE
Payer: MEDICARE

## 2024-07-16 ENCOUNTER — APPOINTMENT (OUTPATIENT)
Dept: ULTRASOUND IMAGING | Facility: HOSPITAL | Age: 79
End: 2024-07-16
Attending: EMERGENCY MEDICINE
Payer: MEDICARE

## 2024-07-16 ENCOUNTER — APPOINTMENT (OUTPATIENT)
Dept: GENERAL RADIOLOGY | Facility: HOSPITAL | Age: 79
End: 2024-07-16
Payer: MEDICARE

## 2024-07-16 VITALS
SYSTOLIC BLOOD PRESSURE: 150 MMHG | HEART RATE: 53 BPM | OXYGEN SATURATION: 95 % | TEMPERATURE: 98 F | RESPIRATION RATE: 20 BRPM | DIASTOLIC BLOOD PRESSURE: 106 MMHG

## 2024-07-16 DIAGNOSIS — M79.641 RIGHT HAND PAIN: Primary | ICD-10-CM

## 2024-07-16 LAB
VIT E ALPHA TOCO: 15.7 MG/L
VIT E GAMMA TOCO: 4.1 MG/L

## 2024-07-16 PROCEDURE — 99284 EMERGENCY DEPT VISIT MOD MDM: CPT

## 2024-07-16 PROCEDURE — 99285 EMERGENCY DEPT VISIT HI MDM: CPT

## 2024-07-16 PROCEDURE — 73090 X-RAY EXAM OF FOREARM: CPT | Performed by: EMERGENCY MEDICINE

## 2024-07-16 PROCEDURE — 93971 EXTREMITY STUDY: CPT | Performed by: EMERGENCY MEDICINE

## 2024-07-16 PROCEDURE — 73130 X-RAY EXAM OF HAND: CPT | Performed by: EMERGENCY MEDICINE

## 2024-07-16 RX ORDER — HYDROCODONE BITARTRATE AND ACETAMINOPHEN 5; 325 MG/1; MG/1
1 TABLET ORAL ONCE
Status: COMPLETED | OUTPATIENT
Start: 2024-07-16 | End: 2024-07-16

## 2024-07-16 RX ORDER — HYDROCODONE BITARTRATE AND ACETAMINOPHEN 5; 325 MG/1; MG/1
TABLET ORAL
Status: DISPENSED
Start: 2024-07-16 | End: 2024-07-17

## 2024-07-16 RX ORDER — LIDOCAINE 50 MG/G
1 PATCH TOPICAL EVERY 24 HOURS
Qty: 7 PATCH | Refills: 0 | Status: SHIPPED | OUTPATIENT
Start: 2024-07-16 | End: 2024-07-16

## 2024-07-16 RX ORDER — HYDROCODONE BITARTRATE AND ACETAMINOPHEN 5; 325 MG/1; MG/1
1 TABLET ORAL EVERY 12 HOURS PRN
Qty: 6 TABLET | Refills: 0 | Status: SHIPPED | OUTPATIENT
Start: 2024-07-16 | End: 2024-07-19

## 2024-07-17 LAB — METHYLMALONIC ACID: 1016 NMOL/L

## 2024-07-17 NOTE — ED INITIAL ASSESSMENT (HPI)
Patient arrives through triage with c/o of R. Wrist pain that travels up the R. Arm. Patient denies trauma/injury to wrist and arm.

## 2024-07-17 NOTE — ED PROVIDER NOTES
Patient Seen in: [facility] Emergency Department    History     Chief Complaint   Patient presents with    Wrist Pain    Arm Pain     Stated Complaint: Wrist/Arm Pain     HPI    79-year-old female with past medical history of COPD, DVT off Eliquis, hypertension, dyslipidemia and with recent initiation of memantine presenting for evaluation with right lateral forearm and hand/wrist swelling and pain over the past several days.  No preceding traumatic or infectious complaints.  No focal weakness/paresthesias.  Taking Tylenol and tramadol with relief.    Past Medical History:    Aortic aneurysm, thoracic (HCC)    Back problem    Calculus of kidney    Cataract    COPD (chronic obstructive pulmonary disease) (HCC)    Coronary atherosclerosis    Deep vein thrombosis (HCC)    Diabetes (HCC)    Diverticulosis of large intestine    Heart attack (HCC)    2013    High blood pressure    High cholesterol    History of stomach ulcers    Incontinence    Osteoarthritis    Ovarian cancer (HCC)    PONV (postoperative nausea and vomiting)    Renal disorder    Shortness of breath    Stroke (HCC)    shaking, wheelchair       Past Surgical History:   Procedure Laterality Date    Appendectomy      Arthroscopy of joint unlisted Right     Rotator cuff surgery    Back surgery  07/21/2017    L3-4 LUIS and hemilaminectomies    Cataract      Cath drug eluting stent      2013    Endovas aaa repr w/3-p part      Ep loop recorder implant Left 2017    Foot surgery Bilateral     Fracture surgery Right     right clavicle    Hysterectomy      Theodora localization wire 1 site right (cpt=19281)      benign-20 years ago    Spine surgery procedure unlisted              Family History   Problem Relation Age of Onset    Cancer Father     Diabetes Father     Diabetes Mother     Cancer Daughter     Breast Cancer Maternal Aunt         x2 in 70's    Ovarian Cancer Self        Social History     Socioeconomic History    Marital status:    Tobacco Use     Smoking status: Former     Current packs/day: 0.50     Average packs/day: 0.5 packs/day for 35.0 years (17.5 ttl pk-yrs)     Types: Cigarettes     Passive exposure: Never    Smokeless tobacco: Former    Tobacco comments:     half pack a day   Vaping Use    Vaping status: Never Used   Substance and Sexual Activity    Alcohol use: No    Drug use: No     Social Determinants of Health     Food Insecurity: No Food Insecurity (10/20/2023)    Food Insecurity     Food Insecurity: Never true   Transportation Needs: No Transportation Needs (10/20/2023)    Transportation Needs     Lack of Transportation: No   Housing Stability: Low Risk  (10/20/2023)    Housing Stability     Housing Instability: No       Review of Systems :  Constitutional: As per HPI  Musculoskeletal: (+ )hand pain.    Positive for stated complaint: Wrist/Arm Pain  Other systems are as noted in HPI.  Constitutional and vital signs reviewed.      All other systems reviewed and negative except as noted above.    PSFH elements reviewed from today and agreed except as otherwise stated in HPI.    Physical Exam     ED Triage Vitals [07/16/24 1940]   /88   Pulse 58   Resp 20   Temp 98 °F (36.7 °C)   Temp src    SpO2 96 %   O2 Device        Current:/88   Pulse 58   Temp 98 °F (36.7 °C)   Resp 20   SpO2 96%         Physical Exam   Constitutional: No distress.   HEENT: MMM.  Head: Normocephalic.   Eyes: No injection.   Cardiovascular: RRR. RUE with 2+ radial pulse and BCR to fingers.  Pulmonary/Chest: Effort normal.   Musculoskeletal: No gross deformity. RUE with diffusely soft compartments without cutaneous/crepitant change, right lateral hand tenderness to fifth metacarpal without wrist/MCP/IP tenderness; RUE with full/intact ROM to wrist and MCP/IP joints.  Neurological: Alert. RUE with 5/5 strength proximally and distally with full/intact ROM to MCP/IP joints.  Skin: Skin is warm.   Psychiatric: Cooperative.  Nursing note and vitals  reviewed.        ED Course   Labs Reviewed - No data to display  XR FOREARM (2 VIEWS), RIGHT (CPT=73090)    Result Date: 7/16/2024  PROCEDURE: XR FOREARM (2 VIEWS), RIGHT (CPT=73090)  COMPARISON: None.  INDICATIONS: Sudden onset of right wrist pain and 5th digit pain today. Denies trauma.  TECHNIQUE: 2 views were obtained.   FINDINGS:  BONES: Osteophytes around the radial head-neck junction.  Radiocarpal joint space narrowing.  Otherwise, no significant arthropathy, fracture or acute abnormality. SOFT TISSUES: No visible soft tissue swelling. EFFUSION: None visible. OTHER: Negative.         CONCLUSION: No acute osseous abnormality of the right forearm.    Dictated by (CST): Sergei Trinh MD on 7/16/2024 at 8:59 PM     Finalized by (CST): Sergei Trinh MD on 7/16/2024 at 9:00 PM          XR HAND (MIN 3 VIEWS), RIGHT (CPT=73130)    Result Date: 7/16/2024  PROCEDURE: XR HAND (MIN 3 VIEWS), RIGHT (CPT=73130)  COMPARISON: [facility], XR HAND (MIN 3 VIEWS), RIGHT (CPT=73130), 1/31/2024, 8:59 PM.  INDICATIONS: Sudden onset of right wrist pain and 5th digit pain today. Denies trauma.  TECHNIQUE: 3 views were obtained.   FINDINGS:  BONES: Demineralized osseous structures.  Radiocarpal and carpometacarpal joint space narrowing as well as narrowing of the interphalangeal joints of the digits.  No acute fracture or suspicious bone lesion.  Osteophytes are present along the margins of the 5th proximal interphalangeal joint. SOFT TISSUES: No visible soft tissue swelling. EFFUSION: None visible. OTHER: Negative.         CONCLUSION: Chronic appearing degenerative change within the right hand.  Demineralized osseous structures.  No acute osseous abnormality.    Dictated by (CST): Sergie Trinh MD on 7/16/2024 at 8:58 PM     Finalized by (CST): Sergei Trinh MD on 7/16/2024 at 8:59 PM         Preliminary Radiology Report  [facility], [facility]  [phone] - Phone    [facility]    NAME: ELENA PARKER     DATE OF EXAM: 07/16/2024  Patient No:  [id number]  Physician:  DEJA^ILIANA ^2  YOB: 1945    Past Medical History (entered by Technologist):    Reason For Exam (entered by Technologist):  RT WRIST/ARM PAIN  Other Notes (entered by Technologist): Right wrist and arm pain.  No prior RUE.    Negative DVT study of the RUE.    Additional Information (per [facility]): Wrist pain, no swelling    DOPPLER VENOUS ULTRASOUND RIGHT UPPER EXTREMITY     IMPRESSION:    No evidence of deep venous thrombosis involving the right upper extremity.  Limited view of the wrist did not demonstrate any obvious joint effusion.    The results were faxed at 10:17 PM central standard time. If you would like to discuss this case directly please call [phone] ([email or web address] [phone]).            MDM   DIFFERENTIAL DIAGNOSIS: After history and physical exam differential diagnosis includes but is not limited to fracture, DVT.    Pulse ox: 96%:Normal on RA, as independently interpreted by myself    Medical Decision Making  Evaluation for right hand pain without neurovascular compromise or cutaneous/crepitant change and with full/intact range of motion - imaging nonacute, stable for discharge with symptomatic care and outpatient followup.    Problems Addressed:  Right hand pain: acute illness or injury    Amount and/or Complexity of Data Reviewed  Radiology: ordered and independent interpretation performed. Decision-making details documented in ED Course.     Details: XR without obvious dislocation as independently interpreted by myself    Risk  Prescription drug management.        I was wearing at minimum a facemask and eye protection throughout this encounter with handwashing performed prior and after patient evaluation without personal hand/facial/oropharyngeal contact and gloves worn throughout encounter. See note and/or contact this provider for further PPE details.    Disposition and Plan     Clinical Impression:  1.  Right hand pain        Disposition:  Discharge    Follow-up:  Weiler, Colleen M, DO  [address]  [address]  [facility] [facility] [id number] [phone]    Call  For followup and re-evaluation.      Medications Prescribed:  Current Discharge Medication List        START taking these medications    Details   HYDROcodone-acetaminophen 5-325 MG Oral Tab Take 1 tablet by mouth every 12 (twelve) hours as needed for Pain.  Qty: 6 tablet, Refills: 0    Associated Diagnoses: Right hand pain      diclofenac 1 % External Gel Apply 2 g topically 4 (four) times daily as needed.  Qty: 100 g, Refills: 0

## 2024-07-22 ENCOUNTER — TELEPHONE (OUTPATIENT)
Dept: ENDOCRINOLOGY CLINIC | Facility: CLINIC | Age: 79
End: 2024-07-22

## 2024-07-22 NOTE — TELEPHONE ENCOUNTER
Received fax from [employer] in regards to DWO. Fax filled out and placed in providers folder for review and signature.

## 2024-07-23 DIAGNOSIS — E53.8 VITAMIN B12 DEFICIENCY: Primary | ICD-10-CM

## 2024-07-24 ENCOUNTER — TELEPHONE (OUTPATIENT)
Dept: FAMILY MEDICINE CLINIC | Facility: CLINIC | Age: 79
End: 2024-07-24

## 2024-07-24 ENCOUNTER — APPOINTMENT (OUTPATIENT)
Dept: GENERAL RADIOLOGY | Facility: HOSPITAL | Age: 79
End: 2024-07-24
Attending: EMERGENCY MEDICINE
Payer: MEDICARE

## 2024-07-24 ENCOUNTER — PATIENT MESSAGE (OUTPATIENT)
Dept: NEUROLOGY | Facility: CLINIC | Age: 79
End: 2024-07-24

## 2024-07-24 ENCOUNTER — HOSPITAL ENCOUNTER (INPATIENT)
Facility: HOSPITAL | Age: 79
LOS: 4 days | Discharge: NURSING, REHAB OR LONG-TERM CARE | End: 2024-07-29
Attending: EMERGENCY MEDICINE | Admitting: STUDENT IN AN ORGANIZED HEALTH CARE EDUCATION/TRAINING PROGRAM
Payer: MEDICARE

## 2024-07-24 ENCOUNTER — APPOINTMENT (OUTPATIENT)
Dept: MRI IMAGING | Facility: HOSPITAL | Age: 79
End: 2024-07-24
Attending: EMERGENCY MEDICINE
Payer: MEDICARE

## 2024-07-24 DIAGNOSIS — N30.00 ACUTE CYSTITIS WITHOUT HEMATURIA: ICD-10-CM

## 2024-07-24 DIAGNOSIS — I63.9 ACUTE CVA (CEREBROVASCULAR ACCIDENT) (HCC): Primary | ICD-10-CM

## 2024-07-24 DIAGNOSIS — E53.8 B12 DEFICIENCY: Primary | ICD-10-CM

## 2024-07-24 LAB
ALBUMIN SERPL-MCNC: 4.1 G/DL (ref 3.2–4.8)
ALP LIVER SERPL-CCNC: 145 U/L
ALT SERPL-CCNC: <7 U/L
ANION GAP SERPL CALC-SCNC: 8 MMOL/L (ref 0–18)
AST SERPL-CCNC: 12 U/L (ref ?–34)
BASOPHILS # BLD AUTO: 0.06 X10(3) UL (ref 0–0.2)
BASOPHILS NFR BLD AUTO: 0.8 %
BILIRUB DIRECT SERPL-MCNC: 0.1 MG/DL (ref ?–0.3)
BILIRUB SERPL-MCNC: 0.4 MG/DL (ref 0.2–1.1)
BILIRUB UR QL: NEGATIVE
BNP SERPL-MCNC: 171 PG/ML
BUN BLD-MCNC: 48 MG/DL (ref 9–23)
BUN/CREAT SERPL: 22.2 (ref 10–20)
CALCIUM BLD-MCNC: 9.5 MG/DL (ref 8.7–10.4)
CHLORIDE SERPL-SCNC: 106 MMOL/L (ref 98–112)
CLARITY UR: CLEAR
CO2 SERPL-SCNC: 28 MMOL/L (ref 21–32)
CREAT BLD-MCNC: 2.16 MG/DL
DEPRECATED RDW RBC AUTO: 40.4 FL (ref 35.1–46.3)
EGFRCR SERPLBLD CKD-EPI 2021: 23 ML/MIN/1.73M2 (ref 60–?)
EOSINOPHIL # BLD AUTO: 0.11 X10(3) UL (ref 0–0.7)
EOSINOPHIL NFR BLD AUTO: 1.4 %
ERYTHROCYTE [DISTWIDTH] IN BLOOD BY AUTOMATED COUNT: 13.4 % (ref 11–15)
GLUCOSE BLD-MCNC: 151 MG/DL (ref 70–99)
GLUCOSE UR-MCNC: NORMAL MG/DL
HCT VFR BLD AUTO: 33.9 %
HGB BLD-MCNC: 11.4 G/DL
HGB UR QL STRIP.AUTO: NEGATIVE
IMM GRANULOCYTES # BLD AUTO: 0.03 X10(3) UL (ref 0–1)
IMM GRANULOCYTES NFR BLD: 0.4 %
KETONES UR-MCNC: NEGATIVE MG/DL
LEUKOCYTE ESTERASE UR QL STRIP.AUTO: 75
LYMPHOCYTES # BLD AUTO: 1.33 X10(3) UL (ref 1–4)
LYMPHOCYTES NFR BLD AUTO: 17.2 %
MCH RBC QN AUTO: 27.9 PG (ref 26–34)
MCHC RBC AUTO-ENTMCNC: 33.6 G/DL (ref 31–37)
MCV RBC AUTO: 82.9 FL
MONOCYTES # BLD AUTO: 0.49 X10(3) UL (ref 0.1–1)
MONOCYTES NFR BLD AUTO: 6.3 %
NEUTROPHILS # BLD AUTO: 5.7 X10 (3) UL (ref 1.5–7.7)
NEUTROPHILS # BLD AUTO: 5.7 X10(3) UL (ref 1.5–7.7)
NEUTROPHILS NFR BLD AUTO: 73.9 %
NITRITE UR QL STRIP.AUTO: NEGATIVE
OSMOLALITY SERPL CALC.SUM OF ELEC: 310 MOSM/KG (ref 275–295)
PH UR: 5.5 [PH] (ref 5–8)
PLATELET # BLD AUTO: 254 10(3)UL (ref 150–450)
POTASSIUM SERPL-SCNC: 4.6 MMOL/L (ref 3.5–5.1)
PROT SERPL-MCNC: 6.5 G/DL (ref 5.7–8.2)
PROT UR-MCNC: 20 MG/DL
RBC # BLD AUTO: 4.09 X10(6)UL
SODIUM SERPL-SCNC: 142 MMOL/L (ref 136–145)
SP GR UR STRIP: 1.02 (ref 1–1.03)
UROBILINOGEN UR STRIP-ACNC: NORMAL
WBC # BLD AUTO: 7.7 X10(3) UL (ref 4–11)

## 2024-07-24 PROCEDURE — 71045 X-RAY EXAM CHEST 1 VIEW: CPT | Performed by: EMERGENCY MEDICINE

## 2024-07-24 PROCEDURE — 70551 MRI BRAIN STEM W/O DYE: CPT | Performed by: EMERGENCY MEDICINE

## 2024-07-24 RX ORDER — METOCLOPRAMIDE HYDROCHLORIDE 5 MG/ML
10 INJECTION INTRAMUSCULAR; INTRAVENOUS ONCE
Status: COMPLETED | OUTPATIENT
Start: 2024-07-24 | End: 2024-07-24

## 2024-07-24 RX ORDER — DIPHENHYDRAMINE HYDROCHLORIDE 50 MG/ML
25 INJECTION INTRAMUSCULAR; INTRAVENOUS ONCE
Status: COMPLETED | OUTPATIENT
Start: 2024-07-24 | End: 2024-07-24

## 2024-07-24 RX ORDER — CYANOCOBALAMIN 1000 UG/ML
1000 INJECTION, SOLUTION INTRAMUSCULAR; SUBCUTANEOUS ONCE
Status: SHIPPED | OUTPATIENT
Start: 2024-07-24

## 2024-07-24 NOTE — TELEPHONE ENCOUNTER
Spoke with patient, daughter Keaton  (  Name and date of birth verified ) informed of Dr. Rojo's instructions below      appointment  made    Future Appointments   Date Time Provider Department Center   7/29/2024 10:30 AM [facility] PET DOSE RM CF PET SCAN EM [facility]   7/29/2024 11:45 AM [facility] PET RM1 CF PET SCAN EM [facility]   8/12/2024  2:00 PM Anali Cruz APRN ECOPOENDO [facility]   8/12/2024  3:00 PM [facility] NURSING ECOPOFM [facility]   8/26/2024  2:40 PM Tab Thayer MD [id number] [facility]   10/2/2024  2:20 PM [facility]   10/16/2024  2:15 PM [facility]

## 2024-07-24 NOTE — TELEPHONE ENCOUNTER
Daughter Keaton states Dr. Dow is ordering B12 injections for patient. Daughter would like to know if you would be okay with the injections being given at [facility] office due to location being better for patient (travel difficult per daughter). Dr Dow will order medication but we would need to place order for in office injection for MAR documentation. Pended for review.

## 2024-07-24 NOTE — ED PROVIDER NOTES
Patient Seen in: [facility] Emergency Department    History     Chief Complaint   Patient presents with    Headache    Nausea    Difficulty Breathing       HPI    79-year-old female with past medical history significant for thoracic aortic aneurysm, COPD, CAD, DVT, diabetes, high blood pressure, high cholesterol, peptic ulcer disease, arthritis, ovarian cancer, CVA, presents to the emergency department for evaluation of multiple problems.  She states that she felt pain in the back of her neck going into her head this morning and has had a persistent headache.  She has had nausea without vomiting.  She also felt like she is having some shortness of breath today but no coughing.  She took 6 puffs of her inhaler without relief according to patient's daughter who is at bedside.  Daughter states that patient complained of some numbness and tingling in her right leg about 45 minutes before she got here but it is since resolved.  Patient has had weakness in both of her legs and has needed assistance walking.    History from Independent Source: Daughter gave additional history as stated in HPI    External Records Reviewed: Reviewed last neurology note from the 10th of this month and patient has history of Alzheimer's and vascular dementia along with anxiety and paranoia.  Last imaging of the brain was a CT scan done in January 2024 and it was relatively unremarkable.    History reviewed.   Past Medical History:    Aortic aneurysm, thoracic (HCC)    Back problem    Calculus of kidney    Cataract    COPD (chronic obstructive pulmonary disease) (HCC)    Coronary atherosclerosis    Deep vein thrombosis (HCC)    Diabetes (HCC)    Diverticulosis of large intestine    Heart attack (HCC)    2013    High blood pressure    High cholesterol    History of stomach ulcers    Incontinence    Osteoarthritis    Ovarian cancer (HCC)    PONV (postoperative nausea and vomiting)    Renal disorder    Shortness of breath    Stroke (HCC)     shaking, wheelchair       History reviewed.   Past Surgical History:   Procedure Laterality Date    Appendectomy      Arthroscopy of joint unlisted Right     Rotator cuff surgery    Back surgery  07/21/2017    L3-4 LUIS and hemilaminectomies    Cataract      Cath drug eluting stent      2013    Endovas aaa repr w/3-p part      Ep loop recorder implant Left 2017    Foot surgery Bilateral     Fracture surgery Right     right clavicle    Hysterectomy      Theodora localization wire 1 site right (cpt=19281)      benign-20 years ago    Spine surgery procedure unlisted           Medications :  (Not in a hospital admission)       Family History   Problem Relation Age of Onset    Cancer Father     Diabetes Father     Diabetes Mother     Cancer Daughter     Breast Cancer Maternal Aunt         x2 in 70's    Ovarian Cancer Self        Smoking Status:   Social History     Socioeconomic History    Marital status:    Tobacco Use    Smoking status: Former     Current packs/day: 0.50     Average packs/day: 0.5 packs/day for 35.0 years (17.5 ttl pk-yrs)     Types: Cigarettes     Passive exposure: Never    Smokeless tobacco: Former    Tobacco comments:     half pack a day   Vaping Use    Vaping status: Never Used   Substance and Sexual Activity    Alcohol use: No    Drug use: No       Constitutional and vital signs reviewed.      Social History and Family History elements reviewed from today, pertinent positives to the presenting problem noted.    Physical Exam     ED Triage Vitals [07/24/24 1807]   /63   Pulse 60   Resp 20   Temp 97.8 °F (36.6 °C)   Temp src Oral   SpO2 97 %   O2 Device None (Room air)       Physical Exam   Constitutional: AAOx3, well nourished, NAD  HEENT: Normocephalic, PERRLA, MMM  CV: s1s2+, RRR, no m/r/g, normal distal pulses  Pulmonary/Chest: CTA b/l with no rales, wheezes.  No chest wall tenderness  Abdominal: Nontender.  Nondistended. Soft. Bowel sounds are normal.   Neck/Back:   :    Musculoskeletal: Normal range of motion. No deformity.   Neurological: Awake, alert. Normal reflexes. No cranial nerve deficit.  5 out of 5 strength throughout.  Skin: Skin is warm and dry. No rash noted. No erythema.   Psychiatric:      All measures to prevent infection transmission during my interaction with the patient were taken. The patient was already wearing a droplet mask on my arrival to the room. Personal protective equipment was worn throughout the duration of the exam.      ED Course        Labs Reviewed   BASIC METABOLIC PANEL (8) - Abnormal; Notable for the following components:       Result Value    Glucose 151 (*)     BUN 48 (*)     Creatinine 2.16 (*)     BUN/CREA Ratio 22.2 (*)     Calculated Osmolality 310 (*)     eGFR-Cr 23 (*)     All other components within normal limits   HEPATIC FUNCTION PANEL (7) - Abnormal; Notable for the following components:    ALT <7 (*)     Alkaline Phosphatase 145 (*)     All other components within normal limits   URINALYSIS WITH CULTURE REFLEX - Abnormal; Notable for the following components:    Protein Urine 20 (*)     Leukocyte Esterase Urine 75 (*)     WBC Urine 11-20 (*)     Bacteria Urine 1+ (*)     Squamous Epi. Cells Few (*)     All other components within normal limits   BNP (B TYPE NATRIURETIC PEPTIDE) - Abnormal; Notable for the following components:    Beta Natriuretic Peptide 171 (*)     All other components within normal limits   CBC W/ DIFFERENTIAL - Abnormal; Notable for the following components:    HGB 11.4 (*)     HCT 33.9 (*)     All other components within normal limits   CBC WITH DIFFERENTIAL WITH PLATELET    Narrative:     The following orders were created for panel order CBC With Differential With Platelet.                  Procedure                               Abnormality         Status                                     ---------                               -----------         ------                                     CBC W/  DIFFERENTIAL[[id number]]          Abnormal            Final result                                                 Please view results for these tests on the individual orders.   URINE CULTURE, ROUTINE     My Independent Interpretation of EKG (if performed): EKG    Rate, intervals and axes as noted on EKG Report.  Rate: 61 bpm  Rhythm: Sinus Rhythm  Reading: Normal sinus rhythm, left axis deviation, normal intervals, no ectopy, no ST changes             Monitor Interpretation:   normal sinus rhythm as interpreted by me.      Imaging Results Available and Reviewed while in ED: XR CHEST AP PORTABLE  (CPT=71045)    Result Date: 7/24/2024  CONCLUSION: No radiographic evidence of acute cardiopulmonary abnormality.  Cardiomegaly and aortic repair changes are again seen.    elm-remote    Dictated by (CST): Fletcher Carreno MD on 7/24/2024 at 7:49 PM     Finalized by (CST): Fletcher Carreno MD on 7/24/2024 at 7:51 PM         [facility], [facility]  [phone] - Phone    [facility]    NAME: ELENA PARKER    DATE OF EXAM: 07/24/2024  Patient No:  [id number]  Physician:  KALAPNA  YOB: 1945    Past Medical History (entered by Technologist):  Hx of stroke.  Reason For Exam (entered by Technologist):  Eval for fever and weakness with difficulty breathing and RT leg numbness today and Headache.  Other Notes (entered by Technologist): ER Pod 0 [phone]    Additional Information (per [facility]):    MR BRAIN     IMPRESSION:   Small focus of diffusion restriction in the left inferior cerebellum and scattered small foci of diffusion restriction in the right frontal and parietal cortex, suggestive of acute infarcts, possibly embolic etiology.      Punctate focus of susceptibility artifact in the left frontal lobe (series 8 image 20), suggestive of of microbleed.    No mass-effect.      Severe white matter FLAIR hyperintense signal may represent chronic microvessel ischemic change.  Old  lacunar infarcts involving the left basal ganglia and right periventricular white matter.    Cerebral volume loss.       Case discussed with  Dr. RIGGS at 1: 25 AM ET.       Se Baron Dietrich MD  ED Medications Administered:   Medications   cefTRIAXone (Rocephin) 1 g in sodium chloride 0.9% 100 mL IVPB-ADDV (has no administration in time range)   aspirin chewable tab 324 mg (has no administration in time range)   metoclopramide (Reglan) 5 mg/mL injection 10 mg (10 mg Intravenous Given 7/24/24 1923)   diphenhydrAMINE (Benadryl) 50 mg/mL  injection 25 mg (25 mg Intravenous Given 7/24/24 1923)   sodium chloride 0.9 % IV bolus 250 mL (0 mL Intravenous Stopped 7/24/24 2023)             MDM     Vitals:    07/24/24 1807 07/24/24 1915 07/24/24 2000 07/24/24 2115   BP: 122/63 144/62 132/79 124/66   Pulse: 60 62 63 63   Resp: 20 19 18 17   Temp: 97.8 °F (36.6 °C)      TempSrc: Oral      SpO2: 97% 96% 97% 96%   Weight: 69.4 kg      Height: 154.9 cm (5' 1\")        *I personally reviewed and interpreted all ED vitals.    Independent Interpretation of Studies: I have independently reviewed patient's chest x-ray.  There are no acute findings    Social Determinants of Health:     Procedures:      Differential/MDM/Shared Decision Making: Differential Diagnosis includes CVA, migraine, infection, electrolyte abnormality, dehydration, gastroenteritis, others.      The patient already has COPD, CAD, DVT, diabetes, high blood pressure, high cholesterol, peptic ulcer disease, ovarian cancer, CVA, thoracic aortic aneurysm, to contribute to the complexity of this ED evaluation.           Patient's lab work was remarkable for a mild urinary tract infection.  She has chronic kidney disease and her creatinine is near baseline currently.  MRI was completed and shows that patient does have multiple small left cerebellar infarcts.  There is also a possible microbleed in the left frontal lobe.  Discussed case with [facility] hospitalist and they have  accepted patient for admission.  Discussed with [facility] neurology and they will see for consult.  Patient and daughter have been updated and are agreeable with plan for admission for management of acute CVA.  Patient also was given ceftriaxone for UTI.    Patient has a mostly intact neuroexam at this time without any focal muscle weakness or sensory deficit.  She was having some numbness and tingling in the right leg that seems to have resolved.  Patient has equal strength in lower extremities and has difficulty raising her legs not due to weakness but because of pain that it elicits in her lower back.  Upper extremities have normal range of motion and strength.    Critical care time exclusive of procedure time spent on this patient was 35 min for taking history from patient examining patient, medical decision-making, reviewing lab work and radiology studies, explaining results to patient and family, discussing with consultants/admitting physician, and documenting in patient's chart.    Condition upon leaving the department: Stable    Disposition and Plan     Clinical Impression:  1. Acute CVA (cerebrovascular accident) (HCC)    2. Acute cystitis without hematuria        Disposition:  Admit    Follow-up:  No follow-up provider specified.    Medications Prescribed:  Current Discharge Medication List          Hospital Problems       Present on Admission  Date Reviewed: 7/10/2024            ICD-10-CM Noted POA    * (Principal) Acute CVA (cerebrovascular accident) (HCC) I63.9 7/25/2024 Unknown

## 2024-07-24 NOTE — ED INITIAL ASSESSMENT (HPI)
Pt arrives via wheelchair to ED for c/o headache, nausea, and BLAIR that started this morning. Pt denies CP. Pt states difficulty taking deep breaths. Per family pt took 6 pumps of inhaler without relief. Aox4, speaking in full sentences.     Pt's daughter states patient was complaining of R leg numbness 45 min PTA. Pt denies numbness at this time. Weakness to BLE. Good equal strength to BUE. No facial droop. Speaking in clear sentences. -FAST exam.     This RN spoke with Dr. SHERICE Graham about patient's s/s, no stroke alert at this time.

## 2024-07-25 ENCOUNTER — APPOINTMENT (OUTPATIENT)
Dept: ULTRASOUND IMAGING | Facility: HOSPITAL | Age: 79
End: 2024-07-25
Attending: Other
Payer: MEDICARE

## 2024-07-25 ENCOUNTER — APPOINTMENT (OUTPATIENT)
Dept: CT IMAGING | Facility: HOSPITAL | Age: 79
End: 2024-07-25
Attending: HOSPITALIST
Payer: MEDICARE

## 2024-07-25 ENCOUNTER — APPOINTMENT (OUTPATIENT)
Dept: CV DIAGNOSTICS | Facility: HOSPITAL | Age: 79
End: 2024-07-25
Attending: Other
Payer: MEDICARE

## 2024-07-25 ENCOUNTER — PATIENT OUTREACH (OUTPATIENT)
Dept: CASE MANAGEMENT | Age: 79
End: 2024-07-25

## 2024-07-25 PROBLEM — N30.00 ACUTE CYSTITIS WITHOUT HEMATURIA: Status: ACTIVE | Noted: 2024-07-25

## 2024-07-25 PROBLEM — I63.9 ACUTE CVA (CEREBROVASCULAR ACCIDENT) (HCC): Status: ACTIVE | Noted: 2024-07-25

## 2024-07-25 LAB
ANION GAP SERPL CALC-SCNC: 9 MMOL/L (ref 0–18)
ATRIAL RATE: 61 BPM
BUN BLD-MCNC: 45 MG/DL (ref 9–23)
BUN/CREAT SERPL: 22 (ref 10–20)
CALCIUM BLD-MCNC: 9.5 MG/DL (ref 8.7–10.4)
CHLORIDE SERPL-SCNC: 108 MMOL/L (ref 98–112)
CHOLEST SERPL-MCNC: 188 MG/DL (ref ?–200)
CO2 SERPL-SCNC: 25 MMOL/L (ref 21–32)
CREAT BLD-MCNC: 2.05 MG/DL
EGFRCR SERPLBLD CKD-EPI 2021: 24 ML/MIN/1.73M2 (ref 60–?)
GLUCOSE BLD-MCNC: 158 MG/DL (ref 70–99)
GLUCOSE BLDC GLUCOMTR-MCNC: 171 MG/DL (ref 70–99)
GLUCOSE BLDC GLUCOMTR-MCNC: 215 MG/DL (ref 70–99)
GLUCOSE BLDC GLUCOMTR-MCNC: 223 MG/DL (ref 70–99)
GLUCOSE BLDC GLUCOMTR-MCNC: 235 MG/DL (ref 70–99)
GLUCOSE BLDC GLUCOMTR-MCNC: 318 MG/DL (ref 70–99)
HDLC SERPL-MCNC: 31 MG/DL (ref 40–59)
LDLC SERPL CALC-MCNC: 95 MG/DL (ref ?–100)
MAGNESIUM SERPL-MCNC: 2 MG/DL (ref 1.6–2.6)
NONHDLC SERPL-MCNC: 157 MG/DL (ref ?–130)
OSMOLALITY SERPL CALC.SUM OF ELEC: 309 MOSM/KG (ref 275–295)
P AXIS: 45 DEGREES
P-R INTERVAL: 156 MS
POTASSIUM SERPL-SCNC: 3.7 MMOL/L (ref 3.5–5.1)
Q-T INTERVAL: 476 MS
QRS DURATION: 88 MS
QTC CALCULATION (BEZET): 479 MS
R AXIS: -44 DEGREES
SODIUM SERPL-SCNC: 142 MMOL/L (ref 136–145)
T AXIS: 24 DEGREES
TRIGL SERPL-MCNC: 371 MG/DL (ref 30–149)
VENTRICULAR RATE: 61 BPM
VLDLC SERPL CALC-MCNC: 62 MG/DL (ref 0–30)

## 2024-07-25 PROCEDURE — 99223 1ST HOSP IP/OBS HIGH 75: CPT | Performed by: STUDENT IN AN ORGANIZED HEALTH CARE EDUCATION/TRAINING PROGRAM

## 2024-07-25 PROCEDURE — 93306 TTE W/DOPPLER COMPLETE: CPT | Performed by: OTHER

## 2024-07-25 PROCEDURE — 70450 CT HEAD/BRAIN W/O DYE: CPT | Performed by: HOSPITALIST

## 2024-07-25 PROCEDURE — 99223 1ST HOSP IP/OBS HIGH 75: CPT | Performed by: OTHER

## 2024-07-25 RX ORDER — HYDRALAZINE HYDROCHLORIDE 20 MG/ML
10 INJECTION INTRAMUSCULAR; INTRAVENOUS EVERY 2 HOUR PRN
Status: DISCONTINUED | OUTPATIENT
Start: 2024-07-25 | End: 2024-07-29

## 2024-07-25 RX ORDER — CLOPIDOGREL BISULFATE 75 MG/1
75 TABLET ORAL DAILY
Status: DISCONTINUED | OUTPATIENT
Start: 2024-07-26 | End: 2024-07-28

## 2024-07-25 RX ORDER — AMLODIPINE BESYLATE 5 MG/1
5 TABLET ORAL DAILY
Status: DISCONTINUED | OUTPATIENT
Start: 2024-07-25 | End: 2024-07-29

## 2024-07-25 RX ORDER — HEPARIN SODIUM 5000 [USP'U]/ML
5000 INJECTION, SOLUTION INTRAVENOUS; SUBCUTANEOUS EVERY 8 HOURS SCHEDULED
Status: DISCONTINUED | OUTPATIENT
Start: 2024-07-25 | End: 2024-07-26

## 2024-07-25 RX ORDER — ONDANSETRON 2 MG/ML
4 INJECTION INTRAMUSCULAR; INTRAVENOUS EVERY 6 HOURS PRN
Status: DISCONTINUED | OUTPATIENT
Start: 2024-07-25 | End: 2024-07-28

## 2024-07-25 RX ORDER — NICOTINE POLACRILEX 4 MG
30 LOZENGE BUCCAL
Status: DISCONTINUED | OUTPATIENT
Start: 2024-07-25 | End: 2024-07-29

## 2024-07-25 RX ORDER — KETOROLAC TROMETHAMINE 15 MG/ML
15 INJECTION, SOLUTION INTRAMUSCULAR; INTRAVENOUS EVERY 6 HOURS PRN
Status: DISCONTINUED | OUTPATIENT
Start: 2024-07-25 | End: 2024-07-27

## 2024-07-25 RX ORDER — LABETALOL HYDROCHLORIDE 5 MG/ML
10 INJECTION, SOLUTION INTRAVENOUS EVERY 10 MIN PRN
Status: COMPLETED | OUTPATIENT
Start: 2024-07-25 | End: 2024-07-25

## 2024-07-25 RX ORDER — POTASSIUM CHLORIDE 14.9 MG/ML
20 INJECTION INTRAVENOUS ONCE
Status: COMPLETED | OUTPATIENT
Start: 2024-07-25 | End: 2024-07-25

## 2024-07-25 RX ORDER — METOCLOPRAMIDE HYDROCHLORIDE 5 MG/ML
5 INJECTION INTRAMUSCULAR; INTRAVENOUS EVERY 8 HOURS PRN
Status: DISCONTINUED | OUTPATIENT
Start: 2024-07-25 | End: 2024-07-28

## 2024-07-25 RX ORDER — DEXTROSE MONOHYDRATE 25 G/50ML
50 INJECTION, SOLUTION INTRAVENOUS
Status: DISCONTINUED | OUTPATIENT
Start: 2024-07-25 | End: 2024-07-29

## 2024-07-25 RX ORDER — ASPIRIN 81 MG/1
324 TABLET, CHEWABLE ORAL ONCE
Status: COMPLETED | OUTPATIENT
Start: 2024-07-25 | End: 2024-07-25

## 2024-07-25 RX ORDER — ASPIRIN 81 MG/1
81 TABLET, CHEWABLE ORAL DAILY
Status: DISCONTINUED | OUTPATIENT
Start: 2024-07-25 | End: 2024-07-29

## 2024-07-25 RX ORDER — SENNOSIDES 8.6 MG
17.2 TABLET ORAL NIGHTLY PRN
Status: DISCONTINUED | OUTPATIENT
Start: 2024-07-25 | End: 2024-07-28

## 2024-07-25 RX ORDER — ACETAMINOPHEN 650 MG/1
650 SUPPOSITORY RECTAL EVERY 4 HOURS PRN
Status: DISCONTINUED | OUTPATIENT
Start: 2024-07-25 | End: 2024-07-29

## 2024-07-25 RX ORDER — ATORVASTATIN CALCIUM 40 MG/1
40 TABLET, FILM COATED ORAL NIGHTLY
Status: DISCONTINUED | OUTPATIENT
Start: 2024-07-25 | End: 2024-07-29

## 2024-07-25 RX ORDER — ALBUTEROL SULFATE 90 UG/1
2 AEROSOL, METERED RESPIRATORY (INHALATION) EVERY 4 HOURS PRN
Status: DISCONTINUED | OUTPATIENT
Start: 2024-07-25 | End: 2024-07-29

## 2024-07-25 RX ORDER — EZETIMIBE 10 MG/1
10 TABLET ORAL NIGHTLY
Status: DISCONTINUED | OUTPATIENT
Start: 2024-07-25 | End: 2024-07-29

## 2024-07-25 RX ORDER — PROCHLORPERAZINE EDISYLATE 5 MG/ML
10 INJECTION INTRAMUSCULAR; INTRAVENOUS ONCE
Status: COMPLETED | OUTPATIENT
Start: 2024-07-25 | End: 2024-07-25

## 2024-07-25 RX ORDER — SODIUM CHLORIDE 9 MG/ML
INJECTION, SOLUTION INTRAVENOUS CONTINUOUS
Status: DISCONTINUED | OUTPATIENT
Start: 2024-07-25 | End: 2024-07-26

## 2024-07-25 RX ORDER — SODIUM CHLORIDE 9 MG/ML
INJECTION, SOLUTION INTRAVENOUS CONTINUOUS
Status: ACTIVE | OUTPATIENT
Start: 2024-07-25 | End: 2024-07-25

## 2024-07-25 RX ORDER — ACETAMINOPHEN 325 MG/1
650 TABLET ORAL EVERY 4 HOURS PRN
Status: DISCONTINUED | OUTPATIENT
Start: 2024-07-25 | End: 2024-07-29

## 2024-07-25 RX ORDER — DONEPEZIL HYDROCHLORIDE 5 MG/1
5 TABLET, FILM COATED ORAL NIGHTLY
Status: DISCONTINUED | OUTPATIENT
Start: 2024-07-25 | End: 2024-07-29

## 2024-07-25 RX ORDER — BISACODYL 10 MG
10 SUPPOSITORY, RECTAL RECTAL
Status: DISCONTINUED | OUTPATIENT
Start: 2024-07-25 | End: 2024-07-28

## 2024-07-25 RX ORDER — NICOTINE POLACRILEX 4 MG
15 LOZENGE BUCCAL
Status: DISCONTINUED | OUTPATIENT
Start: 2024-07-25 | End: 2024-07-29

## 2024-07-25 RX ORDER — INSULIN DEGLUDEC 100 U/ML
6 INJECTION, SOLUTION SUBCUTANEOUS NIGHTLY
Status: DISCONTINUED | OUTPATIENT
Start: 2024-07-25 | End: 2024-07-28

## 2024-07-25 RX ORDER — POLYETHYLENE GLYCOL 3350 17 G/17G
17 POWDER, FOR SOLUTION ORAL DAILY PRN
Status: DISCONTINUED | OUTPATIENT
Start: 2024-07-25 | End: 2024-07-28

## 2024-07-25 NOTE — OCCUPATIONAL THERAPY NOTE
OT order received, chart reviewed. Per order, \"Eval and treat; if on bedrest start after 24 hours.\" Per chart, patient on bedrest initiated 7/25 @ 0247. Will f/u tomorrow as medically appropriate.    Isabel Lewis OTR/L  [facility]  #[phone]

## 2024-07-25 NOTE — CONSULTS
Formerly [facility]  [address] [place] [address], [address]  [facility] [address]  [phone]            Jillian Yip Patient Status:  Inpatient    1945 MRN [id number]   Location [facility] 3W/SW Attending Anali Cruz DO   Hosp Day # 0 PCP Colleen Weiler, DO     Date of Admission:  2024  Date of Consult:  2024  Reason for Consultation:   Acute stroke    History of Present Illness:   Patient is a 79 year old female who was admitted to the hospital for Acute CVA (cerebrovascular accident) (HCC):    History was obtained from the patient herself, her daughter at bedside as well as from the medical record.  She told me that yesterday during seeing a headache in her neck and moving up towards her head.  She gradually started feeling numbness that she describes as loss of sensation in the right hemibody involving the right upper and right lower extremities. She was also weak on the right hemibody.  She also told me that she had trouble finding her words and speaking in sentences.  She had  previous stroke in 2017 in left MCA territory.  There was no loss of consciousness and no jerking or twitching no she said she has a chronic tremor in her right upper extremity.  She initially told me the tremor started yesterday after MRI brain but then she told me that the tremor has been there for a month.  She in fact has tremors in both upper extremities.  She reports the weakness in her lower extremities is chronic.  On arrival to the emergency, she was not declared a stroke code as the weakness in bilateral lower extremities was chronic.  Numbness on the right leg started about 45 minutes prior to arrival to the emergency department.    She sees Dr. Dow in outpatient neurology.  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  Recently seen in outpatient neurology in  for posttraumatic headaches and she was complaining of  memory problems then.     MRI brain revealed scattered foci of diffusion restriction within the right frontal and parietal cortex as well as diffusion restriction in the left inferior cerebellum    She is telling me she continues to have some word finding difficulties.  She continues to complain of some weakness and numbness on the right hemibody  Past Medical History  Past Medical History:    Aortic aneurysm, thoracic (HCC)    Back problem    Calculus of kidney    Cataract    COPD (chronic obstructive pulmonary disease) (HCC)    Coronary atherosclerosis    Deep vein thrombosis (HCC)    Diabetes (HCC)    Diverticulosis of large intestine    Heart attack (HCC)    2013    High blood pressure    High cholesterol    History of stomach ulcers    Incontinence    Osteoarthritis    Ovarian cancer (HCC)    PONV (postoperative nausea and vomiting)    Renal disorder    Shortness of breath    Stroke (HCC)    shaking, wheelchair       Past Surgical History  Past Surgical History:   Procedure Laterality Date    Appendectomy      Arthroscopy of joint unlisted Right     Rotator cuff surgery    Back surgery  07/21/2017    L3-4 LUIS and hemilaminectomies    Cataract      Cath drug eluting stent      2013    Endovas aaa repr w/3-p part      Ep loop recorder implant Left 2017    Foot surgery Bilateral     Fracture surgery Right     right clavicle    Hysterectomy      Theodora localization wire 1 site right (cpt=19281)      benign-20 years ago    Spine surgery procedure unlisted         Family History  Family History   Problem Relation Age of Onset    Cancer Father     Diabetes Father     Diabetes Mother     Cancer Daughter     Breast Cancer Maternal Aunt         x2 in 70's    Ovarian Cancer Self        Social History  Pediatric History   Patient Parents    Not on file     Other Topics Concern    Not on file   Social History Narrative    Not on file           Current Medications:  Current Facility-Administered Medications   Medication Dose  Route Frequency    sodium chloride 0.9% infusion   Intravenous Continuous    acetaminophen (Tylenol) tab 650 mg  650 mg Oral Q4H PRN    Or    acetaminophen (Tylenol) rectal suppository 650 mg  650 mg Rectal Q4H PRN    hydrALAzine (Apresoline) 20 mg/mL injection 10 mg  10 mg Intravenous Q2H PRN    ondansetron (Zofran) 4 MG/2ML injection 4 mg  4 mg Intravenous Q6H PRN    Or    metoclopramide (Reglan) 5 mg/mL injection 5 mg  5 mg Intravenous Q8H PRN    polyethylene glycol (PEG 3350) (Miralax) 17 g oral packet 17 g  17 g Oral Daily PRN    sennosides (Senokot) tab 17.2 mg  17.2 mg Oral Nightly PRN    bisacodyl (Dulcolax) 10 MG rectal suppository 10 mg  10 mg Rectal Daily PRN    cefTRIAXone (Rocephin) 1 g in sodium chloride 0.9% 100 mL IVPB-ADDV  1 g Intravenous Q24H    glucose (Dex4) 15 GM/59ML oral liquid 15 g  15 g Oral Q15 Min PRN    Or    glucose (Glutose) 40% oral gel 15 g  15 g Oral Q15 Min PRN    Or    glucose-vitamin C (Dex-4) chewable tab 4 tablet  4 tablet Oral Q15 Min PRN    Or    dextrose 50% injection 50 mL  50 mL Intravenous Q15 Min PRN    Or    glucose (Dex4) 15 GM/59ML oral liquid 30 g  30 g Oral Q15 Min PRN    Or    glucose (Glutose) 40% oral gel 30 g  30 g Oral Q15 Min PRN    Or    glucose-vitamin C (Dex-4) chewable tab 8 tablet  8 tablet Oral Q15 Min PRN    insulin aspart (NovoLOG) 100 Units/mL FlexPen 1-5 Units  1-5 Units Subcutaneous TID CC and HS    aspirin chewable tab 81 mg  81 mg Oral Daily    albuterol (Ventolin HFA) 108 (90 Base) MCG/ACT inhaler 2 puff  2 puff Inhalation Q4H PRN    insulin degludec (Tresiba) 100 units/mL flextouch 6 Units  6 Units Subcutaneous Nightly    umeclidinium-vilanterol (Anoro Ellipta) 62.5-25 MCG/ACT inhaler 1 puff  1 puff Inhalation Daily    pantoprazole (Protonix) 40 mg in sodium chloride 0.9% PF 10 mL IV push  40 mg Intravenous Daily    donepezil (Aricept) tab 5 mg  5 mg Oral Nightly    atorvastatin (Lipitor) tab 40 mg  40 mg Oral Nightly    ezetimibe (Zetia) tab 10  mg  10 mg Oral Nightly    amLODIPine (Norvasc) tab 5 mg  5 mg Oral Daily    [START ON 2024] clopidogrel (Plavix) tab 75 mg  75 mg Oral Daily     Medications Prior to Admission   Medication Sig    [] HYDROcodone-acetaminophen 5-325 MG Oral Tab Take 1 tablet by mouth every 12 (twelve) hours as needed for Pain.    diclofenac 1 % External Gel Apply 2 g topically 4 (four) times daily as needed.    isosorbide mononitrate  MG Oral Tablet 24 Hr Take 1 tablet (120 mg total) by mouth daily.    donepezil 5 MG Oral Tab Take 1 tablet (5 mg total) by mouth nightly.    Insulin Pen Needle (CARETOUCH PEN NEEDLES) 32G X 4 MM Does not apply Misc Use with injection once daily as directed    insulin glargine (LANTUS SOLOSTAR) 100 UNIT/ML Subcutaneous Solution Pen-injector Inject 6 Units into the skin nightly.    Blood Glucose Monitoring Suppl (ACCU-CHEK GUIDE) w/Device Does not apply Kit 1 each daily.    Glucose Blood (ACCU-CHEK GUIDE) In Vitro Strip 1 each by In Vitro route daily.    Blood Glucose Monitoring Suppl (ONETOUCH VERIO IQ SYSTEM) w/Device Does not apply Kit 1 Device by Other route 3 (three) times daily. Use as directed. (Patient not taking: Reported on 2024)    Glucose Blood (ONETOUCH VERIO) In Vitro Strip Use as directed. (Patient not taking: Reported on 2024)    OneTouch Delica Lancets 33G Does not apply Misc 1 Lancet by Finger stick route 3 (three) times daily. (Patient not taking: Reported on 2024)    pantoprazole 40 MG Oral Tab EC Take 1 tablet (40 mg total) by mouth 2 (two) times daily before meals.    albuterol 108 (90 Base) MCG/ACT Inhalation Aero Soln Inhale 2 puffs into the lungs every 4 (four) hours as needed.    traMADol 50 MG Oral Tab Take 1 tablet (50 mg total) by mouth every 12 (twelve) hours as needed.    acetaminophen 500 MG Oral Tab Take 1 tablet (500 mg total) by mouth every 8 (eight) hours.    lidocaine-menthol 4-1 % External Patch Place 2 patches onto the skin daily.     metoprolol tartrate 100 MG Oral Tab Take 1 tablet (100 mg total) by mouth 2 (two) times daily.    nitroglycerin 0.4 MG Sublingual SL Tab Place 1 tablet (0.4 mg total) under the tongue every 5 (five) minutes as needed for Chest pain.    amLODIPine Besylate 5 MG Oral Tab Take 1 tablet (5 mg total) by mouth at bedtime.    ONETOUCH DELICA LANCETS 33G Does not apply Misc OneTouch Delica Lancets 33 gauge   TEST BLOOD GLUCOSE TWO TIMES A DAY AS DIRECTED (Patient not taking: Reported on 5/13/2024)    Clopidogrel Bisulfate 75 MG Oral Tab Take 1 tablet (75 mg total) by mouth daily.    ezetimibe 10 MG Oral Tab Take 1 tablet (10 mg total) by mouth nightly.       Allergies  Allergies   Allergen Reactions    Codeine SWELLING and OTHER (SEE COMMENTS)     Face and body get puffy/swell, headache  Per pt, has tolerated morphine and Norco previously with no adverse effects    Enalapril Maleate-Felodipine SHORTNESS OF BREATH    Nyquil Severe Cold-Flu [Gnp Multi-Symptom Cold] PALPITATIONS    Radiology Contrast Iodinated Dyes ANAPHYLAXIS    Shellfish-Derived Products     Dilaudid [Hydromorphone] OTHER (SEE COMMENTS)     PATIENT'S DAUGHTER HAD A VERY BAD REACTION FROM DILAUDID (MENTALLY)    Sulfa Antibiotics RASH and OTHER (SEE COMMENTS)     Rash         Review of Systems:   As in HPI, the rest of the 14 system review was done and was negative    Physical Exam:     Vitals:    07/25/24 1100 07/25/24 1104 07/25/24 1200 07/25/24 1209   BP:  154/57 159/62 149/84   Pulse:  63 74 72   Resp:  16 16    Temp:   97.6 °F (36.4 °C)    TempSrc:   Oral    SpO2: 94% 95% 94%    Weight:       Height:           General: No apparent distress, well nourished, well groomed.  Head- Normocephalic, atraumatic  Eyes- No redness or swelling  ENT- Hearing intake, smell preserved, normal glutition  Neck- No masses or adenopathy  Cv: pulses were palpable and normal, no cyanosis or edema     Neurological:   He is alert attentive and fully oriented.  She told me that  it is 25 July 2024 and she knew that she is at [facility].  She could spell forward and backwards.  She was able to name objects and repeat and she would follow simple and complex commands without problems.  Her speech was only minimally dysarthric.  Her visual field is full to confrontation test and her extraocular movements are intact.  Her pupils were 4 mm equal and reactive to light and her face appears symmetric.  Her tongue protrudes in the midline.  She had a supple neck and tone was normal in upper and lower extremities.  She had normal motor power 5/5 in both upper extremities at proximal and distal muscles.  She was 4/5 and motor power in proximal and distal muscles of the lower extremities.  There was no ataxia on finger-nose-finger test. She has no rest tremors but bilateral postural tremors worse on the right side. Minimal kinetic tremors.   She reported decreased sensation to vibration and touch in her right hemibody compared to the left.         Results:     Laboratory Data:  Lab Results   Component Value Date    WBC 7.7 07/24/2024    HGB 11.4 (L) 07/24/2024    HCT 33.9 (L) 07/24/2024    .0 07/24/2024    CREATSERUM 2.05 (H) 07/25/2024    BUN 45 (H) 07/25/2024     07/25/2024    K 3.7 07/25/2024     07/25/2024    CO2 25.0 07/25/2024     (H) 07/25/2024    CA 9.5 07/25/2024    ALB 4.1 07/24/2024    ALKPHO 145 (H) 07/24/2024    TP 6.5 07/24/2024    AST 12 07/24/2024    ALT <7 (L) 07/24/2024    PTT 23.8 10/10/2023    INR 1.14 10/10/2023    PTP 14.7 10/10/2023    TSH 1.030 03/23/2022    ESRML 9 03/23/2022    CRP 0.86 (H) 07/24/2019    MG 2.0 07/25/2024    PHOS 4.4 05/13/2024    TROP 0.01 10/16/2017    CK 20 (L) 10/21/2023    CKMB 1.0 10/16/2017    B12 381 07/10/2024         Imaging:    CT BRAIN OR HEAD (CPT=70450)    Result Date: 7/25/2024  CONCLUSION:  1. No acute intracranial hemorrhage.  Punctate focus of susceptibility artifact in the left frontal lobe on previous day  brain MR therefore likely represents a chronic microhemorrhage (not unusual in patients of this age).  2. Subcentimeter low-attenuation focus in the left cerebellum likely relates to an acute lacunar-type infarct.  Other small acute cortical infarcts at the right frontal and parietal lobes are not well seen by CT. 3. Nonspecific white matter changes involving both cerebral hemispheres that most likely reflect sequelae of chronic microangiopathy. 4. Intracranial atherosclerosis. 5. 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  Differential considerations for parotid gland masses are broad and include both benign and malignant primary neoplasms as well as metastatic disease.  Nonemergent otolaryngology evaluation of this finding is recommended with strong consideration of histologic sampling for definitive characterization.   Dictated by (CST): Atif Otto MD on 7/25/2024 at 9:13 AM     Finalized by (CST): Atif Otto MD on 7/25/2024 at 9:21 AM          MRI BRAIN WO ACUTE (3) SEQUENCE (CPT=70551)    Result Date: 7/25/2024  CONCLUSION:   Scattered foci of diffusion restriction within the right frontal and parietal cortex, in a pattern suggestive acute ischemia related to embolic etiology.  Additional diffusion restriction in the left inferior cerebellum, also compatible with acute ischemia, which may be embolic in etiology as well.  Punctate focus of susceptibility in the anterior left frontal region, along a sulcus.  This may represent nonspecific mineralization or sequela of age-indeterminate macro hemorrhage.  Further assessment can be made with CT of the brain.  Background of advanced chronic small vessel ischemic disease, global parenchymal volume loss, as well chronic lacunar infarcts in the left basal ganglia and right periventricular white matter.   These findings were discussed with Dr. Sun at 1:25 a.m. By Dr.Se Dietrich Findings were discussed with charge  nurse Jackie perez  07/25/2024 at 7:41 a.m. by Sadaf Page.  Findings were discussed with Dr. Da Gómez on 07/25/2024 at 7:45 a.m by Sadaf Page.   Dictated by (CST): Sadaf Page MD on 7/25/2024 at 7:32 AM     Finalized by (CST): Sadaf Page MD on 7/25/2024 at 7:49 AM          XR CHEST AP PORTABLE  (CPT=71045)    Result Date: 7/24/2024  CONCLUSION: No radiographic evidence of acute cardiopulmonary abnormality.  Cardiomegaly and aortic repair changes are again seen.    elm-remote    Dictated by (CST): Fletcher Carreno MD on 7/24/2024 at 7:49 PM     Finalized by (CST): Fletcher Carreno MD on 7/24/2024 at 7:51 PM         EKG 12 Lead    Result Date: 7/25/2024  Normal sinus rhythm Left atrial enlargement Left axis deviation Minimal voltage criteria for LVH, may be normal variant ( Jerad product ) Nonspecific ST abnormality Abnormal ECG When compared with ECG of 02-OCT-2023 13:37, Non-specific change in ST segment in Inferior leads T wave inversion no longer evident in Anterior leads Confirmed by SYLVESTER ALVAREZ, BRYON ([id number]) on 7/25/2024 7:47:58 AM       Impression:     Acute CVA (cerebrovascular accident) (HCC)  I personally reviewed MRI brain and there are areas of diffusion restriction in right frontal and parietal cortex as well as in the inferior cerebellum.  These look embolic in nature.  She has previous history of stroke in left MCA distribution without residual deficits.    Recommendations:  1-ultrasound carotids  2-echocardiogram  3-continue telemetry  4-she would benefit from prolonged cardiac monitoring on discharge  5-A1c  6-LDL  7-PT/OT/ST  8- Stroke education  9-Dual Antiplatelet therapy with atorvastatin 40mg daily  10-Ok for chemical DVT prophylaxis    Neurology is following    Thank you for allowing me to participate in the care of your patient.    Kayla Palumbo MD

## 2024-07-25 NOTE — PLAN OF CARE
Ivf. Neuro checks. Seizure precaution. Speech eval. PT/OT consult. MRI Brain pending result.    Problem: Patient Centered Care  Goal: Patient preferences are identified and integrated in the patient's plan of care  Description: Interventions:  - What would you like us to know as we care for you? Home with daughter.  - Provide timely, complete, and accurate information to patient/family  - Incorporate patient and family knowledge, values, beliefs, and cultural backgrounds into the planning and delivery of care  - Encourage patient/family to participate in care and decision-making at the level they choose  - Honor patient and family perspectives and choices  Outcome: Progressing     Problem: Diabetes/Glucose Control  Goal: Glucose maintained within prescribed range  Description: INTERVENTIONS:  - Monitor Blood Glucose as ordered  - Assess for signs and symptoms of hyperglycemia and hypoglycemia  - Administer ordered medications to maintain glucose within target range  - Assess barriers to adequate nutritional intake and initiate nutrition consult as needed  - Instruct patient on self management of diabetes  Outcome: Progressing     Problem: CARDIOVASCULAR - ADULT  Goal: Maintains optimal cardiac output and hemodynamic stability  Description: INTERVENTIONS:  - Monitor vital signs, rhythm, and trends  - Monitor for bleeding, hypotension and signs of decreased cardiac output  - Evaluate effectiveness of vasoactive medications to optimize hemodynamic stability  - Monitor arterial and/or venous puncture sites for bleeding and/or hematoma  - Assess quality of pulses, skin color and temperature  - Assess for signs of decreased coronary artery perfusion - ex. Angina  - Evaluate fluid balance, assess for edema, trend weights  Outcome: Progressing  Goal: Absence of cardiac arrhythmias or at baseline  Description: INTERVENTIONS:  - Continuous cardiac monitoring, monitor vital signs, obtain 12 lead EKG if indicated  - Evaluate  effectiveness of antiarrhythmic and heart rate control medications as ordered  - Initiate emergency measures for life threatening arrhythmias  - Monitor electrolytes and administer replacement therapy as ordered  Outcome: Progressing     Problem: METABOLIC/FLUID AND ELECTROLYTES - ADULT  Goal: Glucose maintained within prescribed range  Description: INTERVENTIONS:  - Monitor Blood Glucose as ordered  - Assess for signs and symptoms of hyperglycemia and hypoglycemia  - Administer ordered medications to maintain glucose within target range  - Assess barriers to adequate nutritional intake and initiate nutrition consult as needed  - Instruct patient on self management of diabetes  Outcome: Progressing  Goal: Electrolytes maintained within normal limits  Description: INTERVENTIONS:  - Monitor labs and rhythm and assess patient for signs and symptoms of electrolyte imbalances  - Administer electrolyte replacement as ordered  - Monitor response to electrolyte replacements, including rhythm and repeat lab results as appropriate  - Fluid restriction as ordered  - Instruct patient on fluid and nutrition restrictions as appropriate  Outcome: Progressing  Goal: Hemodynamic stability and optimal renal function maintained  Description: INTERVENTIONS:  - Monitor labs and assess for signs and symptoms of volume excess or deficit  - Monitor intake, output and patient weight  - Monitor urine specific gravity, serum osmolarity and serum sodium as indicated or ordered  - Monitor response to interventions for patient's volume status, including labs, urine output, blood pressure (other measures as available)  - Encourage oral intake as appropriate  - Instruct patient on fluid and nutrition restrictions as appropriate  Outcome: Progressing     Problem: SAFETY ADULT - FALL  Goal: Free from fall injury  Description: INTERVENTIONS:  - Assess pt frequently for physical needs  - Identify cognitive and physical deficits and behaviors that  affect risk of falls.  - [place] fall precautions as indicated by assessment.  - Educate pt/family on patient safety including physical limitations  - Instruct pt to call for assistance with activity based on assessment  - Modify environment to reduce risk of injury  - Provide assistive devices as appropriate  - Consider OT/PT consult to assist with strengthening/mobility  - Encourage toileting schedule  Outcome: Progressing     Problem: DISCHARGE PLANNING  Goal: Discharge to home or other facility with appropriate resources  Description: INTERVENTIONS:  - Identify barriers to discharge w/pt and caregiver  - Include patient/family/discharge partner in discharge planning  - Arrange for needed discharge resources and transportation as appropriate  - Identify discharge learning needs (meds, wound care, etc)  - Arrange for interpreters to assist at discharge as needed  - Consider post-discharge preferences of patient/family/discharge partner  - Complete POLST form as appropriate  - Assess patient's ability to be responsible for managing their own health  - Refer to Case Management Department for coordinating discharge planning if the patient needs post-hospital services based on physician/LIP order or complex needs related to functional status, cognitive ability or social support system  Outcome: Progressing

## 2024-07-25 NOTE — ED QUICK NOTES
Orders for admission, patient is aware of plan and ready to go upstairs. Any questions, please call ED RN Shaq at extension [phone].     Patient Covid vaccination status: Fully vaccinated     COVID Test Ordered in ED: None    COVID Suspicion at Admission: N/A    Running Infusions:  None    Mental Status/LOC at time of transport: a&ox4    Other pertinent information:   CIWA score: N/A   NIH score:  N/A

## 2024-07-25 NOTE — PLAN OF CARE
Patient is alert and oriented x2 and on RA. No complaints of pain. CT of Brain, US carotids, and ECHO completed. NIH and Neuro checks completed. Electrolyte replacement per protocol. BP elevated, PRN meds given with relief. Frequent rounding done throughout the shift. Safety precautions in place.    Problem: Patient Centered Care  Goal: Patient preferences are identified and integrated in the patient's plan of care  Description: Interventions:  - What would you like us to know as we care for you?   - Provide timely, complete, and accurate information to patient/family  - Incorporate patient and family knowledge, values, beliefs, and cultural backgrounds into the planning and delivery of care  - Encourage patient/family to participate in care and decision-making at the level they choose  - Honor patient and family perspectives and choices  Outcome: Progressing     Problem: Diabetes/Glucose Control  Goal: Glucose maintained within prescribed range  Description: INTERVENTIONS:  - Monitor Blood Glucose as ordered  - Assess for signs and symptoms of hyperglycemia and hypoglycemia  - Administer ordered medications to maintain glucose within target range  - Assess barriers to adequate nutritional intake and initiate nutrition consult as needed  - Instruct patient on self management of diabetes  Outcome: Progressing     Problem: Patient/Family Goals  Goal: Patient/Family Long Term Goal  Description: Patient's Long Term Goal: Go home     Interventions:  - Follow MD ordered  - Non pharmacological interventions   - See additional Care Plan goals for specific interventions  Outcome: Progressing  Goal: Patient/Family Short Term Goal  Description: Patient's Short Term Goal: Improve strength    Interventions:   - Follow MD orders  -Non- pharmacological interventions   - See additional Care Plan goals for specific interventions  Outcome: Progressing     Problem: CARDIOVASCULAR - ADULT  Goal: Maintains optimal cardiac output and  hemodynamic stability  Description: INTERVENTIONS:  - Monitor vital signs, rhythm, and trends  - Monitor for bleeding, hypotension and signs of decreased cardiac output  - Evaluate effectiveness of vasoactive medications to optimize hemodynamic stability  - Monitor arterial and/or venous puncture sites for bleeding and/or hematoma  - Assess quality of pulses, skin color and temperature  - Assess for signs of decreased coronary artery perfusion - ex. Angina  - Evaluate fluid balance, assess for edema, trend weights  Outcome: Progressing  Goal: Absence of cardiac arrhythmias or at baseline  Description: INTERVENTIONS:  - Continuous cardiac monitoring, monitor vital signs, obtain 12 lead EKG if indicated  - Evaluate effectiveness of antiarrhythmic and heart rate control medications as ordered  - Initiate emergency measures for life threatening arrhythmias  - Monitor electrolytes and administer replacement therapy as ordered  Outcome: Progressing     Problem: METABOLIC/FLUID AND ELECTROLYTES - ADULT  Goal: Glucose maintained within prescribed range  Description: INTERVENTIONS:  - Monitor Blood Glucose as ordered  - Assess for signs and symptoms of hyperglycemia and hypoglycemia  - Administer ordered medications to maintain glucose within target range  - Assess barriers to adequate nutritional intake and initiate nutrition consult as needed  - Instruct patient on self management of diabetes  Outcome: Progressing  Goal: Electrolytes maintained within normal limits  Description: INTERVENTIONS:  - Monitor labs and rhythm and assess patient for signs and symptoms of electrolyte imbalances  - Administer electrolyte replacement as ordered  - Monitor response to electrolyte replacements, including rhythm and repeat lab results as appropriate  - Fluid restriction as ordered  - Instruct patient on fluid and nutrition restrictions as appropriate  Outcome: Progressing  Goal: Hemodynamic stability and optimal renal function  maintained  Description: INTERVENTIONS:  - Monitor labs and assess for signs and symptoms of volume excess or deficit  - Monitor intake, output and patient weight  - Monitor urine specific gravity, serum osmolarity and serum sodium as indicated or ordered  - Monitor response to interventions for patient's volume status, including labs, urine output, blood pressure (other measures as available)  - Encourage oral intake as appropriate  - Instruct patient on fluid and nutrition restrictions as appropriate  Outcome: Progressing     Problem: SAFETY ADULT - FALL  Goal: Free from fall injury  Description: INTERVENTIONS:  - Assess pt frequently for physical needs  - Identify cognitive and physical deficits and behaviors that affect risk of falls.  - [place] fall precautions as indicated by assessment.  - Educate pt/family on patient safety including physical limitations  - Instruct pt to call for assistance with activity based on assessment  - Modify environment to reduce risk of injury  - Provide assistive devices as appropriate  - Consider OT/PT consult to assist with strengthening/mobility  - Encourage toileting schedule  Outcome: Progressing     Problem: DISCHARGE PLANNING  Goal: Discharge to home or other facility with appropriate resources  Description: INTERVENTIONS:  - Identify barriers to discharge w/pt and caregiver  - Include patient/family/discharge partner in discharge planning  - Arrange for needed discharge resources and transportation as appropriate  - Identify discharge learning needs (meds, wound care, etc)  - Arrange for interpreters to assist at discharge as needed  - Consider post-discharge preferences of patient/family/discharge partner  - Complete POLST form as appropriate  - Assess patient's ability to be responsible for managing their own health  - Refer to Case Management Department for coordinating discharge planning if the patient needs post-hospital services based on physician/LIP order or  complex needs related to functional status, cognitive ability or social support system  Outcome: Progressing     Problem: NEUROLOGICAL - ADULT  Goal: Achieves stable or improved neurological status  Description: INTERVENTIONS  - Assess for and report changes in neurological status  - Initiate measures to prevent increased intracranial pressure  - Maintain blood pressure and fluid volume within ordered parameters to optimize cerebral perfusion and minimize risk of hemorrhage  - Monitor temperature, glucose, and sodium. Initiate appropriate interventions as ordered  Outcome: Progressing  Goal: Achieves maximal functionality and self care  Description: INTERVENTIONS  - Monitor swallowing and airway patency with patient fatigue and changes in neurological status  - Encourage and assist patient to increase activity and self care with guidance from PT/OT  - Encourage visually impaired, hearing impaired and aphasic patients to use assistive/communication devices  Outcome: Progressing

## 2024-07-25 NOTE — PHYSICAL THERAPY NOTE
PHYSICAL THERAPY EVALUATION - INPATIENT     Room Number: 336/336-A  Evaluation Date: 7/25/2024  Type of Evaluation: Initial   Physician Order: PT Eval and Treat    Presenting Problem: Acute CVA  Co-Morbidities : Hx of thoracic aortic aneurysm, COPD, CAD, DM, HTN, CKD, arthritis  Reason for Therapy: Mobility Dysfunction and Discharge Planning    PHYSICAL THERAPY ASSESSMENT   Patient is a 79 year old female admitted 7/24/2024 for headache, nausea, difficulty breathing, and RLE numbness. MRI of brain significant for \"Scattered foci of diffusion restriction within the right frontal and parietal cortex, in a pattern suggestive acute ischemia related to embolic etiology. Additional diffusion restriction in the left inferior cerebellum, also compatible with acute ischemia, which may be embolic in etiology as well.\" Per orders, \"Eval and treat; if on bedrest start after 24 hours.\" Per chart, patient on bedrest initiated 7.25 at 0247. OT communicated with Dr. Seng Sorto, Neurology, who reported that patient is NOT on bedrest and is okay to see for therapy evaluation.      Prior to admission, patient's baseline is mod I with cane or RW, set-up assist with ADLs. Patient is currently functioning below baseline with bed mobility, transfers, gait, stair negotiation, and standing prolonged periods.  Patient is requiring moderate assist and maximum assist x 2 as a result of the following impairments: decreased functional strength, decreased endurance/aerobic capacity, impaired standing balance, impaired motor planning, and decreased muscular endurance.  Physical Therapy will continue to follow for duration of hospitalization.    Patient will benefit from continued skilled PT Services to facilitate return to prior level of function as patient demonstrates high motivation with excellent tolerance to an intensive therapy program .    PLAN  PT Treatment Plan: Bed mobility;Body mechanics;Endurance;Patient education;Gait  training;Strengthening;Stair training;Transfer training;Balance training  Rehab Potential : Good  Frequency (Obs): 5x/week    PHYSICAL THERAPY MEDICAL/SOCIAL HISTORY   Problem List  Principal Problem:    Acute CVA (cerebrovascular accident) (HCC)  Active Problems:    Acute cystitis without hematuria    HOME SITUATION  Home Situation  Type of Home: House  Home Layout: Multi-level (3 level)  Stairs to Enter : 5  Railing: Yes  Stairs to Bedroom: 14  Railing: Yes  Lives With: Daughter (grandson)  Drives: No  Patient Owned Equipment: Rolling walker;Cane;Wheelchair  Patient Regularly Uses: Glasses     SUBJECTIVE  \"I'm not allowed to cook anymore\"    PHYSICAL THERAPY EXAMINATION   OBJECTIVE  Precautions: Bed/chair alarm  Fall Risk: High fall risk    WEIGHT BEARING RESTRICTION  Weight Bearing Restriction: None    PAIN ASSESSMENT  Rating: Unable to rate  Location: reports BLE pain after sitting EOB  Management Techniques: Activity promotion;Relaxation;Repositioning    COGNITION  Overall Cognitive Status:  WFL - within functional limits  Orientation Level:  oriented x4    RANGE OF MOTION AND STRENGTH ASSESSMENT  Upper extremity ROM and strength are within functional limits   Lower extremity ROM is within functional limits   Lower extremity strength is within functional limits. Formal testing completed, LLE weaker than RLE but within functional limit.     BALANCE  Static Sitting: Fair +  Dynamic Sitting: Fair  Static Standing: Poor +  Dynamic Standing: Poor    NEUROLOGICAL FINDINGS     Coordination - Heel to Shin: Symmetrical     Sensation: BLEs symmetrical    ACTIVITY TOLERANCE  Pulse: 72  Heart Rate Source: Monitor     BP: 149/84  BP Location: Right arm  BP Method: Automatic  Patient Position: Sitting    O2 WALK  Oxygen Therapy  SPO2% on Room Air at Rest: 96    AM-PAC '6-Clicks' INPATIENT SHORT FORM - BASIC MOBILITY  How much difficulty does the patient currently have...  Patient Difficulty: Turning over in bed (including  adjusting bedclothes, sheets and blankets)?: A Lot   Patient Difficulty: Sitting down on and standing up from a chair with arms (e.g., wheelchair, bedside commode, etc.): A Lot   Patient Difficulty: Moving from lying on back to sitting on the side of the bed?: A Lot   How much help from another person does the patient currently need...   Help from Another: Moving to and from a bed to a chair (including a wheelchair)?: A Lot   Help from Another: Need to walk in hospital room?: A Lot   Help from Another: Climbing 3-5 steps with a railing?: A Lot     AM-PAC Score:  Raw Score: 12   Approx Degree of Impairment: 68.66%   Standardized Score (AM-PAC Scale): 35.33   CMS Modifier (G-Code): CL    FUNCTIONAL ABILITY STATUS  Functional Mobility/Gait Assessment  Gait Assistance: Moderate assistance (x2)  Distance (ft): 2 ft  Assistive Device: Rolling walker  Pattern: Shuffle;R Foot flat;L Foot flat (forward posture, short step length bilaterally). Pt cued for upright posture for postural management. Assist and cuing required when turning for sequencing with RW.   Supine to Sit: moderate assistx2. Increased time to complete. Assistance required for LE and trunk management. Tolerated 10 minutes of static sitting with both hands on bed requiring SBA to maintain balance.   Sit to Stand: moderate assistx2. Cued for proper sequencing with RW to ensure pt safety. Cued for upright posture.     Exercise/Education Provided:  Bed mobility  Body mechanics  Functional activity tolerated  Gait training  Posture  Transfer training    Pt received lying supine in bed. OT present. RN approved activity. Pt agreeable to participation. Educated pt on POC and benefits of mobilization OOB. Pt reported feeling dizzy sitting EOB, vitals taken (/84 mmHg). Encouraged patient to sit in bedside chair for all meals and ambulate as tolerated with nursing staff.     The patient's Approx Degree of Impairment: 68.66% has been calculated based on  documentation in the [facility] '6 clicks' Inpatient Basic Mobility Short Form.  Research supports that patients with this level of impairment may benefit from IR.  Final disposition will be made by interdisciplinary medical team.    Patient End of Session: Up in chair;Needs met;Call light within reach;RN aware of session/findings;All patient questions and concerns addressed;Alarm set    CURRENT GOALS  Goals to be met by: 8/8/24  Patient Goal Patient's self-stated goal is: return to PLOF   Goal #1 Patient is able to demonstrate supine - sit EOB @ level: minimum assistance     Goal #1   Current Status    Goal #2 Patient is able to demonstrate transfers Sit to/from Stand at assistance level: minimum assistance with walker - rolling     Goal #2  Current Status    Goal #3 Patient is able to ambulate 50 feet with assist device: walker - rolling at assistance level: minimum assistance   Goal #3   Current Status    Goal #4 Patient will negotiate 7 stairs/one curb w/ assistive device and min A   Goal #4   Current Status    Goal #5 Patient to demonstrate independence with home activity/exercise instructions provided to patient in preparation for discharge.   Goal #5   Current Status      Patient Evaluation Complexity Level:  History High - 3 or more personal factors and/or co-morbidities   Examination of body systems High - addressing a total of 4 or more elements   Clinical Presentation  Moderate - Evolving   Clinical Decision Making  Moderate Complexity     Therapeutic Activity:  29 minutes    [facility]  DPT Student     I provided clinical instruction and supervision for the duration of this session and agree with the above documentation.     Jenna Haase, PT, DPT  [facility]  Ext: [phone]

## 2024-07-25 NOTE — CONSULTS
[facility]  part of [facility]    Report of Consultation    Jillian Yip Patient Status:  Inpatient    1945 MRN [id number]   Location [facility] 3W/SW Attending Anali Cruz DO   Hosp Day # 0 PCP Colleen Weiler, DO     Date of Admission:  2024  Date of Consult:  2024  Reason for Consultation:   CAD/CVA    History of Present Illness:   Patient is a 79 year old female who was admitted to the hospital for Acute CVA (cerebrovascular accident) (HCC):  Patient is known to our service  Admitted with Headache/dizzy spell and R sided weakness (more than baseline) as patient has L MCA CVA with Residual R sided weakness.  Patient states she had Headache and Dizzy spell and SOB, now resolved.  Patient in ER was in NSR and with stable vitals  PMH:  HTN/HTHD/Chronic diastolic heart failure  CAD s/p MI   CVA L MCA with R sided residual Hemiparesis  Type II Aortic aneurysm  HLD  COPD on inhalers  CKD III b    Currently sitting up in chair without cardiac c/o  HR and BP stable  C monitor; sinus rhythm    Past Medical History  Past Medical History:    Aortic aneurysm, thoracic (HCC)    Back problem    Calculus of kidney    Cataract    COPD (chronic obstructive pulmonary disease) (HCC)    Coronary atherosclerosis    Deep vein thrombosis (HCC)    Diabetes (HCC)    Diverticulosis of large intestine    Heart attack (HCC)    2013    High blood pressure    High cholesterol    History of stomach ulcers    Incontinence    Osteoarthritis    Ovarian cancer (HCC)    PONV (postoperative nausea and vomiting)    Renal disorder    Shortness of breath    Stroke (HCC)    shaking, wheelchair       Past Surgical History  Past Surgical History:   Procedure Laterality Date    Appendectomy      Arthroscopy of joint unlisted Right     Rotator cuff surgery    Back surgery  2017    L3-4 LUIS and hemilaminectomies    Cataract      Cath drug eluting stent      2013    Endovas aaa repr w/3-p part       Ep loop recorder implant Left 2017    Foot surgery Bilateral     Fracture surgery Right     right clavicle    Hysterectomy      Theodora localization wire 1 site right (cpt=19281)      benign-20 years ago    Spine surgery procedure unlisted         Family History  Family History   Problem Relation Age of Onset    Cancer Father     Diabetes Father     Diabetes Mother     Cancer Daughter     Breast Cancer Maternal Aunt         x2 in 70's    Ovarian Cancer Self        Social History  Social History     Socioeconomic History    Marital status:    Tobacco Use    Smoking status: Former     Current packs/day: 0.50     Average packs/day: 0.5 packs/day for 35.0 years (17.5 ttl pk-yrs)     Types: Cigarettes     Passive exposure: Never    Smokeless tobacco: Former    Tobacco comments:     half pack a day   Vaping Use    Vaping status: Never Used   Substance and Sexual Activity    Alcohol use: No    Drug use: No     Social Determinants of Health     Food Insecurity: No Food Insecurity (7/25/2024)    Food Insecurity     Food Insecurity: Never true   Transportation Needs: No Transportation Needs (7/25/2024)    Transportation Needs     Lack of Transportation: No   Housing Stability: Low Risk  (7/25/2024)    Housing Stability     Housing Instability: No           Current Medications:  Current Facility-Administered Medications   Medication Dose Route Frequency    sodium chloride 0.9% infusion   Intravenous Continuous    acetaminophen (Tylenol) tab 650 mg  650 mg Oral Q4H PRN    Or    acetaminophen (Tylenol) rectal suppository 650 mg  650 mg Rectal Q4H PRN    hydrALAzine (Apresoline) 20 mg/mL injection 10 mg  10 mg Intravenous Q2H PRN    ondansetron (Zofran) 4 MG/2ML injection 4 mg  4 mg Intravenous Q6H PRN    Or    metoclopramide (Reglan) 5 mg/mL injection 5 mg  5 mg Intravenous Q8H PRN    polyethylene glycol (PEG 3350) (Miralax) 17 g oral packet 17 g  17 g Oral Daily PRN    sennosides (Senokot) tab 17.2 mg  17.2 mg Oral  Nightly PRN    bisacodyl (Dulcolax) 10 MG rectal suppository 10 mg  10 mg Rectal Daily PRN    cefTRIAXone (Rocephin) 1 g in sodium chloride 0.9% 100 mL IVPB-ADDV  1 g Intravenous Q24H    glucose (Dex4) 15 GM/59ML oral liquid 15 g  15 g Oral Q15 Min PRN    Or    glucose (Glutose) 40% oral gel 15 g  15 g Oral Q15 Min PRN    Or    glucose-vitamin C (Dex-4) chewable tab 4 tablet  4 tablet Oral Q15 Min PRN    Or    dextrose 50% injection 50 mL  50 mL Intravenous Q15 Min PRN    Or    glucose (Dex4) 15 GM/59ML oral liquid 30 g  30 g Oral Q15 Min PRN    Or    glucose (Glutose) 40% oral gel 30 g  30 g Oral Q15 Min PRN    Or    glucose-vitamin C (Dex-4) chewable tab 8 tablet  8 tablet Oral Q15 Min PRN    insulin aspart (NovoLOG) 100 Units/mL FlexPen 1-5 Units  1-5 Units Subcutaneous TID CC and HS    aspirin chewable tab 81 mg  81 mg Oral Daily    albuterol (Ventolin HFA) 108 (90 Base) MCG/ACT inhaler 2 puff  2 puff Inhalation Q4H PRN    insulin degludec (Tresiba) 100 units/mL flextouch 6 Units  6 Units Subcutaneous Nightly    umeclidinium-vilanterol (Anoro Ellipta) 62.5-25 MCG/ACT inhaler 1 puff  1 puff Inhalation Daily    pantoprazole (Protonix) 40 mg in sodium chloride 0.9% PF 10 mL IV push  40 mg Intravenous Daily    donepezil (Aricept) tab 5 mg  5 mg Oral Nightly     Medications Prior to Admission   Medication Sig    [] HYDROcodone-acetaminophen 5-325 MG Oral Tab Take 1 tablet by mouth every 12 (twelve) hours as needed for Pain.    diclofenac 1 % External Gel Apply 2 g topically 4 (four) times daily as needed.    isosorbide mononitrate  MG Oral Tablet 24 Hr Take 1 tablet (120 mg total) by mouth daily.    donepezil 5 MG Oral Tab Take 1 tablet (5 mg total) by mouth nightly.    Insulin Pen Needle (CARETOUCH PEN NEEDLES) 32G X 4 MM Does not apply Misc Use with injection once daily as directed    insulin glargine (LANTUS SOLOSTAR) 100 UNIT/ML Subcutaneous Solution Pen-injector Inject 6 Units into the skin nightly.     Blood Glucose Monitoring Suppl (ACCU-CHEK GUIDE) w/Device Does not apply Kit 1 each daily.    Glucose Blood (ACCU-CHEK GUIDE) In Vitro Strip 1 each by In Vitro route daily.    Blood Glucose Monitoring Suppl (ONETOUCH VERIO IQ SYSTEM) w/Device Does not apply Kit 1 Device by Other route 3 (three) times daily. Use as directed. (Patient not taking: Reported on 5/13/2024)    Glucose Blood (ONETOUCH VERIO) In Vitro Strip Use as directed. (Patient not taking: Reported on 5/13/2024)    OneTouch Delica Lancets 33G Does not apply Misc 1 Lancet by Finger stick route 3 (three) times daily. (Patient not taking: Reported on 5/13/2024)    pantoprazole 40 MG Oral Tab EC Take 1 tablet (40 mg total) by mouth 2 (two) times daily before meals.    albuterol 108 (90 Base) MCG/ACT Inhalation Aero Soln Inhale 2 puffs into the lungs every 4 (four) hours as needed.    traMADol 50 MG Oral Tab Take 1 tablet (50 mg total) by mouth every 12 (twelve) hours as needed.    acetaminophen 500 MG Oral Tab Take 1 tablet (500 mg total) by mouth every 8 (eight) hours.    lidocaine-menthol 4-1 % External Patch Place 2 patches onto the skin daily.    metoprolol tartrate 100 MG Oral Tab Take 1 tablet (100 mg total) by mouth 2 (two) times daily.    nitroglycerin 0.4 MG Sublingual SL Tab Place 1 tablet (0.4 mg total) under the tongue every 5 (five) minutes as needed for Chest pain.    amLODIPine Besylate 5 MG Oral Tab Take 1 tablet (5 mg total) by mouth at bedtime.    ONETOUCH DELICA LANCETS 33G Does not apply Misc OneTouch Delica Lancets 33 gauge   TEST BLOOD GLUCOSE TWO TIMES A DAY AS DIRECTED (Patient not taking: Reported on 5/13/2024)    Clopidogrel Bisulfate 75 MG Oral Tab Take 1 tablet (75 mg total) by mouth daily.    ezetimibe 10 MG Oral Tab Take 1 tablet (10 mg total) by mouth nightly.       Allergies  Allergies   Allergen Reactions    Codeine SWELLING and OTHER (SEE COMMENTS)     Face and body get puffy/swell, headache  Per pt, has tolerated  morphine and Norco previously with no adverse effects    Enalapril Maleate-Felodipine SHORTNESS OF BREATH    Nyquil Severe Cold-Flu [Gnp Multi-Symptom Cold] PALPITATIONS    Radiology Contrast Iodinated Dyes ANAPHYLAXIS    Shellfish-Derived Products     Dilaudid [Hydromorphone] OTHER (SEE COMMENTS)     PATIENT'S DAUGHTER HAD A VERY BAD REACTION FROM DILAUDID (MENTALLY)    Sulfa Antibiotics RASH and OTHER (SEE COMMENTS)     Rash         Review of Systems:   Pertinent items are noted in HPI.    Physical Exam:   Vital Signs:  Blood pressure 159/62, pulse 74, temperature 97.6 °F (36.4 °C), temperature source Oral, resp. rate 16, height 5' 1\" (1.549 m), weight 150 lb 6.4 oz (68.2 kg), SpO2 94%, not currently breastfeeding.     Physical Exam  Vitals and nursing note reviewed.   Constitutional:       Appearance: Normal appearance.   HENT:      Head: Normocephalic and atraumatic.      Nose: Nose normal.   Cardiovascular:      Rate and Rhythm: Normal rate and regular rhythm.      Pulses: Normal pulses.      Heart sounds: Normal heart sounds.   Pulmonary:      Effort: Pulmonary effort is normal.      Breath sounds: Normal breath sounds.   Abdominal:      General: Bowel sounds are normal.      Palpations: Abdomen is soft.   Musculoskeletal:      Cervical back: Neck supple.   Skin:     General: Skin is warm and dry.   Neurological:      General: No focal deficit present.      Mental Status: She is alert and oriented to person, place, and time.        Results:     Laboratory Data:  Lab Results   Component Value Date    WBC 7.7 07/24/2024    HGB 11.4 (L) 07/24/2024    HCT 33.9 (L) 07/24/2024    .0 07/24/2024    CREATSERUM 2.05 (H) 07/25/2024    BUN 45 (H) 07/25/2024     07/25/2024    K 3.7 07/25/2024     07/25/2024    CO2 25.0 07/25/2024     (H) 07/25/2024    CA 9.5 07/25/2024    ALB 4.1 07/24/2024    ALKPHO 145 (H) 07/24/2024    TP 6.5 07/24/2024    AST 12 07/24/2024    ALT <7 (L) 07/24/2024    PTT 23.8  10/10/2023    INR 1.14 10/10/2023    PTP 14.7 10/10/2023    TSH 1.030 03/23/2022    ESRML 9 03/23/2022    CRP 0.86 (H) 07/24/2019    MG 2.0 07/25/2024    PHOS 4.4 05/13/2024    TROP 0.01 10/16/2017    CK 20 (L) 10/21/2023    CKMB 1.0 10/16/2017    B12 381 07/10/2024         Imaging:  CT BRAIN OR HEAD (CPT=70450)    Result Date: 7/25/2024  CONCLUSION:  1. No acute intracranial hemorrhage.  Punctate focus of susceptibility artifact in the left frontal lobe on previous day brain MR therefore likely represents a chronic microhemorrhage (not unusual in patients of this age).  2. Subcentimeter low-attenuation focus in the left cerebellum likely relates to an acute lacunar-type infarct.  Other small acute cortical infarcts at the right frontal and parietal lobes are not well seen by CT. 3. Nonspecific white matter changes involving both cerebral hemispheres that most likely reflect sequelae of chronic microangiopathy. 4. Intracranial atherosclerosis. 5. 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  Differential considerations for parotid gland masses are broad and include both benign and malignant primary neoplasms as well as metastatic disease.  Nonemergent otolaryngology evaluation of this finding is recommended with strong consideration of histologic sampling for definitive characterization.   Dictated by (CST): Atif Otto MD on 7/25/2024 at 9:13 AM     Finalized by (CST): Atif Otto MD on 7/25/2024 at 9:21 AM          MRI BRAIN WO ACUTE (3) SEQUENCE (CPT=70551)    Result Date: 7/25/2024  CONCLUSION:   Scattered foci of diffusion restriction within the right frontal and parietal cortex, in a pattern suggestive acute ischemia related to embolic etiology.  Additional diffusion restriction in the left inferior cerebellum, also compatible with acute ischemia, which may be embolic in etiology as well.  Punctate focus of susceptibility in the anterior left frontal region, along a sulcus.   This may represent nonspecific mineralization or sequela of age-indeterminate macro hemorrhage.  Further assessment can be made with CT of the brain.  Background of advanced chronic small vessel ischemic disease, global parenchymal volume loss, as well chronic lacunar infarcts in the left basal ganglia and right periventricular white matter.   These findings were discussed with Dr. Sun at 1:25 a.m. By Dr.Se Dietrich Findings were discussed with charge  nurse Jackie  on 07/25/2024 at 7:41 a.m. by Sadaf Page.  Findings were discussed with Dr. Da Gómez on 07/25/2024 at 7:45 a.m by Sadaf Page.   Dictated by (CST): Sadaf Page MD on 7/25/2024 at 7:32 AM     Finalized by (CST): Sadaf Page MD on 7/25/2024 at 7:49 AM          XR CHEST AP PORTABLE  (CPT=71045)    Result Date: 7/24/2024  CONCLUSION: No radiographic evidence of acute cardiopulmonary abnormality.  Cardiomegaly and aortic repair changes are again seen.    elm-remote    Dictated by (CST): Fletcher Carreno MD on 7/24/2024 at 7:49 PM     Finalized by (CST): Fletcher Carreno MD on 7/24/2024 at 7:51 PM            Pertinent labs:  BUN 45  Creat 2.05    Hb 11.4    EKG:  Normal sinus rhythm   Left atrial enlargement   Left axis deviation   Minimal voltage criteria for LVH, may be normal variant ( [place] product )   Nonspecific ST abnormality   Abnormal ECG   When compared with ECG of 02-OCT-2023 13:37,   Non-specific change in ST segment in Inferior leads   T wave inversion no longer evident in Anterior leads   Confirmed by SYLVESTER ALVAREZ JORDAN ([id number]) on 7/25/2024 7:47:58 AM       Prior Cardiac w/u: 9/15/2022  PATIENT'S NAME: ELENA PARKER   ATTENDING PHYSICIAN: Kashmir Whalen DO   OPERATING PHYSICIAN: Kashmir Whalen DO   PATIENT ACCOUNT#:   [id number]    LOCATION:  [place] [address]  MEDICAL RECORD #:   [id number]       YOB: 1945  ADMISSION DATE:       09/15/2022      OPERATION DATE:  09/15/2022     CARDIAC PROCEDURE  TRANSCRIPTION        ANGIOGRAPHY  PREOPERATIVE DIAGNOSIS:    POSTOPERATIVE DIAGNOSIS:    PROCEDURE PERFORMED:  Bilateral coronary angiogram.     SEDATION:  Conscious sedation for 45 minutes by a registered nurse who independently monitored the patient's blood pressure, oximetry, and heart rate.       INDICATION:  Chest pain, abnormal stress test.     DESCRIPTION OF PROCEDURE:  Informed consent was obtained.  The patient was placed in the cardiac catheterization lab.  Right wrist was sterilely draped and prepped.  The patient received preprocedural hydration because of renal insufficiency.  A 6-Lao sheath was inserted.  We noted a lot of tortuosity in the right radial artery as well as the brachial artery.  We navigated with a Whisper wire and then a Glidewire.  We used TIG catheter for right coronary angiography and an EBU 5-Lao 4.0 guide catheter for left coronary angiography.  At the end of the procedure, a radial band was applied.  The patient received heparin, verapamil, and nitroglycerin during the procedure.       FINDINGS:  There is a proximal right coronary artery stent that is totally occluded and fills by collaterals faintly distally from the right, but mainly fills from the distal LAD and mid LAD brisk collaterals.  The left main is normal in appearance.  The left anterior descending coronary artery is normal in appearance in the proximal portion; in the mid portion there is a 50% calcific stenosis appreciated.  At the very distal portion of the vessel, there is a very heavily calcified 90% stenosis that happens to be right where there are 2 large collateral branches going to the distal right coronary artery.  There are 2 major diagonal branches; both are patent with moderate proximal disease involving the first diagonal branch and mild proximal disease involving the second diagonal branch.  The ramus intermedius branch has severe disease in the proximal portion about 50% stenosis.  The rest of the  circumflex is small.       IMPRESSION:    1.       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  2.       Very distal heavily calcified 90% stenosis of the left anterior descending artery right where there are collaterals going to the distal right coronary artery.       RECOMMENDATIONS:  There is a distal left anterior descending artery stenosis, but this area is very heavily calcified and also in a location where there are collateral vessels going to the distal right coronary artery at about a 90-degree bend and therefore high risk for occlusion of these collateral vessels.  Intervention in the distal vessel with heavy calcification also is with risk and poor outcome.  Therefore, medical management would be recommended.  Furthermore, the patient has renal failure and additional contrast use would have jeopardized the renal function.       Dictated By Kashmir Whalen DO  d:09/15/2022 12:03:48  Impression:   Admitted with Acute CVA    H/O L MCA CVA     HTN/HTHD/Chronic diastolic heart failure    CKD III b    CAD s/p MI    Type II aortic aneurysm s/p repair    COPD  Recommendations:  Patient remains in NSR with stable BP  Check ECHO  Currently no cardiac c/o or issues  Permissive BP  Neurology evaluation  Max medical Rx  ASA 81 mg  Will start Statin/zetia  Plavix 75 mg if ok with neurology service  Continue Amlodipine 5 mg    D/w patient and nursing staff    Thank you for allowing me to participate in the care of your patient.    Jett Anderson MD [facility]  [address]  [place] [address]  [phone]

## 2024-07-25 NOTE — PROGRESS NOTES
Called pt daughter to schedule hospital follow-up appts :    Pt does have existing appts & daughter Keaton is ok, with keeping these dates.  Does not want to move anything sooner.    Neuro / Stroke  Wednesday 10/2 @2:20pm w/ Dr. Joan Dow (existing appt)    DM  Monday 8/12 @3pm w/ Anali CARR (exisitng appt)    Pt daughter Keaton, does not want to move any of pt's scheduled appts any sooner.  They're ok with these dates and times.        Closing encounter

## 2024-07-25 NOTE — SLP NOTE
ADULT SWALLOWING EVALUATION    ASSESSMENT    ASSESSMENT/OVERALL IMPRESSION:  PPE REQUIRED. THIS THERAPIST WORE GLOVES AND MASK FOR DURATION OF EVALUATION. HANDS WASHED UPON ENTRANCE/EXIT.    Per MD H&P, \"Jillian Yip is a(n) 79 year old female, who presents for evaluation of headache, right leg numbness, imbalance.  Patient with significant past medical history including COPD, left MCA stroke in 2017, IDDM type II, thoracic aortic aneurysm, essential hypertension, hyperlipidemia, CAD, previous DVT, gastric peptic ulcer, ovarian cancer, mixed dementia with Alzheimer's disease.  Patient reports that she woke up this morning with a headache, reports the headache to start at her neck and moving up to her head associated with nausea but no vomiting.  She also felt short of breath but no cough or chest pain.  Patient took 6 puffs of her inhaler without much relief.  She decided to come to the emergency department and about an hour prior to presentation she felt numbness to her right leg which then resolved.  She normally uses a walker, wheelchair and a cane.  She reports bilateral lower extremity weakness which is chronic.  Denies any tingling sensation.  Patient denies difficulty with urination or burning on urination.  Denies any change in vision.\"    SLP BSSE orders received and acknowledged. A swallow evaluation warranted per stroke protocol. Pt afebrile with clear vocal quality, on room air, with oxygen saturation at 95%. Pt with hx of dysphagia at [facility], BSE 10/22/23 with recommendations for regular/thin liquids.  Pt positioned 90 degrees in bed, alert/cooperative. Pt with no complaints of pain. Oral motor skills within functional limits. Pt presented with trials of hard solids and thin liquids via straw. Pt with adequate oral acceptance and bilabial seal across all trials. Pt with intact bite, mastication of solids, and timely A-P transit. Pt's swallow response appears timely with adequate hyolaryngeal  elevation/excursion. No clinical signs of aspiration (e.g., immediate/delayed throat clear, immediate/delayed cough, wet vocal quality, increased O2 effort) observed across all trials. 7/24 CXR indicates \"No radiographic evidence of acute cardiopulmonary abnormality.  Cardiomegaly and aortic repair changes are again seen\". Oxygen status remained stable t/o the entire evaluation.     At this time, pt presents with functional oral and probable pharyngeal swallow stages. Recommend a regular diet and thin liquids with strict adherence to safe swallowing compensatory strategies. Results and recommendations reviewed with RN, pt. Pt v/u to all results/recommendations. Recommendations remain written on whiteboard. SLP collaborated with RN for MD diet orders.     PLAN: SLP to f/u x1 meal assessments, monitor imaging, and VFSS if clinically indicated         RECOMMENDATIONS   Diet Recommendations - Solids: Regular  Diet Recommendations - Liquids: Thin Liquids                        Compensatory Strategies Recommended: Slow rate  Aspiration Precautions: Upright position;Slow rate  Medication Administration Recommendations:  (as tolerated)  Treatment Plan/Recommendations: Aspiration precautions    HISTORY   MEDICAL HISTORY  Reason for Referral: Stroke protocol    Problem List  Principal Problem:    Acute CVA (cerebrovascular accident) (HCC)  Active Problems:    Acute cystitis without hematuria      Past Medical History  Past Medical History:    Aortic aneurysm, thoracic (HCC)    Back problem    Calculus of kidney    Cataract    COPD (chronic obstructive pulmonary disease) (HCC)    Coronary atherosclerosis    Deep vein thrombosis (HCC)    Diabetes (HCC)    Diverticulosis of large intestine    Heart attack (HCC)    2013    High blood pressure    High cholesterol    History of stomach ulcers    Incontinence    Osteoarthritis    Ovarian cancer (HCC)    PONV (postoperative nausea and vomiting)    Renal disorder    Shortness of  breath    Stroke (HCC)    shaking, wheelchair       Prior Living Situation: Home with support  Diet Prior to Admission: Regular;Thin liquids  Precautions: Aspiration;Seizure    Patient/Family Goals: did not state    SWALLOWING HISTORY  Current Diet Consistency: NPO  Dysphagia History: see above  Imaging Results: 7/25 MRI BRAIN  CONCLUSION:   Scattered foci of diffusion restriction within the right frontal and parietal cortex, in a pattern suggestive acute ischemia related to embolic etiology.   Additional diffusion restriction in the left inferior cerebellum, also compatible with acute ischemia, which may be embolic in etiology as well.   Punctate focus of susceptibility in the anterior left frontal region, along a sulcus.  This may represent nonspecific mineralization or sequela of age-indeterminate macro hemorrhage.  Further assessment can be made with CT of the brain.   Background of advanced chronic small vessel ischemic disease, global parenchymal volume loss, as well chronic lacunar infarcts in the left basal ganglia and right periventricular white matter.       SUBJECTIVE       OBJECTIVE   ORAL MOTOR EXAMINATION  Dentition: Functional  Symmetry: Within Functional Limits  Strength: Within Functional Limits  Tone: Within Functional Limits  Range of Motion: Within Functional Limits  Rate of Motion: Within Functional Limits    Voice Quality: Clear  Respiratory Status: Unlabored  Consistencies Trialed: Thin liquids;Hard solid  Method of Presentation: Self presentation;Straw  Patient Positioning: Upright;Midline    Oral Phase of Swallow: Within Functional Limits                      Pharyngeal Phase of Swallow: Within Functional Limits           (Please note: Silent aspiration cannot be evaluated clinically. Videofluoroscopic Swallow Study is required to rule-out silent aspiration.)    Esophageal Phase of Swallow: No complaints consistent with possible esophageal involvement  Comments: NA              GOALS  Goal  #1 The patient will tolerate regular consistency and thin liquids without overt signs or symptoms of aspiration with 100 % accuracy over 1 session(s).  In Progress   Goal #2 The patient/family/caregiver will demonstrate understanding and implementation of aspiration precautions and swallow strategies independently over 1 session(s).    In Progress     FOLLOW UP  Treatment Plan/Recommendations: Aspiration precautions  Number of Visits to Meet Established Goals: 1  Follow Up Needed (Documentation Required): Yes  SLP Follow-up Date: 07/26/24    Thank you for your referral.   If you have any questions, please contact ANA Clark M.S. CCC-SLP  Speech Language Pathologist  Phone Number [phone]

## 2024-07-25 NOTE — CM/SW NOTE
07/25/24 1400   CM/SW Referral Data   Referral Source Social Work (self-referral);Physician   Reason for Referral Discharge planning   Informant Daughter   Medical Hx   Does patient have an established PCP? Yes   Significant Past Medical/Mental Health Hx Alzheimer's, COPD, CVA, DM2, CKD, HLD, CAD   Patient Info   Patient's Current Mental Status at Time of Assessment Memory Impairments   Patient's Home Environment House   Number of Levels in Home 1   Number of Stair in Home 0   Patient lives with Daughter   Patient Status Prior to Admission   Independent with ADLs and Mobility No   Pt. requires assistance with Housework;Driving;Meals;Bathing;Ambulating;Dressing;Medications;Toileting;Finances   Services in place prior to admission DME/Supplies at home   Type of DME/Supplies Wheeled Walker   Discharge Needs   Anticipated D/C needs To be determined     Social work was able to meet with the patient and her daughter at bedside.    The patient lives at home with family in a 1 level home and someone is there 24/7.  The patient requires assistance with most ADLs at baseline.  The patient owns a wheeled walker.    Social work will follow up with patient and family if there are any discharge needs.    The patient and family have no questions or concerns at this time.    SW/CM to remain available for support and/or discharge planning.     Shani Tolentino MSW, LSW  Discharge Planner [phone]

## 2024-07-25 NOTE — PHYSICAL THERAPY NOTE
PT orders received and chart reviewed. Per orders, \"Eval and treat; if on bedrest start after 24 hours.\" Per chart, patient on bedrest initiated 7/25 at 0247. Will follow-up tomorrow as medically appropriate.     Jenna Haase, PT, DPT  [facility]  Ext: [phone]

## 2024-07-25 NOTE — OCCUPATIONAL THERAPY NOTE
OCCUPATIONAL THERAPY EVALUATION - INPATIENT     Room Number: 336/336-A  Evaluation Date: 7/25/2024  Type of Evaluation: Initial  Presenting Problem: CVA, acute cystitis w/o hematuria  Hx of thoracic aortic aneurysm, COPD, CAD, DM, HTN, CKD, arthritis     MRI brain 7/25: Scattered foci of diffusion restriction within the right frontal and parietal cortex, in a pattern suggestive acute ischemia related to embolic etiology.      Additional diffusion restriction in the left inferior cerebellum, also compatible with acute ischemia, which may be embolic in etiology as well.      Punctate focus of susceptibility in the anterior left frontal region, along a sulcus.  This may represent nonspecific mineralization or sequela of age-indeterminate macro hemorrhage. Further assessment can be made with CT of the brain.     Physician Order: IP Consult to Occupational Therapy  Reason for Therapy: ADL/IADL Dysfunction and Discharge Planning    OCCUPATIONAL THERAPY ASSESSMENT   Patient is a 79 year old female admitted 7/24/2024 for CVA, acute cystitis w/o hematuria.  Prior to admission, patient's baseline is receiving set-up assist for bathing; patient is modified independent with dressing, toileting, and functional mobility/transfers using a RW, cane, and w/c. Receives assist for tub transfers and IADLs. Patient is currently functioning below baseline with ADLs, functional mobility, and transfers.  Patient is requiring up to total asisst for ADLs, and up to mod x 2 assist for functional mobility and transfers as a result of the following impairments: decreased functional strength, decreased endurance, pain, impaired balance, impaired motor planning, and decreased muscular endurance. Occupational Therapy will continue to follow for duration of hospitalization.    Patient will benefit from continued skilled OT Services to facilitate return to prior level of function as patient demonstrates high motivation with excellent tolerance to an  intensive therapy program    PLAN  OT Treatment Plan: Balance activities;Energy conservation/work simplification techniques;ADL training;Functional transfer training;Endurance training;Patient/Family education;Patient/Family training;Equipment eval/education;Compensatory technique education  OT Device Recommendations: TBD    OCCUPATIONAL THERAPY MEDICAL/SOCIAL HISTORY   Problem List  Principal Problem:    Acute CVA (cerebrovascular accident) (HCC)  Active Problems:    Acute cystitis without hematuria    HOME SITUATION  Type of Home: House  Home Layout: Multi-level; Able to live on main level (3 level)  Lives With: Daughter (grandson)  Toilet and Equipment: Standard height toilet; Toilet riser with arms  Shower/Tub and Equipment: Tub-shower combo; Shower chair (2 chairs)  Hand Dominance: Right  Drives: No  Patient Regularly Uses: Glasses  Use of Equipment: RW, cane, wheelchair  Stairs: 5 ALEIDA    SUBJECTIVE  Patient was pleasant and agreeable to therapy.    OCCUPATIONAL THERAPY EXAMINATION    OBJECTIVE  Precautions: Bed/chair alarm; Seizure; Aspiration  Fall Risk: High fall risk    PAIN ASSESSMENT  Rating: -- (not rated)  Location: BLE (when at EOB)  Management Techniques: Breathing techniques; Activity promotion; Body mechanics; Repositioning    ACTIVITY TOLERANCE  Pulse: 72 (EOB pre-activity)  Heart Rate Source: Monitor     BP: 149/84 (EOB pre-activity)  BP Location: Right arm  BP Method: Automatic  Patient Position: Sitting    O2 SATURATIONS  Oxygen Therapy  SPO2% on Room Air at Rest: 96    COGNITION  Orientation Level:  oriented to place, oriented to time, oriented to situation, and oriented to person  Following Commands:  follows one step commands consistently    SENSATION  Light touch:  intact  No new sensation changes from baseline.    VISION  Denied blurry/double vision. Able to read small print on TV while in bed from ~10' distance.    RANGE OF MOTION   Upper extremity ROM is within functional limits except B  shoulders <90 degrees. Patient reports this is her baseline.    STRENGTH ASSESSMENT  Upper extremity strength is within functional limits except R and L  strength is below functional limits    ACTIVITIES OF DAILY LIVING ASSESSMENT  AM-PAC ‘6-Clicks’ Inpatient Daily Activity Short Form  How much help from another person does the patient currently need…  -   Putting on and taking off regular lower body clothing?: A Lot  -   Bathing (including washing, rinsing, drying)?: A Lot  -   Toileting, which includes using toilet, bedpan or urinal? : Total  -   Putting on and taking off regular upper body clothing?: A Little  -   Taking care of personal grooming such as brushing teeth?: A Little  -   Eating meals?: A Little    AM-PAC Score:  Score: 14  Approx Degree of Impairment: 59.67%  Standardized Score (AM-PAC Scale): 33.39  CMS Modifier (G-Code): CK    BED MOBILITY  Supine to Sit: mod assist x2   Comments:  Patient required assist to scoot forward to EOB. SBA for static sitting balance at EOB.    FUNCTIONAL TRANSFER ASSESSMENT  Sit to Stand from EOB: mod assist x2 w/ RW  Chair Transfer: mod assist x2 w/ RW  Comments:  Patient benefited from cues for safe hand placement and to move RW along with her to the chair.     FUNCTIONAL MOBILITY  mod assist x2 for ~3 steps from EOB to chair using RW  Comments:   Patient was unsteady and required postural cues.     ACTIVITIES OF DAILY LIVING  Eating: setup assist (per obs)  Grooming: setup assist seated (per obs)  UB Dressing: contact guard assist seated (per obs)  LB Dressing: max assist  Toileting: total assist (per obs)    EDUCATION PROVIDED  Patient: Role of Occupational Therapy; Plan of Care; Discharge Recommendations; Functional Transfer Techniques; Fall Prevention; Posture/Positioning; Proper Body Mechanics  Patient's Response to Education: Verbalized Understanding; Returned Demonstration    The patient's Approx Degree of Impairment: 59.67% has been calculated based on  documentation in the [facility] '6 clicks' Inpatient Daily Activity Short Form.  Research supports that patients with this level of impairment may benefit from rehab.Final disposition will be made by interdisciplinary medical team.     Patient End of Session: Up in chair;Needs met;Call light within reach;RN aware of session/findings;All patient questions and concerns addressed;Alarm set    OT Goals  Patients self stated goal is: none stated     Patient will complete functional transfer with Min A   Comment:     Patient will complete toileting with Min A   Comment:     Patient will tolerate standing for 2-3 minutes in prep for adls with Min A   Comment:    Patient will complete item retrieval with Mod A  Comment:          Goals  on: 24  Frequency: 3-5x /wk    Patient Evaluation Complexity Level:   Occupational Profile/Medical History MODERATE - Expanded review of history including review of medical or therapy record   Specific performance deficits impacting engagement in ADL/IADL MODERATE  3 - 5 performance deficits   Client Assessment/Performance Deficits MODERATE - Comorbidities and min to mod modifications of tasks    Clinical Decision Making MODERATE - Analysis of occupational profile, detailed assessments, several treatment options    Overall Complexity MODERATE     OT Session Time:   Therapeutic Activity: 32 minutes    [facility]  OT Student  ___________________________________________    I provided clinical instruction and supervision for the duration of this session and agree with the above documentation.    RIKKI Hoffman/L  [facility]  #[phone]

## 2024-07-25 NOTE — H&P
[facility]  part of [facility]    History & Physical    Jillian Yip Patient Status:  Emergency    1945 MRN [id number]   Location [facility] EMERGENCY DEPARTMENT Attending Huy Sun MD   Hosp Day # 0 PCP Colleen Weiler, DO     Date:  2024  Date of Admission:  2024    Chief Complaint:  Chief Complaint   Patient presents with    Headache    Nausea    Difficulty Breathing     Assessment and Plan:    H/o left MCA stroke in 2017  Acute left cerebellum/right frontal/parietal cortex CVA   Acute left frontal lobe microbleed  -As seen on MRI brain  -Stroke protocol initiated  -Neurology consulted, Dr. Seng Sorto notified with recommendation for aspirin 324 mg which was given in the ED  -Maintain n.p.o.  -Patient is on Plavix, will hold Plavix.  Continue with aspirin daily.  -Gentle IV fluid hydration  -Neurochecks  -Speech therapy / PT / OT pending eval  -SCDs for DVT prophylaxis    H/o COPD  -Not in acute exacerbation.  No wheezing on physical exam.  -Resume Anoro Ellipta.  Albuterol as needed    Urinary tract infection  -Urinalysis with evidence for UTI.  Patient started on Rocephin.  Will continue Rocephin pending urine cultures.    IDDM type II  -Resume home dose of Lantus 6 units nightly.  -Will add insulin sliding scale.  Hypoglycemia protocol.  Accu-Cheks.    H/o gastric peptic ulcer  -Protonix 40 mg IV daily    CKD  -Creatinine noted to be 1 6, appears to be at baseline.  Monitor renal function with daily BMP    Mixed dementia with Alzheimer's disease  -Patient follows with neurology as an outpatient.  Resume donepezil    Other medical conditions  Thoracic arctic aneurysm  Essential hypertension  Hyperlipidemia  CAD  Previous DVT  Ovarian cancer    Prophylaxis  SCDs    CODE STATUS  Full    History of Present Illness:  Jillian Yip is a(n) 79 year old female, who presents for evaluation of headache, right leg numbness, imbalance.  Patient with significant past  medical history including COPD, left MCA stroke in 2017, IDDM type II, thoracic aortic aneurysm, essential hypertension, hyperlipidemia, CAD, previous DVT, gastric peptic ulcer, ovarian cancer, mixed dementia with Alzheimer's disease.  Patient reports that she woke up this morning with a headache, reports the headache to start at her neck and moving up to her head associated with nausea but no vomiting.  She also felt short of breath but no cough or chest pain.  Patient took 6 puffs of her inhaler without much relief.  She decided to come to the emergency department and about an hour prior to presentation she felt numbness to her right leg which then resolved.  She normally uses a walker, wheelchair and a cane.  She reports bilateral lower extremity weakness which is chronic.  Denies any tingling sensation.  Patient denies difficulty with urination or burning on urination.  Denies any change in vision.  Denies any other complaints.  On presentation to the ED, initial vital signs reveal temp 97.8, heart rate 60, blood pressure 122/63, respiratory 20.  Urinalysis with evidence of UTI.  Imaging including chest x-ray reveals no intrathoracic abnormalities.  MRI of the brain reveals left cerebellum and right frontal/parietal cortex acute infarct as well as left frontal lobe microbleed.     History:  Past Medical History:    Aortic aneurysm, thoracic (HCC)    Back problem    Calculus of kidney    Cataract    COPD (chronic obstructive pulmonary disease) (HCC)    Coronary atherosclerosis    Deep vein thrombosis (HCC)    Diabetes (HCC)    Diverticulosis of large intestine    Heart attack (HCC)    2013    High blood pressure    High cholesterol    History of stomach ulcers    Incontinence    Osteoarthritis    Ovarian cancer (HCC)    PONV (postoperative nausea and vomiting)    Renal disorder    Shortness of breath    Stroke (HCC)    shaking, wheelchair     Past Surgical History:   Procedure Laterality Date    Appendectomy       Arthroscopy of joint unlisted Right     Rotator cuff surgery    Back surgery  2017    L3-4 LUIS and hemilaminectomies    Cataract      Cath drug eluting stent      2013    Endovas aaa repr w/3-p part      Ep loop recorder implant Left 2017    Foot surgery Bilateral     Fracture surgery Right     right clavicle    Hysterectomy      Theodora localization wire 1 site right (cpt=19281)      benign-20 years ago    Spine surgery procedure unlisted       Family History   Problem Relation Age of Onset    Cancer Father     Diabetes Father     Diabetes Mother     Cancer Daughter     Breast Cancer Maternal Aunt         x2 in 70's    Ovarian Cancer Self       reports that she has quit smoking. Her smoking use included cigarettes. She has a 17.5 pack-year smoking history. She has never been exposed to tobacco smoke. She has quit using smokeless tobacco. She reports that she does not drink alcohol and does not use drugs.    Allergies:  Allergies   Allergen Reactions    Codeine SWELLING and OTHER (SEE COMMENTS)     Face and body get puffy/swell, headache  Per pt, has tolerated morphine and Norco previously with no adverse effects    Enalapril Maleate-Felodipine SHORTNESS OF BREATH    Nyquil Severe Cold-Flu [Gnp Multi-Symptom Cold] PALPITATIONS    Radiology Contrast Iodinated Dyes ANAPHYLAXIS    Shellfish-Derived Products     Dilaudid [Hydromorphone] OTHER (SEE COMMENTS)     PATIENT'S DAUGHTER HAD A VERY BAD REACTION FROM DILAUDID (MENTALLY)    Sulfa Antibiotics RASH and OTHER (SEE COMMENTS)     Rash         Home Medications:  Prior to Admission Medications   Prescriptions Last Dose Informant Patient Reported? Taking?   Blood Glucose Monitoring Suppl (ACCU-CHEK GUIDE) w/Device Does not apply Kit   No No   Si each daily.   Blood Glucose Monitoring Suppl (ONETOUCH VERIO IQ SYSTEM) w/Device Does not apply Kit   No No   Si Device by Other route 3 (three) times daily. Use as directed.   Patient not taking: Reported on 2024    Clopidogrel Bisulfate 75 MG Oral Tab   No No   Sig: Take 1 tablet (75 mg total) by mouth daily.   Glucose Blood (ACCU-CHEK GUIDE) In Vitro Strip   No No   Si each by In Vitro route daily.   Glucose Blood (ONETOUCH VERIO) In Vitro Strip   No No   Sig: Use as directed.   Patient not taking: Reported on 2024   HYDROcodone-acetaminophen 5-325 MG Oral Tab   No No   Sig: Take 1 tablet by mouth every 12 (twelve) hours as needed for Pain.   Insulin Lispro, 1 Unit Dial, (HUMALOG KWIKPEN) 100 UNIT/ML Subcutaneous Solution Pen-injector   No No   Sig: Inject as per sliding scale: If 160-200= 1 unit, 201-240= 2 units, 241-280= 3 units, 281-320= 4 units, 321-360= 5 units, call MD if <70 or >360 Subcutaneously with meals for DM   Patient not taking: Reported on 7/10/2024   Insulin Pen Needle (CARETOUCH PEN NEEDLES) 32G X 4 MM Does not apply Misc   No No   Sig: Use with injection once daily as directed   ONETOUCH DELICA LANCETS 33G Does not apply Misc   Yes No   Sig: OneTouch Delica Lancets 33 gauge   TEST BLOOD GLUCOSE TWO TIMES A DAY AS DIRECTED   Patient not taking: Reported on 2024   OneTouch Delica Lancets 33G Does not apply Misc   No No   Si Lancet by Finger stick route 3 (three) times daily.   Patient not taking: Reported on 2024   Sennosides-Docusate Sodium (SENNA PLUS OR)   Yes No   Sig: Take by mouth.   Vitamin C 500 MG Oral Tab   Yes No   Sig: Take 1 tablet (500 mg total) by mouth daily.   Patient not taking: Reported on 2024   Zinc Oxide 10 % External Ointment   Yes No   Sig: Apply topically.   acetaminophen 500 MG Oral Tab   Yes No   Sig: Take 1 tablet (500 mg total) by mouth every 8 (eight) hours.   albuterol 108 (90 Base) MCG/ACT Inhalation Aero Soln   No No   Sig: Inhale 2 puffs into the lungs every 4 (four) hours as needed.   amLODIPine Besylate 5 MG Oral Tab   Yes No   Sig: Take 1 tablet (5 mg total) by mouth at bedtime.   aspirin 81 MG Oral Chew Tab   No No   Sig: Chew 1 tablet (81  mg total) by mouth daily.   Patient not taking: Reported on 7/10/2024   bisacodyl 10 MG Rectal Suppos   Yes No   Sig: Place 1 suppository (10 mg total) rectally daily as needed.   Patient not taking: Reported on 7/10/2024   diclofenac 1 % External Gel   No No   Sig: Apply 2 g topically 4 (four) times daily as needed.   donepezil 5 MG Oral Tab   No No   Sig: Take 1 tablet (5 mg total) by mouth nightly.   ezetimibe 10 MG Oral Tab   Yes No   Sig: Take 1 tablet (10 mg total) by mouth nightly.   gabapentin 100 MG Oral Cap   Yes No   Sig: Take 1 capsule (100 mg total) by mouth 2 (two) times daily.   Patient not taking: Reported on 5/13/2024   hydrocortisone 2.5 % External Cream   Yes No   Sig: Apply topically 2 (two) times daily.   insulin glargine (LANTUS SOLOSTAR) 100 UNIT/ML Subcutaneous Solution Pen-injector   No No   Sig: Inject 6 Units into the skin nightly.   isosorbide mononitrate  MG Oral Tablet 24 Hr   Yes No   Sig: Take 1 tablet (120 mg total) by mouth daily.   lidocaine-menthol 4-1 % External Patch   Yes No   Sig: Place 2 patches onto the skin daily.   linaGLIPtin 5 mg Oral Tab   No No   Sig: Take 1 tablet (5 mg total) by mouth daily.   Patient not taking: Reported on 7/10/2024   metoprolol tartrate 100 MG Oral Tab   Yes No   Sig: Take 1 tablet (100 mg total) by mouth 2 (two) times daily.   nitroglycerin 0.4 MG Sublingual SL Tab   Yes No   Sig: Place 1 tablet (0.4 mg total) under the tongue every 5 (five) minutes as needed for Chest pain.   pantoprazole 40 MG Oral Tab EC   No No   Sig: Take 1 tablet (40 mg total) by mouth 2 (two) times daily before meals.   simethicone 80 MG Oral Chew Tab   Yes No   Sig: Chew 1 tablet (80 mg total) by mouth 4 (four) times daily with meals and nightly.   Patient not taking: Reported on 7/10/2024   traMADol 50 MG Oral Tab   No No   Sig: Take 1 tablet (50 mg total) by mouth every 12 (twelve) hours as needed.   umeclidinium-vilanterol (ANORO ELLIPTA) 62.5-25 MCG/ACT  Inhalation Aerosol Powder, Breath Activated   No No   Sig: Inhale 1 puff into the lungs daily.   Patient not taking: Reported on 5/13/2024      Facility-Administered Medications Last Administration Doses Remaining   cyanocobalamin (Vitamin B12) 1000 MCG/ML injection 1,000 mcg None recorded 1          Review of Systems:  Constitutional: Negative  Eye:  Negative.  Ear/Nose/Mouth/Throat:  Negative.  Respiratory:  Negative  Cardiovascular: Negative  Gastrointestinal:  Negative.  Genitourinary:  Negative  Endocrine:  Negative.  Immunologic:  Negative.  Musculoskeletal:  + Right leg numbness/bilateral extremity weakness  Integumentary:  Negative.  Neurologic:  Negative.  Psychiatric:  Negative.  ROS reviewed as documented in chart    Physical Exam:  Temp:  [97.8 °F (36.6 °C)] 97.8 °F (36.6 °C)  Pulse:  [60-63] 63  Resp:  [17-20] 17  BP: (122-144)/(62-79) 124/66  SpO2:  [96 %-98 %] 96 %    General:  Alert and oriented.  Chronically ill-appearing  Diffuse skin problem:  None.  Eye:  Pupils are equal, round and reactive to light, extraocular movements are intact, Normal conjunctiva.  HENT:  Normocephalic, oral mucosa is moist.  Head:  Normocephalic, atraumatic.  Neck:  Supple, non-tender, no carotid bruit, no jugular venous distention, no lymphadenopathy, no thyromegaly.  Respiratory:  Lungs are clear to auscultation, respirations are non-labored, breath sounds are equal, symmetrical chest wall expansion.  Cardiovascular:  Normal rate, regular rhythm, no murmur, no edema.  Gastrointestinal:  Soft, non-tender, non-distended, normal bowel sounds, no organomegaly.  Lymphatics:  No lymphadenopathy neck, axilla, groin.  Musculoskeletal: Normal range of motion.  normal strength.  Feet:  Normal pulses.  Neurologic:  Alert, oriented, no focal deficits, cranial nerves II-XII are grossly intact.  Cognition and Speech:  Oriented, speech clear and coherent.  Psychiatric:  Cooperative, appropriate mood & affect.      Laboratory Data:    Lab Results   Component Value Date    WBC 7.7 07/24/2024    HGB 11.4 07/24/2024    HCT 33.9 07/24/2024    .0 07/24/2024    CREATSERUM 2.16 07/24/2024    BUN 48 07/24/2024     07/24/2024    K 4.6 07/24/2024     07/24/2024    CO2 28.0 07/24/2024     07/24/2024    CA 9.5 07/24/2024    ALB 4.1 07/24/2024    ALKPHO 145 07/24/2024    BILT 0.4 07/24/2024    TP 6.5 07/24/2024    AST 12 07/24/2024    ALT <7 07/24/2024       Imaging:  XR CHEST AP PORTABLE  (CPT=71045)    Result Date: 7/24/2024  CONCLUSION: No radiographic evidence of acute cardiopulmonary abnormality.  Cardiomegaly and aortic repair changes are again seen.    elm-remote    Dictated by (CST): Fletcher Carreno MD on 7/24/2024 at 7:49 PM     Finalized by (CST): Fletcher Carreno MD on 7/24/2024 at 7:51 PM            Primary care physician  Colleen Weiler, DO    60 minutes spent on this admission - examining patient, obtaining history, reviewing previous medical records, going over test results/imaging and discussing plan of care. All questions answered.     Disposition  Clinical course will dictate outcome      Miya Castañeda MD  7/25/2024  1:42 AM

## 2024-07-26 ENCOUNTER — APPOINTMENT (OUTPATIENT)
Dept: NUCLEAR MEDICINE | Facility: HOSPITAL | Age: 79
End: 2024-07-26
Attending: INTERNAL MEDICINE
Payer: MEDICARE

## 2024-07-26 ENCOUNTER — APPOINTMENT (OUTPATIENT)
Dept: ULTRASOUND IMAGING | Facility: HOSPITAL | Age: 79
End: 2024-07-26
Attending: Other
Payer: MEDICARE

## 2024-07-26 LAB
ANION GAP SERPL CALC-SCNC: 8 MMOL/L (ref 0–18)
ANION GAP SERPL CALC-SCNC: 9 MMOL/L (ref 0–18)
APTT PPP: 26.5 SECONDS (ref 23–36)
ATRIAL RATE: 91 BPM
BASOPHILS # BLD AUTO: 0.06 X10(3) UL (ref 0–0.2)
BASOPHILS NFR BLD AUTO: 1 %
BUN BLD-MCNC: 37 MG/DL (ref 9–23)
BUN BLD-MCNC: 39 MG/DL (ref 9–23)
BUN/CREAT SERPL: 20.3 (ref 10–20)
BUN/CREAT SERPL: 21 (ref 10–20)
CALCIUM BLD-MCNC: 9.4 MG/DL (ref 8.7–10.4)
CALCIUM BLD-MCNC: 9.7 MG/DL (ref 8.7–10.4)
CHLORIDE SERPL-SCNC: 107 MMOL/L (ref 98–112)
CHLORIDE SERPL-SCNC: 109 MMOL/L (ref 98–112)
CO2 SERPL-SCNC: 25 MMOL/L (ref 21–32)
CO2 SERPL-SCNC: 25 MMOL/L (ref 21–32)
CREAT BLD-MCNC: 1.76 MG/DL
CREAT BLD-MCNC: 1.92 MG/DL
D DIMER PPP FEU-MCNC: 3.59 UG/ML FEU (ref ?–0.8)
DEPRECATED RDW RBC AUTO: 42 FL (ref 35.1–46.3)
DEPRECATED RDW RBC AUTO: 42.4 FL (ref 35.1–46.3)
EGFRCR SERPLBLD CKD-EPI 2021: 26 ML/MIN/1.73M2 (ref 60–?)
EGFRCR SERPLBLD CKD-EPI 2021: 29 ML/MIN/1.73M2 (ref 60–?)
EOSINOPHIL # BLD AUTO: 0.17 X10(3) UL (ref 0–0.7)
EOSINOPHIL NFR BLD AUTO: 2.9 %
ERYTHROCYTE [DISTWIDTH] IN BLOOD BY AUTOMATED COUNT: 13.6 % (ref 11–15)
ERYTHROCYTE [DISTWIDTH] IN BLOOD BY AUTOMATED COUNT: 13.7 % (ref 11–15)
ERYTHROCYTE [SEDIMENTATION RATE] IN BLOOD: 13 MM/HR
EST. AVERAGE GLUCOSE BLD GHB EST-MCNC: 194 MG/DL (ref 68–126)
GLUCOSE BLD-MCNC: 169 MG/DL (ref 70–99)
GLUCOSE BLD-MCNC: 174 MG/DL (ref 70–99)
GLUCOSE BLDC GLUCOMTR-MCNC: 136 MG/DL (ref 70–99)
GLUCOSE BLDC GLUCOMTR-MCNC: 137 MG/DL (ref 70–99)
GLUCOSE BLDC GLUCOMTR-MCNC: 164 MG/DL (ref 70–99)
HBA1C MFR BLD: 8.4 % (ref ?–5.7)
HCT VFR BLD AUTO: 33.6 %
HCT VFR BLD AUTO: 35.8 %
HGB BLD-MCNC: 11 G/DL
HGB BLD-MCNC: 11.4 G/DL
IMM GRANULOCYTES # BLD AUTO: 0.02 X10(3) UL (ref 0–1)
IMM GRANULOCYTES NFR BLD: 0.3 %
LYMPHOCYTES # BLD AUTO: 1.08 X10(3) UL (ref 1–4)
LYMPHOCYTES NFR BLD AUTO: 18.6 %
MCH RBC QN AUTO: 26.8 PG (ref 26–34)
MCH RBC QN AUTO: 27.5 PG (ref 26–34)
MCHC RBC AUTO-ENTMCNC: 31.8 G/DL (ref 31–37)
MCHC RBC AUTO-ENTMCNC: 32.7 G/DL (ref 31–37)
MCV RBC AUTO: 84 FL
MCV RBC AUTO: 84 FL
MONOCYTES # BLD AUTO: 0.55 X10(3) UL (ref 0.1–1)
MONOCYTES NFR BLD AUTO: 9.5 %
NEUTROPHILS # BLD AUTO: 3.92 X10 (3) UL (ref 1.5–7.7)
NEUTROPHILS # BLD AUTO: 3.92 X10(3) UL (ref 1.5–7.7)
NEUTROPHILS NFR BLD AUTO: 67.7 %
OSMOLALITY SERPL CALC.SUM OF ELEC: 305 MOSM/KG (ref 275–295)
OSMOLALITY SERPL CALC.SUM OF ELEC: 307 MOSM/KG (ref 275–295)
P AXIS: 59 DEGREES
P-R INTERVAL: 154 MS
PLATELET # BLD AUTO: 218 10(3)UL (ref 150–450)
PLATELET # BLD AUTO: 225 10(3)UL (ref 150–450)
POTASSIUM SERPL-SCNC: 4.2 MMOL/L (ref 3.5–5.1)
POTASSIUM SERPL-SCNC: 4.2 MMOL/L (ref 3.5–5.1)
POTASSIUM SERPL-SCNC: 4.4 MMOL/L (ref 3.5–5.1)
Q-T INTERVAL: 362 MS
QRS DURATION: 94 MS
QTC CALCULATION (BEZET): 445 MS
R AXIS: -37 DEGREES
RBC # BLD AUTO: 4 X10(6)UL
RBC # BLD AUTO: 4.26 X10(6)UL
SODIUM SERPL-SCNC: 141 MMOL/L (ref 136–145)
SODIUM SERPL-SCNC: 142 MMOL/L (ref 136–145)
T AXIS: 94 DEGREES
TROPONIN I SERPL HS-MCNC: 1175 NG/L
TROPONIN I SERPL HS-MCNC: 288 NG/L
TROPONIN I SERPL HS-MCNC: 831 NG/L
VENTRICULAR RATE: 91 BPM
WBC # BLD AUTO: 5.8 X10(3) UL (ref 4–11)
WBC # BLD AUTO: 6.1 X10(3) UL (ref 4–11)

## 2024-07-26 PROCEDURE — 99233 SBSQ HOSP IP/OBS HIGH 50: CPT | Performed by: HOSPITALIST

## 2024-07-26 PROCEDURE — 78582 LUNG VENTILAT&PERFUS IMAGING: CPT | Performed by: INTERNAL MEDICINE

## 2024-07-26 PROCEDURE — 93880 EXTRACRANIAL BILAT STUDY: CPT | Performed by: OTHER

## 2024-07-26 RX ORDER — HEPARIN SODIUM 1000 [USP'U]/ML
60 INJECTION, SOLUTION INTRAVENOUS; SUBCUTANEOUS ONCE
Status: COMPLETED | OUTPATIENT
Start: 2024-07-26 | End: 2024-07-26

## 2024-07-26 RX ORDER — METOPROLOL TARTRATE 50 MG/1
50 TABLET, FILM COATED ORAL
Status: DISCONTINUED | OUTPATIENT
Start: 2024-07-26 | End: 2024-07-27

## 2024-07-26 RX ORDER — HEPARIN SODIUM AND DEXTROSE 10000; 5 [USP'U]/100ML; G/100ML
12 INJECTION INTRAVENOUS ONCE
Status: COMPLETED | OUTPATIENT
Start: 2024-07-26 | End: 2024-07-26

## 2024-07-26 RX ORDER — HEPARIN SODIUM AND DEXTROSE 10000; 5 [USP'U]/100ML; G/100ML
18 INJECTION INTRAVENOUS ONCE
Status: DISCONTINUED | OUTPATIENT
Start: 2024-07-26 | End: 2024-07-26

## 2024-07-26 RX ORDER — HEPARIN SODIUM 1000 [USP'U]/ML
60 INJECTION, SOLUTION INTRAVENOUS; SUBCUTANEOUS ONCE
Status: DISCONTINUED | OUTPATIENT
Start: 2024-07-26 | End: 2024-07-26

## 2024-07-26 RX ORDER — DIPHENHYDRAMINE HCL 25 MG
50 CAPSULE ORAL ONCE
Status: DISCONTINUED | OUTPATIENT
Start: 2024-07-26 | End: 2024-07-26

## 2024-07-26 RX ORDER — HEPARIN SODIUM AND DEXTROSE 10000; 5 [USP'U]/100ML; G/100ML
INJECTION INTRAVENOUS CONTINUOUS
Status: DISCONTINUED | OUTPATIENT
Start: 2024-07-27 | End: 2024-07-26

## 2024-07-26 RX ORDER — LORAZEPAM 2 MG/ML
1 INJECTION INTRAMUSCULAR ONCE
Status: COMPLETED | OUTPATIENT
Start: 2024-07-26 | End: 2024-07-26

## 2024-07-26 RX ORDER — HEPARIN SODIUM AND DEXTROSE 10000; 5 [USP'U]/100ML; G/100ML
12 INJECTION INTRAVENOUS ONCE
Status: DISCONTINUED | OUTPATIENT
Start: 2024-07-26 | End: 2024-07-26

## 2024-07-26 RX ORDER — HEPARIN SODIUM 1000 [USP'U]/ML
80 INJECTION, SOLUTION INTRAVENOUS; SUBCUTANEOUS ONCE
Status: DISCONTINUED | OUTPATIENT
Start: 2024-07-26 | End: 2024-07-26

## 2024-07-26 RX ORDER — ISOSORBIDE MONONITRATE 30 MG/1
30 TABLET, EXTENDED RELEASE ORAL DAILY
Status: DISCONTINUED | OUTPATIENT
Start: 2024-07-26 | End: 2024-07-27

## 2024-07-26 RX ORDER — HEPARIN SODIUM AND DEXTROSE 10000; 5 [USP'U]/100ML; G/100ML
INJECTION INTRAVENOUS CONTINUOUS
Status: DISCONTINUED | OUTPATIENT
Start: 2024-07-27 | End: 2024-07-28

## 2024-07-26 NOTE — CONSULTS
[facility]  part of [facility]      Jillian Yip Patient Status:  Inpatient    1945 MRN [id number]   Location [facility] 3W/SW Attending Anali Cruz DO   Hosp Day # 1 PCP Colleen Weiler, DO       CC: Self care, cog deficits CVA    History of Present Illness:    79 year old female, who presents for evaluation of headache, right leg numbness, imbalance.  Patient with significant past medical history including COPD, left MCA stroke in 2017, IDDM type II, thoracic aortic aneurysm, essential hypertension, hyperlipidemia, CAD, previous DVT, gastric peptic ulcer, ovarian cancer, mixed dementia with Alzheimer's disease.  Patient reports that she woke up this morning with a headache, reports the headache to start at her neck and moving up to her head associated with nausea but no vomiting.  She also felt short of breath but no cough or chest pain.  Patient took 6 puffs of her inhaler without much relief.  She decided to come to the emergency department and about an hour prior to presentation she felt numbness to her right leg which then resolved.  She normally uses a walker, wheelchair and a cane. Daughter and grandson very involved in her care. Provide 24hr support.   Urinalysis with evidence of UTI.  Imaging including chest x-ray reveals no intrathoracic abnormalities.  MRI of the brain reveals left cerebellum and right frontal/parietal cortex acute infarct as well as left frontal lobe microbleed consistent with embolic disease. Also shows evidence of microvascular changes, prior lacunar infarts and volume loss. Patient does have some memory problems which have gotten worse since her prior hospital stay. Had prolonged course of SNF rehab and then HH and was able to function in her own home. Of note she had complaints of chest pain today with some decline in cognition and cardiac workup has been initiated. We were asked to see what the best way to approach her rehabiliation  would be.     Facility-Administered Medications Prior to Admission   Medication Dose Route Frequency Provider Last Rate Last Admin    cyanocobalamin (Vitamin B12) 1000 MCG/ML injection 1,000 mcg  1,000 mcg Intramuscular Once          Medications Prior to Admission   Medication Sig Dispense Refill Last Dose    [] HYDROcodone-acetaminophen 5-325 MG Oral Tab Take 1 tablet by mouth every 12 (twelve) hours as needed for Pain. 6 tablet 0     diclofenac 1 % External Gel Apply 2 g topically 4 (four) times daily as needed. 100 g 0     isosorbide mononitrate  MG Oral Tablet 24 Hr Take 1 tablet (120 mg total) by mouth daily.       donepezil 5 MG Oral Tab Take 1 tablet (5 mg total) by mouth nightly. 90 tablet 0     Insulin Pen Needle (CARETOUCH PEN NEEDLES) 32G X 4 MM Does not apply Misc Use with injection once daily as directed 100 each 3     insulin glargine (LANTUS SOLOSTAR) 100 UNIT/ML Subcutaneous Solution Pen-injector Inject 6 Units into the skin nightly. 15 mL 1     Blood Glucose Monitoring Suppl (ACCU-CHEK GUIDE) w/Device Does not apply Kit 1 each daily. 1 kit 0     Glucose Blood (ACCU-CHEK GUIDE) In Vitro Strip 1 each by In Vitro route daily. 100 strip 3     Blood Glucose Monitoring Suppl (ONETOUCH VERIO IQ SYSTEM) w/Device Does not apply Kit 1 Device by Other route 3 (three) times daily. Use as directed. (Patient not taking: Reported on 2024) 1 kit 0     Glucose Blood (ONETOUCH VERIO) In Vitro Strip Use as directed. (Patient not taking: Reported on 2024) 300 strip 3     OneTouch Delica Lancets 33G Does not apply Misc 1 Lancet by Finger stick route 3 (three) times daily. (Patient not taking: Reported on 2024) 300 each 3     pantoprazole 40 MG Oral Tab EC Take 1 tablet (40 mg total) by mouth 2 (two) times daily before meals. 180 tablet 3     albuterol 108 (90 Base) MCG/ACT Inhalation Aero Soln Inhale 2 puffs into the lungs every 4 (four) hours as needed. 1 each 1     traMADol 50 MG Oral Tab  Take 1 tablet (50 mg total) by mouth every 12 (twelve) hours as needed. 8 tablet 0     acetaminophen 500 MG Oral Tab Take 1 tablet (500 mg total) by mouth every 8 (eight) hours.       lidocaine-menthol 4-1 % External Patch Place 2 patches onto the skin daily.       metoprolol tartrate 100 MG Oral Tab Take 1 tablet (100 mg total) by mouth 2 (two) times daily.       nitroglycerin 0.4 MG Sublingual SL Tab Place 1 tablet (0.4 mg total) under the tongue every 5 (five) minutes as needed for Chest pain.       amLODIPine Besylate 5 MG Oral Tab Take 1 tablet (5 mg total) by mouth at bedtime.       ONETOUCH DELICA LANCETS 33G Does not apply Misc OneTouch Delica Lancets 33 gauge   TEST BLOOD GLUCOSE TWO TIMES A DAY AS DIRECTED (Patient not taking: Reported on 5/13/2024)       Clopidogrel Bisulfate 75 MG Oral Tab Take 1 tablet (75 mg total) by mouth daily. 30 tablet 0     ezetimibe 10 MG Oral Tab Take 1 tablet (10 mg total) by mouth nightly.        Allergies   Allergen Reactions    Codeine SWELLING and OTHER (SEE COMMENTS)     Face and body get puffy/swell, headache  Per pt, has tolerated morphine and Norco previously with no adverse effects    Enalapril Maleate-Felodipine SHORTNESS OF BREATH    Nyquil Severe Cold-Flu [Gnp Multi-Symptom Cold] PALPITATIONS    Radiology Contrast Iodinated Dyes ANAPHYLAXIS    Shellfish-Derived Products     Dilaudid [Hydromorphone] OTHER (SEE COMMENTS)     PATIENT'S DAUGHTER HAD A VERY BAD REACTION FROM DILAUDID (MENTALLY)    Sulfa Antibiotics RASH and OTHER (SEE COMMENTS)     Rash       Past Medical History:    Aortic aneurysm, thoracic (HCC)    Back problem    Calculus of kidney    Cataract    COPD (chronic obstructive pulmonary disease) (HCC)    Coronary atherosclerosis    Deep vein thrombosis (HCC)    Diabetes (HCC)    Diverticulosis of large intestine    Heart attack (HCC)    2013    High blood pressure    High cholesterol    History of stomach ulcers    Incontinence    Osteoarthritis     Ovarian cancer (HCC)    PONV (postoperative nausea and vomiting)    Renal disorder    Shortness of breath    Stroke (HCC)    shaking, wheelchair     Past Surgical History:   Procedure Laterality Date    Appendectomy      Arthroscopy of joint unlisted Right     Rotator cuff surgery    Back surgery  07/21/2017    L3-4 LUIS and hemilaminectomies    Cataract      Cath drug eluting stent      2013    Endovas aaa repr w/3-p part      Ep loop recorder implant Left 2017    Foot surgery Bilateral     Fracture surgery Right     right clavicle    Hysterectomy      Theodora localization wire 1 site right (cpt=19281)      benign-20 years ago    Spine surgery procedure unlisted       Social History     Socioeconomic History    Marital status:      Spouse name: Not on file    Number of children: Not on file    Years of education: Not on file    Highest education level: Not on file   Occupational History    Not on file   Tobacco Use    Smoking status: Former     Current packs/day: 0.50     Average packs/day: 0.5 packs/day for 35.0 years (17.5 ttl pk-yrs)     Types: Cigarettes     Passive exposure: Never    Smokeless tobacco: Former    Tobacco comments:     half pack a day   Vaping Use    Vaping status: Never Used   Substance and Sexual Activity    Alcohol use: No    Drug use: No    Sexual activity: Not on file   Other Topics Concern    Not on file   Social History Narrative    Not on file     Social Determinants of Health     Financial Resource Strain: Not on file   Food Insecurity: No Food Insecurity (7/25/2024)    Food Insecurity     Food Insecurity: Never true   Transportation Needs: No Transportation Needs (7/25/2024)    Transportation Needs     Lack of Transportation: No     Car Seat: Not on file   Physical Activity: Not on file   Stress: Not on file   Social Connections: Not on file   Housing Stability: Low Risk  (7/25/2024)    Housing Stability     Housing Instability: No     Housing Instability Emergency: Not on file      Crib or Bassinette: Not on file     Family History   Problem Relation Age of Onset    Cancer Father     Diabetes Father     Diabetes Mother     Cancer Daughter     Breast Cancer Maternal Aunt         x2 in 70's    Ovarian Cancer Self        PAIN SCALE: HA, mild shoulder pain    ACP: Full code    Review of Systems  Denies any SOB, had some CP earlier this AM. Has neck pain. Was able to sleep at night. Able to walk with a walker at home. Able to perform basic ADLs w/ supervision and increased time. No vision changes, no SOB.    Physical Exam  Temp:  [98.7 °F (37.1 °C)-99.1 °F (37.3 °C)] 98.8 °F (37.1 °C)  Pulse:  [77-95] 95  Resp:  [16-20] 17  BP: (126-162)/(73-94) 133/78  SpO2:  [93 %-96 %] 93 %  93%    I/O last 3 completed shifts:  In: 3234.3 [P.O.:1003; I.V.:2131.3; IV PIGGYBACK:100]  Out: 1140 [Urine:1140]  I/O this shift:  In: 490 [P.O.:480; I.V.:10]  Out: -        /78 (BP Location: Right arm)   Pulse 95   Temp 98.8 °F (37.1 °C) (Oral)   Resp 17   Ht 5' 1\" (1.549 m)   Wt 155 lb 3.2 oz (70.4 kg)   SpO2 93%   BMI 29.32 kg/m²     General Appearance:    Alert, cooperative, no distress, appears stated age   Head:    Normocephalic, without obvious abnormality, atraumatic   Eyes:    PERRL, conjunctiva/corneas clear, EOM's intact, fundi     benign, both eyes   Ears:    Normal TM's and external ear canals, both ears   Nose:   Nares normal, septum midline, mucosa normal, no drainage    or sinus tenderness   Throat:   Lips, mucosa, and tongue normal; teeth and gums normal   Neck:   Supple, symmetrical, trachea midline, no adenopathy;     thyroid:  no enlargement/tenderness/nodules; no carotid    bruit or JVD   Back:     Symmetric, no curvature, ROM normal, no CVA tenderness   Lungs:     Clear to auscultation bilaterally, respirations unlabored   Chest Wall:    No tenderness or deformity    Heart:    Regular rate and rhythm, S1 and S2 normal, no murmur, rub   or gallop       Abdomen:     Soft, non-tender, bowel  sounds active all four quadrants,     no masses, no organomegaly           Extremities:   Extremities normal, atraumatic, no cyanosis or edema   Pulses:   2+ and symmetric all extremities   Skin:   Skin color, texture, turgor normal, no rashes or lesions   Lymph nodes:   Cervical, supraclavicular, and axillary nodes normal   Neurologic:   CNII-XII intact, normal strength, sensation and reflexes     Throughout. Somnolent. Oriented x 2       Previous Function:  Supervision with RW. Can dress and bath herself. Family assists w/ iADLs    CURRENT FUNCTION:  BED MOBILITY  Supine to Sit: mod assist x2   Comments:  Patient required assist to scoot forward to EOB. SBA for static sitting balance at EOB.     FUNCTIONAL TRANSFER ASSESSMENT  Sit to Stand from EOB: mod assist x2 w/ RW  Chair Transfer: mod assist x2 w/ RW    AM-PAC Score:  Raw Score: 12   Approx Degree of Impairment: 68.66%   Standardized Score (AM-PAC Scale): 35.33   CMS Modifier (G-Code): CL     FUNCTIONAL ABILITY STATUS  Functional Mobility/Gait Assessment  Gait Assistance: Moderate assistance (x2)  Distance (ft): 2 ft  Assistive Device: Rolling walker  Pattern: Shuffle;R Foot flat;L Foot flat (forward posture, short step length bilaterally). Pt cued for upright posture for postural management. Assist and cuing required when turning for sequencing with RW.   Supine to Sit: moderate assistx2. Increased time to complete. Assistance required for LE and trunk management. Tolerated 10 minutes of static sitting with both hands on bed requiring SBA to maintain balance.   Sit to Stand: moderate assistx2. Cued for proper sequencing with RW to ensure pt safety. Cued for upright posture.     Labs  Lab Results   Component Value Date    WBC 5.8 07/26/2024    HGB 11.4 (L) 07/26/2024    HCT 35.8 07/26/2024    MCV 84.0 07/26/2024    .0 07/26/2024     Lab Results   Component Value Date    PTT 23.8 10/10/2023     No results found for: \"PROTIME\"  Lab Results   Component  Value Date     2024    K 4.2 2024    K 4.2 2024    CO2 25.0 2024     2024    BUN 37 (H) 2024       Radiology:  CARD ECHO 2D DOPPLER (CPT=93306)    Result Date: 2024  Transthoracic Echocardiogram Name:Jillian Yip Date: 2024 :  1945 Ht:  (60.63in) BP: 159 / 62 MRN:  [id number]    Age:  79years    Wt:  (150lb)   HR: Loc:  [facility]       Gndr: F          BSA: 1.66m^2 Sonographer: Josselin Lyon Ordering:    Kayla Logan Consulting:  Kayla Logan ---------------------------------------------------------------------------- History/Indications:   Cerebrovascular accident.  Type II aortic aneurysm s/p repair. ---------------------------------------------------------------------------- Procedure information:  A transthoracic complete 2D study was performed. Additional evaluation included M-mode, complete spectral Doppler, and color Doppler.  Patient status:  Inpatient.  Location:  Bedside.    This was a routine study. Transthoracic echocardiography for diagnosis and ventricular function evaluation. Image quality was adequate. ---------------------------------------------------------------------------- Conclusions: Left ventricle: The cavity size was normal. Wall thickness was mildly increased. Systolic function was normal. The estimated ejection fraction was 55-60%, by biplane method of disks. Wall motion is normal; there are no regional wall motion abnormalities. Doppler parameters are consistent with abnormal left ventricular relaxation - grade 1 diastolic dysfunction. * ---------------------------------------------------------------------------- * Findings: Left ventricle:  The cavity size was normal. Wall thickness was mildly increased. Systolic function was normal. The estimated ejection fraction was 55-60%, by biplane method of disks. Wall motion is normal; there are no regional wall motion abnormalities. Doppler parameters are consistent with  abnormal left ventricular relaxation - grade 1 diastolic dysfunction. Left atrium:  The atrium was normal in size. Right ventricle:  The cavity size was normal. Systolic function was normal. Right atrium:  The atrium was normal in size. Mitral valve:  The annulus was mildly calcified. The leaflets were mildly calcified. Leaflet separation was normal.  Doppler:  Transvalvular velocity was within the normal range. There was no evidence for stenosis. There was trace regurgitation. Aortic valve:   The valve was trileaflet. The leaflets were mildly calcified. Cusp separation was normal.  Doppler:  Transvalvular velocity was within the normal range. There was no evidence for stenosis. There was trace regurgitation. Tricuspid valve:  The valve is structurally normal. Leaflet separation was normal.  Doppler:  Transvalvular velocity was within the normal range. There was no evidence for stenosis. There was mild regurgitation. Pulmonic valve:   The valve is structurally normal. Cusp separation was normal.  Doppler:  Transvalvular velocity was within the normal range. There was no evidence for stenosis. There was no significant regurgitation. Pericardium:   There was no pericardial effusion. Aorta: Aortic root: The aortic root was normal. Ascending aorta: The ascending aorta was normal. Pulmonary arteries: Systolic pressure was estimated to be 33mm Hg. Systemic veins:  Central venous respirophasic diameter changes are in the normal range (>50%). Inferior vena cava: The IVC was normal-sized. The IVC was normal-sized. ---------------------------------------------------------------------------- Measurements  Left ventricle                    Value        Ref  IVS thickness, ED, PLAX       (H) 1.1   cm     0.6 - 0.9  LV ID, ED, PLAX               (H) 5.3   cm     3.8 - 5.2  LV ID, ES, PLAX                   3.5   cm     2.2 - 3.5  LV PW thickness, ED, PLAX     (H) 1.1   cm     0.6 - 0.9  IVS/LV PW ratio, ED, PLAX         1.00          ---------  LV PW/LV ID ratio, ED, PLAX       0.21         ---------  LV ejection fraction              62    %      54 - 74  LV end-diastolic volume, 1-p      60    ml     48 - 140  A4C  LV ejection fraction, 1-p A4C     60    %      46 - 78  Stroke volume, 1-p A4C            36    ml     ---------  LV end-diastolic volume/bsa,      36    ml/m^2 30 - 82  1-p A4C  Stroke volume/bsa, 1-p A4C        22    ml/m^2 ---------  LV end-diastolic volume, 2-p      48    ml     46 - 106  LV end-systolic volume, 2-p       19    ml     14 - 42  LV ejection fraction, 2-p         61    %      54 - 74  Stroke volume, 2-p                29    ml     ---------  LV end-diastolic volume/bsa,      29    ml/m^2 29 - 61  2-p  LV end-systolic volume/bsa,       11    ml/m^2 8 - 24  2-p  Stroke volume/bsa, 2-p            17.3  ml/m^2 ---------  LV e', lateral                (L) 7.1   cm/sec >=10.0  LV E/e', lateral              (H) 14           <=13  LV e', medial                     8.7   cm/sec >=7.0  LV E/e', medial                   11           ---------  LV e', average                    7.9   cm/sec ---------  LV E/e', average                  12           <=14  Aortic root                       Value        Ref  Aortic root ID                    2.7   cm     2.4 - 3.9  Ascending aorta                   Value        Ref  Ascending aorta ID                3.1   cm     1.9 - 3.5  Left atrium                       Value        Ref  LA ID, A-P, ES                    3.5   cm     2.7 - 3.8  LA volume, S                      42    ml     22 - 52  LA volume/bsa, S                  25    ml/m^2 16 - 34  LA volume, ES, 1-p A4C            51    ml     22 - 52  LA volume, ES, 1-p A2C            33    ml     22 - 52  LA volume, ES, A/L                43    ml     ---------  LA volume/bsa, ES, A/L            26    ml/m^2 16 - 34  LA/aortic root ratio              1.3          ---------  Mitral valve                      Value        Ref  Mitral  E-wave peak velocity       0.98  m/sec  ---------  Mitral A-wave peak velocity       1.43  m/sec  ---------  Mitral deceleration time          269   ms     ---------  Mitral peak gradient, D           4     mm Hg  ---------  Mitral E/A ratio, peak            0.7          ---------  Pulmonary artery                  Value        Ref  PA pressure, S, DP                37    mm Hg  ---------  Tricuspid valve                   Value        Ref  Tricuspid regurg peak             2.74  m/sec  <=2.8  velocity  Tricuspid peak RV-RA gradient     34    mm Hg  ---------  Systemic veins                    Value        Ref  Estimated CVP                     3     mm Hg  ---------  Inferior vena cava                Value        Ref  ID                                1.7   cm     <=2.1  Right ventricle                   Value        Ref  TAPSE, 2D                         1.88  cm     >=1.70  TAPSE, MM                         1.88  cm     >=1.70  RV pressure, S, DP                37    mm Hg  ---------  RV s', lateral                    17.8  cm/sec >=9.5 Legend: (L)  and  (H)  zaki values outside specified reference range. ---------------------------------------------------------------------------- Prepared and electronically signed by Sam Santana 07/25/2024 15:36     CT BRAIN OR HEAD (CPT=70450)    Result Date: 7/25/2024  PROCEDURE: CT BRAIN OR HEAD (CPT=70450)  COMPARISON: [facility], CT BRAIN OR HEAD (CPT=70450), 1/31/2024, 9:09 PM.  [facility], MRI BRAIN WO ACUTE (3) SEQUENCE(CPT=70551), 7/24/2024, 11:37 PM.  INDICATIONS: Brain bleed  TECHNIQUE: CT images were obtained without contrast material.  Automated exposure control for dose reduction was used.  Dose information is transmitted to the ACR (American College of Radiology) NRDR (National Radiology Data Registry) which includes the Dose Index Registry.  FINDINGS:  CSF SPACES: No hydrocephalus, subarachnoid hemorrhage, or effacement of the  basal cisterns is appreciated. There is no extra-axial fluid collection. CEREBRUM: No acute intraparenchymal hemorrhage, edema, or cortical sulcal effacement is apparent. There is no space-occupying lesion, mass effect, or shift of midline structures. The gray-white matter junction is preserved and bilaterally symmetric in appearance. Scattered hypodense foci noted in the subcortical and periventricular white matter of both cerebral hemispheres.  Known small acute cortical infarcts at the right frontal and parietal lobes not well seen by CT. CEREBELLUM: Subcentimeter low-attenuation focus in the left cerebellum (series 3, image 10). BRAINSTEM: No edema, hemorrhage, mass, or acute infarction is seen.  CALVARIUM: There is no apparent depressed fracture, mass, or other significant visible lesion.  SINUSES: Limited views demonstrate no significant mucosal thickening or fluid.  ORBITS: Limited views are notable for postoperative changes of the lenses bilaterally. OTHER: Atherosclerotic vascular calcifications are perceived in the carotid siphons and distal vertebral arteries.  Partially imaged ovoid 1.6 x 1.2 cm nodule in the right parotid gland.         CONCLUSION:  1. No acute intracranial hemorrhage.  Punctate focus of susceptibility artifact in the left frontal lobe on previous day brain MR therefore likely represents a chronic microhemorrhage (not unusual in patients of this age).  2. Subcentimeter low-attenuation focus in the left cerebellum likely relates to an acute lacunar-type infarct.  Other small acute cortical infarcts at the right frontal and parietal lobes are not well seen by CT. 3. Nonspecific white matter changes involving both cerebral hemispheres that most likely reflect sequelae of chronic microangiopathy. 4. Intracranial atherosclerosis. 5. 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  Differential considerations for parotid gland masses are broad and include both benign and  malignant primary neoplasms as well as metastatic disease.  Nonemergent otolaryngology evaluation of this finding is recommended with strong consideration of histologic sampling for definitive characterization.   Dictated by (CST): Atif Otto MD on 7/25/2024 at 9:13 AM     Finalized by (CST): Atif Otto MD on 7/25/2024 at 9:21 AM          MRI BRAIN WO ACUTE (3) SEQUENCE (CPT=70551)    Result Date: 7/25/2024  PROCEDURE: MRI BRAIN WO ACUTE (3) SEQUENCE(CPT=70551)  COMPARISON: [facility], CT BRAIN OR HEAD (CPT=70450), 1/31/2024, 9:09 PM.  INDICATIONS: fever, weakness, trouble breathing  TECHNIQUE: A limited ER 3-sequence stroke protocol study was conducted with diffusion weighted imaging, T2-weighted FLAIR, and gradient recalled echo images performed in the axial plane.  Images were performed without contrast.  Limited 3-sequence stroke protocol study was performed. Within these parameters:  Findings:   PARENCHYMA: There is no mass.  There are a few foci of diffusion restriction in a subcortical location in the right parietal and frontal region (series 5, image 46 and 47 for example). There is a tiny punctate focus of susceptibility artifact within the anterior left frontal region (series 8, image 20).  This localizes along a sulcus.  There is no other signal abnormality on other sequences at this location There is mild global parenchymal volume loss There is extensive confluent FLAIR signal abnormality in the periventricular and subcortical white matter.  There are lacunar infarcts in the left basal ganglia as well as in the right periventricular white matter along the corona radiata MIDLINE: The pituitary gland is unremarkable. CSF SPACES: Ventricles, cisterns, and sulci are mildly enlarged.  No hydrocephalus or mass.  No midline shift or herniation. VESSELS: Limited assessment of flow voids is unremarkable SKULL: No mass or other significant visible lesion. SINUSES: Limited views  demonstrate no significant mucosal thickening or fluid.  MASTOIDS: No effusion. ORBITS: Ocular lens replacements. SOFT TISSUES: No acute abnormality.          CONCLUSION:   Scattered foci of diffusion restriction within the right frontal and parietal cortex, in a pattern suggestive acute ischemia related to embolic etiology.  Additional diffusion restriction in the left inferior cerebellum, also compatible with acute ischemia, which may be embolic in etiology as well.  Punctate focus of susceptibility in the anterior left frontal region, along a sulcus.  This may represent nonspecific mineralization or sequela of age-indeterminate macro hemorrhage.  Further assessment can be made with CT of the brain.  Background of advanced chronic small vessel ischemic disease, global parenchymal volume loss, as well chronic lacunar infarcts in the left basal ganglia and right periventricular white matter.   These findings were discussed with Dr. Sun at 1:25 a.m. By Dr.Se Dietrich Findings were discussed with charge  nurse Jackie  on 07/25/2024 at 7:41 a.m. by Sadaf Page.  Findings were discussed with Dr. Da Gómez on 07/25/2024 at 7:45 a.m by Sadaf Page.   Dictated by (CST): Sadaf Page MD on 7/25/2024 at 7:32 AM     Finalized by (CST): Sadaf Page MD on 7/25/2024 at 7:49 AM          XR CHEST AP PORTABLE  (CPT=71045)    Result Date: 7/24/2024  PROCEDURE: XR CHEST AP PORTABLE  (CPT=71045) TIME: 1931 hours.   COMPARISON: [facility], XR CHEST AP PORTABLE (CPT=71045), 1/31/2024, 8:59 PM.  [facility], XR CHEST AP PORTABLE (CPT=71045), 10/23/2023, 2:09 PM.  [facility], XR CHEST PA + LAT CHEST (CPT=71046), 8/02/2018, 2:57 PM.  INDICATIONS: Fever, weakness, and shortness of breath for one day  TECHNIQUE:   Single view.   FINDINGS:  CARDIAC/VASC: Cardiomegaly.  Aortic graft again seen. MEDIAST/MACARIO:   No visible mass or adenopathy. LUNGS/PLEURA: Normal.  No significant pulmonary  parenchymal abnormalities.  No effusion or pleural thickening. BONES: No fracture or visible bony lesion. OTHER: Negative.          CONCLUSION: No radiographic evidence of acute cardiopulmonary abnormality.  Cardiomegaly and aortic repair changes are again seen.    elm-remote    Dictated by (CST): Fletcher Carreno MD on 7/24/2024 at 7:49 PM     Finalized by (CST): Fletcher Carreno MD on 7/24/2024 at 7:51 PM          US VENOUS DOPPLER ARM RIGHT - DIAG IMG (CPT=93971)    Result Date: 7/17/2024  PROCEDURE: US VENOUS DOPPLER ARM RIGHT-DIAG IMG (CPT=93971)  COMPARISON: None.  INDICATIONS: Right wrist and arm pain  TECHNIQUE: Color duplex Doppler venous ultrasound of the right upper extremity was performed in the usual manner.  FINDINGS: The internal jugular, subclavian, axillary, brachial, cephalic and basilic veins appear normal.  Flow was demonstrated with color and pulsed Doppler.  Comparison scans of the left subclavian vein appear normal.   THROMBI: None visible.  COMPRESSIBILITY: Normal.  OTHER: Negative.          CONCLUSION:   No DVT within the right upper extremity.  Preliminary report was submitted by the [employer] teleradiologist and there are no significant discrepancies.    Dictated by (CST): Vincent Leonard MD on 7/17/2024 at 7:41 AM     Finalized by (CST): Vincent Leonard MD on 7/17/2024 at 7:43 AM          XR FOREARM (2 VIEWS), RIGHT (CPT=73090)    Result Date: 7/16/2024  PROCEDURE: XR FOREARM (2 VIEWS), RIGHT (CPT=73090)  COMPARISON: None.  INDICATIONS: Sudden onset of right wrist pain and 5th digit pain today. Denies trauma.  TECHNIQUE: 2 views were obtained.   FINDINGS:  BONES: Osteophytes around the radial head-neck junction.  Radiocarpal joint space narrowing.  Otherwise, no significant arthropathy, fracture or acute abnormality. SOFT TISSUES: No visible soft tissue swelling. EFFUSION: None visible. OTHER: Negative.         CONCLUSION: No acute osseous abnormality of the right forearm.    Dictated by (CST): Ziggy  MD Sergei on 7/16/2024 at 8:59 PM     Finalized by (CST): Sergei Trinh MD on 7/16/2024 at 9:00 PM          XR HAND (MIN 3 VIEWS), RIGHT (CPT=73130)    Result Date: 7/16/2024  PROCEDURE: XR HAND (MIN 3 VIEWS), RIGHT (CPT=73130)  COMPARISON: [facility], XR HAND (MIN 3 VIEWS), RIGHT (CPT=73130), 1/31/2024, 8:59 PM.  INDICATIONS: Sudden onset of right wrist pain and 5th digit pain today. Denies trauma.  TECHNIQUE: 3 views were obtained.   FINDINGS:  BONES: Demineralized osseous structures.  Radiocarpal and carpometacarpal joint space narrowing as well as narrowing of the interphalangeal joints of the digits.  No acute fracture or suspicious bone lesion.  Osteophytes are present along the margins of the 5th proximal interphalangeal joint. SOFT TISSUES: No visible soft tissue swelling. EFFUSION: None visible. OTHER: Negative.         CONCLUSION: Chronic appearing degenerative change within the right hand.  Demineralized osseous structures.  No acute osseous abnormality.    Dictated by (CST): Sergei Trinh MD on 7/16/2024 at 8:58 PM     Finalized by (CST): Sergei Trinh MD on 7/16/2024 at 8:59 PM          [unfilled]    Assessment    Patient Active Problem List   Diagnosis    Spondylolisthesis of lumbar region    S/P lumbar microdiscectomy    Hyperglycemia    Hypertensive crisis    Hemispheric carotid artery syndrome    Cerebrovascular accident (CVA) due to embolism of left carotid artery ([facility])    Episodic mood disorder ([facility])    Schizophrenia ([facility])    Hypokalemia    Left leg cellulitis    Chronic kidney disease, unspecified CKD stage    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 Discussed with daughter that goals of care would be to improve function to decrease burden of care but would most likely need assist at home. She is aware and and agreeable with this plan.    Thank you for this consultation and allowing me to participate in this patients care.       MARCELLUS KERN MD    7/26/2024    3:43 PM

## 2024-07-26 NOTE — PLAN OF CARE
Patient is alert and oriented x3-4. Can be confused at times. Room air. Remote tele in place. Voiding via purewick. General diet. ACHS accuchecks. IVF and abx continued. Q4 neuros continued. Patient was complaining of a headache. Notified Dr. Seng Sorto and gave x1 dose of compazine per order. Patient then became nauseous and and the headache persisted. Prn zofran and reglan given. Headache pain continued. Notified MD and gave prn toradol per order. Nausea and headache continued, gave 1x dose of prn ativan per order. Call light and personal belongings within reach. Safety and seizure precautions in place.     Problem: Patient Centered Care  Goal: Patient preferences are identified and integrated in the patient's plan of care  Description: Interventions:  - Provide timely, complete, and accurate information to patient/family  - Incorporate patient and family knowledge, values, beliefs, and cultural backgrounds into the planning and delivery of care  - Encourage patient/family to participate in care and decision-making at the level they choose  - Honor patient and family perspectives and choices  Outcome: Progressing     Problem: Diabetes/Glucose Control  Goal: Glucose maintained within prescribed range  Description: INTERVENTIONS:  - Monitor Blood Glucose as ordered  - Assess for signs and symptoms of hyperglycemia and hypoglycemia  - Administer ordered medications to maintain glucose within target range  - Assess barriers to adequate nutritional intake and initiate nutrition consult as needed  - Instruct patient on self management of diabetes  Outcome: Progressing     Problem: Patient/Family Goals  Goal: Patient/Family Long Term Goal  Description: Patient's Long Term Goal: Go home     Interventions:  - Follow MD ordered  - Non pharmacological interventions   - See additional Care Plan goals for specific interventions  Outcome: Progressing  Goal: Patient/Family Short Term Goal  Description: Patient's Short Term Goal:  Improve strength    Interventions:   - Follow MD orders  -Non- pharmacological interventions   - See additional Care Plan goals for specific interventions  Outcome: Progressing     Problem: CARDIOVASCULAR - ADULT  Goal: Maintains optimal cardiac output and hemodynamic stability  Description: INTERVENTIONS:  - Monitor vital signs, rhythm, and trends  - Monitor for bleeding, hypotension and signs of decreased cardiac output  - Evaluate effectiveness of vasoactive medications to optimize hemodynamic stability  - Monitor arterial and/or venous puncture sites for bleeding and/or hematoma  - Assess quality of pulses, skin color and temperature  - Assess for signs of decreased coronary artery perfusion - ex. Angina  - Evaluate fluid balance, assess for edema, trend weights  Outcome: Progressing  Goal: Absence of cardiac arrhythmias or at baseline  Description: INTERVENTIONS:  - Continuous cardiac monitoring, monitor vital signs, obtain 12 lead EKG if indicated  - Evaluate effectiveness of antiarrhythmic and heart rate control medications as ordered  - Initiate emergency measures for life threatening arrhythmias  - Monitor electrolytes and administer replacement therapy as ordered  Outcome: Progressing     Problem: METABOLIC/FLUID AND ELECTROLYTES - ADULT  Goal: Glucose maintained within prescribed range  Description: INTERVENTIONS:  - Monitor Blood Glucose as ordered  - Assess for signs and symptoms of hyperglycemia and hypoglycemia  - Administer ordered medications to maintain glucose within target range  - Assess barriers to adequate nutritional intake and initiate nutrition consult as needed  - Instruct patient on self management of diabetes  Outcome: Progressing  Goal: Electrolytes maintained within normal limits  Description: INTERVENTIONS:  - Monitor labs and rhythm and assess patient for signs and symptoms of electrolyte imbalances  - Administer electrolyte replacement as ordered  - Monitor response to electrolyte  replacements, including rhythm and repeat lab results as appropriate  - Fluid restriction as ordered  - Instruct patient on fluid and nutrition restrictions as appropriate  Outcome: Progressing  Goal: Hemodynamic stability and optimal renal function maintained  Description: INTERVENTIONS:  - Monitor labs and assess for signs and symptoms of volume excess or deficit  - Monitor intake, output and patient weight  - Monitor urine specific gravity, serum osmolarity and serum sodium as indicated or ordered  - Monitor response to interventions for patient's volume status, including labs, urine output, blood pressure (other measures as available)  - Encourage oral intake as appropriate  - Instruct patient on fluid and nutrition restrictions as appropriate  Outcome: Progressing     Problem: SAFETY ADULT - FALL  Goal: Free from fall injury  Description: INTERVENTIONS:  - Assess pt frequently for physical needs  - Identify cognitive and physical deficits and behaviors that affect risk of falls.  - [place] fall precautions as indicated by assessment.  - Educate pt/family on patient safety including physical limitations  - Instruct pt to call for assistance with activity based on assessment  - Modify environment to reduce risk of injury  - Provide assistive devices as appropriate  - Consider OT/PT consult to assist with strengthening/mobility  - Encourage toileting schedule  Outcome: Progressing     Problem: DISCHARGE PLANNING  Goal: Discharge to home or other facility with appropriate resources  Description: INTERVENTIONS:  - Identify barriers to discharge w/pt and caregiver  - Include patient/family/discharge partner in discharge planning  - Arrange for needed discharge resources and transportation as appropriate  - Identify discharge learning needs (meds, wound care, etc)  - Arrange for interpreters to assist at discharge as needed  - Consider post-discharge preferences of patient/family/discharge partner  - Complete POLST  form as appropriate  - Assess patient's ability to be responsible for managing their own health  - Refer to Case Management Department for coordinating discharge planning if the patient needs post-hospital services based on physician/LIP order or complex needs related to functional status, cognitive ability or social support system  Outcome: Progressing     Problem: NEUROLOGICAL - ADULT  Goal: Achieves stable or improved neurological status  Description: INTERVENTIONS  - Assess for and report changes in neurological status  - Initiate measures to prevent increased intracranial pressure  - Maintain blood pressure and fluid volume within ordered parameters to optimize cerebral perfusion and minimize risk of hemorrhage  - Monitor temperature, glucose, and sodium. Initiate appropriate interventions as ordered  Outcome: Progressing  Goal: Achieves maximal functionality and self care  Description: INTERVENTIONS  - Monitor swallowing and airway patency with patient fatigue and changes in neurological status  - Encourage and assist patient to increase activity and self care with guidance from PT/OT  - Encourage visually impaired, hearing impaired and aphasic patients to use assistive/communication devices  Outcome: Progressing

## 2024-07-26 NOTE — CM/SW NOTE
07/26/24 1100   Services Requested   PMR Consult Requested Consult ordered     Social work was able to meet with the patient's daughter Sarah at bedside to discuss Acute Rehab.    The patient's daughter is open to Acute Rehab but NOT [facility]. If AIR is not possible, family will take the patient home.  Social work explained the PMR process and the daughter is in agreement.     The patient's daughter states that if PMR finds her appropriate [facility] will be there number 1 choice.    The patient has a history with [facility] and would be open to using them gain if Acute Rehab is not an option.    Social work will follow up.    SW/CM to remain available for support and/or discharge planning.     Shani Tolentino MSW, LSW  Discharge Planner [id number]

## 2024-07-26 NOTE — PLAN OF CARE
Patient c/o radiating jaw pain; VSS, no s/s of distress.  Dr. Anderson notified, new orders as follows;  CTA chest with allergy protocol (steroid/benadryl) d/t contrast allergy.  Per CT staff, unable to do CTA routine or STAT d/t GFR of 29.  Dr. Anderson notified.  New orders for STAT D-dimer and STAT Aorta ultrasound, orders carried out.  Daughter bedside and updated on plan of care.  Will continue to monitor and treat

## 2024-07-26 NOTE — CONSULTS
[facility]  [address] [place] [address], [address]  Health system [id number]  [phone]            Jillian Yip Patient Status:  Inpatient    1945 MRN [id number]   Location [facility] 3W/SW Attending Anali Cruz DO   Hosp Day # 1 PCP Colleen Weiler, DO     Subjective:  Jillian Yip is a(n) 79 year old female.  Had headaches yesterday , she received compazine and Toradol without much benefit  Her headaches improved today    Current Facility-Administered Medications   Medication Dose Route Frequency    metoprolol tartrate (Lopressor) tab 50 mg  50 mg Oral 2x Daily(Beta Blocker)    acetaminophen (Tylenol) tab 650 mg  650 mg Oral Q4H PRN    Or    acetaminophen (Tylenol) rectal suppository 650 mg  650 mg Rectal Q4H PRN    hydrALAzine (Apresoline) 20 mg/mL injection 10 mg  10 mg Intravenous Q2H PRN    ondansetron (Zofran) 4 MG/2ML injection 4 mg  4 mg Intravenous Q6H PRN    Or    metoclopramide (Reglan) 5 mg/mL injection 5 mg  5 mg Intravenous Q8H PRN    polyethylene glycol (PEG 3350) (Miralax) 17 g oral packet 17 g  17 g Oral Daily PRN    sennosides (Senokot) tab 17.2 mg  17.2 mg Oral Nightly PRN    bisacodyl (Dulcolax) 10 MG rectal suppository 10 mg  10 mg Rectal Daily PRN    cefTRIAXone (Rocephin) 1 g in sodium chloride 0.9% 100 mL IVPB-ADDV  1 g Intravenous Q24H    glucose (Dex4) 15 GM/59ML oral liquid 15 g  15 g Oral Q15 Min PRN    Or    glucose (Glutose) 40% oral gel 15 g  15 g Oral Q15 Min PRN    Or    glucose-vitamin C (Dex-4) chewable tab 4 tablet  4 tablet Oral Q15 Min PRN    Or    dextrose 50% injection 50 mL  50 mL Intravenous Q15 Min PRN    Or    glucose (Dex4) 15 GM/59ML oral liquid 30 g  30 g Oral Q15 Min PRN    Or    glucose (Glutose) 40% oral gel 30 g  30 g Oral Q15 Min PRN    Or    glucose-vitamin C (Dex-4) chewable tab 8 tablet  8 tablet Oral Q15 Min PRN    insulin aspart (NovoLOG) 100 Units/mL FlexPen 1-5 Units  1-5 Units Subcutaneous TID CC and HS     aspirin chewable tab 81 mg  81 mg Oral Daily    albuterol (Ventolin HFA) 108 (90 Base) MCG/ACT inhaler 2 puff  2 puff Inhalation Q4H PRN    insulin degludec (Tresiba) 100 units/mL flextouch 6 Units  6 Units Subcutaneous Nightly    umeclidinium-vilanterol (Anoro Ellipta) 62.5-25 MCG/ACT inhaler 1 puff  1 puff Inhalation Daily    pantoprazole (Protonix) 40 mg in sodium chloride 0.9% PF 10 mL IV push  40 mg Intravenous Daily    donepezil (Aricept) tab 5 mg  5 mg Oral Nightly    atorvastatin (Lipitor) tab 40 mg  40 mg Oral Nightly    ezetimibe (Zetia) tab 10 mg  10 mg Oral Nightly    amLODIPine (Norvasc) tab 5 mg  5 mg Oral Daily    clopidogrel (Plavix) tab 75 mg  75 mg Oral Daily    heparin (Porcine) 5000 UNIT/ML injection 5,000 Units  5,000 Units Subcutaneous Q8H RODNEY    ketorolac (Toradol) 15 MG/ML injection 15 mg  15 mg Intravenous Q6H PRN       Objective:  Blood pressure 134/66, pulse 96, temperature 99.5 °F (37.5 °C), temperature source Oral, resp. rate 17, height 61\", weight 155 lb 3.2 oz (70.4 kg), SpO2 96%, not currently breastfeeding.    Physical Exam:  Vitals:    07/26/24 0650 07/26/24 1127 07/26/24 1344 07/26/24 1706   BP:  160/87 133/78 134/66   Pulse:  82 95 96   Resp:  18 17 17   Temp:  98.8 °F (37.1 °C)  99.5 °F (37.5 °C)   TempSrc:  Oral  Oral   SpO2:  94% 93% 96%   Weight: 155 lb 3.2 oz (70.4 kg)      Height:           General: No apparent distress, well nourished, well groomed.  Head- Normocephalic, atraumatic, tender on palpation of her temples mostly right side  Eyes- No redness or swelling  ENT- Hearing intake, smell preserved, normal glutition  Neck- No masses or adenopathy  Cv: pulses were palpable and normal, no cyanosis or edema      Neurological:    She was able to name objects and repeat and she would follow simple and complex commands without problems.  Her speech was only minimally dysarthric.  Her visual field is full to confrontation test and her extraocular movements are intact.  Her  pupils were 4 mm equal and reactive to light and her face appears symmetric.  Her tongue protrudes in the midline.  She had a supple neck and tone was normal in upper and lower extremities.  She had normal motor power 5/5 in both upper extremities at proximal and distal muscles.  She was 4/5 and motor power in proximal and distal muscles of the lower extremities.  There was no ataxia on finger-nose-finger test. She has no rest tremors but bilateral postural tremors worse on the right side. Minimal kinetic tremors.   She reported decreased sensation to vibration and touch in her right hemibody compared to the left.        Lab Results   Component Value Date    WBC 5.8 07/26/2024    HGB 11.4 (L) 07/26/2024    HCT 35.8 07/26/2024    .0 07/26/2024    CREATSERUM 1.76 (H) 07/26/2024    BUN 37 (H) 07/26/2024     07/26/2024    K 4.2 07/26/2024    K 4.2 07/26/2024     07/26/2024    CO2 25.0 07/26/2024     (H) 07/26/2024    CA 9.7 07/26/2024    ALB 4.1 07/24/2024    ALKPHO 145 (H) 07/24/2024    BILT 0.4 07/24/2024    TP 6.5 07/24/2024    AST 12 07/24/2024    ALT <7 (L) 07/24/2024    PTT 23.8 10/10/2023    INR 1.14 10/10/2023    TSH 1.030 03/23/2022    DDIMER 3.59 (H) 07/26/2024    ESRML 9 03/23/2022    CRP 0.86 (H) 07/24/2019    MG 2.0 07/25/2024    PHOS 4.4 05/13/2024    TROP 0.01 10/16/2017    CK 20 (L) 10/21/2023    CKMB 1.0 10/16/2017    B12 381 07/10/2024       CT BRAIN OR HEAD (CPT=70450)    Result Date: 7/25/2024  CONCLUSION:  1. No acute intracranial hemorrhage.  Punctate focus of susceptibility artifact in the left frontal lobe on previous day brain MR therefore likely represents a chronic microhemorrhage (not unusual in patients of this age).  2. Subcentimeter low-attenuation focus in the left cerebellum likely relates to an acute lacunar-type infarct.  Other small acute cortical infarcts at the right frontal and parietal lobes are not well seen by CT. 3. Nonspecific white matter changes  involving both cerebral hemispheres that most likely reflect sequelae of chronic microangiopathy. 4. Intracranial atherosclerosis. 5. 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  Differential considerations for parotid gland masses are broad and include both benign and malignant primary neoplasms as well as metastatic disease.  Nonemergent otolaryngology evaluation of this finding is recommended with strong consideration of histologic sampling for definitive characterization.   Dictated by (CST): Atif Otto MD on 7/25/2024 at 9:13 AM     Finalized by (CST): Atif Otto MD on 7/25/2024 at 9:21 AM          MRI BRAIN WO ACUTE (3) SEQUENCE (CPT=70551)    Result Date: 7/25/2024  CONCLUSION:   Scattered foci of diffusion restriction within the right frontal and parietal cortex, in a pattern suggestive acute ischemia related to embolic etiology.  Additional diffusion restriction in the left inferior cerebellum, also compatible with acute ischemia, which may be embolic in etiology as well.  Punctate focus of susceptibility in the anterior left frontal region, along a sulcus.  This may represent nonspecific mineralization or sequela of age-indeterminate macro hemorrhage.  Further assessment can be made with CT of the brain.  Background of advanced chronic small vessel ischemic disease, global parenchymal volume loss, as well chronic lacunar infarcts in the left basal ganglia and right periventricular white matter.   These findings were discussed with Dr. Sun at 1:25 a.m. By Dr.Se Dietrich Findings were discussed with charge  nurse Jackie  on 07/25/2024 at 7:41 a.m. by Sadaf Page.  Findings were discussed with Dr. Da Gómez on 07/25/2024 at 7:45 a.m by Sadaf Page.   Dictated by (CST): Sadaf Page MD on 7/25/2024 at 7:32 AM     Finalized by (CST): Sadaf Page MD on 7/25/2024 at 7:49 AM          XR CHEST AP PORTABLE  (CPT=71045)    Result Date: 7/24/2024  CONCLUSION: No radiographic  evidence of acute cardiopulmonary abnormality.  Cardiomegaly and aortic repair changes are again seen.    elm-remote    Dictated by (CST): Fletcher Carreno MD on 7/24/2024 at 7:49 PM     Finalized by (CST): Fletcher Carreno MD on 7/24/2024 at 7:51 PM         EKG 12 Lead    Result Date: 7/26/2024  Normal sinus rhythm Left axis deviation Minimal voltage criteria for LVH, may be normal variant ( [place] product ) Septal infarct , age undetermined Abnormal ECG When compared with ECG of 24-JUL-2024 18:06, Vent. rate has increased BY  30 BPM Septal infarct is now Present Confirmed by SYLVESTER ALVAREZ JORDAN ([id number]) on 7/26/2024 4:17:25 PM    EKG 12 Lead    Result Date: 7/25/2024  Normal sinus rhythm Left atrial enlargement Left axis deviation Minimal voltage criteria for LVH, may be normal variant ( [place] product ) Nonspecific ST abnormality Abnormal ECG When compared with ECG of 02-OCT-2023 13:37, Non-specific change in ST segment in Inferior leads T wave inversion no longer evident in Anterior leads Confirmed by SYLVESTER ALVAREZ JORDAN ([id number]) on 7/25/2024 7:47:58 AM       Assessment:  Patient Active Problem List   Diagnosis    Spondylolisthesis of lumbar region    S/P lumbar microdiscectomy    Hyperglycemia    Hypertensive crisis    Hemispheric carotid artery syndrome    Cerebrovascular accident (CVA) due to embolism of left carotid artery (HCC)    Episodic mood disorder (HCC)    Schizophrenia (HCC)    Hypokalemia    Left leg cellulitis    Chronic kidney disease, unspecified CKD stage    Thoraco abdominal aneurysm (HCC)    Weakness    Acute CVA (cerebrovascular accident) (HCC)    Acute cystitis without hematuria       Impression and Plan:   Jaw pain and tenderness on palpation of right temple  Will check ESR      MRI brain shows areas of diffusion restriction in right frontal and parietal cortex as well as in the inferior cerebellum.  These look embolic in nature.  She has previous history of stroke in left MCA distribution  without residual deficits.     Recommendations:  1-ultrasound carotids pending  2-echocardiogram EF 55-60%  3-continue telemetry  4-she had 18 months of cardiac monitoring in the past that did not reveal a fib  5-A1c 8.4   6-LDL 95   7-PT/OT/ST  8- Stroke education  9-Dual Antiplatelet therapy with atorvastatin 40mg daily  10-Ok for chemical DVT prophylaxis     Neurology is following     Thank you for allowing me to participate in the care of your patient.    Kayla Palumbo MD

## 2024-07-26 NOTE — PLAN OF CARE
Jillian Yip is A&O x 3 with forgetfulness.  Lives at home with family.    Mod assist with walker.  Intermittent of bowel and bladder.  VSS:  stable.  Denies pain.  Plan:  neuro checks, NIH daily, ABX  Will continue to monitor and treat.    Problem: Patient Centered Care  Goal: Patient preferences are identified and integrated in the patient's plan of care  Description: Interventions:  - What would you like us to know as we care for you?   - Provide timely, complete, and accurate information to patient/family  - Incorporate patient and family knowledge, values, beliefs, and cultural backgrounds into the planning and delivery of care  - Encourage patient/family to participate in care and decision-making at the level they choose  - Honor patient and family perspectives and choices  Outcome: Progressing     Problem: Diabetes/Glucose Control  Goal: Glucose maintained within prescribed range  Description: INTERVENTIONS:  - Monitor Blood Glucose as ordered  - Assess for signs and symptoms of hyperglycemia and hypoglycemia  - Administer ordered medications to maintain glucose within target range  - Assess barriers to adequate nutritional intake and initiate nutrition consult as needed  - Instruct patient on self management of diabetes  Outcome: Progressing     Problem: Patient/Family Goals  Goal: Patient/Family Long Term Goal  Description: Patient's Long Term Goal: Go home     Interventions:  - Follow MD ordered  - Non pharmacological interventions   - See additional Care Plan goals for specific interventions  Outcome: Progressing  Goal: Patient/Family Short Term Goal  Description: Patient's Short Term Goal: Improve strength    Interventions:   - Follow MD orders  -Non- pharmacological interventions   - See additional Care Plan goals for specific interventions  Outcome: Progressing     Problem: CARDIOVASCULAR - ADULT  Goal: Maintains optimal cardiac output and hemodynamic stability  Description: INTERVENTIONS:  -  Monitor vital signs, rhythm, and trends  - Monitor for bleeding, hypotension and signs of decreased cardiac output  - Evaluate effectiveness of vasoactive medications to optimize hemodynamic stability  - Monitor arterial and/or venous puncture sites for bleeding and/or hematoma  - Assess quality of pulses, skin color and temperature  - Assess for signs of decreased coronary artery perfusion - ex. Angina  - Evaluate fluid balance, assess for edema, trend weights  Outcome: Progressing  Goal: Absence of cardiac arrhythmias or at baseline  Description: INTERVENTIONS:  - Continuous cardiac monitoring, monitor vital signs, obtain 12 lead EKG if indicated  - Evaluate effectiveness of antiarrhythmic and heart rate control medications as ordered  - Initiate emergency measures for life threatening arrhythmias  - Monitor electrolytes and administer replacement therapy as ordered  Outcome: Progressing     Problem: METABOLIC/FLUID AND ELECTROLYTES - ADULT  Goal: Glucose maintained within prescribed range  Description: INTERVENTIONS:  - Monitor Blood Glucose as ordered  - Assess for signs and symptoms of hyperglycemia and hypoglycemia  - Administer ordered medications to maintain glucose within target range  - Assess barriers to adequate nutritional intake and initiate nutrition consult as needed  - Instruct patient on self management of diabetes  Outcome: Progressing  Goal: Electrolytes maintained within normal limits  Description: INTERVENTIONS:  - Monitor labs and rhythm and assess patient for signs and symptoms of electrolyte imbalances  - Administer electrolyte replacement as ordered  - Monitor response to electrolyte replacements, including rhythm and repeat lab results as appropriate  - Fluid restriction as ordered  - Instruct patient on fluid and nutrition restrictions as appropriate  Outcome: Progressing  Goal: Hemodynamic stability and optimal renal function maintained  Description: INTERVENTIONS:  - Monitor labs and  assess for signs and symptoms of volume excess or deficit  - Monitor intake, output and patient weight  - Monitor urine specific gravity, serum osmolarity and serum sodium as indicated or ordered  - Monitor response to interventions for patient's volume status, including labs, urine output, blood pressure (other measures as available)  - Encourage oral intake as appropriate  - Instruct patient on fluid and nutrition restrictions as appropriate  Outcome: Progressing     Problem: SAFETY ADULT - FALL  Goal: Free from fall injury  Description: INTERVENTIONS:  - Assess pt frequently for physical needs  - Identify cognitive and physical deficits and behaviors that affect risk of falls.  - [place] fall precautions as indicated by assessment.  - Educate pt/family on patient safety including physical limitations  - Instruct pt to call for assistance with activity based on assessment  - Modify environment to reduce risk of injury  - Provide assistive devices as appropriate  - Consider OT/PT consult to assist with strengthening/mobility  - Encourage toileting schedule  Outcome: Progressing     Problem: DISCHARGE PLANNING  Goal: Discharge to home or other facility with appropriate resources  Description: INTERVENTIONS:  - Identify barriers to discharge w/pt and caregiver  - Include patient/family/discharge partner in discharge planning  - Arrange for needed discharge resources and transportation as appropriate  - Identify discharge learning needs (meds, wound care, etc)  - Arrange for interpreters to assist at discharge as needed  - Consider post-discharge preferences of patient/family/discharge partner  - Complete POLST form as appropriate  - Assess patient's ability to be responsible for managing their own health  - Refer to Case Management Department for coordinating discharge planning if the patient needs post-hospital services based on physician/LIP order or complex needs related to functional status, cognitive ability  or social support system  Outcome: Progressing     Problem: NEUROLOGICAL - ADULT  Goal: Achieves stable or improved neurological status  Description: INTERVENTIONS  - Assess for and report changes in neurological status  - Initiate measures to prevent increased intracranial pressure  - Maintain blood pressure and fluid volume within ordered parameters to optimize cerebral perfusion and minimize risk of hemorrhage  - Monitor temperature, glucose, and sodium. Initiate appropriate interventions as ordered  Outcome: Progressing  Goal: Achieves maximal functionality and self care  Description: INTERVENTIONS  - Monitor swallowing and airway patency with patient fatigue and changes in neurological status  - Encourage and assist patient to increase activity and self care with guidance from PT/OT  - Encourage visually impaired, hearing impaired and aphasic patients to use assistive/communication devices  Outcome: Progressing

## 2024-07-26 NOTE — PROGRESS NOTES
[facility]  part of [facility]    Cardiology Hospital Progress Note       Jillian Yip Patient Status:  Inpatient    1945 MRN [id number]   Location [facility] [place] 3W/SW Attending Anali Cruz DO   Hosp Day # 1 PCP Colleen Weiler, DO       Patient is a 79 year old female who was admitted to the hospital for Acute CVA (cerebrovascular accident) (HCC):  Subjective:  Patient denies any cardiac c/o when seen in am    In the afternoon :Patient c/o jaw pains and chest and back pains  HR and BP stable      Past Medical History:   Past Medical History:    Aortic aneurysm, thoracic (HCC)    Back problem    Calculus of kidney    Cataract    COPD (chronic obstructive pulmonary disease) (HCC)    Coronary atherosclerosis    Deep vein thrombosis (HCC)    Diabetes (HCC)    Diverticulosis of large intestine    Heart attack (HCC)    2013    High blood pressure    High cholesterol    History of stomach ulcers    Incontinence    Osteoarthritis    Ovarian cancer (HCC)    PONV (postoperative nausea and vomiting)    Renal disorder    Shortness of breath    Stroke (HCC)    shaking, wheelchair       Past Surgical History:  Past Surgical History:   Procedure Laterality Date    Appendectomy      Arthroscopy of joint unlisted Right     Rotator cuff surgery    Back surgery  2017    L3-4 LUIS and hemilaminectomies    Cataract      Cath drug eluting stent      2013    Endovas aaa repr w/3-p part      Ep loop recorder implant Left 2017    Foot surgery Bilateral     Fracture surgery Right     right clavicle    Hysterectomy      Theodora localization wire 1 site right (cpt=19281)      benign-20 years ago    Spine surgery procedure unlisted         Family History:  Family History   Problem Relation Age of Onset    Cancer Father     Diabetes Father     Diabetes Mother     Cancer Daughter     Breast Cancer Maternal Aunt         x2 in 70's    Ovarian Cancer Self        Social History:  Pediatric History    Patient Parents    Not on file     Other Topics Concern    Not on file   Social History Narrative    Not on file           Current Medications:  Current Facility-Administered Medications   Medication Dose Route Frequency    sodium chloride 0.9% infusion   Intravenous Continuous    acetaminophen (Tylenol) tab 650 mg  650 mg Oral Q4H PRN    Or    acetaminophen (Tylenol) rectal suppository 650 mg  650 mg Rectal Q4H PRN    hydrALAzine (Apresoline) 20 mg/mL injection 10 mg  10 mg Intravenous Q2H PRN    ondansetron (Zofran) 4 MG/2ML injection 4 mg  4 mg Intravenous Q6H PRN    Or    metoclopramide (Reglan) 5 mg/mL injection 5 mg  5 mg Intravenous Q8H PRN    polyethylene glycol (PEG 3350) (Miralax) 17 g oral packet 17 g  17 g Oral Daily PRN    sennosides (Senokot) tab 17.2 mg  17.2 mg Oral Nightly PRN    bisacodyl (Dulcolax) 10 MG rectal suppository 10 mg  10 mg Rectal Daily PRN    cefTRIAXone (Rocephin) 1 g in sodium chloride 0.9% 100 mL IVPB-ADDV  1 g Intravenous Q24H    glucose (Dex4) 15 GM/59ML oral liquid 15 g  15 g Oral Q15 Min PRN    Or    glucose (Glutose) 40% oral gel 15 g  15 g Oral Q15 Min PRN    Or    glucose-vitamin C (Dex-4) chewable tab 4 tablet  4 tablet Oral Q15 Min PRN    Or    dextrose 50% injection 50 mL  50 mL Intravenous Q15 Min PRN    Or    glucose (Dex4) 15 GM/59ML oral liquid 30 g  30 g Oral Q15 Min PRN    Or    glucose (Glutose) 40% oral gel 30 g  30 g Oral Q15 Min PRN    Or    glucose-vitamin C (Dex-4) chewable tab 8 tablet  8 tablet Oral Q15 Min PRN    insulin aspart (NovoLOG) 100 Units/mL FlexPen 1-5 Units  1-5 Units Subcutaneous TID CC and HS    aspirin chewable tab 81 mg  81 mg Oral Daily    albuterol (Ventolin HFA) 108 (90 Base) MCG/ACT inhaler 2 puff  2 puff Inhalation Q4H PRN    insulin degludec (Tresiba) 100 units/mL flextouch 6 Units  6 Units Subcutaneous Nightly    umeclidinium-vilanterol (Anoro Ellipta) 62.5-25 MCG/ACT inhaler 1 puff  1 puff Inhalation Daily    pantoprazole (Protonix)  40 mg in sodium chloride 0.9% PF 10 mL IV push  40 mg Intravenous Daily    donepezil (Aricept) tab 5 mg  5 mg Oral Nightly    atorvastatin (Lipitor) tab 40 mg  40 mg Oral Nightly    ezetimibe (Zetia) tab 10 mg  10 mg Oral Nightly    amLODIPine (Norvasc) tab 5 mg  5 mg Oral Daily    clopidogrel (Plavix) tab 75 mg  75 mg Oral Daily    heparin (Porcine) 5000 UNIT/ML injection 5,000 Units  5,000 Units Subcutaneous Q8H RODNEY    ketorolac (Toradol) 15 MG/ML injection 15 mg  15 mg Intravenous Q6H PRN     Medications Prior to Admission   Medication Sig    [] HYDROcodone-acetaminophen 5-325 MG Oral Tab Take 1 tablet by mouth every 12 (twelve) hours as needed for Pain.    diclofenac 1 % External Gel Apply 2 g topically 4 (four) times daily as needed.    isosorbide mononitrate  MG Oral Tablet 24 Hr Take 1 tablet (120 mg total) by mouth daily.    donepezil 5 MG Oral Tab Take 1 tablet (5 mg total) by mouth nightly.    Insulin Pen Needle (CARETOUCH PEN NEEDLES) 32G X 4 MM Does not apply Misc Use with injection once daily as directed    insulin glargine (LANTUS SOLOSTAR) 100 UNIT/ML Subcutaneous Solution Pen-injector Inject 6 Units into the skin nightly.    Blood Glucose Monitoring Suppl (ACCU-CHEK GUIDE) w/Device Does not apply Kit 1 each daily.    Glucose Blood (ACCU-CHEK GUIDE) In Vitro Strip 1 each by In Vitro route daily.    Blood Glucose Monitoring Suppl (ONETOUCH VERIO IQ SYSTEM) w/Device Does not apply Kit 1 Device by Other route 3 (three) times daily. Use as directed. (Patient not taking: Reported on 2024)    Glucose Blood (ONETOUCH VERIO) In Vitro Strip Use as directed. (Patient not taking: Reported on 2024)    OneTouch Delica Lancets 33G Does not apply Misc 1 Lancet by Finger stick route 3 (three) times daily. (Patient not taking: Reported on 2024)    pantoprazole 40 MG Oral Tab EC Take 1 tablet (40 mg total) by mouth 2 (two) times daily before meals.    albuterol 108 (90 Base) MCG/ACT  Inhalation Aero Soln Inhale 2 puffs into the lungs every 4 (four) hours as needed.    traMADol 50 MG Oral Tab Take 1 tablet (50 mg total) by mouth every 12 (twelve) hours as needed.    acetaminophen 500 MG Oral Tab Take 1 tablet (500 mg total) by mouth every 8 (eight) hours.    lidocaine-menthol 4-1 % External Patch Place 2 patches onto the skin daily.    metoprolol tartrate 100 MG Oral Tab Take 1 tablet (100 mg total) by mouth 2 (two) times daily.    nitroglycerin 0.4 MG Sublingual SL Tab Place 1 tablet (0.4 mg total) under the tongue every 5 (five) minutes as needed for Chest pain.    amLODIPine Besylate 5 MG Oral Tab Take 1 tablet (5 mg total) by mouth at bedtime.    ONETOUCH DELICA LANCETS 33G Does not apply Misc OneTouch Delica Lancets 33 gauge   TEST BLOOD GLUCOSE TWO TIMES A DAY AS DIRECTED (Patient not taking: Reported on 5/13/2024)    Clopidogrel Bisulfate 75 MG Oral Tab Take 1 tablet (75 mg total) by mouth daily.    ezetimibe 10 MG Oral Tab Take 1 tablet (10 mg total) by mouth nightly.       Allergies:  Allergies   Allergen Reactions    Codeine SWELLING and OTHER (SEE COMMENTS)     Face and body get puffy/swell, headache  Per pt, has tolerated morphine and Norco previously with no adverse effects    Enalapril Maleate-Felodipine SHORTNESS OF BREATH    Nyquil Severe Cold-Flu [Gnp Multi-Symptom Cold] PALPITATIONS    Radiology Contrast Iodinated Dyes ANAPHYLAXIS    Shellfish-Derived Products     Dilaudid [Hydromorphone] OTHER (SEE COMMENTS)     PATIENT'S DAUGHTER HAD A VERY BAD REACTION FROM DILAUDID (MENTALLY)    Sulfa Antibiotics RASH and OTHER (SEE COMMENTS)     Rash         Review of Systems:   A comprehensive 12 point review of system was performed.  All pertinent positive and negative findings per HPI.    Physical Exam:   Blood pressure 160/87, pulse 82, temperature 98.8 °F (37.1 °C), temperature source Oral, resp. rate 18, height 5' 1\" (1.549 m), weight 155 lb 3.2 oz (70.4 kg), SpO2 94%, not currently  breastfeeding.  Intake/Output:   Last 3 shifts: [id number]@   This shift: No intake/output data recorded.     Vent Settings:      Hemodynamic parameters (last 24 hours):      Scheduled Meds:    cefTRIAXone  1 g Intravenous Q24H    insulin aspart  1-5 Units Subcutaneous TID CC and HS    aspirin  81 mg Oral Daily    insulin degludec  6 Units Subcutaneous Nightly    umeclidinium-vilanterol  1 puff Inhalation Daily    pantoprazole  40 mg Intravenous Daily    donepezil  5 mg Oral Nightly    atorvastatin  40 mg Oral Nightly    ezetimibe  10 mg Oral Nightly    amLODIPine  5 mg Oral Daily    clopidogrel  75 mg Oral Daily    heparin  5,000 Units Subcutaneous Q8H RODNEY       Continuous Infusions:    sodium chloride 75 mL/hr at 07/25/24 0404         Physical Exam:    General: No acute distress./obese  HEENT: NAD  Neck: No JVD  Cardiac: Normal rate, No murmur.  Lungs: Clear without rhales, rhochi or wheezing.  Abdomen: Soft, non-tender.   Extremities: No edema.    Neurologic: Alert and moving all 4 extremities.  Psychiatry: Patient has calm affect.      Results:     Laboratory Data:  Lab Results   Component Value Date    WBC 5.8 07/26/2024    HGB 11.4 (L) 07/26/2024    HCT 35.8 07/26/2024    .0 07/26/2024    CREATSERUM 1.76 (H) 07/26/2024    BUN 37 (H) 07/26/2024     07/26/2024    K 4.2 07/26/2024    K 4.2 07/26/2024     07/26/2024    CO2 25.0 07/26/2024     (H) 07/26/2024    CA 9.7 07/26/2024    ALB 4.1 07/24/2024    ALKPHO 145 (H) 07/24/2024    TP 6.5 07/24/2024    AST 12 07/24/2024    ALT <7 (L) 07/24/2024    PTT 23.8 10/10/2023    INR 1.14 10/10/2023    PTP 14.7 10/10/2023    TSH 1.030 03/23/2022    ESRML 9 03/23/2022    CRP 0.86 (H) 07/24/2019    MG 2.0 07/25/2024    PHOS 4.4 05/13/2024    TROP 0.01 10/16/2017    CK 20 (L) 10/21/2023    CKMB 1.0 10/16/2017    B12 381 07/10/2024         Imaging:  ECHO:  Conclusions:   Left ventricle: The cavity size was normal. Wall thickness was mildly    increased. Systolic function was normal. The estimated ejection fraction was   55-60%, by biplane method of disks. Wall motion is normal; there are no   regional wall motion abnormalities. Doppler parameters are consistent with   abnormal left ventricular relaxation - grade 1 diastolic dysfunction.   CT BRAIN OR HEAD (CPT=70450)     Result Date: 7/25/2024  CONCLUSION:         1. No acute intracranial hemorrhage.  Punctate focus of susceptibility artifact in the left frontal lobe on previous day brain MR therefore likely represents a chronic microhemorrhage (not unusual in patients of this age).  2. Subcentimeter low-attenuation focus in the left cerebellum likely relates to an acute lacunar-type infarct.  Other small acute cortical infarcts at the right frontal and parietal lobes are not well seen by CT. 3. Nonspecific white matter changes involving both cerebral hemispheres that most likely reflect sequelae of chronic microangiopathy. 4. Intracranial atherosclerosis. 5. 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  Differential considerations for parotid gland masses are broad and include both benign and malignant primary neoplasms as well as metastatic disease.  Nonemergent otolaryngology evaluation of this finding is recommended with strong consideration of histologic sampling for definitive characterization.   Dictated by (CST): Atif Otto MD on 7/25/2024 at 9:13 AM     Finalized by (CST): Atif Otto MD on 7/25/2024 at 9:21 AM           MRI BRAIN WO ACUTE (3) SEQUENCE (CPT=70551)     Result Date: 7/25/2024  CONCLUSION:          Scattered foci of diffusion restriction within the right frontal and parietal cortex, in a pattern suggestive acute ischemia related to embolic etiology.  Additional diffusion restriction in the left inferior cerebellum, also compatible with acute ischemia, which may be embolic in etiology as well.  Punctate focus of susceptibility in the anterior left  frontal region, along a sulcus.  This may represent nonspecific mineralization or sequela of age-indeterminate macro hemorrhage.  Further assessment can be made with CT of the brain.  Background of advanced chronic small vessel ischemic disease, global parenchymal volume loss, as well chronic lacunar infarcts in the left basal ganglia and right periventricular white matter.   These findings were discussed with Dr. Sun at 1:25 a.m. By Dr.Se Dietrich Findings were discussed with charge  nurse Jackie  on 07/25/2024 at 7:41 a.m. by Sadaf Page.  Findings were discussed with Dr. Da Gómez on 07/25/2024 at 7:45 a.m by Sadaf Page.   Dictated by (CST): Sadaf Page MD on 7/25/2024 at 7:32 AM     Finalized by (CST): Sadaf Page MD on 7/25/2024 at 7:49 AM           XR CHEST AP PORTABLE  (CPT=71045)     Result Date: 7/24/2024  CONCLUSION:        No radiographic evidence of acute cardiopulmonary abnormality.  Cardiomegaly and aortic repair changes are again seen.    elm-remote    Dictated by (CST): Fletcher Carreno MD on 7/24/2024 at 7:49 PM     Finalized by (CST): Fletcher Carreno MD on 7/24/2024 at 7:51 PM             Pertinent labs:  BUN 45  Creat 2.05    Hb 11.4     EKG:  Normal sinus rhythm   Left atrial enlargement   Left axis deviation   Minimal voltage criteria for LVH, may be normal variant ( [place] product )   Nonspecific ST abnormality   Abnormal ECG   When compared with ECG of 02-OCT-2023 13:37,   Non-specific change in ST segment in Inferior leads   T wave inversion no longer evident in Anterior leads   Confirmed by SYLVESTER ALVAREZ, BRYON ([id number]) on 7/25/2024 7:47:58 AM         Prior Cardiac w/u: 9/15/2022  PATIENT'S NAME: ELENA PARKER   ATTENDING PHYSICIAN: Kashmir Whalen DO   OPERATING PHYSICIAN: Kashmir Whalen DO   PATIENT ACCOUNT#:   [id number]    LOCATION:  [place] [address]  MEDICAL RECORD #:   [id number]       YOB: 1945  ADMISSION DATE:       09/15/2022       OPERATION DATE:  09/15/2022     CARDIAC PROCEDURE TRANSCRIPTION        ANGIOGRAPHY  PREOPERATIVE DIAGNOSIS:    POSTOPERATIVE DIAGNOSIS:    PROCEDURE PERFORMED:  Bilateral coronary angiogram.     SEDATION:  Conscious sedation for 45 minutes by a registered nurse who independently monitored the patient's blood pressure, oximetry, and heart rate.       INDICATION:  Chest pain, abnormal stress test.     DESCRIPTION OF PROCEDURE:  Informed consent was obtained.  The patient was placed in the cardiac catheterization lab.  Right wrist was sterilely draped and prepped.  The patient received preprocedural hydration because of renal insufficiency.  A 6-Malian sheath was inserted.  We noted a lot of tortuosity in the right radial artery as well as the brachial artery.  We navigated with a Whisper wire and then a Glidewire.  We used TIG catheter for right coronary angiography and an EBU 5-Malian 4.0 guide catheter for left coronary angiography.  At the end of the procedure, a radial band was applied.  The patient received heparin, verapamil, and nitroglycerin during the procedure.       FINDINGS:  There is a proximal right coronary artery stent that is totally occluded and fills by collaterals faintly distally from the right, but mainly fills from the distal LAD and mid LAD brisk collaterals.  The left main is normal in appearance.  The left anterior descending coronary artery is normal in appearance in the proximal portion; in the mid portion there is a 50% calcific stenosis appreciated.  At the very distal portion of the vessel, there is a very heavily calcified 90% stenosis that happens to be right where there are 2 large collateral branches going to the distal right coronary artery.  There are 2 major diagonal branches; both are patent with moderate proximal disease involving the first diagonal branch and mild proximal disease involving the second diagonal branch.  The ramus intermedius branch has severe disease in the  proximal portion about 50% stenosis.  The rest of the circumflex is small.       IMPRESSION:    1.       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  2.       Very distal heavily calcified 90% stenosis of the left anterior descending artery right where there are collaterals going to the distal right coronary artery.       RECOMMENDATIONS:  There is a distal left anterior descending artery stenosis, but this area is very heavily calcified and also in a location where there are collateral vessels going to the distal right coronary artery at about a 90-degree bend and therefore high risk for occlusion of these collateral vessels.  Intervention in the distal vessel with heavy calcification also is with risk and poor outcome.  Therefore, medical management would be recommended.  Furthermore, the patient has renal failure and additional contrast use would have jeopardized the renal function.       Dictated By Kashmir Whalen DO  d:09/15/2022 12:03:48         IMPRESSION:  Admitted with Acute CVA     H/O L MCA CVA      HTN/HTHD/Chronic diastolic heart failure     CKD III b     CAD s/p MI     Type II aortic aneurysm s/p repair     COPD    RECOMMENDATIONS:  Patient remains in NSR with stable BP  ECHO as noted  Currently no cardiac c/o or issues  Permissive BP: will resume BB , follow BP and HR closely  Neurology evaluation  Max medical Rx  ASA 81 mg  Will start Statin/zetia  Plavix 75 mg if ok with neurology service  Continue Amlodipine 5 mg  Now added Metoprolol 50 mg bid     D/w patient and nursing staff  D/W daughter in the room  Please do not hesitate to call with questions.    Jett Anderson MD  [facility]  [address]  [place] [address]  [phone]  ADDENDUM: 18: 30  Paitient c/o jaw pains and chest pains and back pains  HR and BP stab;e  Stat Troponin /D dimer were ordered  CTA of chest with PE and aoryic  protocol ordered.  Patient could not have CTA due to Contrast allergy and CKD  Stat VQ scan ordered  Troponin:  288  D Dimer 3.51  Will check VQ scan and leg V dopplers  Started on Heparin gtt per protocol  Hb 11.0  ECHO as noted with Normal biventricular size and function and normal Aortic root and Aortic valve per report    D/W Nursing staff  Patient has CAD as noted above not amenable to PCI per interventional notes  Will proceed with medical Rx at the present time  Currently patient is chest pain free per nursing staff with stable HR and BP    Jett Anderson MD

## 2024-07-26 NOTE — OCCUPATIONAL THERAPY NOTE
Attempted to see pt for OT today. Pt is reporting jaw pain radiating to chest. RN present and messaging doctor. Because of symptoms, will hold off on therapy for now until further work up performed to rule out cardiac issues. Will follow up with patient another day.    Carrie Walters, OTR/L

## 2024-07-26 NOTE — CM/SW NOTE
07/26/24 1600   Choice of Post-Acute Provider   Informed patient of right to choose their preferred provider Yes   List of appropriate post-acute services provided to patient/family with quality data Yes   Patient/family choice [facility]   Information given to Daughter     Social work was able to meet with the patient and her daughter at bedside to discuss Acute Rehab.    Social work provided the AR list at bedside and the patient's daughter has decided to go with [facility].    [facility] is reserved in [facility].    NEED FOR DISCHARGE: MEDICAL CLEARANCE.    SW/CM to remain available for support and/or discharge planning.     Shani Tolentino MSW, LSW  Discharge Planner [id number]

## 2024-07-26 NOTE — PROGRESS NOTES
[facility]  part of [facility]    Progress Note    Jillian Yip Patient Status:  Inpatient    1945 MRN [id number]   Location [facility] 3W/SW Attending Anali Cruz DO   Hosp Day # 1 PCP Colleen Weiler, DO     Chief complaint cva     Subjective:   Jillian Yip is a(n) 79 year old female pt doing well today. No c/o's     ROS:   No cp, sob   No c/d   No n/v     Objective:   Blood pressure 133/78, pulse 95, temperature 98.8 °F (37.1 °C), temperature source Oral, resp. rate 17, height 5' 1\" (1.549 m), weight 155 lb 3.2 oz (70.4 kg), SpO2 93%, not currently breastfeeding.      Intake/Output Summary (Last 24 hours) at 2024 1451  Last data filed at 2024 1130  Gross per 24 hour   Intake 2728.25 ml   Output 1100 ml   Net 1628.25 ml       Patient Weight(s) for the past 336 hrs:   Weight   24 0650 155 lb 3.2 oz (70.4 kg)   24 0600 150 lb 6.4 oz (68.2 kg)   24 1807 153 lb (69.4 kg)           General appearance: alert, appears stated age and cooperative  Pulmonary:  clear to auscultation bilaterally  Cardiovascular: S1, S2 normal, no murmur, click, rub or gallop, regular rate and rhythm  Abdominal: soft, non-tender; bowel sounds normal; no masses,  no organomegaly  Extremities: extremities normal, atraumatic, no cyanosis or edema        Medicines:     Current Facility-Administered Medications   Medication Dose Route Frequency    metoprolol tartrate (Lopressor) tab 50 mg  50 mg Oral 2x Daily(Beta Blocker)    predniSONE (Deltasone) tab 50 mg  50 mg Oral Q6H    diphenhydrAMINE (Benadryl) cap/tab 50 mg  50 mg Oral Once    acetaminophen (Tylenol) tab 650 mg  650 mg Oral Q4H PRN    Or    acetaminophen (Tylenol) rectal suppository 650 mg  650 mg Rectal Q4H PRN    hydrALAzine (Apresoline) 20 mg/mL injection 10 mg  10 mg Intravenous Q2H PRN    ondansetron (Zofran) 4 MG/2ML injection 4 mg  4 mg Intravenous Q6H PRN    Or    metoclopramide (Reglan) 5 mg/mL  injection 5 mg  5 mg Intravenous Q8H PRN    polyethylene glycol (PEG 3350) (Miralax) 17 g oral packet 17 g  17 g Oral Daily PRN    sennosides (Senokot) tab 17.2 mg  17.2 mg Oral Nightly PRN    bisacodyl (Dulcolax) 10 MG rectal suppository 10 mg  10 mg Rectal Daily PRN    cefTRIAXone (Rocephin) 1 g in sodium chloride 0.9% 100 mL IVPB-ADDV  1 g Intravenous Q24H    glucose (Dex4) 15 GM/59ML oral liquid 15 g  15 g Oral Q15 Min PRN    Or    glucose (Glutose) 40% oral gel 15 g  15 g Oral Q15 Min PRN    Or    glucose-vitamin C (Dex-4) chewable tab 4 tablet  4 tablet Oral Q15 Min PRN    Or    dextrose 50% injection 50 mL  50 mL Intravenous Q15 Min PRN    Or    glucose (Dex4) 15 GM/59ML oral liquid 30 g  30 g Oral Q15 Min PRN    Or    glucose (Glutose) 40% oral gel 30 g  30 g Oral Q15 Min PRN    Or    glucose-vitamin C (Dex-4) chewable tab 8 tablet  8 tablet Oral Q15 Min PRN    insulin aspart (NovoLOG) 100 Units/mL FlexPen 1-5 Units  1-5 Units Subcutaneous TID CC and HS    aspirin chewable tab 81 mg  81 mg Oral Daily    albuterol (Ventolin HFA) 108 (90 Base) MCG/ACT inhaler 2 puff  2 puff Inhalation Q4H PRN    insulin degludec (Tresiba) 100 units/mL flextouch 6 Units  6 Units Subcutaneous Nightly    umeclidinium-vilanterol (Anoro Ellipta) 62.5-25 MCG/ACT inhaler 1 puff  1 puff Inhalation Daily    pantoprazole (Protonix) 40 mg in sodium chloride 0.9% PF 10 mL IV push  40 mg Intravenous Daily    donepezil (Aricept) tab 5 mg  5 mg Oral Nightly    atorvastatin (Lipitor) tab 40 mg  40 mg Oral Nightly    ezetimibe (Zetia) tab 10 mg  10 mg Oral Nightly    amLODIPine (Norvasc) tab 5 mg  5 mg Oral Daily    clopidogrel (Plavix) tab 75 mg  75 mg Oral Daily    heparin (Porcine) 5000 UNIT/ML injection 5,000 Units  5,000 Units Subcutaneous Q8H RODNEY    ketorolac (Toradol) 15 MG/ML injection 15 mg  15 mg Intravenous Q6H PRN       Lab Results   Component Value Date    WBC 5.8 07/26/2024    HGB 11.4 (L) 07/26/2024    HCT 35.8 07/26/2024    PLT  218.0 07/26/2024    CREATSERUM 1.76 (H) 07/26/2024    BUN 37 (H) 07/26/2024     07/26/2024    K 4.2 07/26/2024    K 4.2 07/26/2024     07/26/2024    CO2 25.0 07/26/2024     (H) 07/26/2024    CA 9.7 07/26/2024    ALB 4.1 07/24/2024    ALKPHO 145 (H) 07/24/2024    BILT 0.4 07/24/2024    TP 6.5 07/24/2024    AST 12 07/24/2024    ALT <7 (L) 07/24/2024    PTT 23.8 10/10/2023    INR 1.14 10/10/2023    TSH 1.030 03/23/2022    ESRML 9 03/23/2022    CRP 0.86 (H) 07/24/2019    MG 2.0 07/25/2024    PHOS 4.4 05/13/2024    TROP 0.01 10/16/2017    CK 20 (L) 10/21/2023    CKMB 1.0 10/16/2017    B12 381 07/10/2024       CT BRAIN OR HEAD (CPT=70450)    Result Date: 7/25/2024  CONCLUSION:  1. No acute intracranial hemorrhage.  Punctate focus of susceptibility artifact in the left frontal lobe on previous day brain MR therefore likely represents a chronic microhemorrhage (not unusual in patients of this age).  2. Subcentimeter low-attenuation focus in the left cerebellum likely relates to an acute lacunar-type infarct.  Other small acute cortical infarcts at the right frontal and parietal lobes are not well seen by CT. 3. Nonspecific white matter changes involving both cerebral hemispheres that most likely reflect sequelae of chronic microangiopathy. 4. Intracranial atherosclerosis. 5. 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  Differential considerations for parotid gland masses are broad and include both benign and malignant primary neoplasms as well as metastatic disease.  Nonemergent otolaryngology evaluation of this finding is recommended with strong consideration of histologic sampling for definitive characterization.   Dictated by (CST): Atif Otto MD on 7/25/2024 at 9:13 AM     Finalized by (CST): Atif Otto MD on 7/25/2024 at 9:21 AM          MRI BRAIN WO ACUTE (3) SEQUENCE (CPT=70551)    Result Date: 7/25/2024  CONCLUSION:   Scattered foci of diffusion restriction  within the right frontal and parietal cortex, in a pattern suggestive acute ischemia related to embolic etiology.  Additional diffusion restriction in the left inferior cerebellum, also compatible with acute ischemia, which may be embolic in etiology as well.  Punctate focus of susceptibility in the anterior left frontal region, along a sulcus.  This may represent nonspecific mineralization or sequela of age-indeterminate macro hemorrhage.  Further assessment can be made with CT of the brain.  Background of advanced chronic small vessel ischemic disease, global parenchymal volume loss, as well chronic lacunar infarcts in the left basal ganglia and right periventricular white matter.   These findings were discussed with Dr. Sun at 1:25 a.m. By Dr.Se Dietrich Findings were discussed with charge  nurse Jackie  on 07/25/2024 at 7:41 a.m. by Sadaf Page.  Findings were discussed with Dr. Da Gómez on 07/25/2024 at 7:45 a.m by Sadaf Page.   Dictated by (CST): Sadaf Page MD on 7/25/2024 at 7:32 AM     Finalized by (CST): Sadaf Page MD on 7/25/2024 at 7:49 AM          XR CHEST AP PORTABLE  (CPT=71045)    Result Date: 7/24/2024  CONCLUSION: No radiographic evidence of acute cardiopulmonary abnormality.  Cardiomegaly and aortic repair changes are again seen.    elm-remote    Dictated by (CST): Fletcher Carreno MD on 7/24/2024 at 7:49 PM     Finalized by (CST): Fletcher Carreno MD on 7/24/2024 at 7:51 PM         EKG 12 Lead    Result Date: 7/26/2024  Normal sinus rhythm Left axis deviation Minimal voltage criteria for LVH, may be normal variant ( Jerad product ) Septal infarct , age undetermined Abnormal ECG When compared with ECG of 24-JUL-2024 18:06, Vent. rate has increased BY  30 BPM Septal infarct is now Present    EKG 12 Lead    Result Date: 7/25/2024  Normal sinus rhythm Left atrial enlargement Left axis deviation Minimal voltage criteria for LVH, may be normal variant ( Jerad product ) Nonspecific ST  abnormality Abnormal ECG When compared with ECG of 02-OCT-2023 13:37, Non-specific change in ST segment in Inferior leads T wave inversion no longer evident in Anterior leads Confirmed by SYLVESTER ALVAREZ JORDAN ([id number]) on 7/25/2024 7:47:58 AM     Results:     CBC:    Lab Results   Component Value Date    WBC 5.8 07/26/2024    WBC 7.7 07/24/2024    WBC 7.9 05/13/2024     Lab Results   Component Value Date    HGB 11.4 (L) 07/26/2024    HGB 11.4 (L) 07/24/2024    HGB 11.3 (L) 05/13/2024      Lab Results   Component Value Date    .0 07/26/2024    .0 07/24/2024    .0 05/13/2024       Recent Labs   Lab 07/24/24  1927 07/25/24  0640 07/26/24  0711   * 158* 174*   BUN 48* 45* 37*   CREATSERUM 2.16* 2.05* 1.76*   CA 9.5 9.5 9.7    142 141   K 4.6 3.7 4.2  4.2    108 107   CO2 28.0 25.0 25.0             Assessment and Plan:       H/o left MCA stroke in 2017  Acute left cerebellum/right frontal/parietal cortex CVA   Acute left frontal lobe microbleed  -As seen on MRI brain  -Stroke protocol initiated  -Neurology consulted, Dr. Seng Sorto notified with recommendation for aspirin 324 mg which was given in the ED  -adv diet  -cont plavix and asa   -Gentle IV fluid hydration  -Neurochecks  -Speech therapy / PT / OT pending eval  -SCDs for DVT prophylaxis  - acute rehab consult   - htn - cont metoprolol and norvasc     H/o COPD  -Not in acute exacerbation.  No wheezing on physical exam.  -Resume Anoro Ellipta.  Albuterol as needed     Urinary tract infection  -Urinalysis with evidence for UTI.  Patient started on Rocephin.  Will continue Rocephin pending urine cultures.     IDDM type II  -Resume home dose of Lantus 6 units nightly.  -Will add insulin sliding scale.  Hypoglycemia protocol.  Accu-Cheks.     H/o gastric peptic ulcer  -Protonix 40 mg IV daily     CKD  -Creatinine noted to be 1 6, appears to be at baseline.  Monitor renal function with daily BMP     Mixed dementia with Alzheimer's  disease  -Patient follows with neurology as an outpatient.  Resume donepezil     Other medical conditions  Thoracic arctic aneurysm  Essential hypertension  Hyperlipidemia  CAD  Previous DVT  Ovarian cancer      Anali Cruz,          Chart reviewed, including current vitals, notes, labs and imaging  Labs ordered and medications adjusted as outlined above  Coordinate care with care team/consultants  Discussed with patient results of tests, management plan as outlined above, and the need for ongoing hospitalization  D/w RN     [facility]        7/26/2024       PHYSICIAN Certification of Need for Inpatient Hospitalization - Initial Certification    Patient will require inpatient services that will reasonably be expected to span two midnight's based on the clinical documentation in H+P.   Based on patients current state of illness, I anticipate that, after discharge, patient will require TBD.

## 2024-07-27 LAB
ANION GAP SERPL CALC-SCNC: 8 MMOL/L (ref 0–18)
APTT PPP: 64.7 SECONDS (ref 23–36)
BUN BLD-MCNC: 37 MG/DL (ref 9–23)
BUN/CREAT SERPL: 17.5 (ref 10–20)
CALCIUM BLD-MCNC: 9 MG/DL (ref 8.7–10.4)
CHLORIDE SERPL-SCNC: 109 MMOL/L (ref 98–112)
CO2 SERPL-SCNC: 22 MMOL/L (ref 21–32)
CREAT BLD-MCNC: 2.11 MG/DL
EGFRCR SERPLBLD CKD-EPI 2021: 23 ML/MIN/1.73M2 (ref 60–?)
GLUCOSE BLD-MCNC: 241 MG/DL (ref 70–99)
GLUCOSE BLDC GLUCOMTR-MCNC: 195 MG/DL (ref 70–99)
GLUCOSE BLDC GLUCOMTR-MCNC: 237 MG/DL (ref 70–99)
GLUCOSE BLDC GLUCOMTR-MCNC: 245 MG/DL (ref 70–99)
GLUCOSE BLDC GLUCOMTR-MCNC: 296 MG/DL (ref 70–99)
OSMOLALITY SERPL CALC.SUM OF ELEC: 305 MOSM/KG (ref 275–295)
POTASSIUM SERPL-SCNC: 4.4 MMOL/L (ref 3.5–5.1)
SODIUM SERPL-SCNC: 139 MMOL/L (ref 136–145)
TROPONIN I SERPL HS-MCNC: 4803 NG/L

## 2024-07-27 PROCEDURE — 99233 SBSQ HOSP IP/OBS HIGH 50: CPT | Performed by: HOSPITALIST

## 2024-07-27 PROCEDURE — 99233 SBSQ HOSP IP/OBS HIGH 50: CPT | Performed by: OTHER

## 2024-07-27 RX ORDER — NITROGLYCERIN 0.4 MG/1
0.4 TABLET SUBLINGUAL EVERY 5 MIN PRN
Status: DISCONTINUED | OUTPATIENT
Start: 2024-07-27 | End: 2024-07-29

## 2024-07-27 RX ORDER — METOPROLOL TARTRATE 50 MG/1
50 TABLET, FILM COATED ORAL ONCE
Status: COMPLETED | OUTPATIENT
Start: 2024-07-27 | End: 2024-07-27

## 2024-07-27 RX ORDER — ISOSORBIDE MONONITRATE 60 MG/1
60 TABLET, EXTENDED RELEASE ORAL ONCE
Status: COMPLETED | OUTPATIENT
Start: 2024-07-27 | End: 2024-07-27

## 2024-07-27 RX ORDER — ISOSORBIDE MONONITRATE 60 MG/1
120 TABLET, EXTENDED RELEASE ORAL DAILY
Status: DISCONTINUED | OUTPATIENT
Start: 2024-07-28 | End: 2024-07-29

## 2024-07-27 RX ORDER — RANOLAZINE 500 MG/1
500 TABLET, EXTENDED RELEASE ORAL 2 TIMES DAILY
Status: DISCONTINUED | OUTPATIENT
Start: 2024-07-27 | End: 2024-07-29

## 2024-07-27 RX ORDER — METOPROLOL TARTRATE 100 MG/1
100 TABLET ORAL
Status: DISCONTINUED | OUTPATIENT
Start: 2024-07-27 | End: 2024-07-29

## 2024-07-27 RX ORDER — ISOSORBIDE MONONITRATE 60 MG/1
60 TABLET, EXTENDED RELEASE ORAL DAILY
Status: DISCONTINUED | OUTPATIENT
Start: 2024-07-27 | End: 2024-07-27

## 2024-07-27 RX ORDER — NITROGLYCERIN 0.4 MG/1
TABLET SUBLINGUAL
Status: COMPLETED
Start: 2024-07-27 | End: 2024-07-27

## 2024-07-27 RX ORDER — METHYLPREDNISOLONE SODIUM SUCCINATE 125 MG/2ML
125 INJECTION, POWDER, LYOPHILIZED, FOR SOLUTION INTRAMUSCULAR; INTRAVENOUS ONCE
Status: DISCONTINUED | OUTPATIENT
Start: 2024-07-27 | End: 2024-07-28

## 2024-07-27 NOTE — PROGRESS NOTES
EKG reviewed, inferior ST elevations. I remember her cath when performed in 2022. She has chronically occluded RCA and a apical LAD collateral going to this area with heavy calcification. Cath images reviewed again. The area is not ameneable to PCI. We had discussed this in the past as well. It is possible that her MI is demand related in setting of elevated heart rate and blood pressure.

## 2024-07-27 NOTE — PLAN OF CARE
Jillian is sleeping well. Patient with chest pain during this RN's shift. EKG and labs reported to Cardiology. MD entered orders overnight. Plan to continue to monitor.         Problem: Patient Centered Care  Goal: Patient preferences are identified and integrated in the patient's plan of care  Description: Interventions:  - What would you like us to know as we care for you? \"My right wrist hurst. Not sure if I hit it against the walker at home or what happened.\"  - Provide timely, complete, and accurate information to patient/family  - Incorporate patient and family knowledge, values, beliefs, and cultural backgrounds into the planning and delivery of care  - Encourage patient/family to participate in care and decision-making at the level they choose  - Honor patient and family perspectives and choices  Outcome: Progressing     Problem: Diabetes/Glucose Control  Goal: Glucose maintained within prescribed range  Description: INTERVENTIONS:  - Monitor Blood Glucose as ordered  - Assess for signs and symptoms of hyperglycemia and hypoglycemia  - Administer ordered medications to maintain glucose within target range  - Assess barriers to adequate nutritional intake and initiate nutrition consult as needed  - Instruct patient on self management of diabetes  Outcome: Progressing     Problem: Patient/Family Goals  Goal: Patient/Family Long Term Goal  Description: Patient's Long Term Goal: Go home     Interventions:  - Follow MD ordered  - Non pharmacological interventions   - See additional Care Plan goals for specific interventions  Outcome: Progressing  Goal: Patient/Family Short Term Goal  Description: Patient's Short Term Goal: Improve strength    Interventions:   - Follow MD orders  -Non- pharmacological interventions   - See additional Care Plan goals for specific interventions  Outcome: Progressing     Problem: CARDIOVASCULAR - ADULT  Goal: Maintains optimal cardiac output and hemodynamic stability  Description:  INTERVENTIONS:  - Monitor vital signs, rhythm, and trends  - Monitor for bleeding, hypotension and signs of decreased cardiac output  - Evaluate effectiveness of vasoactive medications to optimize hemodynamic stability  - Monitor arterial and/or venous puncture sites for bleeding and/or hematoma  - Assess quality of pulses, skin color and temperature  - Assess for signs of decreased coronary artery perfusion - ex. Angina  - Evaluate fluid balance, assess for edema, trend weights  Outcome: Progressing  Goal: Absence of cardiac arrhythmias or at baseline  Description: INTERVENTIONS:  - Continuous cardiac monitoring, monitor vital signs, obtain 12 lead EKG if indicated  - Evaluate effectiveness of antiarrhythmic and heart rate control medications as ordered  - Initiate emergency measures for life threatening arrhythmias  - Monitor electrolytes and administer replacement therapy as ordered  Outcome: Progressing     Problem: METABOLIC/FLUID AND ELECTROLYTES - ADULT  Goal: Glucose maintained within prescribed range  Description: INTERVENTIONS:  - Monitor Blood Glucose as ordered  - Assess for signs and symptoms of hyperglycemia and hypoglycemia  - Administer ordered medications to maintain glucose within target range  - Assess barriers to adequate nutritional intake and initiate nutrition consult as needed  - Instruct patient on self management of diabetes  Outcome: Progressing  Goal: Electrolytes maintained within normal limits  Description: INTERVENTIONS:  - Monitor labs and rhythm and assess patient for signs and symptoms of electrolyte imbalances  - Administer electrolyte replacement as ordered  - Monitor response to electrolyte replacements, including rhythm and repeat lab results as appropriate  - Fluid restriction as ordered  - Instruct patient on fluid and nutrition restrictions as appropriate  Outcome: Progressing  Goal: Hemodynamic stability and optimal renal function maintained  Description: INTERVENTIONS:  -  Monitor labs and assess for signs and symptoms of volume excess or deficit  - Monitor intake, output and patient weight  - Monitor urine specific gravity, serum osmolarity and serum sodium as indicated or ordered  - Monitor response to interventions for patient's volume status, including labs, urine output, blood pressure (other measures as available)  - Encourage oral intake as appropriate  - Instruct patient on fluid and nutrition restrictions as appropriate  Outcome: Progressing     Problem: SAFETY ADULT - FALL  Goal: Free from fall injury  Description: INTERVENTIONS:  - Assess pt frequently for physical needs  - Identify cognitive and physical deficits and behaviors that affect risk of falls.  - Welda fall precautions as indicated by assessment.  - Educate pt/family on patient safety including physical limitations  - Instruct pt to call for assistance with activity based on assessment  - Modify environment to reduce risk of injury  - Provide assistive devices as appropriate  - Consider OT/PT consult to assist with strengthening/mobility  - Encourage toileting schedule  Outcome: Progressing     Problem: DISCHARGE PLANNING  Goal: Discharge to home or other facility with appropriate resources  Description: INTERVENTIONS:  - Identify barriers to discharge w/pt and caregiver  - Include patient/family/discharge partner in discharge planning  - Arrange for needed discharge resources and transportation as appropriate  - Identify discharge learning needs (meds, wound care, etc)  - Arrange for interpreters to assist at discharge as needed  - Consider post-discharge preferences of patient/family/discharge partner  - Complete POLST form as appropriate  - Assess patient's ability to be responsible for managing their own health  - Refer to Case Management Department for coordinating discharge planning if the patient needs post-hospital services based on physician/LIP order or complex needs related to functional status,  cognitive ability or social support system  Outcome: Progressing     Problem: NEUROLOGICAL - ADULT  Goal: Achieves stable or improved neurological status  Description: INTERVENTIONS  - Assess for and report changes in neurological status  - Initiate measures to prevent increased intracranial pressure  - Maintain blood pressure and fluid volume within ordered parameters to optimize cerebral perfusion and minimize risk of hemorrhage  - Monitor temperature, glucose, and sodium. Initiate appropriate interventions as ordered  Outcome: Progressing  Goal: Achieves maximal functionality and self care  Description: INTERVENTIONS  - Monitor swallowing and airway patency with patient fatigue and changes in neurological status  - Encourage and assist patient to increase activity and self care with guidance from PT/OT  - Encourage visually impaired, hearing impaired and aphasic patients to use assistive/communication devices  Outcome: Progressing

## 2024-07-27 NOTE — PROGRESS NOTES
[facility]  part of [facility]    Progress Note    Jillian Yip Patient Status:  Inpatient    1945 MRN [id number]   Location [facility] 3W/SW Attending Anali Cruz DO   Hosp Day # 2 PCP Colleen Weiler, DO     Chief complaint cva     Subjective:   Jillian Yip is a(n) 79 year old female pt reports mild chest pain     ROS:   No cp, sob No c/d   No n/v     Objective:   Blood pressure 147/62, pulse 63, temperature 99.4 °F (37.4 °C), temperature source Oral, resp. rate 17, height 5' 1\" (1.549 m), weight 155 lb 9.6 oz (70.6 kg), SpO2 92%, not currently breastfeeding.      Intake/Output Summary (Last 24 hours) at 2024 1000  Last data filed at 2024 0800  Gross per 24 hour   Intake 1344.27 ml   Output 525 ml   Net 819.27 ml       Patient Weight(s) for the past 336 hrs:   Weight   24 0350 155 lb 9.6 oz (70.6 kg)   24 0650 155 lb 3.2 oz (70.4 kg)   24 0600 150 lb 6.4 oz (68.2 kg)   24 1807 153 lb (69.4 kg)           General appearance: alert, appears stated age and cooperative  Pulmonary:  clear to auscultation bilaterally  Cardiovascular: S1, S2 normal, no murmur, click, rub or gallop, regular rate and rhythm  Abdominal: soft, non-tender; bowel sounds normal; no masses,  no organomegaly  Extremities: extremities normal, atraumatic, no cyanosis or edema        Medicines:     Current Facility-Administered Medications   Medication Dose Route Frequency    metoprolol tartrate (Lopressor) tab 100 mg  100 mg Oral 2x Daily(Beta Blocker)    nitroglycerin (Nitrostat) SL tab 0.4 mg  0.4 mg Sublingual Q5 Min PRN    methylPREDNISolone sodium succinate (Solu-MEDROL) injection 125 mg  125 mg Intravenous Once    [START ON 2024] isosorbide mononitrate ER (Imdur) 24 hr tab 120 mg  120 mg Oral Daily    heparin (Porcine) 95886 units/250mL infusion ACS/AFIB CONTINUOUS  200-3,000 Units/hr Intravenous Continuous    acetaminophen (Tylenol) tab 650 mg  650 mg  Oral Q4H PRN    Or    acetaminophen (Tylenol) rectal suppository 650 mg  650 mg Rectal Q4H PRN    hydrALAzine (Apresoline) 20 mg/mL injection 10 mg  10 mg Intravenous Q2H PRN    ondansetron (Zofran) 4 MG/2ML injection 4 mg  4 mg Intravenous Q6H PRN    Or    metoclopramide (Reglan) 5 mg/mL injection 5 mg  5 mg Intravenous Q8H PRN    polyethylene glycol (PEG 3350) (Miralax) 17 g oral packet 17 g  17 g Oral Daily PRN    sennosides (Senokot) tab 17.2 mg  17.2 mg Oral Nightly PRN    bisacodyl (Dulcolax) 10 MG rectal suppository 10 mg  10 mg Rectal Daily PRN    cefTRIAXone (Rocephin) 1 g in sodium chloride 0.9% 100 mL IVPB-ADDV  1 g Intravenous Q24H    glucose (Dex4) 15 GM/59ML oral liquid 15 g  15 g Oral Q15 Min PRN    Or    glucose (Glutose) 40% oral gel 15 g  15 g Oral Q15 Min PRN    Or    glucose-vitamin C (Dex-4) chewable tab 4 tablet  4 tablet Oral Q15 Min PRN    Or    dextrose 50% injection 50 mL  50 mL Intravenous Q15 Min PRN    Or    glucose (Dex4) 15 GM/59ML oral liquid 30 g  30 g Oral Q15 Min PRN    Or    glucose (Glutose) 40% oral gel 30 g  30 g Oral Q15 Min PRN    Or    glucose-vitamin C (Dex-4) chewable tab 8 tablet  8 tablet Oral Q15 Min PRN    insulin aspart (NovoLOG) 100 Units/mL FlexPen 1-5 Units  1-5 Units Subcutaneous TID CC and HS    aspirin chewable tab 81 mg  81 mg Oral Daily    albuterol (Ventolin HFA) 108 (90 Base) MCG/ACT inhaler 2 puff  2 puff Inhalation Q4H PRN    insulin degludec (Tresiba) 100 units/mL flextouch 6 Units  6 Units Subcutaneous Nightly    umeclidinium-vilanterol (Anoro Ellipta) 62.5-25 MCG/ACT inhaler 1 puff  1 puff Inhalation Daily    pantoprazole (Protonix) 40 mg in sodium chloride 0.9% PF 10 mL IV push  40 mg Intravenous Daily    donepezil (Aricept) tab 5 mg  5 mg Oral Nightly    atorvastatin (Lipitor) tab 40 mg  40 mg Oral Nightly    ezetimibe (Zetia) tab 10 mg  10 mg Oral Nightly    amLODIPine (Norvasc) tab 5 mg  5 mg Oral Daily    clopidogrel (Plavix) tab 75 mg  75 mg Oral  Daily    ketorolac (Toradol) 15 MG/ML injection 15 mg  15 mg Intravenous Q6H PRN       Lab Results   Component Value Date    WBC 6.1 07/26/2024    HGB 11.0 (L) 07/26/2024    HCT 33.6 (L) 07/26/2024    .0 07/26/2024    CREATSERUM 1.92 (H) 07/26/2024    BUN 39 (H) 07/26/2024     07/26/2024    K 4.4 07/26/2024     07/26/2024    CO2 25.0 07/26/2024     (H) 07/26/2024    CA 9.4 07/26/2024    ALB 4.1 07/24/2024    ALKPHO 145 (H) 07/24/2024    BILT 0.4 07/24/2024    TP 6.5 07/24/2024    AST 12 07/24/2024    ALT <7 (L) 07/24/2024    PTT 64.7 (H) 07/27/2024    INR 1.14 10/10/2023    TSH 1.030 03/23/2022    DDIMER 3.59 (H) 07/26/2024    ESRML 13 07/26/2024    CRP 0.86 (H) 07/24/2019    MG 2.0 07/25/2024    PHOS 4.4 05/13/2024    TROP 0.01 10/16/2017    CK 20 (L) 10/21/2023    CKMB 1.0 10/16/2017    B12 381 07/10/2024       NM LUNG VQ VENT / PERFUSION SCAN  (CPT=78582)    Result Date: 7/26/2024  CONCLUSION: No mismatched perfusion defects.  Low probability of pulmonary embolism.    elm-remote    Dictated by (CST): Fletcher Carreno MD on 7/26/2024 at 8:15 PM     Finalized by (CST): Fletcher Carreno MD on 7/26/2024 at 8:20 PM          US CAROTID DOPPLER BILAT - DIAG IMG (CPT=93880)    Result Date: 7/26/2024  CONCLUSION:   No sonographic evidence of hemodynamically significant stenosis.  Mild plaque at the bilateral carotid bulbs.  A small amount of soft plaque is noted in the mid to distal left common carotid artery.    elm-remote    Dictated by (CST): Fletcher Carreno MD on 7/26/2024 at 8:11 PM     Finalized by (CST): Fletcher Carreno MD on 7/26/2024 at 8:13 PM         EKG 12 Lead    Result Date: 7/27/2024  Normal sinus rhythm Possible Left atrial enlargement Left axis deviation LVH with repolarization abnormality ( Jerad product ) ST elevation consider inferior injury or acute infarct ** ** ACUTE MI / STEMI ** ** Consider right ventricular involvement in acute inferior infarct Abnormal ECG When compared with  ECG of 26-JUL-2024 13:56, Criteria for Septal infarct are no longer Present    EKG 12 Lead    Result Date: 7/26/2024  Normal sinus rhythm Left axis deviation Minimal voltage criteria for LVH, may be normal variant ( Jerad product ) Septal infarct , age undetermined Abnormal ECG When compared with ECG of 24-JUL-2024 18:06, Vent. rate has increased BY  30 BPM Septal infarct is now Present Confirmed by SYLVESTER ALVAREZ JORDAN ([id number]) on 7/26/2024 4:17:25 PM     Results:     CBC:    Lab Results   Component Value Date    WBC 6.1 07/26/2024    WBC 5.8 07/26/2024    WBC 7.7 07/24/2024     Lab Results   Component Value Date    HGB 11.0 (L) 07/26/2024    HGB 11.4 (L) 07/26/2024    HGB 11.4 (L) 07/24/2024      Lab Results   Component Value Date    .0 07/26/2024    .0 07/26/2024    .0 07/24/2024       Recent Labs   Lab 07/25/24  0640 07/26/24  0711 07/26/24  1835   * 174* 169*   BUN 45* 37* 39*   CREATSERUM 2.05* 1.76* 1.92*   CA 9.5 9.7 9.4    141 142   K 3.7 4.2  4.2 4.4    107 109   CO2 25.0 25.0 25.0             Assessment and Plan:       Chest pain overnight with EKG showing ST depression in inferior leads. Troponin elevated to 4,000. Started on heparin drip and isosorbide. Family not interested in cardiac cath given her anatomy.   - cont tele, isosorbide and heparin   - cont metoprolol     H/o left MCA stroke in 2017  Acute left cerebellum/right frontal/parietal cortex CVA   Acute left frontal lobe microbleed  -As seen on MRI brain  -Stroke protocol initiated  -Neurology consulted, Dr. Seng Sorto notified with recommendation for aspirin 324 mg which was given in the ED  -adv diet  -cont plavix and asa   -Gentle IV fluid hydration  -Neurochecks  -Speech therapy / PT / OT pending eval  -SCDs for DVT prophylaxis  - acute rehab consult   - htn - cont metoprolol and norvasc     H/o COPD  -Not in acute exacerbation.  No wheezing on physical exam.  -Resume Anoro Ellipta.  Albuterol as  needed     Urinary tract infection  -Urinalysis with evidence for UTI.  Patient started on Rocephin.  Will continue Rocephin pending urine cultures.     IDDM type II  -Resume home dose of Lantus 6 units nightly.  -Will add insulin sliding scale.  Hypoglycemia protocol.  Accu-Cheks.     H/o gastric peptic ulcer  -Protonix 40 mg IV daily     CKD  -Creatinine noted to be 1 6, appears to be at baseline.  Monitor renal function with daily BMP     Mixed dementia with Alzheimer's disease  -Patient follows with neurology as an outpatient.  Resume donepezil     Other medical conditions  Thoracic arctic aneurysm  Essential hypertension  Hyperlipidemia  CAD  Previous DVT  Ovarian cancer      Anali Cruz, DO         Chart reviewed, including current vitals, notes, labs and imaging  Labs ordered and medications adjusted as outlined above  Coordinate care with care team/consultants  Discussed with patient results of tests, management plan as outlined above, and the need for ongoing hospitalization  D/w RN     [facility] high    [place] to pcu       PHYSICIAN Certification of Need for Inpatient Hospitalization - Initial Certification    Patient will require inpatient services that will reasonably be expected to span two midnight's based on the clinical documentation in H+P.   Based on patients current state of illness, I anticipate that, after discharge, patient will require TBD.

## 2024-07-27 NOTE — PROGRESS NOTES
I have called and talked with the patient's daughter by phone this morning I have known them for a long time.  I discussed my previous discussions and the findings overnight as well as the rising troponin.  I offered repeat cardiac catheterization but potential for limited utility also with a known previous anatomy.  However she continues to have more chest pain today.  The daughter will talk to her mother and get back to us.

## 2024-07-27 NOTE — PROGRESS NOTES
Discussed again with patient and daughter who is providing assistance.  They have decided to go with medical management.  Chest pain is better with nitroglycerin.  I will adjust her oral nitrates.

## 2024-07-27 NOTE — PLAN OF CARE
New orders per Dr. Whalen for heparin gtt (PE/DVT protocol) d/t elevated D-dimer and troponin.  Endorsed to overnight RN for heparin IV bolus and start of gtt d/t patient out of room for VQ scan.   Schedule, willing to see. Pcp can prescribe till I take over care

## 2024-07-27 NOTE — PHYSICAL THERAPY NOTE
PHYSICAL THERAPY TREATMENT NOTE - INPATIENT     Room Number: 336/336-A       Presenting Problem: Acute CVA  Co-Morbidities : Hx of thoracic aortic aneurysm, COPD, CAD, DM, HTN, CKD, arthritis    Problem List  Principal Problem:    Acute CVA (cerebrovascular accident) (HCC)  Active Problems:    Acute cystitis without hematuria      PHYSICAL THERAPY ASSESSMENT   Patient demonstrates limited progress this session, goals  remain in progress.      Patient is requiring minimal assist as a result of the following impairments: decreased functional strength, decreased endurance/aerobic capacity, impaired motor planning, decreased muscular endurance, and medical status.     Patient continues to function below baseline with bed mobility, transfers, and gait.  Contributing factors to remaining limitations include decreased functional strength, decreased endurance/aerobic capacity, impaired coordination, and decreased muscular endurance.  Next session anticipate patient to progress bed mobility and transfers.  Physical Therapy will continue to follow patient for duration of hospitalization.    Patient continues to benefit from continued skilled PT services: to facilitate return to prior level of function as patient demonstrates high motivation with excellent tolerance to an intensive therapy program .    PLAN  PT Treatment Plan: Gait training;Don/doff brace;Body mechanics;Transfer training;Balance training  Frequency (Obs): 5x/week    SUBJECTIVE  cooperative    OBJECTIVE  Precautions: Bed/chair alarm    WEIGHT BEARING RESTRICTION                PAIN ASSESSMENT   Rating: Unable to rate  Location: Rt wrist  Management Techniques: Relaxation;Repositioning    BALANCE  Static Sitting: Fair +  Dynamic Sitting: Fair  Static Standing: Poor +  Dynamic Standing: Poor    ACTIVITY TOLERANCE  Pulse: 63  Heart Rate Source: Monitor  Resp: 18  BP: 123/54  BP Location: Right arm  BP Method: Automatic  Patient Position: Semi-Fernandez     O2  WALK  Oxygen Therapy  SPO2% on Room Air at Rest: 96    AM-PAC '6-Clicks' INPATIENT SHORT FORM - BASIC MOBILITY  How much difficulty does the patient currently have...  Patient Difficulty: Turning over in bed (including adjusting bedclothes, sheets and blankets)?: A Lot   Patient Difficulty: Sitting down on and standing up from a chair with arms (e.g., wheelchair, bedside commode, etc.): A Lot   Patient Difficulty: Moving from lying on back to sitting on the side of the bed?: A Lot   How much help from another person does the patient currently need...   Help from Another: Moving to and from a bed to a chair (including a wheelchair)?: A Lot   Help from Another: Need to walk in hospital room?: A Lot   Help from Another: Climbing 3-5 steps with a railing?: A Lot     AM-PAC Score:  Raw Score: 12   Approx Degree of Impairment: 68.66%   Standardized Score (AM-PAC Scale): 35.33   CMS Modifier (G-Code): CL    FUNCTIONAL ABILITY STATUS  Functional Mobility/Gait Assessment  Gait Assistance: Not tested  Distance (ft): 2 ft  Assistive Device: Rolling walker  Pattern: Shuffle;R Foot flat;L Foot flat (forward posture, short step length bilaterally)  Rolling: maximum assist  Supine to Sit:  NT  Sit to Supine:  NT  Sit to Stand:  NT    Skilled Therapy Provided: chart reviewed and pt's tropin level is high elevated. Per RN patient is appropriate for bed mobility and supine exer today.  patient in bed agreeable for bed mobility and therapeutic exer. Two daughters present in room. Patient practice AAROM of B UE/LE in supine x 1-15 reps. Family educated and practice HEP in supine to cont assist patient as needed. Patient tolerate well therapeutic exer and feel better when was done.  Patient was eft comfortable and positioning with pillows for comfort.    The patient's Approx Degree of Impairment: 68.66% has been calculated based on documentation in the [facility] '6 clicks' Inpatient Daily Activity Short Form.  Research supports that  patients with this level of impairment may benefit from IR.  Final disposition will be made by interdisciplinary medical team.    THERAPEUTIC EXERCISES  Lower Extremity Ankle pumps  Heel slides  Knee extension  Leg slides  SLR     Position Supine       Patient End of Session: In bed;Alarm set;All patient questions and concerns addressed;RN aware of session/findings;Call light within reach;Needs met;Family present    CURRENT GOALS   Goals to be met by: 8/8/24  Patient Goal Patient's self-stated goal is: return to PLOF   Goal #1 Patient is able to demonstrate supine - sit EOB @ level: minimum assistance     Goal #1   Current Status Max A   Goal #2 Patient is able to demonstrate transfers Sit to/from Stand at assistance level: minimum assistance with walker - rolling     Goal #2  Current Status NT   Goal #3 Patient is able to ambulate 50 feet with assist device: walker - rolling at assistance level: minimum assistance   Goal #3   Current Status NT   Goal #4 Patient will negotiate 7 stairs/one curb w/ assistive device and min A   Goal #4   Current Status NT   Goal #5 Patient to demonstrate independence with home activity/exercise instructions provided to patient in preparation for discharge.   Goal #5   Current Status instructed     Gait Training:  minutes  Therapeutic Activity: 15 minutes  Neuromuscular Re-education:  minutes  Therapeutic Exercise: 15 minutes  Canalith Repositioning:  minutes  Manual Therapy:  minutes  Can add/delete any of these

## 2024-07-27 NOTE — PROGRESS NOTES
[facility]  [address], [address]  [facility] [address]  [phone]          INPATIENT STROKE NEUROLOGY   FOLLOW UP PROGRESS NOTE  [facility]  part of [facility]    Jillian Yip Patient Status:  Inpatient     1945 MRN [id number]    Location [facility] 3W/SW Attending Anali Cruz DO    Hosp Day # 2 PCP Colleen Weiler, DO    Date of Admission:  2024  Date of Consult Follow Up:  2024       Assessment and Plan:   Jillian Yip is a 79 year old female w/ a pmhx sig. for    prior embolic stroke without a source in 2017 s/p new LINQ, DM, HTN, HLD and dementia who is seen for recurrent embolic strokes  . On exam  she currently has no focality. Suspect she has afib. There is no evidence that empiric ac will add additional secondary stroke prevention benefit but clinically it may be appropriate.ideally pt would have a asia. She will discuss it w family. They are concerned about risks associated w/ anesthesia.   Blood sugars still elevated; recommend improved glycemic control.  Increased sliding scale insulin to moderate intensity.     She was not a candidate for tpa or thrombectomy b/c she was outside the time window.  Discussed ASIA w/ Dr. Simeon      Recurrent embolic strokes  Differential Diagnosis for stroke/TIA mechanism:  Undiagnosed afib       Plan    Diagnostics:  Imaging: None indicated.   Cardiac imaging: Telemetry, TTE , and Outpatient Cardionet/Holter monitoring    Labs: Fasting lipid panel and HgbA1C  Therapeutics:  Blood pressure goal: Permissive hypertension:  trend towards normotension  Please avoid blood pressure variability. wide fluctuations in BP can lead to stroke expansion.    Do not recommend lowering the blood pressure more than 25% in a 24-hour period.  Please avoid the use of atenolol as this medication is known to cause blood pressure variability.  PRN hydralazine and labetalol for sustained  pressures outside of goal  Neuro checks Q4.  Telemetry.NIH stroke scale per protocol.  Blood glucose goal: .  Accuchecks Q6 if patient has DM;  Increased sliding scale insulin to moderate intensity.   closely monitor to prevent hypoglycemia in patients with AIS.  Antithrombotics: Aspirin 81mg daily and Plavix 75mg daily ; will need to review indications for DAPT.  Given suspicion for cardiac source do not think DAPT is indicated unless there is a cardiac indication.   She has a continuous heparin drip ordered as well.   Lipid-lowering agents: Cholesterol Meds: atorvastatin - 40 MG; ezetimibe - 10 MG; ezetimibe Tabs - 10 MG     Avoid hypotonic fluids as this can worsen cerebral edema.  Physical therapy, Occupational Therapy, speech therapy evaluations ordered.  maintain oxygen saturation >94%. No supplemental oxygen  in nonhypoxic patients with acute ischemic stroke (AIS).  Tx hyperthermia (temperature >38°C) and identify source  STAT head CT and page neurology if any change in neurological exam or focal deficits.    Long term secondary stroke prevention goals:  Home BP monitoring. Pt instructed to check BP at home in the AM and PM, and bring values to future clinic visits. BP goal is for normotension, < 130/80.  HbA1c goal <7.0  LDL-C goal  <70 mg/dL.  Target total cholesterol < 200 mg/dL Target HDL >45 mg/dL for men, >55 mg/dL for women, Target triglycerides <150 mg/dL  Physical inactivity - target exercise at least 3 times per week of moderate intensity activity for 20 minutes.  Obesity - target ideal body weight and girth <35\" for women, <40\" for men  Smoking - Target smoking cessation           INTERVAL EVENTS  07/27/24:  AARON ordered       SUBJECTIVE:      Spoke w/ pt and family at bedside.  She reports she is crabby.     I reviewed the imaging with the patient and the family in the room.  All explanations were provided in layman's terms.  Explained the pathophysiology/mechanism of stroke.  Gave the  definition of stroke.  Explained the difference between stroke and TIA.  Discussed mechanism of stroke.  Discussed the work-up that have been done thus far and plan.  Discussed recurrent stroke symptoms.  Discussed secondary stroke prevention.      Pertinent positive and negatives per HPI.  All others were reviewed and negative.       Objective   OBJECTIVE:   Last vitals and weight :  Blood pressure 111/48, pulse 64, temperature 98.6 °F (37 °C), temperature source Oral, resp. rate 18, height 61\", weight 155 lb 9.6 oz (70.6 kg), SpO2 93%, not currently breastfeeding.   Vitals:    07/27/24 1400 07/27/24 1502 07/27/24 1747 07/27/24 2001   BP: 123/54 116/57 (!) 127/96 111/48   BP Location: Right arm Right arm Right arm Right arm   Pulse: 63 63 68 64   Resp: 18 18 18 18   Temp:  98.6 °F (37 °C) 98.6 °F (37 °C) 98.6 °F (37 °C)   TempSrc:  Oral Oral Oral   SpO2:  93% 91% 93%   Weight:       Height:          Exam:  - General: appears stated age and no distress     - Pulmonary: no sign of respiratory distress.    Neurologic Exam  - Mental Status: Alert and attentive. Oriented  to age and month. Knew the name of the hospital.  Speech is spontaneous, fluent, and prosodic. Comprehension intact. Repetition intact. Phrase length and rate are normal. No paraphasic errors, neologisms, anomia, neglect, apraxia, or R/L confusion.   - Cranial Nerves: No gaze preference. Visual fields:normal  Pupils are 4mm briskly constricting to 3mm and equally round and reactive to light  in a well lit room. EOMI. No nystagmus. No ptosis. V1-V3 intact B/L to light touch.No pathological facial asymmetry. No flattening of the nasolabial fold. .  Hearing grossly intact.  Tongue midline. No atrophy or fasiculations of the tongue noted. Palate and uvula elevate symmetrically.  Shoulder shrug symmetric.    - Motor:  normal tone, normal bulk. No interosseous wasting. No flattening of hypothenar eminences.       Right Left     Motor Strength      Knee  extensors 3 0           Pronator drift: No pronator drift   Arm Rolling: No orbiting.   Finger Taps: Finger taps are symmetric in rate and amplitude. .    Rapid movements: Rapid/fine movements are symmetric. As expected their dominant hand is slightly faster.   Foot Taps: Foot taps are symmetric.   Leg Drift:      Reflexes:    C5 C6 C7  L4 S1   R 2+ 2+  2+ 2+   L 2+ 2+  2+ 2+   Adductor Spread: No adductor spread noted.     Nonsustained clonus: Absent   Sustained clonus: Absent   - Sensory:   Light touch: normal  Temperature: normal  Vibration: normal  - Cerebellum: No truncal ataxia. No titubations. No dysmetria, no dysdiadochokinesis. No overshoot.    - Plantar response: flexor bilaterally    Medications:   metoprolol tartrate  100 mg Oral 2x Daily(Beta Blocker)    methylPREDNISolone  125 mg Intravenous Once    [START ON 7/28/2024] isosorbide mononitrate ER  120 mg Oral Daily    ranolazine ER  500 mg Oral BID    [START ON 7/28/2024] insulin aspart  1-7 Units Subcutaneous TID CC    cefTRIAXone  1 g Intravenous Q24H    aspirin  81 mg Oral Daily    insulin degludec  6 Units Subcutaneous Nightly    umeclidinium-vilanterol  1 puff Inhalation Daily    pantoprazole  40 mg Intravenous Daily    donepezil  5 mg Oral Nightly    atorvastatin  40 mg Oral Nightly    ezetimibe  10 mg Oral Nightly    amLODIPine  5 mg Oral Daily    clopidogrel  75 mg Oral Daily       PRNS:   nitroglycerin    acetaminophen **OR** acetaminophen    hydrALAzine    ondansetron **OR** metoclopramide    polyethylene glycol (PEG 3350)    sennosides    bisacodyl    glucose **OR** glucose **OR** glucose-vitamin C **OR** dextrose **OR** glucose **OR** glucose **OR** glucose-vitamin C    albuterol    Infusions:    continuous dose heparin 800 Units/hr (07/27/24 0718)          Results:   Laboratory Data:  Lab Results   Component Value Date    WBC 6.1 07/26/2024    HGB 11.0 (L) 07/26/2024    HCT 33.6 (L) 07/26/2024    .0 07/26/2024    CREATSERUM 2.11 (H)  07/27/2024    BUN 37 (H) 07/27/2024     07/27/2024    K 4.4 07/27/2024     07/27/2024    CO2 22.0 07/27/2024     (H) 07/27/2024    CA 9.0 07/27/2024    ALB 4.1 07/24/2024    ALKPHO 145 (H) 07/24/2024    TP 6.5 07/24/2024    AST 12 07/24/2024    ALT <7 (L) 07/24/2024    PTT 64.7 (H) 07/27/2024    INR 1.14 10/10/2023    PTP 14.7 10/10/2023    TSH 1.030 03/23/2022    DDIMER 3.59 (H) 07/26/2024    ESRML 13 07/26/2024    CRP 0.86 (H) 07/24/2019    MG 2.0 07/25/2024    PHOS 4.4 05/13/2024    TROP 0.01 10/16/2017    CK 20 (L) 10/21/2023    CKMB 1.0 10/16/2017    B12 381 07/10/2024     Recent Results (from the past 72 hour(s))   POCT Glucose    Collection Time: 07/25/24  2:59 AM   Result Value Ref Range    POC Glucose  235 (H) 70 - 99 mg/dL   POCT Glucose    Collection Time: 07/25/24  6:00 AM   Result Value Ref Range    POC Glucose  171 (H) 70 - 99 mg/dL   Basic Metabolic Panel (8)    Collection Time: 07/25/24  6:40 AM   Result Value Ref Range    Glucose 158 (H) 70 - 99 mg/dL    Sodium 142 136 - 145 mmol/L    Potassium 3.7 3.5 - 5.1 mmol/L    Chloride 108 98 - 112 mmol/L    CO2 25.0 21.0 - 32.0 mmol/L    Anion Gap 9 0 - 18 mmol/L    BUN 45 (H) 9 - 23 mg/dL    Creatinine 2.05 (H) 0.55 - 1.02 mg/dL    BUN/CREA Ratio 22.0 (H) 10.0 - 20.0    Calcium, Total 9.5 8.7 - 10.4 mg/dL    Calculated Osmolality 309 (H) 275 - 295 mOsm/kg    eGFR-Cr 24 (L) >=60 mL/min/1.73m2   Magnesium    Collection Time: 07/25/24  6:40 AM   Result Value Ref Range    Magnesium 2.0 1.6 - 2.6 mg/dL   RAINBOW DRAW LAVENDER    Collection Time: 07/25/24  6:40 AM   Result Value Ref Range    Hold Lavender Auto Resulted    Lipid Panel    Collection Time: 07/25/24  6:40 AM   Result Value Ref Range    Cholesterol, Total 188 <200 mg/dL    HDL Cholesterol 31 (L) 40 - 59 mg/dL    Triglycerides 371 (H) 30 - 149 mg/dL    LDL Cholesterol 95 <100 mg/dL    VLDL 62 (H) 0 - 30 mg/dL    Non HDL Chol 157 (H) <130 mg/dL   POCT Glucose    Collection Time:  07/25/24 12:29 PM   Result Value Ref Range    POC Glucose  223 (H) 70 - 99 mg/dL   POCT Glucose    Collection Time: 07/25/24  4:46 PM   Result Value Ref Range    POC Glucose  318 (H) 70 - 99 mg/dL   POCT Glucose    Collection Time: 07/25/24  8:29 PM   Result Value Ref Range    POC Glucose  215 (H) 70 - 99 mg/dL   Potassium    Collection Time: 07/26/24  7:11 AM   Result Value Ref Range    Potassium 4.2 3.5 - 5.1 mmol/L   Basic Metabolic Panel (8)    Collection Time: 07/26/24  7:11 AM   Result Value Ref Range    Glucose 174 (H) 70 - 99 mg/dL    Sodium 141 136 - 145 mmol/L    Potassium 4.2 3.5 - 5.1 mmol/L    Chloride 107 98 - 112 mmol/L    CO2 25.0 21.0 - 32.0 mmol/L    Anion Gap 9 0 - 18 mmol/L    BUN 37 (H) 9 - 23 mg/dL    Creatinine 1.76 (H) 0.55 - 1.02 mg/dL    BUN/CREA Ratio 21.0 (H) 10.0 - 20.0    Calcium, Total 9.7 8.7 - 10.4 mg/dL    Calculated Osmolality 305 (H) 275 - 295 mOsm/kg    eGFR-Cr 29 (L) >=60 mL/min/1.73m2   CBC W/ DIFFERENTIAL    Collection Time: 07/26/24  7:11 AM   Result Value Ref Range    WBC 5.8 4.0 - 11.0 x10(3) uL    RBC 4.26 3.80 - 5.30 x10(6)uL    HGB 11.4 (L) 12.0 - 16.0 g/dL    HCT 35.8 35.0 - 48.0 %    MCV 84.0 80.0 - 100.0 fL    MCH 26.8 26.0 - 34.0 pg    MCHC 31.8 31.0 - 37.0 g/dL    RDW-SD 42.0 35.1 - 46.3 fL    RDW 13.7 11.0 - 15.0 %    .0 150.0 - 450.0 10(3)uL    Neutrophil Absolute Prelim 3.92 1.50 - 7.70 x10 (3) uL    Neutrophil Absolute 3.92 1.50 - 7.70 x10(3) uL    Lymphocyte Absolute 1.08 1.00 - 4.00 x10(3) uL    Monocyte Absolute 0.55 0.10 - 1.00 x10(3) uL    Eosinophil Absolute 0.17 0.00 - 0.70 x10(3) uL    Basophil Absolute 0.06 0.00 - 0.20 x10(3) uL    Immature Granulocyte Absolute 0.02 0.00 - 1.00 x10(3) uL    Neutrophil % 67.7 %    Lymphocyte % 18.6 %    Monocyte % 9.5 %    Eosinophil % 2.9 %    Basophil % 1.0 %    Immature Granulocyte % 0.3 %   Hemoglobin A1C    Collection Time: 07/26/24  7:11 AM   Result Value Ref Range    HgbA1C 8.4 (H) <5.7 %    Estimated  Average Glucose 194 (H) 68 - 126 mg/dL   POCT Glucose    Collection Time: 07/26/24 11:09 AM   Result Value Ref Range    POC Glucose  137 (H) 70 - 99 mg/dL   EKG 12 Lead    Collection Time: 07/26/24  1:56 PM   Result Value Ref Range    Ventricular rate 91 BPM    Atrial rate 91 BPM    P-R Interval 154 ms    QRS Duration 94 ms    Q-T Interval 362 ms    QTC Calculation (Bezet) 445 ms    P Axis 59 degrees    R Axis -37 degrees    T Axis 94 degrees   Troponin I (High Sensitivity)    Collection Time: 07/26/24  2:21 PM   Result Value Ref Range    Troponin I (High Sensitivity) 288 (HH) <=34 ng/L   D-Dimer    Collection Time: 07/26/24  3:31 PM   Result Value Ref Range    D-Dimer 3.59 (H) <0.80 ug/mL FEU   PTT, Activated    Collection Time: 07/26/24  3:31 PM   Result Value Ref Range    PTT 26.5 23.0 - 36.0 seconds   Troponin I (High Sensitivity)    Collection Time: 07/26/24  5:05 PM   Result Value Ref Range    Troponin I (High Sensitivity) 831 (HH) <=34 ng/L   Sed Rate, Westergren (Automated)    Collection Time: 07/26/24  6:35 PM   Result Value Ref Range    Sed Rate 13 0 - 30 mm/Hr   Basic Metabolic Panel (8)    Collection Time: 07/26/24  6:35 PM   Result Value Ref Range    Glucose 169 (H) 70 - 99 mg/dL    Sodium 142 136 - 145 mmol/L    Potassium 4.4 3.5 - 5.1 mmol/L    Chloride 109 98 - 112 mmol/L    CO2 25.0 21.0 - 32.0 mmol/L    Anion Gap 8 0 - 18 mmol/L    BUN 39 (H) 9 - 23 mg/dL    Creatinine 1.92 (H) 0.55 - 1.02 mg/dL    BUN/CREA Ratio 20.3 (H) 10.0 - 20.0    Calcium, Total 9.4 8.7 - 10.4 mg/dL    Calculated Osmolality 307 (H) 275 - 295 mOsm/kg    eGFR-Cr 26 (L) >=60 mL/min/1.73m2   CBC, Platelet; No Differential    Collection Time: 07/26/24  6:35 PM   Result Value Ref Range    WBC 6.1 4.0 - 11.0 x10(3) uL    RBC 4.00 3.80 - 5.30 x10(6)uL    HGB 11.0 (L) 12.0 - 16.0 g/dL    HCT 33.6 (L) 35.0 - 48.0 %    MCV 84.0 80.0 - 100.0 fL    MCH 27.5 26.0 - 34.0 pg    MCHC 32.7 31.0 - 37.0 g/dL    RDW 13.6 11.0 - 15.0 %    RDW-SD  42.4 35.1 - 46.3 fL    .0 150.0 - 450.0 10(3)uL   POCT Glucose    Collection Time: 07/26/24  6:46 PM   Result Value Ref Range    POC Glucose  164 (H) 70 - 99 mg/dL   POCT Glucose    Collection Time: 07/26/24  8:49 PM   Result Value Ref Range    POC Glucose  136 (H) 70 - 99 mg/dL   EKG 12 Lead    Collection Time: 07/26/24  9:59 PM   Result Value Ref Range    Ventricular rate 96 BPM    Atrial rate 96 BPM    P-R Interval 162 ms    QRS Duration 94 ms    Q-T Interval 364 ms    QTC Calculation (Bezet) 459 ms    P Axis 55 degrees    R Axis -36 degrees    T Axis 111 degrees   Troponin I (High Sensitivity)    Collection Time: 07/26/24 10:31 PM   Result Value Ref Range    Troponin I (High Sensitivity) 1,175 (HH) <=34 ng/L   PTT, Activated    Collection Time: 07/27/24  6:07 AM   Result Value Ref Range    PTT 64.7 (H) 23.0 - 36.0 seconds   Troponin I (High Sensitivity)    Collection Time: 07/27/24  6:07 AM   Result Value Ref Range    Troponin I (High Sensitivity) 4,803 (HH) <=34 ng/L   RAINBOW DRAW LAVENDER    Collection Time: 07/27/24  6:07 AM   Result Value Ref Range    Hold Lavender Auto Resulted    POCT Glucose    Collection Time: 07/27/24  8:18 AM   Result Value Ref Range    POC Glucose  195 (H) 70 - 99 mg/dL   Basic Metabolic Panel (8)    Collection Time: 07/27/24 10:31 AM   Result Value Ref Range    Glucose 241 (H) 70 - 99 mg/dL    Sodium 139 136 - 145 mmol/L    Potassium 4.4 3.5 - 5.1 mmol/L    Chloride 109 98 - 112 mmol/L    CO2 22.0 21.0 - 32.0 mmol/L    Anion Gap 8 0 - 18 mmol/L    BUN 37 (H) 9 - 23 mg/dL    Creatinine 2.11 (H) 0.55 - 1.02 mg/dL    BUN/CREA Ratio 17.5 10.0 - 20.0    Calcium, Total 9.0 8.7 - 10.4 mg/dL    Calculated Osmolality 305 (H) 275 - 295 mOsm/kg    eGFR-Cr 23 (L) >=60 mL/min/1.73m2   POCT Glucose    Collection Time: 07/27/24 11:49 AM   Result Value Ref Range    POC Glucose  245 (H) 70 - 99 mg/dL   POCT Glucose    Collection Time: 07/27/24  5:11 PM   Result Value Ref Range    POC Glucose   237 (H) 70 - 99 mg/dL            Last A1c was done on 7/26/2024.       Test results/Imaging:   NM LUNG VQ VENT / PERFUSION SCAN  (CPT=78582)    Result Date: 7/26/2024  CONCLUSION: No mismatched perfusion defects.  Low probability of pulmonary embolism.    elm-remote    Dictated by (CST): Fletcher Carreno MD on 7/26/2024 at 8:15 PM     Finalized by (CST): Fletcher Carreno MD on 7/26/2024 at 8:20 PM          US CAROTID DOPPLER BILAT - DIAG IMG (CPT=93880)    Result Date: 7/26/2024  CONCLUSION:   No sonographic evidence of hemodynamically significant stenosis.  Mild plaque at the bilateral carotid bulbs.  A small amount of soft plaque is noted in the mid to distal left common carotid artery.    elm-remote    Dictated by (CST): Fletcher Carreno MD on 7/26/2024 at 8:11 PM     Finalized by (CST): Fletcher Carreno MD on 7/26/2024 at 8:13 PM         EKG 12 Lead    Result Date: 7/27/2024  Normal sinus rhythm Possible Left atrial enlargement Left axis deviation LVH with repolarization abnormality ( [place] product ) ST elevation consider inferior injury or acute infarct ** ** ACUTE MI / STEMI ** ** Consider right ventricular involvement in acute inferior infarct Abnormal ECG When compared with ECG of 26-JUL-2024 13:56, Criteria for Septal infarct are no longer Present    EKG 12 Lead    Result Date: 7/26/2024  Normal sinus rhythm Left axis deviation Minimal voltage criteria for LVH, may be normal variant ( [place] product ) Septal infarct , age undetermined Abnormal ECG When compared with ECG of 24-JUL-2024 18:06, Vent. rate has increased BY  30 BPM Septal infarct is now Present Confirmed by SYLVESTER ALVAREZ JORDAN ([id number]) on 7/26/2024 4:17:25 PM     CT BRAIN OR HEAD (CPT=70450)    Result Date: 7/25/2024  CONCLUSION:  1. No acute intracranial hemorrhage.  Punctate focus of susceptibility artifact in the left frontal lobe on previous day brain MR therefore likely represents a chronic microhemorrhage (not unusual in patients of this age).   2. Subcentimeter low-attenuation focus in the left cerebellum likely relates to an acute lacunar-type infarct.  Other small acute cortical infarcts at the right frontal and parietal lobes are not well seen by CT. 3. Nonspecific white matter changes involving both cerebral hemispheres that most likely reflect sequelae of chronic microangiopathy. 4. Intracranial atherosclerosis. 5. 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  Differential considerations for parotid gland masses are broad and include both benign and malignant primary neoplasms as well as metastatic disease.  Nonemergent otolaryngology evaluation of this finding is recommended with strong consideration of histologic sampling for definitive characterization.   Dictated by (CST): Atif Otto MD on 7/25/2024 at 9:13 AM     Finalized by (CST): Atif Otto MD on 7/25/2024 at 9:21 AM           Performed an independent visualization of mri brain  Imaging revealed:   agree w read     Education/Instructions given to: patient   Barriers to Learning:None  Content: Refer to note above. Evaluation/Outcome: Verbalized understanding    Disclaimer:   This record was dictated using Dragon software. There may be errors due to voice recognition problems that were not realized and corrected during the completion of the note.      This document is not intended to support charting by exception.  Sections left blank in a completed note should be presumed not to have been done.      [id number]  Discussion with other consultants:  Yes cardiology  Discussion with patient and family: yes  Review of other notes:  yes   Obtain old records / summarize old records)   Yes reviewed prior neurology notes and cardiology notes from 2017  (New studies ordered)  AARON  (Code status review)  not by this author but pt is DNAR  (IV controlled therapy)  no  (Drugs being monitored for toxicity)  Heparin drip   Increased intensity of correction factor insulin     Thank  you.  Kingston Sharma D.O.   Vascular & General Neurology    07/27/24  8:25 PM

## 2024-07-27 NOTE — SLP NOTE
ADULT SWALLOWING EVALUATION    ASSESSMENT    ASSESSMENT/OVERALL IMPRESSION:  PPE REQUIRED. THIS THERAPIST WORE GLOVES FOR DURATION OF EVALUATION. HANDS WASHED UPON ENTRANCE/EXIT.    SLP f/u for ongoing dysphagia tx/meal assessment per recommendations of regular solids and thin liquids per BSE to ensure tolerance without CSA. RN reports pt tolerates diet and medication well with no overt clinical s/s aspiration. Pt denies any swallowing challenges.     Pt positioned upright at 90 degrees in bed/bedside chair. Pt afebrile, tolerating room air with oxygen status at 92 prior to the start of oral trials. SLP reviewed aspiration precautions and safe swallowing compensatory strategies with the patient. Safe swallow guidelines remain written on the white board in purple. Orange swallow guideline remains on the whiteboard in the room. Patient and/or family v/u. Provided minimal clinician assistance, pt tolerates regular solids and thin liquids via straw with no overt clinical signs/symptoms of aspiration.Patient with adequate oral acceptance and bilabial seal across all trials. Patient with adequate mastication, intact bite, and adequate AP transit time. Patient's pharyngeal phase appears functional with suspected timely swallow and suspected adequate hyolaryngeal elevation/excursion judged clinically to palpation.  No clinical signs of aspiration (e.g., immediate/delayed throat clear, immediate/delayed cough, wet vocal quality, increased O2 effort) observed across all trials. Oxygen status remained stable t/o the entire session. SLP to sign off d/t tolerance of current diet. RN, alerted with results and recommendations.     MOST RECENT CXR 7/24  CONCLUSION: No radiographic evidence of acute cardiopulmonary abnormality.  Cardiomegaly and aortic repair changes are again seen.            RECOMMENDATIONS   Diet Recommendations - Solids: Regular  Diet Recommendations - Liquids: Thin Liquids                        Compensatory  Strategies Recommended: Slow rate;Alternate consistencies;Small bites and sips  Aspiration Precautions: Upright position;Slow rate;Small bites and sips  Medication Administration Recommendations: No restrictions  Treatment Plan/Recommendations: No further inpatient SLP service warranted    HISTORY   MEDICAL HISTORY  Reason for Referral: Stroke protocol    Problem List  Principal Problem:    Acute CVA (cerebrovascular accident) ([facility])  Active Problems:    Acute cystitis without hematuria      Past Medical History  Past Medical History:    Aortic aneurysm, thoracic (HCC)    Back problem    Calculus of kidney    Cataract    COPD (chronic obstructive pulmonary disease) ([facility])    Coronary atherosclerosis    Deep vein thrombosis (HCC)    Diabetes (HCC)    Diverticulosis of large intestine    Heart attack ([facility])    2013    High blood pressure    High cholesterol    History of stomach ulcers    Incontinence    Osteoarthritis    Ovarian cancer (HCC)    PONV (postoperative nausea and vomiting)    Renal disorder    Shortness of breath    Stroke ([facility])    shaking, wheelchair       Prior Living Situation: Home with support  Diet Prior to Admission: Regular;Thin liquids  Precautions: Aspiration;Seizure    Patient/Family Goals: NA    SWALLOWING HISTORY  Current Diet Consistency: NPO  Dysphagia History: NA  Imaging Results:   CXR 7/24  CONCLUSION: No radiographic evidence of acute cardiopulmonary abnormality.  Cardiomegaly and aortic repair changes are again seen.     SUBJECTIVE       OBJECTIVE   ORAL MOTOR EXAMINATION  Dentition: Functional  Symmetry: Within Functional Limits  Strength: Within Functional Limits  Tone: Within Functional Limits  Range of Motion: Within Functional Limits  Rate of Motion: Within Functional Limits    Voice Quality: Clear  Respiratory Status: Unlabored  Consistencies Trialed: Thin liquids;Hard solid  Method of Presentation: Self presentation;Straw  Patient Positioning: Upright;Midline    Oral Phase of  Swallow: Within Functional Limits                      Pharyngeal Phase of Swallow: Within Functional Limits           (Please note: Silent aspiration cannot be evaluated clinically. Videofluoroscopic Swallow Study is required to rule-out silent aspiration.)    Esophageal Phase of Swallow: No complaints consistent with possible esophageal involvement  Comments: NA              GOALS  Goal #1 The patient will tolerate regular consistency and thin liquids without overt signs or symptoms of aspiration with 100 % accuracy over 1 session(s).  Met   Goal #2 The patient/family/caregiver will demonstrate understanding and implementation of aspiration precautions and swallow strategies independently over 1 session(s).     Met      FOLLOW UP  Treatment Plan/Recommendations: No further inpatient SLP service warranted  Number of Visits to Meet Established Goals: 1  Follow Up Needed (Documentation Required): No  SLP Follow-up Date: 07/26/24    Thank you for your referral.   If you have any questions, please contact ANA Magdaleno

## 2024-07-27 NOTE — PROGRESS NOTES
Patient seen in follow-up.  Please see previous discussions.  Chest pain has been improving.  She was having jaw pain prior and has improved now.  She is comfortable in bed.    Vitals:    07/27/24 0758   BP: 147/62   Pulse: 63   Resp: 17   Temp: 99.4 °F (37.4 °C)       Intake/Output Summary (Last 24 hours) at 7/27/2024 1043  Last data filed at 7/27/2024 0800  Gross per 24 hour   Intake 1344.27 ml   Output 525 ml   Net 819.27 ml     Wt Readings from Last 1 Encounters:   07/27/24 155 lb 9.6 oz (70.6 kg)        General: No acute distress.  Neck: Jugular venous pulsations not seen.  Lungs: Clear to auscultation.  Heart: Normal rate. No murmurs.  Abdomen: Soft. Non tender  Extremities: No edema.  Neurological: Alert. No focal deficits.  Psychiatric: Appropriate mood and affect.  Current Facility-Administered Medications   Medication Dose Route Frequency    metoprolol tartrate (Lopressor) tab 100 mg  100 mg Oral 2x Daily(Beta Blocker)    nitroglycerin (Nitrostat) SL tab 0.4 mg  0.4 mg Sublingual Q5 Min PRN    methylPREDNISolone sodium succinate (Solu-MEDROL) injection 125 mg  125 mg Intravenous Once    [START ON 7/28/2024] isosorbide mononitrate ER (Imdur) 24 hr tab 120 mg  120 mg Oral Daily    ranolazine ER (Ranexa) 12 hr tab 500 mg  500 mg Oral BID    heparin (Porcine) 93276 units/250mL infusion ACS/AFIB CONTINUOUS  200-3,000 Units/hr Intravenous Continuous    acetaminophen (Tylenol) tab 650 mg  650 mg Oral Q4H PRN    Or    acetaminophen (Tylenol) rectal suppository 650 mg  650 mg Rectal Q4H PRN    hydrALAzine (Apresoline) 20 mg/mL injection 10 mg  10 mg Intravenous Q2H PRN    ondansetron (Zofran) 4 MG/2ML injection 4 mg  4 mg Intravenous Q6H PRN    Or    metoclopramide (Reglan) 5 mg/mL injection 5 mg  5 mg Intravenous Q8H PRN    polyethylene glycol (PEG 3350) (Miralax) 17 g oral packet 17 g  17 g Oral Daily PRN    sennosides (Senokot) tab 17.2 mg  17.2 mg Oral Nightly PRN    bisacodyl (Dulcolax) 10 MG rectal  suppository 10 mg  10 mg Rectal Daily PRN    cefTRIAXone (Rocephin) 1 g in sodium chloride 0.9% 100 mL IVPB-ADDV  1 g Intravenous Q24H    glucose (Dex4) 15 GM/59ML oral liquid 15 g  15 g Oral Q15 Min PRN    Or    glucose (Glutose) 40% oral gel 15 g  15 g Oral Q15 Min PRN    Or    glucose-vitamin C (Dex-4) chewable tab 4 tablet  4 tablet Oral Q15 Min PRN    Or    dextrose 50% injection 50 mL  50 mL Intravenous Q15 Min PRN    Or    glucose (Dex4) 15 GM/59ML oral liquid 30 g  30 g Oral Q15 Min PRN    Or    glucose (Glutose) 40% oral gel 30 g  30 g Oral Q15 Min PRN    Or    glucose-vitamin C (Dex-4) chewable tab 8 tablet  8 tablet Oral Q15 Min PRN    insulin aspart (NovoLOG) 100 Units/mL FlexPen 1-5 Units  1-5 Units Subcutaneous TID CC and HS    aspirin chewable tab 81 mg  81 mg Oral Daily    albuterol (Ventolin HFA) 108 (90 Base) MCG/ACT inhaler 2 puff  2 puff Inhalation Q4H PRN    insulin degludec (Tresiba) 100 units/mL flextouch 6 Units  6 Units Subcutaneous Nightly    umeclidinium-vilanterol (Anoro Ellipta) 62.5-25 MCG/ACT inhaler 1 puff  1 puff Inhalation Daily    pantoprazole (Protonix) 40 mg in sodium chloride 0.9% PF 10 mL IV push  40 mg Intravenous Daily    donepezil (Aricept) tab 5 mg  5 mg Oral Nightly    atorvastatin (Lipitor) tab 40 mg  40 mg Oral Nightly    ezetimibe (Zetia) tab 10 mg  10 mg Oral Nightly    amLODIPine (Norvasc) tab 5 mg  5 mg Oral Daily    clopidogrel (Plavix) tab 75 mg  75 mg Oral Daily    ketorolac (Toradol) 15 MG/ML injection 15 mg  15 mg Intravenous Q6H PRN     Medications Prior to Admission   Medication Sig    [] HYDROcodone-acetaminophen 5-325 MG Oral Tab Take 1 tablet by mouth every 12 (twelve) hours as needed for Pain.    diclofenac 1 % External Gel Apply 2 g topically 4 (four) times daily as needed.    isosorbide mononitrate  MG Oral Tablet 24 Hr Take 1 tablet (120 mg total) by mouth daily.    donepezil 5 MG Oral Tab Take 1 tablet (5 mg total) by mouth nightly.     Insulin Pen Needle (CARETOUCH PEN NEEDLES) 32G X 4 MM Does not apply Misc Use with injection once daily as directed    insulin glargine (LANTUS SOLOSTAR) 100 UNIT/ML Subcutaneous Solution Pen-injector Inject 6 Units into the skin nightly.    Blood Glucose Monitoring Suppl (ACCU-CHEK GUIDE) w/Device Does not apply Kit 1 each daily.    Glucose Blood (ACCU-CHEK GUIDE) In Vitro Strip 1 each by In Vitro route daily.    Blood Glucose Monitoring Suppl (ONETOUCH VERIO IQ SYSTEM) w/Device Does not apply Kit 1 Device by Other route 3 (three) times daily. Use as directed. (Patient not taking: Reported on 5/13/2024)    Glucose Blood (ONETOUCH VERIO) In Vitro Strip Use as directed. (Patient not taking: Reported on 5/13/2024)    OneTouch Delica Lancets 33G Does not apply Misc 1 Lancet by Finger stick route 3 (three) times daily. (Patient not taking: Reported on 5/13/2024)    pantoprazole 40 MG Oral Tab EC Take 1 tablet (40 mg total) by mouth 2 (two) times daily before meals.    albuterol 108 (90 Base) MCG/ACT Inhalation Aero Soln Inhale 2 puffs into the lungs every 4 (four) hours as needed.    traMADol 50 MG Oral Tab Take 1 tablet (50 mg total) by mouth every 12 (twelve) hours as needed.    acetaminophen 500 MG Oral Tab Take 1 tablet (500 mg total) by mouth every 8 (eight) hours.    lidocaine-menthol 4-1 % External Patch Place 2 patches onto the skin daily.    metoprolol tartrate 100 MG Oral Tab Take 1 tablet (100 mg total) by mouth 2 (two) times daily.    nitroglycerin 0.4 MG Sublingual SL Tab Place 1 tablet (0.4 mg total) under the tongue every 5 (five) minutes as needed for Chest pain.    amLODIPine Besylate 5 MG Oral Tab Take 1 tablet (5 mg total) by mouth at bedtime.    ONETOUCH DELICA LANCETS 33G Does not apply Misc OneTouch Delica Lancets 33 gauge   TEST BLOOD GLUCOSE TWO TIMES A DAY AS DIRECTED (Patient not taking: Reported on 5/13/2024)    Clopidogrel Bisulfate 75 MG Oral Tab Take 1 tablet (75 mg total) by mouth daily.     ezetimibe 10 MG Oral Tab Take 1 tablet (10 mg total) by mouth nightly.     NM LUNG VQ VENT / PERFUSION SCAN  (CPT=78582)    Result Date: 7/26/2024  CONCLUSION: No mismatched perfusion defects.  Low probability of pulmonary embolism.    elm-remote    Dictated by (CST): Fletcher Carreno MD on 7/26/2024 at 8:15 PM     Finalized by (CST): Fletcher Carreno MD on 7/26/2024 at 8:20 PM          US CAROTID DOPPLER BILAT - DIAG IMG (CPT=93880)    Result Date: 7/26/2024  CONCLUSION:   No sonographic evidence of hemodynamically significant stenosis.  Mild plaque at the bilateral carotid bulbs.  A small amount of soft plaque is noted in the mid to distal left common carotid artery.    elm-remote    Dictated by (CST): Fletcher Carreno MD on 7/26/2024 at 8:11 PM     Finalized by (CST): Fletcher Carreno MD on 7/26/2024 at 8:13 PM            Lab Results   Component Value Date    WBC 6.1 07/26/2024    HGB 11.0 07/26/2024    HCT 33.6 07/26/2024    .0 07/26/2024    CREATSERUM 1.92 07/26/2024    BUN 39 07/26/2024     07/26/2024    K 4.4 07/26/2024     07/26/2024    CO2 25.0 07/26/2024     07/26/2024    CA 9.4 07/26/2024    PTT 64.7 07/27/2024    DDIMER 3.59 07/26/2024    ESRML 13 07/26/2024       IMPRESSION:   Admitted with Acute CVA    Acute coronary syndrome. Known occluded RCA with apical LAD collaterals. Not amenable to PCI in past by cath 2022. Patient decided on medical therapy.      H/O L MCA CVA      HTN, uncontrolled recently     CKD III b     CAD s/p MI     Type II aortic aneurysm s/p repair     COPD     PLAN:  As per our discussion we proceeded with medical management.  Resumed isosorbide at current 20 mg daily.  Patient is on dual antiplatelet therapy.  Resume from metoprolol.  We will add Ranexa 500 mg twice daily.  Patient is also on amlodipine 5 mg daily which can be increased based on blood pressure response.  Long discussion with patient and daughter at bedside.  Patient is now DO NOT RESUSCITATE  status.  She has been also with DNR status prior to arrival to the hospital.

## 2024-07-27 NOTE — PLAN OF CARE
Jillian Yip is A&O x 4 with some forgetfulnes.  Lives at home with family.    Mod assist with ambulating with walker; PT session in bed only d/t cardiac status.  Intermittent of bowel and bladder.  VSS:  stable.  Denies any current pain.  Plan:  heparin gtt, neuro checks, NIH daily, monitor labs, no angiogram; medical management  Will continue to monitor and treat.    Problem: Patient Centered Care  Goal: Patient preferences are identified and integrated in the patient's plan of care  Description: Interventions:  - What would you like us to know as we care for you? \"My right wrist hurst. Not sure if I hit it against the walker at home or what happened.\"  - Provide timely, complete, and accurate information to patient/family  - Incorporate patient and family knowledge, values, beliefs, and cultural backgrounds into the planning and delivery of care  - Encourage patient/family to participate in care and decision-making at the level they choose  - Honor patient and family perspectives and choices  Outcome: Progressing     Problem: Diabetes/Glucose Control  Goal: Glucose maintained within prescribed range  Description: INTERVENTIONS:  - Monitor Blood Glucose as ordered  - Assess for signs and symptoms of hyperglycemia and hypoglycemia  - Administer ordered medications to maintain glucose within target range  - Assess barriers to adequate nutritional intake and initiate nutrition consult as needed  - Instruct patient on self management of diabetes  Outcome: Progressing     Problem: Patient/Family Goals  Goal: Patient/Family Long Term Goal  Description: Patient's Long Term Goal: Go home     Interventions:  - Follow MD ordered  - Non pharmacological interventions   - See additional Care Plan goals for specific interventions  Outcome: Progressing  Goal: Patient/Family Short Term Goal  Description: Patient's Short Term Goal: Improve strength    Interventions:   - Follow MD orders  -Non- pharmacological interventions   -  See additional Care Plan goals for specific interventions  Outcome: Progressing     Problem: CARDIOVASCULAR - ADULT  Goal: Maintains optimal cardiac output and hemodynamic stability  Description: INTERVENTIONS:  - Monitor vital signs, rhythm, and trends  - Monitor for bleeding, hypotension and signs of decreased cardiac output  - Evaluate effectiveness of vasoactive medications to optimize hemodynamic stability  - Monitor arterial and/or venous puncture sites for bleeding and/or hematoma  - Assess quality of pulses, skin color and temperature  - Assess for signs of decreased coronary artery perfusion - ex. Angina  - Evaluate fluid balance, assess for edema, trend weights  Outcome: Progressing  Goal: Absence of cardiac arrhythmias or at baseline  Description: INTERVENTIONS:  - Continuous cardiac monitoring, monitor vital signs, obtain 12 lead EKG if indicated  - Evaluate effectiveness of antiarrhythmic and heart rate control medications as ordered  - Initiate emergency measures for life threatening arrhythmias  - Monitor electrolytes and administer replacement therapy as ordered  Outcome: Progressing     Problem: METABOLIC/FLUID AND ELECTROLYTES - ADULT  Goal: Glucose maintained within prescribed range  Description: INTERVENTIONS:  - Monitor Blood Glucose as ordered  - Assess for signs and symptoms of hyperglycemia and hypoglycemia  - Administer ordered medications to maintain glucose within target range  - Assess barriers to adequate nutritional intake and initiate nutrition consult as needed  - Instruct patient on self management of diabetes  Outcome: Progressing  Goal: Electrolytes maintained within normal limits  Description: INTERVENTIONS:  - Monitor labs and rhythm and assess patient for signs and symptoms of electrolyte imbalances  - Administer electrolyte replacement as ordered  - Monitor response to electrolyte replacements, including rhythm and repeat lab results as appropriate  - Fluid restriction as  ordered  - Instruct patient on fluid and nutrition restrictions as appropriate  Outcome: Progressing  Goal: Hemodynamic stability and optimal renal function maintained  Description: INTERVENTIONS:  - Monitor labs and assess for signs and symptoms of volume excess or deficit  - Monitor intake, output and patient weight  - Monitor urine specific gravity, serum osmolarity and serum sodium as indicated or ordered  - Monitor response to interventions for patient's volume status, including labs, urine output, blood pressure (other measures as available)  - Encourage oral intake as appropriate  - Instruct patient on fluid and nutrition restrictions as appropriate  Outcome: Progressing     Problem: SAFETY ADULT - FALL  Goal: Free from fall injury  Description: INTERVENTIONS:  - Assess pt frequently for physical needs  - Identify cognitive and physical deficits and behaviors that affect risk of falls.  - [place] fall precautions as indicated by assessment.  - Educate pt/family on patient safety including physical limitations  - Instruct pt to call for assistance with activity based on assessment  - Modify environment to reduce risk of injury  - Provide assistive devices as appropriate  - Consider OT/PT consult to assist with strengthening/mobility  - Encourage toileting schedule  Outcome: Progressing     Problem: DISCHARGE PLANNING  Goal: Discharge to home or other facility with appropriate resources  Description: INTERVENTIONS:  - Identify barriers to discharge w/pt and caregiver  - Include patient/family/discharge partner in discharge planning  - Arrange for needed discharge resources and transportation as appropriate  - Identify discharge learning needs (meds, wound care, etc)  - Arrange for interpreters to assist at discharge as needed  - Consider post-discharge preferences of patient/family/discharge partner  - Complete POLST form as appropriate  - Assess patient's ability to be responsible for managing their own  health  - Refer to Case Management Department for coordinating discharge planning if the patient needs post-hospital services based on physician/LIP order or complex needs related to functional status, cognitive ability or social support system  Outcome: Progressing     Problem: NEUROLOGICAL - ADULT  Goal: Achieves stable or improved neurological status  Description: INTERVENTIONS  - Assess for and report changes in neurological status  - Initiate measures to prevent increased intracranial pressure  - Maintain blood pressure and fluid volume within ordered parameters to optimize cerebral perfusion and minimize risk of hemorrhage  - Monitor temperature, glucose, and sodium. Initiate appropriate interventions as ordered  Outcome: Progressing  Goal: Achieves maximal functionality and self care  Description: INTERVENTIONS  - Monitor swallowing and airway patency with patient fatigue and changes in neurological status  - Encourage and assist patient to increase activity and self care with guidance from PT/OT  - Encourage visually impaired, hearing impaired and aphasic patients to use assistive/communication devices  Outcome: Progressing     Problem: HEMATOLOGIC - ADULT  Goal: Maintains hematologic stability  Description: INTERVENTIONS  - Assess for signs and symptoms of bleeding or hemorrhage  - Monitor labs and vital signs for trends  - Administer supportive blood products/factors, fluids and medications as ordered and appropriate  - Administer supportive blood products/factors as ordered and appropriate  Outcome: Progressing  Goal: Free from bleeding injury  Description: (Example usage: patient with low platelets)  INTERVENTIONS:  - Avoid intramuscular injections, enemas and rectal medication administration  - Ensure safe mobilization of patient  - Hold pressure on venipuncture sites to achieve adequate hemostasis  - Assess for signs and symptoms of internal bleeding  - Monitor lab trends  - Patient is to report  abnormal signs of bleeding to staff  - Avoid use of toothpicks and dental floss  - Use electric shaver for shaving  - Use soft bristle tooth brush  - Limit straining and forceful nose blowing  Outcome: Progressing

## 2024-07-28 LAB
ANION GAP SERPL CALC-SCNC: 8 MMOL/L (ref 0–18)
APTT PPP: 49.3 SECONDS (ref 23–36)
ATRIAL RATE: 96 BPM
BASOPHILS # BLD AUTO: 0.06 X10(3) UL (ref 0–0.2)
BASOPHILS NFR BLD AUTO: 0.7 %
BUN BLD-MCNC: 45 MG/DL (ref 9–23)
BUN/CREAT SERPL: 22 (ref 10–20)
CALCIUM BLD-MCNC: 9.1 MG/DL (ref 8.7–10.4)
CHLORIDE SERPL-SCNC: 107 MMOL/L (ref 98–112)
CO2 SERPL-SCNC: 24 MMOL/L (ref 21–32)
CREAT BLD-MCNC: 2.05 MG/DL
DEPRECATED RDW RBC AUTO: 40.5 FL (ref 35.1–46.3)
EGFRCR SERPLBLD CKD-EPI 2021: 24 ML/MIN/1.73M2 (ref 60–?)
EOSINOPHIL # BLD AUTO: 0.23 X10(3) UL (ref 0–0.7)
EOSINOPHIL NFR BLD AUTO: 2.8 %
ERYTHROCYTE [DISTWIDTH] IN BLOOD BY AUTOMATED COUNT: 13 % (ref 11–15)
GLUCOSE BLD-MCNC: 196 MG/DL (ref 70–99)
GLUCOSE BLDC GLUCOMTR-MCNC: 164 MG/DL (ref 70–99)
GLUCOSE BLDC GLUCOMTR-MCNC: 174 MG/DL (ref 70–99)
GLUCOSE BLDC GLUCOMTR-MCNC: 195 MG/DL (ref 70–99)
GLUCOSE BLDC GLUCOMTR-MCNC: 224 MG/DL (ref 70–99)
GLUCOSE BLDC GLUCOMTR-MCNC: 231 MG/DL (ref 70–99)
HCT VFR BLD AUTO: 31.2 %
HGB BLD-MCNC: 10 G/DL
IMM GRANULOCYTES # BLD AUTO: 0.03 X10(3) UL (ref 0–1)
IMM GRANULOCYTES NFR BLD: 0.4 %
LYMPHOCYTES # BLD AUTO: 1.02 X10(3) UL (ref 1–4)
LYMPHOCYTES NFR BLD AUTO: 12.5 %
MCH RBC QN AUTO: 27.4 PG (ref 26–34)
MCHC RBC AUTO-ENTMCNC: 32.1 G/DL (ref 31–37)
MCV RBC AUTO: 85.5 FL
MONOCYTES # BLD AUTO: 0.5 X10(3) UL (ref 0.1–1)
MONOCYTES NFR BLD AUTO: 6.1 %
NEUTROPHILS # BLD AUTO: 6.31 X10 (3) UL (ref 1.5–7.7)
NEUTROPHILS # BLD AUTO: 6.31 X10(3) UL (ref 1.5–7.7)
NEUTROPHILS NFR BLD AUTO: 77.5 %
OSMOLALITY SERPL CALC.SUM OF ELEC: 305 MOSM/KG (ref 275–295)
P AXIS: 55 DEGREES
P-R INTERVAL: 162 MS
PLATELET # BLD AUTO: 209 10(3)UL (ref 150–450)
POTASSIUM SERPL-SCNC: 4.5 MMOL/L (ref 3.5–5.1)
Q-T INTERVAL: 364 MS
QRS DURATION: 94 MS
QTC CALCULATION (BEZET): 459 MS
R AXIS: -36 DEGREES
RBC # BLD AUTO: 3.65 X10(6)UL
SODIUM SERPL-SCNC: 139 MMOL/L (ref 136–145)
T AXIS: 111 DEGREES
VENTRICULAR RATE: 96 BPM
WBC # BLD AUTO: 8.2 X10(3) UL (ref 4–11)

## 2024-07-28 PROCEDURE — 99233 SBSQ HOSP IP/OBS HIGH 50: CPT | Performed by: HOSPITALIST

## 2024-07-28 RX ORDER — BISACODYL 10 MG
10 SUPPOSITORY, RECTAL RECTAL
Status: DISCONTINUED | OUTPATIENT
Start: 2024-07-28 | End: 2024-07-29

## 2024-07-28 RX ORDER — SENNOSIDES 8.6 MG
17.2 TABLET ORAL NIGHTLY
Status: DISCONTINUED | OUTPATIENT
Start: 2024-07-28 | End: 2024-07-29

## 2024-07-28 RX ORDER — PROCHLORPERAZINE EDISYLATE 5 MG/ML
10 INJECTION INTRAMUSCULAR; INTRAVENOUS EVERY 6 HOURS PRN
Status: DISCONTINUED | OUTPATIENT
Start: 2024-07-28 | End: 2024-07-29

## 2024-07-28 RX ORDER — ENEMA 19; 7 G/133ML; G/133ML
1 ENEMA RECTAL ONCE AS NEEDED
Status: DISCONTINUED | OUTPATIENT
Start: 2024-07-28 | End: 2024-07-29

## 2024-07-28 RX ORDER — TRAMADOL HYDROCHLORIDE 50 MG/1
50 TABLET ORAL EVERY 12 HOURS PRN
Status: DISCONTINUED | OUTPATIENT
Start: 2024-07-28 | End: 2024-07-29

## 2024-07-28 RX ORDER — DOCUSATE SODIUM 100 MG/1
100 CAPSULE, LIQUID FILLED ORAL 2 TIMES DAILY
Status: DISCONTINUED | OUTPATIENT
Start: 2024-07-28 | End: 2024-07-29

## 2024-07-28 RX ORDER — INSULIN DEGLUDEC 100 U/ML
10 INJECTION, SOLUTION SUBCUTANEOUS NIGHTLY
Status: DISCONTINUED | OUTPATIENT
Start: 2024-07-28 | End: 2024-07-29

## 2024-07-28 NOTE — PLAN OF CARE
Jillian Yip is A&O x 4 with some forgetfulness.  Lives at home wit family.    Mod assist with ADLs.  Intermittent of bowel and bladder.  VSS:  stable.  Denies any current pain.  Plan:  heparin gtt, neuro checks Qshift, NIH daily, monitor labs  Will continue to monitor and treat.    Problem: Patient Centered Care  Goal: Patient preferences are identified and integrated in the patient's plan of care  Description: Interventions:  - What would you like us to know as we care for you? \"My right wrist hurst. Not sure if I hit it against the walker at home or what happened.\"  - Provide timely, complete, and accurate information to patient/family  - Incorporate patient and family knowledge, values, beliefs, and cultural backgrounds into the planning and delivery of care  - Encourage patient/family to participate in care and decision-making at the level they choose  - Honor patient and family perspectives and choices  Outcome: Progressing     Problem: Diabetes/Glucose Control  Goal: Glucose maintained within prescribed range  Description: INTERVENTIONS:  - Monitor Blood Glucose as ordered  - Assess for signs and symptoms of hyperglycemia and hypoglycemia  - Administer ordered medications to maintain glucose within target range  - Assess barriers to adequate nutritional intake and initiate nutrition consult as needed  - Instruct patient on self management of diabetes  Outcome: Progressing     Problem: Patient/Family Goals  Goal: Patient/Family Long Term Goal  Description: Patient's Long Term Goal: Go home     Interventions:  - Follow MD ordered  - Non pharmacological interventions   - See additional Care Plan goals for specific interventions  Outcome: Progressing  Goal: Patient/Family Short Term Goal  Description: Patient's Short Term Goal: Improve strength    Interventions:   - Follow MD orders  -Non- pharmacological interventions   - See additional Care Plan goals for specific interventions  Outcome: Progressing      Problem: CARDIOVASCULAR - ADULT  Goal: Maintains optimal cardiac output and hemodynamic stability  Description: INTERVENTIONS:  - Monitor vital signs, rhythm, and trends  - Monitor for bleeding, hypotension and signs of decreased cardiac output  - Evaluate effectiveness of vasoactive medications to optimize hemodynamic stability  - Monitor arterial and/or venous puncture sites for bleeding and/or hematoma  - Assess quality of pulses, skin color and temperature  - Assess for signs of decreased coronary artery perfusion - ex. Angina  - Evaluate fluid balance, assess for edema, trend weights  Outcome: Progressing  Goal: Absence of cardiac arrhythmias or at baseline  Description: INTERVENTIONS:  - Continuous cardiac monitoring, monitor vital signs, obtain 12 lead EKG if indicated  - Evaluate effectiveness of antiarrhythmic and heart rate control medications as ordered  - Initiate emergency measures for life threatening arrhythmias  - Monitor electrolytes and administer replacement therapy as ordered  Outcome: Progressing     Problem: METABOLIC/FLUID AND ELECTROLYTES - ADULT  Goal: Glucose maintained within prescribed range  Description: INTERVENTIONS:  - Monitor Blood Glucose as ordered  - Assess for signs and symptoms of hyperglycemia and hypoglycemia  - Administer ordered medications to maintain glucose within target range  - Assess barriers to adequate nutritional intake and initiate nutrition consult as needed  - Instruct patient on self management of diabetes  Outcome: Progressing  Goal: Electrolytes maintained within normal limits  Description: INTERVENTIONS:  - Monitor labs and rhythm and assess patient for signs and symptoms of electrolyte imbalances  - Administer electrolyte replacement as ordered  - Monitor response to electrolyte replacements, including rhythm and repeat lab results as appropriate  - Fluid restriction as ordered  - Instruct patient on fluid and nutrition restrictions as  appropriate  Outcome: Progressing  Goal: Hemodynamic stability and optimal renal function maintained  Description: INTERVENTIONS:  - Monitor labs and assess for signs and symptoms of volume excess or deficit  - Monitor intake, output and patient weight  - Monitor urine specific gravity, serum osmolarity and serum sodium as indicated or ordered  - Monitor response to interventions for patient's volume status, including labs, urine output, blood pressure (other measures as available)  - Encourage oral intake as appropriate  - Instruct patient on fluid and nutrition restrictions as appropriate  Outcome: Progressing     Problem: SAFETY ADULT - FALL  Goal: Free from fall injury  Description: INTERVENTIONS:  - Assess pt frequently for physical needs  - Identify cognitive and physical deficits and behaviors that affect risk of falls.  - [place] fall precautions as indicated by assessment.  - Educate pt/family on patient safety including physical limitations  - Instruct pt to call for assistance with activity based on assessment  - Modify environment to reduce risk of injury  - Provide assistive devices as appropriate  - Consider OT/PT consult to assist with strengthening/mobility  - Encourage toileting schedule  Outcome: Progressing     Problem: DISCHARGE PLANNING  Goal: Discharge to home or other facility with appropriate resources  Description: INTERVENTIONS:  - Identify barriers to discharge w/pt and caregiver  - Include patient/family/discharge partner in discharge planning  - Arrange for needed discharge resources and transportation as appropriate  - Identify discharge learning needs (meds, wound care, etc)  - Arrange for interpreters to assist at discharge as needed  - Consider post-discharge preferences of patient/family/discharge partner  - Complete POLST form as appropriate  - Assess patient's ability to be responsible for managing their own health  - Refer to Case Management Department for coordinating  discharge planning if the patient needs post-hospital services based on physician/LIP order or complex needs related to functional status, cognitive ability or social support system  Outcome: Progressing     Problem: NEUROLOGICAL - ADULT  Goal: Achieves stable or improved neurological status  Description: INTERVENTIONS  - Assess for and report changes in neurological status  - Initiate measures to prevent increased intracranial pressure  - Maintain blood pressure and fluid volume within ordered parameters to optimize cerebral perfusion and minimize risk of hemorrhage  - Monitor temperature, glucose, and sodium. Initiate appropriate interventions as ordered  Outcome: Progressing  Goal: Achieves maximal functionality and self care  Description: INTERVENTIONS  - Monitor swallowing and airway patency with patient fatigue and changes in neurological status  - Encourage and assist patient to increase activity and self care with guidance from PT/OT  - Encourage visually impaired, hearing impaired and aphasic patients to use assistive/communication devices  Outcome: Progressing     Problem: HEMATOLOGIC - ADULT  Goal: Maintains hematologic stability  Description: INTERVENTIONS  - Assess for signs and symptoms of bleeding or hemorrhage  - Monitor labs and vital signs for trends  - Administer supportive blood products/factors, fluids and medications as ordered and appropriate  - Administer supportive blood products/factors as ordered and appropriate  Outcome: Progressing  Goal: Free from bleeding injury  Description: (Example usage: patient with low platelets)  INTERVENTIONS:  - Avoid intramuscular injections, enemas and rectal medication administration  - Ensure safe mobilization of patient  - Hold pressure on venipuncture sites to achieve adequate hemostasis  - Assess for signs and symptoms of internal bleeding  - Monitor lab trends  - Patient is to report abnormal signs of bleeding to staff  - Avoid use of toothpicks and  dental floss  - Use electric shaver for shaving  - Use soft bristle tooth brush  - Limit straining and forceful nose blowing  Outcome: Progressing

## 2024-07-28 NOTE — PROGRESS NOTES
Patient seen in follow-up.  No further chest pain but she has been having nausea and vomiting.  She is does not feel good today.  Daughter is at bedside.    Vitals:    07/28/24 0830   BP: 125/75   Pulse: 54   Resp: 17   Temp: 98.4 °F (36.9 °C)       Intake/Output Summary (Last 24 hours) at 7/28/2024 1207  Last data filed at 7/28/2024 1100  Gross per 24 hour   Intake 498 ml   Output 520 ml   Net -22 ml     Wt Readings from Last 1 Encounters:   07/28/24 154 lb 3.2 oz (69.9 kg)        General: No acute distress.  Neck: Jugular venous pulsations not seen.  Lungs: Clear to auscultation.  Heart: Normal rate. No murmurs.  Abdomen: Soft. Non tender  Extremities: No edema.  Neurological: Alert. No focal deficits.  Psychiatric: Appropriate mood and affect.  Current Facility-Administered Medications   Medication Dose Route Frequency    traMADol (Ultram) tab 50 mg  50 mg Oral Q12H PRN    docusate sodium (Colace) cap 100 mg  100 mg Oral BID    magnesium hydroxide (Milk of Magnesia) 400 MG/5ML oral suspension 30 mL  30 mL Oral Daily PRN    bisacodyl (Dulcolax) 10 MG rectal suppository 10 mg  10 mg Rectal Daily PRN    fleet enema (Fleet) 7-19 GM/118ML rectal enema 133 mL  1 enema Rectal Once PRN    sennosides (Senokot) tab 17.2 mg  17.2 mg Oral Nightly    metoprolol tartrate (Lopressor) tab 100 mg  100 mg Oral 2x Daily(Beta Blocker)    nitroglycerin (Nitrostat) SL tab 0.4 mg  0.4 mg Sublingual Q5 Min PRN    methylPREDNISolone sodium succinate (Solu-MEDROL) injection 125 mg  125 mg Intravenous Once    isosorbide mononitrate ER (Imdur) 24 hr tab 120 mg  120 mg Oral Daily    ranolazine ER (Ranexa) 12 hr tab 500 mg  500 mg Oral BID    insulin aspart (NovoLOG) 100 Units/mL FlexPen 1-7 Units  1-7 Units Subcutaneous TID CC    heparin (Porcine) 33368 units/250mL infusion ACS/AFIB CONTINUOUS  200-3,000 Units/hr Intravenous Continuous    acetaminophen (Tylenol) tab 650 mg  650 mg Oral Q4H PRN    Or    acetaminophen (Tylenol) rectal  suppository 650 mg  650 mg Rectal Q4H PRN    hydrALAzine (Apresoline) 20 mg/mL injection 10 mg  10 mg Intravenous Q2H PRN    ondansetron (Zofran) 4 MG/2ML injection 4 mg  4 mg Intravenous Q6H PRN    Or    metoclopramide (Reglan) 5 mg/mL injection 5 mg  5 mg Intravenous Q8H PRN    cefTRIAXone (Rocephin) 1 g in sodium chloride 0.9% 100 mL IVPB-ADDV  1 g Intravenous Q24H    glucose (Dex4) 15 GM/59ML oral liquid 15 g  15 g Oral Q15 Min PRN    Or    glucose (Glutose) 40% oral gel 15 g  15 g Oral Q15 Min PRN    Or    glucose-vitamin C (Dex-4) chewable tab 4 tablet  4 tablet Oral Q15 Min PRN    Or    dextrose 50% injection 50 mL  50 mL Intravenous Q15 Min PRN    Or    glucose (Dex4) 15 GM/59ML oral liquid 30 g  30 g Oral Q15 Min PRN    Or    glucose (Glutose) 40% oral gel 30 g  30 g Oral Q15 Min PRN    Or    glucose-vitamin C (Dex-4) chewable tab 8 tablet  8 tablet Oral Q15 Min PRN    aspirin chewable tab 81 mg  81 mg Oral Daily    albuterol (Ventolin HFA) 108 (90 Base) MCG/ACT inhaler 2 puff  2 puff Inhalation Q4H PRN    insulin degludec (Tresiba) 100 units/mL flextouch 6 Units  6 Units Subcutaneous Nightly    umeclidinium-vilanterol (Anoro Ellipta) 62.5-25 MCG/ACT inhaler 1 puff  1 puff Inhalation Daily    pantoprazole (Protonix) 40 mg in sodium chloride 0.9% PF 10 mL IV push  40 mg Intravenous Daily    donepezil (Aricept) tab 5 mg  5 mg Oral Nightly    atorvastatin (Lipitor) tab 40 mg  40 mg Oral Nightly    ezetimibe (Zetia) tab 10 mg  10 mg Oral Nightly    amLODIPine (Norvasc) tab 5 mg  5 mg Oral Daily    clopidogrel (Plavix) tab 75 mg  75 mg Oral Daily     Medications Prior to Admission   Medication Sig    [] HYDROcodone-acetaminophen 5-325 MG Oral Tab Take 1 tablet by mouth every 12 (twelve) hours as needed for Pain.    diclofenac 1 % External Gel Apply 2 g topically 4 (four) times daily as needed.    isosorbide mononitrate  MG Oral Tablet 24 Hr Take 1 tablet (120 mg total) by mouth daily.    donepezil 5  MG Oral Tab Take 1 tablet (5 mg total) by mouth nightly.    Insulin Pen Needle (CARETOUCH PEN NEEDLES) 32G X 4 MM Does not apply Misc Use with injection once daily as directed    insulin glargine (LANTUS SOLOSTAR) 100 UNIT/ML Subcutaneous Solution Pen-injector Inject 6 Units into the skin nightly.    Blood Glucose Monitoring Suppl (ACCU-CHEK GUIDE) w/Device Does not apply Kit 1 each daily.    Glucose Blood (ACCU-CHEK GUIDE) In Vitro Strip 1 each by In Vitro route daily.    Blood Glucose Monitoring Suppl (ONETOUCH VERIO IQ SYSTEM) w/Device Does not apply Kit 1 Device by Other route 3 (three) times daily. Use as directed. (Patient not taking: Reported on 5/13/2024)    Glucose Blood (ONETOUCH VERIO) In Vitro Strip Use as directed. (Patient not taking: Reported on 5/13/2024)    OneTouch Delica Lancets 33G Does not apply Misc 1 Lancet by Finger stick route 3 (three) times daily. (Patient not taking: Reported on 5/13/2024)    pantoprazole 40 MG Oral Tab EC Take 1 tablet (40 mg total) by mouth 2 (two) times daily before meals.    albuterol 108 (90 Base) MCG/ACT Inhalation Aero Soln Inhale 2 puffs into the lungs every 4 (four) hours as needed.    traMADol 50 MG Oral Tab Take 1 tablet (50 mg total) by mouth every 12 (twelve) hours as needed.    acetaminophen 500 MG Oral Tab Take 1 tablet (500 mg total) by mouth every 8 (eight) hours.    lidocaine-menthol 4-1 % External Patch Place 2 patches onto the skin daily.    metoprolol tartrate 100 MG Oral Tab Take 1 tablet (100 mg total) by mouth 2 (two) times daily.    nitroglycerin 0.4 MG Sublingual SL Tab Place 1 tablet (0.4 mg total) under the tongue every 5 (five) minutes as needed for Chest pain.    amLODIPine Besylate 5 MG Oral Tab Take 1 tablet (5 mg total) by mouth at bedtime.    ONETOUCH DELICA LANCETS 33G Does not apply Misc OneTouch Delica Lancets 33 gauge   TEST BLOOD GLUCOSE TWO TIMES A DAY AS DIRECTED (Patient not taking: Reported on 5/13/2024)    Clopidogrel Bisulfate  75 MG Oral Tab Take 1 tablet (75 mg total) by mouth daily.    ezetimibe 10 MG Oral Tab Take 1 tablet (10 mg total) by mouth nightly.     NM LUNG VQ VENT / PERFUSION SCAN  (CPT=78582)    Result Date: 7/26/2024  CONCLUSION: No mismatched perfusion defects.  Low probability of pulmonary embolism.    elm-remote    Dictated by (CST): Fletcher Carreno MD on 7/26/2024 at 8:15 PM     Finalized by (CST): Fletcher Carreno MD on 7/26/2024 at 8:20 PM          US CAROTID DOPPLER BILAT - DIAG IMG (CPT=93880)    Result Date: 7/26/2024  CONCLUSION:   No sonographic evidence of hemodynamically significant stenosis.  Mild plaque at the bilateral carotid bulbs.  A small amount of soft plaque is noted in the mid to distal left common carotid artery.    elm-remote    Dictated by (CST): Fletcher Carreno MD on 7/26/2024 at 8:11 PM     Finalized by (CST): Fletcher Carreno MD on 7/26/2024 at 8:13 PM                 IMPRESSION:   Admitted with Acute CVA     Acute coronary syndrome. Known occluded RCA with apical LAD collaterals. Not amenable to PCI in past by cath 2022. Patient decided on medical therapy. Added Ranexa to medical therapy.     H/O L MCA CVA      HTN, uncontrolled recently     CKD III b     CAD s/p MI     Type II aortic aneurysm s/p repair     COPD     PLAN:  Discussed with daughter who is at bedside about transesophageal echocardiogram.  Discussed that it is a relatively low risk procedure however her concern is that patient does not do well with sedation in the past and she would rather not put her mother through any additional testing or procedures.  Going along with her wishes I will start her on aspirin 81 mg daily stop clopidogrel and start Eliquis 2.5 mg twice daily as she has had recurrent embolic strokes.  We will await any additional input from neurology on this also but that is what the patient's daughter wishes.

## 2024-07-28 NOTE — PROGRESS NOTES
[facility]  part of [facility]    Progress Note    Jillian Yip Patient Status:  Inpatient    1945 MRN [id number]   Location [facility] 3W/SW Attending Anali Cruz DO   Hosp Day # 3 PCP Colleen Weiler, DO     Chief complaint cva     Subjective:   Jillian Yip is a(n) 79 year old female Pt denies cp today. Pt with constipation     ROS:   No cp, sob   No n/v     Objective:   Blood pressure 125/75, pulse 54, temperature 98.4 °F (36.9 °C), temperature source Axillary, resp. rate 17, height 5' 1\" (1.549 m), weight 154 lb 3.2 oz (69.9 kg), SpO2 93%, not currently breastfeeding.      Intake/Output Summary (Last 24 hours) at 2024 1218  Last data filed at 2024 1100  Gross per 24 hour   Intake 498 ml   Output 520 ml   Net -22 ml       Patient Weight(s) for the past 336 hrs:   Weight   24 0537 154 lb 3.2 oz (69.9 kg)   24 0350 155 lb 9.6 oz (70.6 kg)   24 0650 155 lb 3.2 oz (70.4 kg)   24 0600 150 lb 6.4 oz (68.2 kg)   24 1807 153 lb (69.4 kg)           General appearance: alert, appears stated age and cooperative  Pulmonary:  clear to auscultation bilaterally  Cardiovascular: S1, S2 normal, no murmur, click, rub or gallop, regular rate and rhythm  Abdominal: soft, non-tender; bowel sounds normal; no masses,  no organomegaly  Extremities: extremities normal, atraumatic, no cyanosis or edema        Medicines:     Current Facility-Administered Medications   Medication Dose Route Frequency    traMADol (Ultram) tab 50 mg  50 mg Oral Q12H PRN    docusate sodium (Colace) cap 100 mg  100 mg Oral BID    magnesium hydroxide (Milk of Magnesia) 400 MG/5ML oral suspension 30 mL  30 mL Oral Daily PRN    bisacodyl (Dulcolax) 10 MG rectal suppository 10 mg  10 mg Rectal Daily PRN    fleet enema (Fleet) 7-19 GM/118ML rectal enema 133 mL  1 enema Rectal Once PRN    sennosides (Senokot) tab 17.2 mg  17.2 mg Oral Nightly    metoprolol tartrate (Lopressor)  tab 100 mg  100 mg Oral 2x Daily(Beta Blocker)    nitroglycerin (Nitrostat) SL tab 0.4 mg  0.4 mg Sublingual Q5 Min PRN    methylPREDNISolone sodium succinate (Solu-MEDROL) injection 125 mg  125 mg Intravenous Once    isosorbide mononitrate ER (Imdur) 24 hr tab 120 mg  120 mg Oral Daily    ranolazine ER (Ranexa) 12 hr tab 500 mg  500 mg Oral BID    insulin aspart (NovoLOG) 100 Units/mL FlexPen 1-7 Units  1-7 Units Subcutaneous TID CC    heparin (Porcine) 75462 units/250mL infusion ACS/AFIB CONTINUOUS  200-3,000 Units/hr Intravenous Continuous    acetaminophen (Tylenol) tab 650 mg  650 mg Oral Q4H PRN    Or    acetaminophen (Tylenol) rectal suppository 650 mg  650 mg Rectal Q4H PRN    hydrALAzine (Apresoline) 20 mg/mL injection 10 mg  10 mg Intravenous Q2H PRN    ondansetron (Zofran) 4 MG/2ML injection 4 mg  4 mg Intravenous Q6H PRN    Or    metoclopramide (Reglan) 5 mg/mL injection 5 mg  5 mg Intravenous Q8H PRN    cefTRIAXone (Rocephin) 1 g in sodium chloride 0.9% 100 mL IVPB-ADDV  1 g Intravenous Q24H    glucose (Dex4) 15 GM/59ML oral liquid 15 g  15 g Oral Q15 Min PRN    Or    glucose (Glutose) 40% oral gel 15 g  15 g Oral Q15 Min PRN    Or    glucose-vitamin C (Dex-4) chewable tab 4 tablet  4 tablet Oral Q15 Min PRN    Or    dextrose 50% injection 50 mL  50 mL Intravenous Q15 Min PRN    Or    glucose (Dex4) 15 GM/59ML oral liquid 30 g  30 g Oral Q15 Min PRN    Or    glucose (Glutose) 40% oral gel 30 g  30 g Oral Q15 Min PRN    Or    glucose-vitamin C (Dex-4) chewable tab 8 tablet  8 tablet Oral Q15 Min PRN    aspirin chewable tab 81 mg  81 mg Oral Daily    albuterol (Ventolin HFA) 108 (90 Base) MCG/ACT inhaler 2 puff  2 puff Inhalation Q4H PRN    insulin degludec (Tresiba) 100 units/mL flextouch 6 Units  6 Units Subcutaneous Nightly    umeclidinium-vilanterol (Anoro Ellipta) 62.5-25 MCG/ACT inhaler 1 puff  1 puff Inhalation Daily    pantoprazole (Protonix) 40 mg in sodium chloride 0.9% PF 10 mL IV push  40 mg  Intravenous Daily    donepezil (Aricept) tab 5 mg  5 mg Oral Nightly    atorvastatin (Lipitor) tab 40 mg  40 mg Oral Nightly    ezetimibe (Zetia) tab 10 mg  10 mg Oral Nightly    amLODIPine (Norvasc) tab 5 mg  5 mg Oral Daily       Lab Results   Component Value Date    WBC 6.1 07/26/2024    HGB 11.0 (L) 07/26/2024    HCT 33.6 (L) 07/26/2024    .0 07/26/2024    CREATSERUM 2.11 (H) 07/27/2024    BUN 37 (H) 07/27/2024     07/27/2024    K 4.4 07/27/2024     07/27/2024    CO2 22.0 07/27/2024     (H) 07/27/2024    CA 9.0 07/27/2024    ALB 4.1 07/24/2024    ALKPHO 145 (H) 07/24/2024    BILT 0.4 07/24/2024    TP 6.5 07/24/2024    AST 12 07/24/2024    ALT <7 (L) 07/24/2024    PTT 64.7 (H) 07/27/2024    INR 1.14 10/10/2023    TSH 1.030 03/23/2022    DDIMER 3.59 (H) 07/26/2024    ESRML 13 07/26/2024    CRP 0.86 (H) 07/24/2019    MG 2.0 07/25/2024    PHOS 4.4 05/13/2024    TROP 0.01 10/16/2017    CK 20 (L) 10/21/2023    CKMB 1.0 10/16/2017    B12 381 07/10/2024       NM LUNG VQ VENT / PERFUSION SCAN  (CPT=78582)    Result Date: 7/26/2024  CONCLUSION: No mismatched perfusion defects.  Low probability of pulmonary embolism.    elm-remote    Dictated by (CST): Fletcher Carreno MD on 7/26/2024 at 8:15 PM     Finalized by (CST): Fletcher Carreno MD on 7/26/2024 at 8:20 PM          US CAROTID DOPPLER BILAT - DIAG IMG (CPT=93880)    Result Date: 7/26/2024  CONCLUSION:   No sonographic evidence of hemodynamically significant stenosis.  Mild plaque at the bilateral carotid bulbs.  A small amount of soft plaque is noted in the mid to distal left common carotid artery.    elm-remote    Dictated by (CST): Fletcher Carreno MD on 7/26/2024 at 8:11 PM     Finalized by (CST): Fletcher Carreno MD on 7/26/2024 at 8:13 PM         EKG 12 Lead    Result Date: 7/28/2024  Normal sinus rhythm Possible Left atrial enlargement Left axis deviation LVH with repolarization abnormality ( Jeard product ) ST elevation inferior leads, not  significantly different than EKG dated 7/26/24 13:56 Abnormal ECG When compared with ECG of 26-JUL-2024 13:56, Criteria for Septal infarct are no longer Present Confirmed by SYLVESTER CASH, RAMON ([id number]) on 7/28/2024 12:05:30 PM    EKG 12 Lead    Result Date: 7/26/2024  Normal sinus rhythm Left axis deviation Minimal voltage criteria for LVH, may be normal variant ( Jerad product ) Septal infarct , age undetermined Abnormal ECG When compared with ECG of 24-JUL-2024 18:06, Vent. rate has increased BY  30 BPM Septal infarct is now Present Confirmed by SYLVESTER ALVAREZ, BRYON ([id number]) on 7/26/2024 4:17:25 PM     Results:     CBC:    Lab Results   Component Value Date    WBC 6.1 07/26/2024    WBC 5.8 07/26/2024    WBC 7.7 07/24/2024     Lab Results   Component Value Date    HGB 11.0 (L) 07/26/2024    HGB 11.4 (L) 07/26/2024    HGB 11.4 (L) 07/24/2024      Lab Results   Component Value Date    .0 07/26/2024    .0 07/26/2024    .0 07/24/2024       Recent Labs   Lab 07/26/24  0711 07/26/24  1835 07/27/24  1031   * 169* 241*   BUN 37* 39* 37*   CREATSERUM 1.76* 1.92* 2.11*   CA 9.7 9.4 9.0    142 139   K 4.2  4.2 4.4 4.4    109 109   CO2 25.0 25.0 22.0             Assessment and Plan:       Chest pain with EKG showing ST depression in inferior leads. Troponin elevated to 4,000. Started on heparin drip and isosorbide. Family not interested in cardiac cath given her anatomy.   - cont tele, isosorbide and heparin   - ranexa added and no cp today   - cont metoprolol   - v/q neg for pe   - defer heparin drip to Dr Levine    H/o left MCA stroke in 2017  Acute left cerebellum/right frontal/parietal cortex CVA   Acute left frontal lobe microbleed  -As seen on MRI brain  -Stroke protocol initiated  -Neurology consulted, Dr. Seng Sorto notified with recommendation for aspirin 324 mg which was given in the ED  -adv diet  -cont plavix and asa   -saline lock  -Neurochecks  -Speech therapy / PT / OT  pending eval  -SCDs for DVT prophylaxis  - acute rehab consult   - htn - cont metoprolol and norvasc     H/o COPD  -Not in acute exacerbation.  No wheezing on physical exam.  -Resume Anoro Ellipta.  Albuterol as needed     Urinary tract infection  -Urinalysis with evidence for UTI.  Patient started on Rocephin.  Will continue Rocephin pending urine cultures. Stop today sp 3 doses      IDDM type II  -Resume home dose of Lantus 6 units nightly.  -ISS increased.   - Hypoglycemia protocol.  Accu-Cheks.     H/o gastric peptic ulcer  -Protonix 40 mg IV daily     CKD  -Creatinine noted to be 1 6, appears to be at baseline.  Monitor renal function with daily BMP     Mixed dementia with Alzheimer's disease  -Patient follows with neurology as an outpatient.  Resume donepezil     Other medical conditions  Thoracic arctic aneurysm  Essential hypertension  Hyperlipidemia  CAD  Previous DVT  Ovarian cancer      Anali Cruz, DO         Chart reviewed, including current vitals, notes, labs and imaging  Labs ordered and medications adjusted as outlined above  Coordinate care with care team/consultants  Discussed with patient results of tests, management plan as outlined above, and the need for ongoing hospitalization  D/w RN     MDM high      PHYSICIAN Certification of Need for Inpatient Hospitalization - Initial Certification    Patient will require inpatient services that will reasonably be expected to span two midnight's based on the clinical documentation in H+P.   Based on patients current state of illness, I anticipate that, after discharge, patient will require TBD.

## 2024-07-28 NOTE — PLAN OF CARE
Patient requesting to go to bathroom on rolling chair.  Patient able to sit at EOB with mod assist.  C/o increasing dizziness. Patient persistent about getting up to rolling chair and taking shower.  Writer provided education regarding safe transfer and risk of falls/injury d/t weakness and dizziness.  Writer offered shower cap in mean time, patient agreeable.  States does not want to use bedpan.  Will continue assess strength and safety for transfers.  Patient wanting to lay back in bed d/t dizziness at this time.  Will continue to monitor and treat.    *PRN zofran and reglan discontinued d/t c/o HA after each PRN dose.  New orders for PRN compazine.  Bowel regimen in place d/t constipation, patient declining PRN MOM at this time.  Continue to monitor and treat.

## 2024-07-28 NOTE — PLAN OF CARE
Jillian, not sleeping well overnight. She continues to experience the same headache intermittently. Patient also given oral medication for constipation this morning.     Problem: Patient Centered Care  Goal: Patient preferences are identified and integrated in the patient's plan of care  Description: Interventions:  - What would you like us to know as we care for you? \"My right wrist hurst. Not sure if I hit it against the walker at home or what happened.\"  - Provide timely, complete, and accurate information to patient/family  - Incorporate patient and family knowledge, values, beliefs, and cultural backgrounds into the planning and delivery of care  - Encourage patient/family to participate in care and decision-making at the level they choose  - Honor patient and family perspectives and choices  Outcome: Progressing     Problem: Diabetes/Glucose Control  Goal: Glucose maintained within prescribed range  Description: INTERVENTIONS:  - Monitor Blood Glucose as ordered  - Assess for signs and symptoms of hyperglycemia and hypoglycemia  - Administer ordered medications to maintain glucose within target range  - Assess barriers to adequate nutritional intake and initiate nutrition consult as needed  - Instruct patient on self management of diabetes  Outcome: Progressing     Problem: Patient/Family Goals  Goal: Patient/Family Long Term Goal  Description: Patient's Long Term Goal: Go home     Interventions:  - Follow MD ordered  - Non pharmacological interventions   - See additional Care Plan goals for specific interventions  Outcome: Progressing  Goal: Patient/Family Short Term Goal  Description: Patient's Short Term Goal: Improve strength    Interventions:   - Follow MD orders  -Non- pharmacological interventions   - See additional Care Plan goals for specific interventions  Outcome: Progressing     Problem: CARDIOVASCULAR - ADULT  Goal: Maintains optimal cardiac output and hemodynamic stability  Description:  INTERVENTIONS:  - Monitor vital signs, rhythm, and trends  - Monitor for bleeding, hypotension and signs of decreased cardiac output  - Evaluate effectiveness of vasoactive medications to optimize hemodynamic stability  - Monitor arterial and/or venous puncture sites for bleeding and/or hematoma  - Assess quality of pulses, skin color and temperature  - Assess for signs of decreased coronary artery perfusion - ex. Angina  - Evaluate fluid balance, assess for edema, trend weights  Outcome: Progressing  Goal: Absence of cardiac arrhythmias or at baseline  Description: INTERVENTIONS:  - Continuous cardiac monitoring, monitor vital signs, obtain 12 lead EKG if indicated  - Evaluate effectiveness of antiarrhythmic and heart rate control medications as ordered  - Initiate emergency measures for life threatening arrhythmias  - Monitor electrolytes and administer replacement therapy as ordered  Outcome: Progressing     Problem: METABOLIC/FLUID AND ELECTROLYTES - ADULT  Goal: Glucose maintained within prescribed range  Description: INTERVENTIONS:  - Monitor Blood Glucose as ordered  - Assess for signs and symptoms of hyperglycemia and hypoglycemia  - Administer ordered medications to maintain glucose within target range  - Assess barriers to adequate nutritional intake and initiate nutrition consult as needed  - Instruct patient on self management of diabetes  Outcome: Progressing  Goal: Electrolytes maintained within normal limits  Description: INTERVENTIONS:  - Monitor labs and rhythm and assess patient for signs and symptoms of electrolyte imbalances  - Administer electrolyte replacement as ordered  - Monitor response to electrolyte replacements, including rhythm and repeat lab results as appropriate  - Fluid restriction as ordered  - Instruct patient on fluid and nutrition restrictions as appropriate  Outcome: Progressing  Goal: Hemodynamic stability and optimal renal function maintained  Description: INTERVENTIONS:  -  Monitor labs and assess for signs and symptoms of volume excess or deficit  - Monitor intake, output and patient weight  - Monitor urine specific gravity, serum osmolarity and serum sodium as indicated or ordered  - Monitor response to interventions for patient's volume status, including labs, urine output, blood pressure (other measures as available)  - Encourage oral intake as appropriate  - Instruct patient on fluid and nutrition restrictions as appropriate  Outcome: Progressing     Problem: SAFETY ADULT - FALL  Goal: Free from fall injury  Description: INTERVENTIONS:  - Assess pt frequently for physical needs  - Identify cognitive and physical deficits and behaviors that affect risk of falls.  - Liguori fall precautions as indicated by assessment.  - Educate pt/family on patient safety including physical limitations  - Instruct pt to call for assistance with activity based on assessment  - Modify environment to reduce risk of injury  - Provide assistive devices as appropriate  - Consider OT/PT consult to assist with strengthening/mobility  - Encourage toileting schedule  Outcome: Progressing     Problem: DISCHARGE PLANNING  Goal: Discharge to home or other facility with appropriate resources  Description: INTERVENTIONS:  - Identify barriers to discharge w/pt and caregiver  - Include patient/family/discharge partner in discharge planning  - Arrange for needed discharge resources and transportation as appropriate  - Identify discharge learning needs (meds, wound care, etc)  - Arrange for interpreters to assist at discharge as needed  - Consider post-discharge preferences of patient/family/discharge partner  - Complete POLST form as appropriate  - Assess patient's ability to be responsible for managing their own health  - Refer to Case Management Department for coordinating discharge planning if the patient needs post-hospital services based on physician/LIP order or complex needs related to functional status,  cognitive ability or social support system  Outcome: Progressing     Problem: NEUROLOGICAL - ADULT  Goal: Achieves stable or improved neurological status  Description: INTERVENTIONS  - Assess for and report changes in neurological status  - Initiate measures to prevent increased intracranial pressure  - Maintain blood pressure and fluid volume within ordered parameters to optimize cerebral perfusion and minimize risk of hemorrhage  - Monitor temperature, glucose, and sodium. Initiate appropriate interventions as ordered  Outcome: Progressing  Goal: Achieves maximal functionality and self care  Description: INTERVENTIONS  - Monitor swallowing and airway patency with patient fatigue and changes in neurological status  - Encourage and assist patient to increase activity and self care with guidance from PT/OT  - Encourage visually impaired, hearing impaired and aphasic patients to use assistive/communication devices  Outcome: Progressing     Problem: HEMATOLOGIC - ADULT  Goal: Maintains hematologic stability  Description: INTERVENTIONS  - Assess for signs and symptoms of bleeding or hemorrhage  - Monitor labs and vital signs for trends  - Administer supportive blood products/factors, fluids and medications as ordered and appropriate  - Administer supportive blood products/factors as ordered and appropriate  Outcome: Progressing  Goal: Free from bleeding injury  Description: (Example usage: patient with low platelets)  INTERVENTIONS:  - Avoid intramuscular injections, enemas and rectal medication administration  - Ensure safe mobilization of patient  - Hold pressure on venipuncture sites to achieve adequate hemostasis  - Assess for signs and symptoms of internal bleeding  - Monitor lab trends  - Patient is to report abnormal signs of bleeding to staff  - Avoid use of toothpicks and dental floss  - Use electric shaver for shaving  - Use soft bristle tooth brush  - Limit straining and forceful nose blowing  Outcome:  Progressing

## 2024-07-29 VITALS
SYSTOLIC BLOOD PRESSURE: 138 MMHG | DIASTOLIC BLOOD PRESSURE: 59 MMHG | BODY MASS INDEX: 29.34 KG/M2 | RESPIRATION RATE: 18 BRPM | HEIGHT: 61 IN | TEMPERATURE: 98 F | OXYGEN SATURATION: 92 % | HEART RATE: 55 BPM | WEIGHT: 155.38 LBS

## 2024-07-29 PROBLEM — E53.8 B12 DEFICIENCY: Status: ACTIVE | Noted: 2024-07-29

## 2024-07-29 LAB
ANION GAP SERPL CALC-SCNC: 7 MMOL/L (ref 0–18)
BASOPHILS # BLD AUTO: 0.04 X10(3) UL (ref 0–0.2)
BASOPHILS NFR BLD AUTO: 0.5 %
BUN BLD-MCNC: 43 MG/DL (ref 9–23)
BUN/CREAT SERPL: 24 (ref 10–20)
CALCIUM BLD-MCNC: 9.3 MG/DL (ref 8.7–10.4)
CHLORIDE SERPL-SCNC: 108 MMOL/L (ref 98–112)
CO2 SERPL-SCNC: 26 MMOL/L (ref 21–32)
CREAT BLD-MCNC: 1.79 MG/DL
DEPRECATED RDW RBC AUTO: 40.8 FL (ref 35.1–46.3)
EGFRCR SERPLBLD CKD-EPI 2021: 28 ML/MIN/1.73M2 (ref 60–?)
EOSINOPHIL # BLD AUTO: 0.26 X10(3) UL (ref 0–0.7)
EOSINOPHIL NFR BLD AUTO: 3.5 %
ERYTHROCYTE [DISTWIDTH] IN BLOOD BY AUTOMATED COUNT: 13 % (ref 11–15)
GLUCOSE BLD-MCNC: 144 MG/DL (ref 70–99)
GLUCOSE BLDC GLUCOMTR-MCNC: 155 MG/DL (ref 70–99)
GLUCOSE BLDC GLUCOMTR-MCNC: 164 MG/DL (ref 70–99)
HCT VFR BLD AUTO: 32 %
HGB BLD-MCNC: 10 G/DL
IMM GRANULOCYTES # BLD AUTO: 0.03 X10(3) UL (ref 0–1)
IMM GRANULOCYTES NFR BLD: 0.4 %
LYMPHOCYTES # BLD AUTO: 1.15 X10(3) UL (ref 1–4)
LYMPHOCYTES NFR BLD AUTO: 15.6 %
MCH RBC QN AUTO: 27 PG (ref 26–34)
MCHC RBC AUTO-ENTMCNC: 31.3 G/DL (ref 31–37)
MCV RBC AUTO: 86.5 FL
MONOCYTES # BLD AUTO: 0.54 X10(3) UL (ref 0.1–1)
MONOCYTES NFR BLD AUTO: 7.3 %
NEUTROPHILS # BLD AUTO: 5.34 X10 (3) UL (ref 1.5–7.7)
NEUTROPHILS # BLD AUTO: 5.34 X10(3) UL (ref 1.5–7.7)
NEUTROPHILS NFR BLD AUTO: 72.7 %
OSMOLALITY SERPL CALC.SUM OF ELEC: 305 MOSM/KG (ref 275–295)
PLATELET # BLD AUTO: 262 10(3)UL (ref 150–450)
POTASSIUM SERPL-SCNC: 4.1 MMOL/L (ref 3.5–5.1)
RBC # BLD AUTO: 3.7 X10(6)UL
SODIUM SERPL-SCNC: 141 MMOL/L (ref 136–145)
WBC # BLD AUTO: 7.4 X10(3) UL (ref 4–11)

## 2024-07-29 PROCEDURE — 99232 SBSQ HOSP IP/OBS MODERATE 35: CPT | Performed by: OTHER

## 2024-07-29 PROCEDURE — 99239 HOSP IP/OBS DSCHRG MGMT >30: CPT | Performed by: HOSPITALIST

## 2024-07-29 RX ORDER — UMECLIDINIUM BROMIDE AND VILANTEROL TRIFENATATE 62.5; 25 UG/1; UG/1
1 POWDER RESPIRATORY (INHALATION) DAILY
Qty: 1 EACH | Refills: 1 | Status: SHIPPED | OUTPATIENT
Start: 2024-07-29

## 2024-07-29 RX ORDER — CYANOCOBALAMIN 1000 UG/ML
1000 INJECTION, SOLUTION INTRAMUSCULAR; SUBCUTANEOUS WEEKLY
Qty: 4 EACH | Refills: 0 | Status: SHIPPED | OUTPATIENT
Start: 2024-08-05

## 2024-07-29 RX ORDER — ASPIRIN 81 MG/1
81 TABLET, CHEWABLE ORAL DAILY
Qty: 30 TABLET | Refills: 0 | Status: SHIPPED | OUTPATIENT
Start: 2024-07-29

## 2024-07-29 RX ORDER — CYANOCOBALAMIN 1000 UG/ML
1000 INJECTION, SOLUTION INTRAMUSCULAR; SUBCUTANEOUS WEEKLY
Status: DISCONTINUED | OUTPATIENT
Start: 2024-07-29 | End: 2024-07-29

## 2024-07-29 RX ORDER — ATORVASTATIN CALCIUM 40 MG/1
40 TABLET, FILM COATED ORAL NIGHTLY
Status: SHIPPED | COMMUNITY
Start: 2024-07-29

## 2024-07-29 RX ORDER — RANOLAZINE 500 MG/1
500 TABLET, EXTENDED RELEASE ORAL 2 TIMES DAILY
Status: SHIPPED | COMMUNITY
Start: 2024-07-29

## 2024-07-29 RX ORDER — TRAZODONE HYDROCHLORIDE 50 MG/1
25 TABLET ORAL NIGHTLY
Status: DISCONTINUED | OUTPATIENT
Start: 2024-07-29 | End: 2024-07-29

## 2024-07-29 NOTE — CM/SW NOTE
07/29/24 1538   Discharge disposition   Expected discharge disposition Rehab Facili   Post Acute Care Provider Other  ([facility])   Discharge transportation [facility]     The patient received a MDO for discharge.    The patient will be transported to [facility] via [facility] at 4:30pm.  PCS Complete.    The RN has given report to the facility.    Social work informed the patient's daughter of transport time.  RN to notify patient.    SW/CM to remain available for support and/or discharge planning.     Shani Tolentino MSW, LSW  Discharge Planner [id number]

## 2024-07-29 NOTE — CARDIAC REHAB
Cardiac Rehab Phase I    Education:  Handouts provided and reviewed: [facility] Booklet.     Diet: Healthy Cardiac diet reviewed.    Disease Process: Disease process reviewed.    Reviewed the following: SITE CARE: n/a      RISK FACTORS: reviewed      SMOKING CESSATION: non smoker      HOME EXERCISE ACTIVITY: reviewed      OUTPATIENT CARDIAC REHAB: not a candidate for phase 2 cardiac rehab

## 2024-07-29 NOTE — PROGRESS NOTES
Patient seen in follow-up.  No further chest pain .  Patient is in good spirit  Daughter by the bedside  No cardiac c/o  HR and BP stable  C Monitor; Sinus rhythm      Vitals:    07/29/24 1337   BP: 138/59   Pulse: 55   Resp: 18   Temp: 98.1 °F (36.7 °C)       Intake/Output Summary (Last 24 hours) at 7/29/2024 1403  Last data filed at 7/29/2024 0415  Gross per 24 hour   Intake 12 ml   Output 800 ml   Net -788 ml     Wt Readings from Last 1 Encounters:   07/29/24 155 lb 6.4 oz (70.5 kg)        General: No acute distress.  Neck: Jugular venous pulsations not seen.  Lungs: Clear to auscultation.  Heart: Normal rate. No murmurs.  Abdomen: Soft. Non tender  Extremities: No edema.  Neurological: Alert. No focal deficits.  Psychiatric: Appropriate mood and affect.  Current Facility-Administered Medications   Medication Dose Route Frequency    traZODone (Desyrel) tab 25 mg  25 mg Oral Nightly    docusate sodium (Colace) cap 100 mg  100 mg Oral BID    magnesium hydroxide (Milk of Magnesia) 400 MG/5ML oral suspension 30 mL  30 mL Oral Daily PRN    bisacodyl (Dulcolax) 10 MG rectal suppository 10 mg  10 mg Rectal Daily PRN    fleet enema (Fleet) 7-19 GM/118ML rectal enema 133 mL  1 enema Rectal Once PRN    sennosides (Senokot) tab 17.2 mg  17.2 mg Oral Nightly    insulin degludec (Tresiba) 100 units/mL flextouch 10 Units  10 Units Subcutaneous Nightly    apixaban (Eliquis) tab 2.5 mg  2.5 mg Oral BID    prochlorperazine (Compazine) 10 MG/2ML injection 10 mg  10 mg Intravenous Q6H PRN    metoprolol tartrate (Lopressor) tab 100 mg  100 mg Oral 2x Daily(Beta Blocker)    nitroglycerin (Nitrostat) SL tab 0.4 mg  0.4 mg Sublingual Q5 Min PRN    isosorbide mononitrate ER (Imdur) 24 hr tab 120 mg  120 mg Oral Daily    ranolazine ER (Ranexa) 12 hr tab 500 mg  500 mg Oral BID    insulin aspart (NovoLOG) 100 Units/mL FlexPen 1-7 Units  1-7 Units Subcutaneous TID CC    acetaminophen (Tylenol) tab 650 mg  650 mg Oral Q4H PRN    Or     acetaminophen (Tylenol) rectal suppository 650 mg  650 mg Rectal Q4H PRN    hydrALAzine (Apresoline) 20 mg/mL injection 10 mg  10 mg Intravenous Q2H PRN    glucose (Dex4) 15 GM/59ML oral liquid 15 g  15 g Oral Q15 Min PRN    Or    glucose (Glutose) 40% oral gel 15 g  15 g Oral Q15 Min PRN    Or    glucose-vitamin C (Dex-4) chewable tab 4 tablet  4 tablet Oral Q15 Min PRN    Or    dextrose 50% injection 50 mL  50 mL Intravenous Q15 Min PRN    Or    glucose (Dex4) 15 GM/59ML oral liquid 30 g  30 g Oral Q15 Min PRN    Or    glucose (Glutose) 40% oral gel 30 g  30 g Oral Q15 Min PRN    Or    glucose-vitamin C (Dex-4) chewable tab 8 tablet  8 tablet Oral Q15 Min PRN    aspirin chewable tab 81 mg  81 mg Oral Daily    albuterol (Ventolin HFA) 108 (90 Base) MCG/ACT inhaler 2 puff  2 puff Inhalation Q4H PRN    umeclidinium-vilanterol (Anoro Ellipta) 62.5-25 MCG/ACT inhaler 1 puff  1 puff Inhalation Daily    pantoprazole (Protonix) 40 mg in sodium chloride 0.9% PF 10 mL IV push  40 mg Intravenous Daily    donepezil (Aricept) tab 5 mg  5 mg Oral Nightly    atorvastatin (Lipitor) tab 40 mg  40 mg Oral Nightly    ezetimibe (Zetia) tab 10 mg  10 mg Oral Nightly    amLODIPine (Norvasc) tab 5 mg  5 mg Oral Daily     Medications Prior to Admission   Medication Sig    [] HYDROcodone-acetaminophen 5-325 MG Oral Tab Take 1 tablet by mouth every 12 (twelve) hours as needed for Pain.    diclofenac 1 % External Gel Apply 2 g topically 4 (four) times daily as needed.    isosorbide mononitrate  MG Oral Tablet 24 Hr Take 1 tablet (120 mg total) by mouth daily.    donepezil 5 MG Oral Tab Take 1 tablet (5 mg total) by mouth nightly.    Insulin Pen Needle (CARETOUCH PEN NEEDLES) 32G X 4 MM Does not apply Misc Use with injection once daily as directed    insulin glargine (LANTUS SOLOSTAR) 100 UNIT/ML Subcutaneous Solution Pen-injector Inject 6 Units into the skin nightly.    Blood Glucose Monitoring Suppl (ACCU-CHEK GUIDE) w/Device  Does not apply Kit 1 each daily.    Glucose Blood (ACCU-CHEK GUIDE) In Vitro Strip 1 each by In Vitro route daily.    Blood Glucose Monitoring Suppl (ONETOUCH VERIO IQ SYSTEM) w/Device Does not apply Kit 1 Device by Other route 3 (three) times daily. Use as directed. (Patient not taking: Reported on 5/13/2024)    Glucose Blood (ONETOUCH VERIO) In Vitro Strip Use as directed. (Patient not taking: Reported on 5/13/2024)    OneTouch Delica Lancets 33G Does not apply Misc 1 Lancet by Finger stick route 3 (three) times daily. (Patient not taking: Reported on 5/13/2024)    pantoprazole 40 MG Oral Tab EC Take 1 tablet (40 mg total) by mouth 2 (two) times daily before meals.    albuterol 108 (90 Base) MCG/ACT Inhalation Aero Soln Inhale 2 puffs into the lungs every 4 (four) hours as needed.    traMADol 50 MG Oral Tab Take 1 tablet (50 mg total) by mouth every 12 (twelve) hours as needed.    acetaminophen 500 MG Oral Tab Take 1 tablet (500 mg total) by mouth every 8 (eight) hours.    lidocaine-menthol 4-1 % External Patch Place 2 patches onto the skin daily.    metoprolol tartrate 100 MG Oral Tab Take 1 tablet (100 mg total) by mouth 2 (two) times daily.    nitroglycerin 0.4 MG Sublingual SL Tab Place 1 tablet (0.4 mg total) under the tongue every 5 (five) minutes as needed for Chest pain.    amLODIPine Besylate 5 MG Oral Tab Take 1 tablet (5 mg total) by mouth at bedtime.    ONETOUCH DELICA LANCETS 33G Does not apply Misc OneTouch Delica Lancets 33 gauge   TEST BLOOD GLUCOSE TWO TIMES A DAY AS DIRECTED (Patient not taking: Reported on 5/13/2024)    Clopidogrel Bisulfate 75 MG Oral Tab Take 1 tablet (75 mg total) by mouth daily.    ezetimibe 10 MG Oral Tab Take 1 tablet (10 mg total) by mouth nightly.     No results found.      Lab Results   Component Value Date    WBC 7.4 07/29/2024    HGB 10.0 07/29/2024    HCT 32.0 07/29/2024    .0 07/29/2024    CREATSERUM 1.79 07/29/2024    BUN 43 07/29/2024     07/29/2024     K 4.1 07/29/2024     07/29/2024    CO2 26.0 07/29/2024     07/29/2024    CA 9.3 07/29/2024       IMPRESSION:   Admitted with Acute CVA     Acute coronary syndrome. Known occluded RCA with apical LAD collaterals. Not amenable to PCI in past by cath 2022. Patient decided on medical therapy. Added Ranexa to medical therapy.     H/O L MCA CVA      HTN, uncontrolled recently     CKD III b     CAD s/p MI     Type II aortic aneurysm s/p repair     COPD     PLAN:  Patient and daughter refused any invasive cardiac procedures. Going along with her wishes Now on aspirin 81 mg daily stop clopidogrel and start Eliquis 2.5 mg twice daily as she has had recurrent embolic strokes.  We will await any additional input from neurology on this also but that is what the patient's daughter wishes.    Patient is hemodynamically stable  No CP now  Cardiac rehab inpatient evaluation and Rx    D/W patient and daughter, explained the Rx plan with complete understanding and in agreement    D/W nursing staff    Cardiac meds:  ASA 81 mg  Eliquis 2.5 mg q 12  Amlodipine 5 mg  Zetia 10 mg  Atorvastatin 40 mg  Indur 120 mg  Metoprolol 100 mg bid  Ranexa 500 mg bid    Jett Anderson MD  [facility] cardiovascular

## 2024-07-29 NOTE — PROGRESS NOTES
[facility]  [address], [address]  [facility] [address]  [phone]          INPATIENT STROKE NEUROLOGY   FOLLOW UP PROGRESS NOTE  [facility]  part of [facility]    Jillian Yip Patient Status:  Inpatient     1945 MRN [id number]    Location [facility] 3W/SW Attending Anali Cruz DO    Hosp Day # 4 PCP Colleen Weiler, DO    Date of Admission:  2024  Date of Consult Follow Up:  2024       Assessment and Plan:   Jillian Yip is a 79 year old female w/ a pmhx sig. for    prior embolic stroke without a source in 2017 s/p new LINQ, DM, HTN, HLD and dementia who is seen for recurrent embolic strokes while on apixaban 2.5 mg twice daily.  Aspirin 81 has been added.. On exam  she currently has no focality. Suspect she has afib. There is no evidence that empiric ac will add additional secondary stroke prevention benefit but clinically it may be appropriate.recommended AARON.  Patient and family expressed concern over anesthesia.    The  BROOK-EXTEND study recently showed that switching from one DOAC to another in DOAC failure does not reduce risk of stroke.      Data from LAAOS III shows that left atrial appendage closure in the addition of a DOAC and DOAC failure does reduce the risk of stroke.  Ideally, patient would have left atrial appendage closure, but given that they are not even interested in AARON do not think that they would be interested in left atrial appendage closure.      Okay to continue apixaban 2.5 twice daily (renally dosed) and aspirin 81mg. Off plavix.   No further workup from neurology.   One time dose of b12 while still admitted. Will sign off.    She was not a candidate for tpa or thrombectomy b/c she was outside the time window.        Recurrent embolic strokes  Differential Diagnosis for stroke/TIA mechanism:  Undiagnosed afib       Plan    Diagnostics:  Imaging: None indicated.   Cardiac imaging:    Labs: None.  Therapeutics:  Blood pressure goal:trend towards normotension    Neuro checks qshift.  Telemetry.NIH stroke scale per protocol.  Blood glucose goal: .  Accuchecks Q6 if patient has DM;  Increased sliding scale insulin to moderate intensity.   closely monitor to prevent hypoglycemia in patients with AIS.  Antithrombotics: Apixaban and asa 81;    Lipid-lowering agents: Cholesterol Meds: atorvastatin - 40 MG; atorvastatin Tabs - 40 MG; ezetimibe - 10 MG; ezetimibe Tabs - 10 MG     Avoid hypotonic fluids as this can worsen cerebral edema.  Physical therapy, Occupational Therapy, speech therapy evaluations ordered.  maintain oxygen saturation >94%. No supplemental oxygen  in nonhypoxic patients with acute ischemic stroke (AIS).  Tx hyperthermia (temperature >38°C) and identify source  STAT head CT and page neurology if any change in neurological exam or focal deficits.    Long term secondary stroke prevention goals:  Home BP monitoring. Pt instructed to check BP at home in the AM and PM, and bring values to future clinic visits. BP goal is for normotension, < 130/80.  HbA1c goal <7.0  LDL-C goal  <70 mg/dL.  Target total cholesterol < 200 mg/dL Target HDL >45 mg/dL for men, >55 mg/dL for women, Target triglycerides <150 mg/dL  Physical inactivity - target exercise at least 3 times per week of moderate intensity activity for 20 minutes.  Obesity - target ideal body weight and girth <35\" for women, <40\" for men  Smoking - Target smoking cessation    2. dementia  Aricept 5mg daily  1x dose of b12 injection.          INTERVAL EVENTS  07/27/24:  AARON ordered       SUBJECTIVE:     Explained plan.  She will follow up with Dr. Dow       I reviewed the imaging with the patient and the family in the room.  All explanations were provided in layman's terms.  Explained the pathophysiology/mechanism of stroke.  Gave the definition of stroke.  Explained the difference between stroke and TIA.  Discussed mechanism of  stroke.  Discussed the work-up that have been done thus far and plan.  Discussed recurrent stroke symptoms.  Discussed secondary stroke prevention.      Pertinent positive and negatives per HPI.  All others were reviewed and negative.       Objective   OBJECTIVE:   Last vitals and weight :  Blood pressure 138/59, pulse 55, temperature 98.1 °F (36.7 °C), temperature source Oral, resp. rate 18, height 61\", weight 155 lb 6.4 oz (70.5 kg), SpO2 92%, not currently breastfeeding.   Vitals:    07/29/24 0415 07/29/24 0511 07/29/24 0834 07/29/24 1337   BP:  (!) 164/67 152/69 138/59   BP Location:  Right arm Right arm Right arm   Pulse:  59 55 55   Resp:   18 18   Temp:   98.3 °F (36.8 °C) 98.1 °F (36.7 °C)   TempSrc:  Oral Oral Oral   SpO2:  95% 95% 92%   Weight: 155 lb 6.4 oz (70.5 kg)      Height:          Exam:  - General: appears stated age and no distress     - Pulmonary: no sign of respiratory distress.    Neurologic Exam  - Mental Status: Alert and attentive. Oriented  to age and month. Knew the name of the hospital.  Speech is spontaneous, fluent, and prosodic. Comprehension intact. Repetition intact. Phrase length and rate are normal. No paraphasic errors, neologisms, anomia, neglect, apraxia, or R/L confusion.   - Cranial Nerves: No gaze preference. Visual fields:normal  Pupils are 4mm briskly constricting to 3mm and equally round and reactive to light  in a well lit room. EOMI. No nystagmus. No ptosis. V1-V3 intact B/L to light touch.No pathological facial asymmetry. No flattening of the nasolabial fold. .  Hearing grossly intact.  Tongue midline. No atrophy or fasiculations of the tongue noted. Palate and uvula elevate symmetrically.  Shoulder shrug symmetric.    - Motor:  normal tone, normal bulk. No interosseous wasting. No flattening of hypothenar eminences.       Right Left     Motor Strength      Knee extensors 3 0           Pronator drift: No pronator drift   Arm Rolling: No orbiting.   Finger Taps: Finger  taps are symmetric in rate and amplitude. .    Rapid movements: Rapid/fine movements are symmetric. As expected their dominant hand is slightly faster.   Foot Taps: Foot taps are symmetric.   Leg Drift:      Reflexes:    C5 C6 C7  L4 S1   R 2+ 2+  2+ 2+   L 2+ 2+  2+ 2+   Adductor Spread: No adductor spread noted.     Nonsustained clonus: Absent   Sustained clonus: Absent   - Sensory:   Light touch: normal  Temperature: normal  Vibration: normal  - Cerebellum: No truncal ataxia. No titubations. No dysmetria, no dysdiadochokinesis. No overshoot.    - Plantar response: flexor bilaterally    Medications:   traZODone  25 mg Oral Nightly    docusate sodium  100 mg Oral BID    sennosides  17.2 mg Oral Nightly    insulin degludec  10 Units Subcutaneous Nightly    apixaban  2.5 mg Oral BID    metoprolol tartrate  100 mg Oral 2x Daily(Beta Blocker)    isosorbide mononitrate ER  120 mg Oral Daily    ranolazine ER  500 mg Oral BID    insulin aspart  1-7 Units Subcutaneous TID CC    aspirin  81 mg Oral Daily    umeclidinium-vilanterol  1 puff Inhalation Daily    pantoprazole  40 mg Intravenous Daily    donepezil  5 mg Oral Nightly    atorvastatin  40 mg Oral Nightly    ezetimibe  10 mg Oral Nightly    amLODIPine  5 mg Oral Daily       PRNS:   magnesium hydroxide    bisacodyl    fleet enema    prochlorperazine    nitroglycerin    acetaminophen **OR** acetaminophen    hydrALAzine    glucose **OR** glucose **OR** glucose-vitamin C **OR** dextrose **OR** glucose **OR** glucose **OR** glucose-vitamin C    albuterol    Infusions:            Results:   Laboratory Data:  Lab Results   Component Value Date    WBC 7.4 07/29/2024    HGB 10.0 (L) 07/29/2024    HCT 32.0 (L) 07/29/2024    .0 07/29/2024    CREATSERUM 1.79 (H) 07/29/2024    BUN 43 (H) 07/29/2024     07/29/2024    K 4.1 07/29/2024     07/29/2024    CO2 26.0 07/29/2024     (H) 07/29/2024    CA 9.3 07/29/2024    ALB 4.1 07/24/2024    ALKPHO 145 (H)  07/24/2024    TP 6.5 07/24/2024    AST 12 07/24/2024    ALT <7 (L) 07/24/2024    PTT 49.3 (H) 07/28/2024    INR 1.14 10/10/2023    PTP 14.7 10/10/2023    TSH 1.030 03/23/2022    DDIMER 3.59 (H) 07/26/2024    ESRML 13 07/26/2024    CRP 0.86 (H) 07/24/2019    MG 2.0 07/25/2024    PHOS 4.4 05/13/2024    TROP 0.01 10/16/2017    CK 20 (L) 10/21/2023    CKMB 1.0 10/16/2017    B12 381 07/10/2024     Recent Results (from the past 72 hour(s))   D-Dimer    Collection Time: 07/26/24  3:31 PM   Result Value Ref Range    D-Dimer 3.59 (H) <0.80 ug/mL FEU   PTT, Activated    Collection Time: 07/26/24  3:31 PM   Result Value Ref Range    PTT 26.5 23.0 - 36.0 seconds   Troponin I (High Sensitivity)    Collection Time: 07/26/24  5:05 PM   Result Value Ref Range    Troponin I (High Sensitivity) 831 (HH) <=34 ng/L   Sed Rate, Westergren (Automated)    Collection Time: 07/26/24  6:35 PM   Result Value Ref Range    Sed Rate 13 0 - 30 mm/Hr   Basic Metabolic Panel (8)    Collection Time: 07/26/24  6:35 PM   Result Value Ref Range    Glucose 169 (H) 70 - 99 mg/dL    Sodium 142 136 - 145 mmol/L    Potassium 4.4 3.5 - 5.1 mmol/L    Chloride 109 98 - 112 mmol/L    CO2 25.0 21.0 - 32.0 mmol/L    Anion Gap 8 0 - 18 mmol/L    BUN 39 (H) 9 - 23 mg/dL    Creatinine 1.92 (H) 0.55 - 1.02 mg/dL    BUN/CREA Ratio 20.3 (H) 10.0 - 20.0    Calcium, Total 9.4 8.7 - 10.4 mg/dL    Calculated Osmolality 307 (H) 275 - 295 mOsm/kg    eGFR-Cr 26 (L) >=60 mL/min/1.73m2   CBC, Platelet; No Differential    Collection Time: 07/26/24  6:35 PM   Result Value Ref Range    WBC 6.1 4.0 - 11.0 x10(3) uL    RBC 4.00 3.80 - 5.30 x10(6)uL    HGB 11.0 (L) 12.0 - 16.0 g/dL    HCT 33.6 (L) 35.0 - 48.0 %    MCV 84.0 80.0 - 100.0 fL    MCH 27.5 26.0 - 34.0 pg    MCHC 32.7 31.0 - 37.0 g/dL    RDW 13.6 11.0 - 15.0 %    RDW-SD 42.4 35.1 - 46.3 fL    .0 150.0 - 450.0 10(3)uL   POCT Glucose    Collection Time: 07/26/24  6:46 PM   Result Value Ref Range    POC Glucose  164 (H)  70 - 99 mg/dL   POCT Glucose    Collection Time: 07/26/24  8:49 PM   Result Value Ref Range    POC Glucose  136 (H) 70 - 99 mg/dL   EKG 12 Lead    Collection Time: 07/26/24  9:59 PM   Result Value Ref Range    Ventricular rate 96 BPM    Atrial rate 96 BPM    P-R Interval 162 ms    QRS Duration 94 ms    Q-T Interval 364 ms    QTC Calculation (Bezet) 459 ms    P Axis 55 degrees    R Axis -36 degrees    T Axis 111 degrees   Troponin I (High Sensitivity)    Collection Time: 07/26/24 10:31 PM   Result Value Ref Range    Troponin I (High Sensitivity) 1,175 (HH) <=34 ng/L   PTT, Activated    Collection Time: 07/27/24  6:07 AM   Result Value Ref Range    PTT 64.7 (H) 23.0 - 36.0 seconds   Troponin I (High Sensitivity)    Collection Time: 07/27/24  6:07 AM   Result Value Ref Range    Troponin I (High Sensitivity) 4,803 (HH) <=34 ng/L   RAINBOW DRAW LAVENDER    Collection Time: 07/27/24  6:07 AM   Result Value Ref Range    Hold Lavender Auto Resulted    POCT Glucose    Collection Time: 07/27/24  8:18 AM   Result Value Ref Range    POC Glucose  195 (H) 70 - 99 mg/dL   Basic Metabolic Panel (8)    Collection Time: 07/27/24 10:31 AM   Result Value Ref Range    Glucose 241 (H) 70 - 99 mg/dL    Sodium 139 136 - 145 mmol/L    Potassium 4.4 3.5 - 5.1 mmol/L    Chloride 109 98 - 112 mmol/L    CO2 22.0 21.0 - 32.0 mmol/L    Anion Gap 8 0 - 18 mmol/L    BUN 37 (H) 9 - 23 mg/dL    Creatinine 2.11 (H) 0.55 - 1.02 mg/dL    BUN/CREA Ratio 17.5 10.0 - 20.0    Calcium, Total 9.0 8.7 - 10.4 mg/dL    Calculated Osmolality 305 (H) 275 - 295 mOsm/kg    eGFR-Cr 23 (L) >=60 mL/min/1.73m2   POCT Glucose    Collection Time: 07/27/24 11:49 AM   Result Value Ref Range    POC Glucose  245 (H) 70 - 99 mg/dL   POCT Glucose    Collection Time: 07/27/24  5:11 PM   Result Value Ref Range    POC Glucose  237 (H) 70 - 99 mg/dL   POCT Glucose    Collection Time: 07/27/24  9:14 PM   Result Value Ref Range    POC Glucose  296 (H) 70 - 99 mg/dL   POCT Glucose     Collection Time: 07/28/24  4:59 AM   Result Value Ref Range    POC Glucose  174 (H) 70 - 99 mg/dL   POCT Glucose    Collection Time: 07/28/24  8:14 AM   Result Value Ref Range    POC Glucose  164 (H) 70 - 99 mg/dL   Basic Metabolic Panel (8)    Collection Time: 07/28/24 11:44 AM   Result Value Ref Range    Glucose 196 (H) 70 - 99 mg/dL    Sodium 139 136 - 145 mmol/L    Potassium 4.5 3.5 - 5.1 mmol/L    Chloride 107 98 - 112 mmol/L    CO2 24.0 21.0 - 32.0 mmol/L    Anion Gap 8 0 - 18 mmol/L    BUN 45 (H) 9 - 23 mg/dL    Creatinine 2.05 (H) 0.55 - 1.02 mg/dL    BUN/CREA Ratio 22.0 (H) 10.0 - 20.0    Calcium, Total 9.1 8.7 - 10.4 mg/dL    Calculated Osmolality 305 (H) 275 - 295 mOsm/kg    eGFR-Cr 24 (L) >=60 mL/min/1.73m2   PTT, Activated    Collection Time: 07/28/24 11:44 AM   Result Value Ref Range    PTT 49.3 (H) 23.0 - 36.0 seconds   CBC W/ DIFFERENTIAL    Collection Time: 07/28/24 11:44 AM   Result Value Ref Range    WBC 8.2 4.0 - 11.0 x10(3) uL    RBC 3.65 (L) 3.80 - 5.30 x10(6)uL    HGB 10.0 (L) 12.0 - 16.0 g/dL    HCT 31.2 (L) 35.0 - 48.0 %    MCV 85.5 80.0 - 100.0 fL    MCH 27.4 26.0 - 34.0 pg    MCHC 32.1 31.0 - 37.0 g/dL    RDW-SD 40.5 35.1 - 46.3 fL    RDW 13.0 11.0 - 15.0 %    .0 150.0 - 450.0 10(3)uL    Neutrophil Absolute Prelim 6.31 1.50 - 7.70 x10 (3) uL    Neutrophil Absolute 6.31 1.50 - 7.70 x10(3) uL    Lymphocyte Absolute 1.02 1.00 - 4.00 x10(3) uL    Monocyte Absolute 0.50 0.10 - 1.00 x10(3) uL    Eosinophil Absolute 0.23 0.00 - 0.70 x10(3) uL    Basophil Absolute 0.06 0.00 - 0.20 x10(3) uL    Immature Granulocyte Absolute 0.03 0.00 - 1.00 x10(3) uL    Neutrophil % 77.5 %    Lymphocyte % 12.5 %    Monocyte % 6.1 %    Eosinophil % 2.8 %    Basophil % 0.7 %    Immature Granulocyte % 0.4 %   POCT Glucose    Collection Time: 07/28/24 12:52 PM   Result Value Ref Range    POC Glucose  195 (H) 70 - 99 mg/dL   POCT Glucose    Collection Time: 07/28/24  6:27 PM   Result Value Ref Range    POC  Glucose  224 (H) 70 - 99 mg/dL   POCT Glucose    Collection Time: 07/28/24  9:02 PM   Result Value Ref Range    POC Glucose  231 (H) 70 - 99 mg/dL   Basic Metabolic Panel (8)    Collection Time: 07/29/24  6:59 AM   Result Value Ref Range    Glucose 144 (H) 70 - 99 mg/dL    Sodium 141 136 - 145 mmol/L    Potassium 4.1 3.5 - 5.1 mmol/L    Chloride 108 98 - 112 mmol/L    CO2 26.0 21.0 - 32.0 mmol/L    Anion Gap 7 0 - 18 mmol/L    BUN 43 (H) 9 - 23 mg/dL    Creatinine 1.79 (H) 0.55 - 1.02 mg/dL    BUN/CREA Ratio 24.0 (H) 10.0 - 20.0    Calcium, Total 9.3 8.7 - 10.4 mg/dL    Calculated Osmolality 305 (H) 275 - 295 mOsm/kg    eGFR-Cr 28 (L) >=60 mL/min/1.73m2   CBC W/ DIFFERENTIAL    Collection Time: 07/29/24  6:59 AM   Result Value Ref Range    WBC 7.4 4.0 - 11.0 x10(3) uL    RBC 3.70 (L) 3.80 - 5.30 x10(6)uL    HGB 10.0 (L) 12.0 - 16.0 g/dL    HCT 32.0 (L) 35.0 - 48.0 %    MCV 86.5 80.0 - 100.0 fL    MCH 27.0 26.0 - 34.0 pg    MCHC 31.3 31.0 - 37.0 g/dL    RDW-SD 40.8 35.1 - 46.3 fL    RDW 13.0 11.0 - 15.0 %    .0 150.0 - 450.0 10(3)uL    Neutrophil Absolute Prelim 5.34 1.50 - 7.70 x10 (3) uL    Neutrophil Absolute 5.34 1.50 - 7.70 x10(3) uL    Lymphocyte Absolute 1.15 1.00 - 4.00 x10(3) uL    Monocyte Absolute 0.54 0.10 - 1.00 x10(3) uL    Eosinophil Absolute 0.26 0.00 - 0.70 x10(3) uL    Basophil Absolute 0.04 0.00 - 0.20 x10(3) uL    Immature Granulocyte Absolute 0.03 0.00 - 1.00 x10(3) uL    Neutrophil % 72.7 %    Lymphocyte % 15.6 %    Monocyte % 7.3 %    Eosinophil % 3.5 %    Basophil % 0.5 %    Immature Granulocyte % 0.4 %   POCT Glucose    Collection Time: 07/29/24  9:30 AM   Result Value Ref Range    POC Glucose  164 (H) 70 - 99 mg/dL   POCT Glucose    Collection Time: 07/29/24  1:08 PM   Result Value Ref Range    POC Glucose  155 (H) 70 - 99 mg/dL            Last A1c was done on 7/26/2024.       Test results/Imaging:   No results found.        CT BRAIN OR HEAD (CPT=70450)    Result Date:  7/25/2024  CONCLUSION:  1. No acute intracranial hemorrhage.  Punctate focus of susceptibility artifact in the left frontal lobe on previous day brain MR therefore likely represents a chronic microhemorrhage (not unusual in patients of this age).  2. Subcentimeter low-attenuation focus in the left cerebellum likely relates to an acute lacunar-type infarct.  Other small acute cortical infarcts at the right frontal and parietal lobes are not well seen by CT. 3. Nonspecific white matter changes involving both cerebral hemispheres that most likely reflect sequelae of chronic microangiopathy. 4. Intracranial atherosclerosis. 5. 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  Differential considerations for parotid gland masses are broad and include both benign and malignant primary neoplasms as well as metastatic disease.  Nonemergent otolaryngology evaluation of this finding is recommended with strong consideration of histologic sampling for definitive characterization.   Dictated by (CST): Atif Otto MD on 7/25/2024 at 9:13 AM     Finalized by (CST): Atif Otto MD on 7/25/2024 at 9:21 AM           Performed an independent visualization of mri brain  Imaging revealed:   agree w read     Education/Instructions given to: patient   Barriers to Learning:None  Content: Refer to note above. Evaluation/Outcome: Verbalized understanding    Disclaimer:   This record was dictated using Dragon software. There may be errors due to voice recognition problems that were not realized and corrected during the completion of the note.      This document is not intended to support charting by exception.  Sections left blank in a completed note should be presumed not to have been done.    Total time spent involved in the care of the patient including time spent writing the note, reviewing the labs, reviewing the relevant imaging, and face to face time was 35 minutes, more than 50% of the time was spent in  counseling and/or coordination of care related to  stroke b12 deficiency.      07/29/24    References:  Taco RP, Lizzie EP, Papmartir D, Orlin JS, Hiram K,  M, Lisandros W, Johanny P, Nova AJ, Edmar P, David PJ, Yuko-Procelena A, Paris A, Teoh KHT, Tagarajohn GI, Franklin MS, Royse AG, Ruth S, Alings M, Kathy PP, Jonathan CD, Juan RJ, Colli A, Avezum Á, Cory J, Smith K, Jose De Jesus J, Naina P, Genesis A, Emir S, Fabio SJ; LAS III Investigators. Left Atrial Appendage Occlusion during Cardiac Surgery to Prevent Stroke. N Engl J Med. 2021 Simon 3;384(22):6527-0750. doi: 10.1056/[id number]. Epub 2021 May 15. PMID: 40070648.  Pacolyoni M, Theo V, Agnelli G, Isela MG, Gichinyereozshane M, Wally DJ, Ryaner ST, Lynatalier P, Mckinley AA, Nabila L, Zibelia A, Putcharliela J, Theabian D, Tomppo L, Fuad P, Katyo D, Emma A, Remillard S, Buehrer M, Bavaud O, Vanacker P, Zuurbier S, Charly AGARWAL, Marcial CMJ, Cappellari M, Emiliani A, Zedde M, Perry-Rahim A, Briggs J, Cronshaw R, Schadan E, Del Jannette M, Kieran C, Mary N, Shannan M, Schchica A, Samina BM, Yanet M, Hola R, Yaghi S, Stephanie KL, Saravanan L, Grifpatricia E, Ron D, Risitano A, Falcou A, Petraglia L, Lotti EM, Spencerport M, Pavolucci L, Lockole P, Edil G, Trice A, Shasha A, Venti M, Traballi L, Urbini C, Kargiotis O, Iggy A, Diomedi M, Marcheselli S, Jesuso P, Zaart A, Tobias G, Baudilio K, Rota E, Tassinari T, Annabella V, Palmerini F, Dannyon P, Arnao V, Ari S, Cottone S, Baldi A, D'Isabel C, Shanekano W, Pegoraro S, Ntaios G, Sagris D, Gimichaelopoulos S, Kosmidou M, Ntais E, Romoli M, Pantoni L, Inocencia S, Bertora P, Chiti A, Canavero I, Sagnapoleone CE, Plocco M, Giorli E, Sue L, Carol E, Maximes G, Bandini F, Gasparro A, Terruso V, Deven M, Pezzini A, Ornello R, Michelle S, Adilia N, Yusuftti U, Feliciano A, Denti L, Flomin Y, Marcelle M, Earnest E, Caselli MC, Ulivi L, Annie N, Gennaro Rasmussen GM. Recurrent Ischemic  Stroke and Bleeding in Patients With Atrial Fibrillation Who Suffered an Acute Stroke While on Treatment With Nonvitamin K Antagonist Oral Anticoagulants: The BROOK-EXTEND Study. Stroke. 2022 Aug;53(8):3647-6819. doi: 10.1161/[id number]. Epub 2022 May 11. PMID: [id number].

## 2024-07-29 NOTE — TELEPHONE ENCOUNTER
From: Jillian Yip  To: Joan Dow  Sent: 7/24/2024 7:49 PM CDT  Subject: B12 injections    [facility] will give her a B 12 shot on 8/12 she has an appointment there already. How often do you want her to have them please.And thank you for your consideration of her condition

## 2024-07-29 NOTE — DISCHARGE INSTRUCTIONS
[employer] Referrals for Psychiatry and Psychotherapy:    [facility]: [address] [place] [address]  Phone: [phone]    [facility]: [address] [place] [address]   Phone: [phone]    [facility]: [address] [place] [address]  Phone: [phone]    [facility]: [address] [place] [address]  Phone: [phone]    [facility] [address] [place] [address]  Phone: [phone]    [facility] [address] [place] [address]   Phone: [phone]    [facility]: [address] [place] [address]  Phone: [phone]    [facility]: [address] [place] [address]  Phone: [phone]    [facility]- [facility] Sen[facility]    [facility] Sen[facility] provides counseling to adults, seniors, family members and caregivers dealing with stress, loss, or increasing emotional or physical difficulties. It makes counseling available to those who struggle with a disability or physical limitations, who lack transportation or other assistance in getting to a therapist's office, or who prefer to talk to someone in the comfort of their own home. Services are covered by Medicare, private insurance and some Medicaid plans, are available to anyone in the [facility] who desires such support, and do not need a physician's order.    We answer inquiries and take confidential referrals via phone, email, fax or our online referral form.  Phone: [phone], Option 1  Monday through Friday, 9am-5pm CST  Email: [email or web address]    [facility] w [address] [place] [address]   Phone: [phone]    [facility]:  [address] [address]  Phone: [phone]    [facility]: [address], [place] [address]  Phone: [phone]

## 2024-07-29 NOTE — PLAN OF CARE
Jillian had some bouts of nausea an vomiting last night. After medication patient felt better, though not well enough to take oral medication. Patient the requested suppository to help with constipation.         Problem: Patient Centered Care  Goal: Patient preferences are identified and integrated in the patient's plan of care  Description: Interventions:  - What would you like us to know as we care for you? \"My right wrist hurst. Not sure if I hit it against the walker at home or what happened.\"  - Provide timely, complete, and accurate information to patient/family  - Incorporate patient and family knowledge, values, beliefs, and cultural backgrounds into the planning and delivery of care  - Encourage patient/family to participate in care and decision-making at the level they choose  - Honor patient and family perspectives and choices  Outcome: Progressing     Problem: Diabetes/Glucose Control  Goal: Glucose maintained within prescribed range  Description: INTERVENTIONS:  - Monitor Blood Glucose as ordered  - Assess for signs and symptoms of hyperglycemia and hypoglycemia  - Administer ordered medications to maintain glucose within target range  - Assess barriers to adequate nutritional intake and initiate nutrition consult as needed  - Instruct patient on self management of diabetes  Outcome: Progressing     Problem: Patient/Family Goals  Goal: Patient/Family Long Term Goal  Description: Patient's Long Term Goal: Go home     Interventions:  - Follow MD ordered  - Non pharmacological interventions   - See additional Care Plan goals for specific interventions  Outcome: Progressing  Goal: Patient/Family Short Term Goal  Description: Patient's Short Term Goal: Improve strength    Interventions:   - Follow MD orders  -Non- pharmacological interventions   - See additional Care Plan goals for specific interventions  Outcome: Progressing     Problem: CARDIOVASCULAR - ADULT  Goal: Maintains optimal cardiac output and  hemodynamic stability  Description: INTERVENTIONS:  - Monitor vital signs, rhythm, and trends  - Monitor for bleeding, hypotension and signs of decreased cardiac output  - Evaluate effectiveness of vasoactive medications to optimize hemodynamic stability  - Monitor arterial and/or venous puncture sites for bleeding and/or hematoma  - Assess quality of pulses, skin color and temperature  - Assess for signs of decreased coronary artery perfusion - ex. Angina  - Evaluate fluid balance, assess for edema, trend weights  Outcome: Progressing  Goal: Absence of cardiac arrhythmias or at baseline  Description: INTERVENTIONS:  - Continuous cardiac monitoring, monitor vital signs, obtain 12 lead EKG if indicated  - Evaluate effectiveness of antiarrhythmic and heart rate control medications as ordered  - Initiate emergency measures for life threatening arrhythmias  - Monitor electrolytes and administer replacement therapy as ordered  Outcome: Progressing     Problem: METABOLIC/FLUID AND ELECTROLYTES - ADULT  Goal: Glucose maintained within prescribed range  Description: INTERVENTIONS:  - Monitor Blood Glucose as ordered  - Assess for signs and symptoms of hyperglycemia and hypoglycemia  - Administer ordered medications to maintain glucose within target range  - Assess barriers to adequate nutritional intake and initiate nutrition consult as needed  - Instruct patient on self management of diabetes  Outcome: Progressing  Goal: Electrolytes maintained within normal limits  Description: INTERVENTIONS:  - Monitor labs and rhythm and assess patient for signs and symptoms of electrolyte imbalances  - Administer electrolyte replacement as ordered  - Monitor response to electrolyte replacements, including rhythm and repeat lab results as appropriate  - Fluid restriction as ordered  - Instruct patient on fluid and nutrition restrictions as appropriate  Outcome: Progressing  Goal: Hemodynamic stability and optimal renal function  maintained  Description: INTERVENTIONS:  - Monitor labs and assess for signs and symptoms of volume excess or deficit  - Monitor intake, output and patient weight  - Monitor urine specific gravity, serum osmolarity and serum sodium as indicated or ordered  - Monitor response to interventions for patient's volume status, including labs, urine output, blood pressure (other measures as available)  - Encourage oral intake as appropriate  - Instruct patient on fluid and nutrition restrictions as appropriate  Outcome: Progressing     Problem: SAFETY ADULT - FALL  Goal: Free from fall injury  Description: INTERVENTIONS:  - Assess pt frequently for physical needs  - Identify cognitive and physical deficits and behaviors that affect risk of falls.  - [place] fall precautions as indicated by assessment.  - Educate pt/family on patient safety including physical limitations  - Instruct pt to call for assistance with activity based on assessment  - Modify environment to reduce risk of injury  - Provide assistive devices as appropriate  - Consider OT/PT consult to assist with strengthening/mobility  - Encourage toileting schedule  Outcome: Progressing     Problem: DISCHARGE PLANNING  Goal: Discharge to home or other facility with appropriate resources  Description: INTERVENTIONS:  - Identify barriers to discharge w/pt and caregiver  - Include patient/family/discharge partner in discharge planning  - Arrange for needed discharge resources and transportation as appropriate  - Identify discharge learning needs (meds, wound care, etc)  - Arrange for interpreters to assist at discharge as needed  - Consider post-discharge preferences of patient/family/discharge partner  - Complete POLST form as appropriate  - Assess patient's ability to be responsible for managing their own health  - Refer to Case Management Department for coordinating discharge planning if the patient needs post-hospital services based on physician/LIP order or  complex needs related to functional status, cognitive ability or social support system  Outcome: Progressing     Problem: NEUROLOGICAL - ADULT  Goal: Achieves stable or improved neurological status  Description: INTERVENTIONS  - Assess for and report changes in neurological status  - Initiate measures to prevent increased intracranial pressure  - Maintain blood pressure and fluid volume within ordered parameters to optimize cerebral perfusion and minimize risk of hemorrhage  - Monitor temperature, glucose, and sodium. Initiate appropriate interventions as ordered  Outcome: Progressing  Goal: Achieves maximal functionality and self care  Description: INTERVENTIONS  - Monitor swallowing and airway patency with patient fatigue and changes in neurological status  - Encourage and assist patient to increase activity and self care with guidance from PT/OT  - Encourage visually impaired, hearing impaired and aphasic patients to use assistive/communication devices  Outcome: Progressing     Problem: HEMATOLOGIC - ADULT  Goal: Maintains hematologic stability  Description: INTERVENTIONS  - Assess for signs and symptoms of bleeding or hemorrhage  - Monitor labs and vital signs for trends  - Administer supportive blood products/factors, fluids and medications as ordered and appropriate  - Administer supportive blood products/factors as ordered and appropriate  Outcome: Progressing  Goal: Free from bleeding injury  Description: (Example usage: patient with low platelets)  INTERVENTIONS:  - Avoid intramuscular injections, enemas and rectal medication administration  - Ensure safe mobilization of patient  - Hold pressure on venipuncture sites to achieve adequate hemostasis  - Assess for signs and symptoms of internal bleeding  - Monitor lab trends  - Patient is to report abnormal signs of bleeding to staff  - Avoid use of toothpicks and dental floss  - Use electric shaver for shaving  - Use soft bristle tooth brush  - Limit  straining and forceful nose blowing  Outcome: Progressing

## 2024-07-29 NOTE — PROGRESS NOTES
[facility]  part of [facility]    Progress Note    Jillian Yip Patient Status:  Inpatient    1945 MRN [id number]   Location [facility] 3W/SW Attending Anali Cruz DO   Hosp Day # 4 PCP Colleen Weiler, DO     Chief complaint cva     Subjective:   Jillian Yip is a(n) 79 year old female Pt denies cp today. Constipation resolved     ROS:   No cp, sob   No n/v     Objective:   Blood pressure 138/59, pulse 55, temperature 98.1 °F (36.7 °C), temperature source Oral, resp. rate 18, height 5' 1\" (1.549 m), weight 155 lb 6.4 oz (70.5 kg), SpO2 92%, not currently breastfeeding.      Intake/Output Summary (Last 24 hours) at 2024 1446  Last data filed at 2024 1337  Gross per 24 hour   Intake 132 ml   Output 900 ml   Net -768 ml       Patient Weight(s) for the past 336 hrs:   Weight   24 0415 155 lb 6.4 oz (70.5 kg)   24 0537 154 lb 3.2 oz (69.9 kg)   24 0350 155 lb 9.6 oz (70.6 kg)   24 0650 155 lb 3.2 oz (70.4 kg)   24 0600 150 lb 6.4 oz (68.2 kg)   24 1807 153 lb (69.4 kg)           General appearance: alert, appears stated age and cooperative  Pulmonary:  clear to auscultation bilaterally  Cardiovascular: S1, S2 normal, no murmur, click, rub or gallop, regular rate and rhythm  Abdominal: soft, non-tender; bowel sounds normal; no masses,  no organomegaly  Extremities: extremities normal, atraumatic, no cyanosis or edema        Medicines:     Current Facility-Administered Medications   Medication Dose Route Frequency    traZODone (Desyrel) tab 25 mg  25 mg Oral Nightly    docusate sodium (Colace) cap 100 mg  100 mg Oral BID    magnesium hydroxide (Milk of Magnesia) 400 MG/5ML oral suspension 30 mL  30 mL Oral Daily PRN    bisacodyl (Dulcolax) 10 MG rectal suppository 10 mg  10 mg Rectal Daily PRN    fleet enema (Fleet) 7-19 GM/118ML rectal enema 133 mL  1 enema Rectal Once PRN    sennosides (Senokot) tab 17.2 mg  17.2 mg Oral  Nightly    insulin degludec (Tresiba) 100 units/mL flextouch 10 Units  10 Units Subcutaneous Nightly    apixaban (Eliquis) tab 2.5 mg  2.5 mg Oral BID    prochlorperazine (Compazine) 10 MG/2ML injection 10 mg  10 mg Intravenous Q6H PRN    metoprolol tartrate (Lopressor) tab 100 mg  100 mg Oral 2x Daily(Beta Blocker)    nitroglycerin (Nitrostat) SL tab 0.4 mg  0.4 mg Sublingual Q5 Min PRN    isosorbide mononitrate ER (Imdur) 24 hr tab 120 mg  120 mg Oral Daily    ranolazine ER (Ranexa) 12 hr tab 500 mg  500 mg Oral BID    insulin aspart (NovoLOG) 100 Units/mL FlexPen 1-7 Units  1-7 Units Subcutaneous TID CC    acetaminophen (Tylenol) tab 650 mg  650 mg Oral Q4H PRN    Or    acetaminophen (Tylenol) rectal suppository 650 mg  650 mg Rectal Q4H PRN    hydrALAzine (Apresoline) 20 mg/mL injection 10 mg  10 mg Intravenous Q2H PRN    glucose (Dex4) 15 GM/59ML oral liquid 15 g  15 g Oral Q15 Min PRN    Or    glucose (Glutose) 40% oral gel 15 g  15 g Oral Q15 Min PRN    Or    glucose-vitamin C (Dex-4) chewable tab 4 tablet  4 tablet Oral Q15 Min PRN    Or    dextrose 50% injection 50 mL  50 mL Intravenous Q15 Min PRN    Or    glucose (Dex4) 15 GM/59ML oral liquid 30 g  30 g Oral Q15 Min PRN    Or    glucose (Glutose) 40% oral gel 30 g  30 g Oral Q15 Min PRN    Or    glucose-vitamin C (Dex-4) chewable tab 8 tablet  8 tablet Oral Q15 Min PRN    aspirin chewable tab 81 mg  81 mg Oral Daily    albuterol (Ventolin HFA) 108 (90 Base) MCG/ACT inhaler 2 puff  2 puff Inhalation Q4H PRN    umeclidinium-vilanterol (Anoro Ellipta) 62.5-25 MCG/ACT inhaler 1 puff  1 puff Inhalation Daily    pantoprazole (Protonix) 40 mg in sodium chloride 0.9% PF 10 mL IV push  40 mg Intravenous Daily    donepezil (Aricept) tab 5 mg  5 mg Oral Nightly    atorvastatin (Lipitor) tab 40 mg  40 mg Oral Nightly    ezetimibe (Zetia) tab 10 mg  10 mg Oral Nightly    amLODIPine (Norvasc) tab 5 mg  5 mg Oral Daily       Lab Results   Component Value Date    WBC 7.4  07/29/2024    HGB 10.0 (L) 07/29/2024    HCT 32.0 (L) 07/29/2024    .0 07/29/2024    CREATSERUM 1.79 (H) 07/29/2024    BUN 43 (H) 07/29/2024     07/29/2024    K 4.1 07/29/2024     07/29/2024    CO2 26.0 07/29/2024     (H) 07/29/2024    CA 9.3 07/29/2024    ALB 4.1 07/24/2024    ALKPHO 145 (H) 07/24/2024    BILT 0.4 07/24/2024    TP 6.5 07/24/2024    AST 12 07/24/2024    ALT <7 (L) 07/24/2024    PTT 49.3 (H) 07/28/2024    INR 1.14 10/10/2023    TSH 1.030 03/23/2022    DDIMER 3.59 (H) 07/26/2024    ESRML 13 07/26/2024    CRP 0.86 (H) 07/24/2019    MG 2.0 07/25/2024    PHOS 4.4 05/13/2024    TROP 0.01 10/16/2017    CK 20 (L) 10/21/2023    CKMB 1.0 10/16/2017    B12 381 07/10/2024       No results found.        Results:     CBC:    Lab Results   Component Value Date    WBC 7.4 07/29/2024    WBC 8.2 07/28/2024    WBC 6.1 07/26/2024     Lab Results   Component Value Date    HGB 10.0 (L) 07/29/2024    HGB 10.0 (L) 07/28/2024    HGB 11.0 (L) 07/26/2024      Lab Results   Component Value Date    .0 07/29/2024    .0 07/28/2024    .0 07/26/2024       Recent Labs   Lab 07/27/24  1031 07/28/24  1144 07/29/24  0659   * 196* 144*   BUN 37* 45* 43*   CREATSERUM 2.11* 2.05* 1.79*   CA 9.0 9.1 9.3    139 141   K 4.4 4.5 4.1    107 108   CO2 22.0 24.0 26.0             Assessment and Plan:       Chest pain with EKG showing ST depression in inferior leads. Troponin elevated to 4,000. Started on heparin drip and isosorbide. Family not interested in cardiac cath given her anatomy.   - cont tele, isosorbide and heparin   - ranexa added and no cp today   - cont metoprolol   - v/q neg for pe   - defer heparin drip to Dr Levine - now off     H/o left MCA stroke in 2017  Acute left cerebellum/right frontal/parietal cortex CVA   Acute left frontal lobe microbleed  -As seen on MRI brain  -Stroke protocol initiated  -Neurology consulted, Dr. Seng Sorto notified with recommendation  for aspirin 324 mg which was given in the ED  -adv diet  -cont eliquis  and asa   -saline lock  -Neurochecks  -Speech therapy / PT / OT pending eval  -SCDs for DVT prophylaxis  - acute rehab consult   - htn - cont metoprolol and norvasc     H/o COPD  -Not in acute exacerbation.  No wheezing on physical exam.  -Resume Anoro Ellipta.  Albuterol as needed     Urinary tract infection  -Urinalysis with evidence for UTI.  Patient started on Rocephin.  Will continue Rocephin pending urine cultures. Stop today sp 3 doses      IDDM type II  -Resume home dose of Lantus 6 units nightly.  -ISS increased.   - Hypoglycemia protocol.  Accu-Cheks.     H/o gastric peptic ulcer  -Protonix 40 mg IV daily     CKD  -Creatinine noted to be 1 6, appears to be at baseline.  Monitor renal function with daily BMP     Mixed dementia with Alzheimer's disease  -Patient follows with neurology as an outpatient.  Resume donepezil     Other medical conditions  Thoracic arctic aneurysm  Essential hypertension  Hyperlipidemia  CAD  Previous DVT  Ovarian cancer      Anali Cruz, DO         Chart reviewed, including current vitals, notes, labs and imaging  Labs ordered and medications adjusted as outlined above  Coordinate care with care team/consultants  Discussed with patient results of tests, management plan as outlined above, and the need for ongoing hospitalization  D/w RN     MDM high      PHYSICIAN Certification of Need for Inpatient Hospitalization - Initial Certification    Patient will require inpatient services that will reasonably be expected to span two midnight's based on the clinical documentation in H+P.   Based on patients current state of illness, I anticipate that, after discharge, patient will require TBD.

## 2024-07-29 NOTE — DISCHARGE SUMMARY
[facility]  part of [facility]    Discharge Summary    Jillian Yip Patient Status:  Inpatient    1945 MRN [id number]   Location [facility] 3W/SW Attending Anali Cruz DO   Hosp Day # 4 PCP Colleen Weiler, DO     Date of Admission: 2024 Disposition: Home or Self Care     Date of Discharge: 2024      Admitting Diagnosis: Acute cystitis without hematuria [N30.00]  Acute CVA (cerebrovascular accident) (HCC) [I63.9]    Hospital Discharge Diagnoses:  cva, mi    Hospital Discharge Diagnoses:  cva, mi     Lace+ Score: 81  59-90 High Risk  29-58 Medium Risk  0-28   Low Risk.    TCM Follow-Up Recommendation:  LACE > 58: High Risk of readmission after discharge from the hospital.          Lace+ Score: 81  59-90 High Risk  29-58 Medium Risk  0-28   Low Risk    Risk of readmission: Jillian Yip has High Risk of readmission after discharge from the hospital.    Problem List:   Patient Active Problem List   Diagnosis    Spondylolisthesis of lumbar region    S/P lumbar microdiscectomy    Hyperglycemia    Hypertensive crisis    Hemispheric carotid artery syndrome    Cerebrovascular accident (CVA) due to embolism of left carotid artery (HCC)    Episodic mood disorder (HCC)    Schizophrenia (HCC)    Hypokalemia    Left leg cellulitis    Chronic kidney disease, unspecified CKD stage    Thoraco abdominal aneurysm (HCC)    Weakness    Acute CVA (cerebrovascular accident) (HCC)    Acute cystitis without hematuria       Reason for Admission: cva, mi     Physical Exam:   General appearance: alert, appears stated age and cooperative  Pulmonary:  clear to auscultation bilaterally  Cardiovascular: S1, S2 normal, no murmur, click, rub or gallop, regular rate and rhythm  Abdominal: soft, non-tender; bowel sounds normal; no masses,  no organomegaly  Extremities: extremities normal, atraumatic, no cyanosis or edema  Psychiatric: calm        History of Present Illness:       Jillian  Phi is a(n) 79 year old female, who presents for evaluation of headache, right leg numbness, imbalance.  Patient with significant past medical history including COPD, left MCA stroke in 2017, IDDM type II, thoracic aortic aneurysm, essential hypertension, hyperlipidemia, CAD, previous DVT, gastric peptic ulcer, ovarian cancer, mixed dementia with Alzheimer's disease.  Patient reports that she woke up this morning with a headache, reports the headache to start at her neck and moving up to her head associated with nausea but no vomiting.  She also felt short of breath but no cough or chest pain.  Patient took 6 puffs of her inhaler without much relief.  She decided to come to the emergency department and about an hour prior to presentation she felt numbness to her right leg which then resolved.  She normally uses a walker, wheelchair and a cane.  She reports bilateral lower extremity weakness which is chronic.  Denies any tingling sensation.  Patient denies difficulty with urination or burning on urination.  Denies any change in vision.  Denies any other complaints.  On presentation to the ED, initial vital signs reveal temp 97.8, heart rate 60, blood pressure 122/63, respiratory 20.  Urinalysis with evidence of UTI.  Imaging including chest x-ray reveals no intrathoracic abnormalities.  MRI of the brain reveals left cerebellum and right frontal/parietal cortex acute infarct as well as left frontal lobe microbleed.        Hospital Course:       Chest pain with EKG showing ST depression in inferior leads. Troponin elevated to 4,000. Started on heparin drip and isosorbide. Family not interested in cardiac cath given her anatomy.   - cont tele, isosorbide and heparin   - ranexa added and no cp today   - cont metoprolol   - v/q neg for pe   - defer heparin drip to Dr Levine - now off      H/o left MCA stroke in 2017  Acute left cerebellum/right frontal/parietal cortex CVA   Acute left frontal lobe microbleed  -As  seen on MRI brain  -Stroke protocol initiated  -Neurology consulted, Dr. Seng Sorto notified with recommendation for aspirin 324 mg which was given in the ED  -adv diet  -cont eliquis  and asa   -saline lock  -Neurochecks  -Speech therapy / PT / OT pending eval  -SCDs for DVT prophylaxis  - acute rehab consult   - htn - cont metoprolol and norvasc      H/o COPD  -Not in acute exacerbation.  No wheezing on physical exam.  -Resume Anoro Ellipta.  Albuterol as needed     Urinary tract infection  -Urinalysis with evidence for UTI.  Patient started on Rocephin.  Will continue Rocephin pending urine cultures. Stop today sp 3 doses      IDDM type II  -Resume home dose of Lantus 6 units nightly.  -ISS increased.   - Hypoglycemia protocol.  Accu-Cheks.     H/o gastric peptic ulcer  -Protonix 40 mg IV daily     CKD  -Creatinine noted to be 1 6, appears to be at baseline.  Monitor renal function with daily BMP     Mixed dementia with Alzheimer's disease  -Patient follows with neurology as an outpatient.  Resume donepezil     Other medical conditions  Thoracic arctic aneurysm  Essential hypertension  Hyperlipidemia  CAD  Previous DVT  Ovarian cancer        Anali Cruz,            Chart reviewed, including current vitals, notes, labs and imaging  Labs ordered and medications adjusted as outlined above  Coordinate care with care team/consultants  Discussed with patient results of tests, management plan as outlined above, and the need for ongoing hospitalization  D/w RN     MDM high        PHYSICIAN Certification of Need for Inpatient Hospitalization - Initial Certification     Patient will require inpatient services that will reasonably be expected to span two midnight's based on the clinical documentation in H+P.   Based on patients current state of illness, I anticipate that, after discharge, patient will require TBD.       Consultations: , Dr Whalen     Procedures: none    Complications: none    Discharge  Condition: Good    Discharge Medications:      Discharge Medications        START taking these medications        Instructions Prescription details   apixaban 2.5 MG Tabs  Commonly known as: Eliquis      Take 1 tablet (2.5 mg total) by mouth 2 (two) times daily.   Refills: 0     atorvastatin 40 MG Tabs  Commonly known as: Lipitor      Take 1 tablet (40 mg total) by mouth nightly.   Refills: 0     insulin aspart 100 Units/mL Sopn  Commonly known as: NovoLOG      Inject 1-7 Units into the skin 3 (three) times daily with meals.   Refills: 0     ranolazine  MG Tb12  Commonly known as: Ranexa      Take 1 tablet (500 mg total) by mouth 2 (two) times daily.   Refills: 0            CONTINUE taking these medications        Instructions Prescription details   Accu-Chek Guide Strp      1 each by In Vitro route daily.   Quantity: 100 strip  Refills: 3     acetaminophen 500 MG Tabs  Commonly known as: Tylenol Extra Strength      Take 1 tablet (500 mg total) by mouth every 8 (eight) hours.   Refills: 0     albuterol 108 (90 Base) MCG/ACT Aers  Commonly known as: Ventolin HFA      Inhale 2 puffs into the lungs every 4 (four) hours as needed.   Quantity: 1 each  Refills: 1     amLODIPine 5 MG Tabs  Commonly known as: Norvasc      Take 1 tablet (5 mg total) by mouth at bedtime.   Refills: 0     Anoro Ellipta 62.5-25 MCG/ACT Aepb  Generic drug: umeclidinium-vilanterol      Inhale 1 puff into the lungs daily.   Quantity: 1 each  Refills: 1     aspirin 81 MG Chew      Chew 1 tablet (81 mg total) by mouth daily.   Quantity: 30 tablet  Refills: 0     CareTouch Pen Needles 32G X 4 MM Misc  Generic drug: Insulin Pen Needle      Use with injection once daily as directed   Quantity: 100 each  Refills: 3     clopidogrel 75 MG Tabs  Commonly known as: Plavix      Take 1 tablet (75 mg total) by mouth daily.   Quantity: 30 tablet  Refills: 0     diclofenac 1 % Gel  Commonly known as: Voltaren      Apply 2 g topically 4 (four) times daily  as needed.   Stop taking on: July 30, 2024  Quantity: 100 g  Refills: 0     donepezil 5 MG Tabs  Commonly known as: Aricept      Take 1 tablet (5 mg total) by mouth nightly.   Stop taking on: October 8, 2024  Quantity: 90 tablet  Refills: 0     ezetimibe 10 MG Tabs  Commonly known as: Zetia      Take 1 tablet (10 mg total) by mouth nightly.   Refills: 0     isosorbide mononitrate  MG Tb24  Commonly known as: Imdur      Take 1 tablet (120 mg total) by mouth daily.   Refills: 0     Lantus SoloStar 100 UNIT/ML Sopn  Generic drug: insulin glargine      Inject 6 Units into the skin nightly.   Quantity: 15 mL  Refills: 1     lidocaine-menthol 4-1 % Ptch      Place 2 patches onto the skin daily.   Refills: 0     metoprolol tartrate 100 MG Tabs  Commonly known as: Lopressor      Take 1 tablet (100 mg total) by mouth 2 (two) times daily.   Refills: 0     nitroglycerin 0.4 MG Subl  Commonly known as: Nitrostat      Place 1 tablet (0.4 mg total) under the tongue every 5 (five) minutes as needed for Chest pain.   Refills: 0     OneTouch Delica Lancets 33G Misc      OneTouch Delica Lancets 33 gauge   TEST BLOOD GLUCOSE TWO TIMES A DAY AS DIRECTED   Refills: 0     OneTouch Delica Lancets 33G Misc      1 Lancet by Finger stick route 3 (three) times daily.   Quantity: 300 each  Refills: 3     OneTouch Verio IQ System w/Device Kit      1 Device by Other route 3 (three) times daily. Use as directed.   Quantity: 1 kit  Refills: 0     Accu-Chek Guide w/Device Kit      1 each daily.   Quantity: 1 kit  Refills: 0     pantoprazole 40 MG Tbec  Commonly known as: Protonix      Take 1 tablet (40 mg total) by mouth 2 (two) times daily before meals.   Quantity: 180 tablet  Refills: 3            STOP taking these medications      HYDROcodone-acetaminophen 5-325 MG Tabs  Commonly known as: Norco        traMADol 50 MG Tabs  Commonly known as: Ultram               ASK your doctor about these medications        Instructions Prescription  details   OneTouch Verio Strp      Use as directed.   Quantity: 300 strip  Refills: 3               Where to Get Your Medications        These medications were sent to [id number] - [place], [place] - [address] AT [facility], [phone], [phone]  [address] [facility], [facility] [address]      Phone: [phone]   Anoro Ellipta 62.5-25 MCG/ACT Aepb  aspirin 81 MG Chew         Follow up Visits: Follow-up with pcp  in 1 week    Follow up Labs: none     Other Discharge Instructions: f/u with cards 2 weeks    Anali Cruz DO  7/29/2024  3:16 PM    > 35 min

## 2024-08-01 RX ORDER — CYANOCOBALAMIN 1000 UG/ML
INJECTION, SOLUTION INTRAMUSCULAR; SUBCUTANEOUS
Qty: 12 ML | Refills: 0 | OUTPATIENT
Start: 2024-08-01

## 2024-08-07 ENCOUNTER — TELEPHONE (OUTPATIENT)
Dept: FAMILY MEDICINE CLINIC | Facility: CLINIC | Age: 79
End: 2024-08-07

## 2024-08-08 NOTE — TELEPHONE ENCOUNTER
Jer - [facility]  [phone]    Called to check if Dr Weiler would sign home health orders for patient.    I read Dr Weiler's positive response below.  Call Jer at above number if needed.

## 2024-08-12 ENCOUNTER — OFFICE VISIT (OUTPATIENT)
Dept: ENDOCRINOLOGY CLINIC | Facility: CLINIC | Age: 79
End: 2024-08-12
Payer: MEDICARE

## 2024-08-12 VITALS
HEART RATE: 53 BPM | SYSTOLIC BLOOD PRESSURE: 149 MMHG | BODY MASS INDEX: 29.27 KG/M2 | DIASTOLIC BLOOD PRESSURE: 92 MMHG | WEIGHT: 155 LBS | HEIGHT: 61 IN

## 2024-08-12 DIAGNOSIS — E11.65 UNCONTROLLED TYPE 2 DIABETES MELLITUS WITH HYPERGLYCEMIA (HCC): Primary | ICD-10-CM

## 2024-08-12 LAB
GLUCOSE BLOOD: 312
TEST STRIP LOT #: NORMAL NUMERIC

## 2024-08-12 PROCEDURE — 82947 ASSAY GLUCOSE BLOOD QUANT: CPT

## 2024-08-12 PROCEDURE — 99214 OFFICE O/P EST MOD 30 MIN: CPT

## 2024-08-12 NOTE — PROGRESS NOTES
Name: Jillian Yip  Date: 8/12/24    Referring Physician: No ref. provider found    HISTORY OF PRESENT ILLNESS   Jillian Yip is a 79 year old female who presents for consult for diabetes mellitus.     She was diagnosed with diabetes about 10 years ago during hospitalization for MI. Currently living with her daughter and grandson and she also has a caregiver Tuesdays- Fridays.   She does have dementia which is worsening per daughter. She was hospitalized a few months ago following aortic aneurysm repair and then developed pneumonia. Hospitalized again 7/2024 for CVA.     Diabetes History:  Diagnosed- 10 years ago   Patient has not had hospitalizations for blood sugar issues    Prior glycohemoglobin were:  8.1% 5/2024;  8.4% 7/2024      Dietary compliance: Good- trying to increase protein      Recall:  Breakfast- toast with coffee and occ. sausage  Lunch- skips or tuna sandwich, lean cuisine, soup   Dinner- protein with vegetable and starch, or takeout 1-2 days weekly, chicken soup    Snack- potato chips, or sweets   Beverages- soda, has cut back fairly significantly     Exercise: No  Polyuria/polydipsia: No  Blurred vision: No    Episodes of hypoglycemia: No  Blood Glucose:  Checking 1-3 times per day    Fasting- 140-250  Before dinner- 200-300's     Previous DM therapies:  Metformin - stopped due CKD   Tradjenta- too costly     Current DM Regimen:  Lantus - 6 units subcutaneous daily in the evening     Modifying factors:  Medication adherence: Yes   Recent steroids, illness or infections: Yes - UTI 7/2024      REVIEW OF SYSTEMS  Eyes: Diabetic retinopathy present: No, hx of cataract             Most recent visit to eye doctor in last 12 months: No- is due for appt. Has seen Dr. Rose in the past.     CV: Cardiovascular disease present: Yes, MI- 2013         Hypertension present: Yes         Hyperlipidemia present: Yes         Peripheral Vascular Disease present: No       Cerebrovascular: CVA - 7 years      : Nephropathy present: CKD- following with nephrology- Dr. Thayer.     Neuro: Neuropathy present: Mild symptoms- seeing podiatry.    Skin: Infection or ulceration: No     Osteoporosis: No    Thyroid disease: No       Medications:     Current Outpatient Medications:     insulin glargine (LANTUS SOLOSTAR) 100 UNIT/ML Subcutaneous Solution Pen-injector, Inject 6 Units into the skin nightly., Disp: 15 mL, Rfl: 1    aspirin 81 MG Oral Chew Tab, Chew 1 tablet (81 mg total) by mouth daily., Disp: 30 tablet, Rfl: 0    umeclidinium-vilanterol (ANORO ELLIPTA) 62.5-25 MCG/ACT Inhalation Aerosol Powder, Breath Activated, Inhale 1 puff into the lungs daily., Disp: 1 each, Rfl: 1    apixaban 2.5 MG Oral Tab, Take 1 tablet (2.5 mg total) by mouth 2 (two) times daily., Disp: , Rfl:     atorvastatin 40 MG Oral Tab, Take 1 tablet (40 mg total) by mouth nightly., Disp: , Rfl:     ranolazine  MG Oral Tablet 12 Hr, Take 1 tablet (500 mg total) by mouth 2 (two) times daily., Disp: , Rfl:     insulin aspart 100 Units/mL Subcutaneous Solution Pen-injector, Inject 1-7 Units into the skin 3 (three) times daily with meals., Disp: , Rfl:     cyanocobalamin 1000 MCG/ML Injection Solution, Inject 1 mL (1,000 mcg total) into the muscle once a week., Disp: 4 each, Rfl: 0    isosorbide mononitrate  MG Oral Tablet 24 Hr, Take 1 tablet (120 mg total) by mouth daily., Disp: , Rfl:     donepezil 5 MG Oral Tab, Take 1 tablet (5 mg total) by mouth nightly., Disp: 90 tablet, Rfl: 0    Insulin Pen Needle (CARETOUCH PEN NEEDLES) 32G X 4 MM Does not apply Misc, Use with injection once daily as directed, Disp: 100 each, Rfl: 3    Blood Glucose Monitoring Suppl (ACCU-CHEK GUIDE) w/Device Does not apply Kit, 1 each daily., Disp: 1 kit, Rfl: 0    Glucose Blood (ACCU-CHEK GUIDE) In Vitro Strip, 1 each by In Vitro route daily., Disp: 100 strip, Rfl: 3    Blood Glucose Monitoring Suppl (ONETOUCH VERIO IQ SYSTEM) w/Device Does not apply Kit, 1  Device by Other route 3 (three) times daily. Use as directed. (Patient not taking: Reported on 5/13/2024), Disp: 1 kit, Rfl: 0    Glucose Blood (ONETOUCH VERIO) In Vitro Strip, Use as directed. (Patient not taking: Reported on 5/13/2024), Disp: 300 strip, Rfl: 3    OneTouch Delica Lancets 33G Does not apply Misc, 1 Lancet by Finger stick route 3 (three) times daily. (Patient not taking: Reported on 5/13/2024), Disp: 300 each, Rfl: 3    pantoprazole 40 MG Oral Tab EC, Take 1 tablet (40 mg total) by mouth 2 (two) times daily before meals., Disp: 180 tablet, Rfl: 3    albuterol 108 (90 Base) MCG/ACT Inhalation Aero Soln, Inhale 2 puffs into the lungs every 4 (four) hours as needed., Disp: 1 each, Rfl: 1    acetaminophen 500 MG Oral Tab, Take 1 tablet (500 mg total) by mouth every 8 (eight) hours., Disp: , Rfl:     lidocaine-menthol 4-1 % External Patch, Place 2 patches onto the skin daily., Disp: , Rfl:     metoprolol tartrate 100 MG Oral Tab, Take 1 tablet (100 mg total) by mouth 2 (two) times daily., Disp: , Rfl:     nitroglycerin 0.4 MG Sublingual SL Tab, Place 1 tablet (0.4 mg total) under the tongue every 5 (five) minutes as needed for Chest pain., Disp: , Rfl:     amLODIPine Besylate 5 MG Oral Tab, Take 1 tablet (5 mg total) by mouth at bedtime., Disp: , Rfl:     ONETOUCH DELICA LANCETS 33G Does not apply Misc, OneTouch Delica Lancets 33 gauge  TEST BLOOD GLUCOSE TWO TIMES A DAY AS DIRECTED (Patient not taking: Reported on 5/13/2024), Disp: , Rfl:     Clopidogrel Bisulfate 75 MG Oral Tab, Take 1 tablet (75 mg total) by mouth daily., Disp: 30 tablet, Rfl: 0    ezetimibe 10 MG Oral Tab, Take 1 tablet (10 mg total) by mouth nightly., Disp: , Rfl:      Allergies:   Allergies   Allergen Reactions    Codeine SWELLING and OTHER (SEE COMMENTS)     Face and body get puffy/swell, headache  Per pt, has tolerated morphine and Norco previously with no adverse effects    Enalapril Maleate-Felodipine SHORTNESS OF BREATH     Nyquil Severe Cold-Flu [Gnp Multi-Symptom Cold] PALPITATIONS    Radiology Contrast Iodinated Dyes ANAPHYLAXIS    Shellfish-Derived Products     Dilaudid [Hydromorphone] OTHER (SEE COMMENTS)     PATIENT'S DAUGHTER HAD A VERY BAD REACTION FROM DILAUDID (MENTALLY)    Sulfa Antibiotics RASH and OTHER (SEE COMMENTS)     Rash         Social History:   Social History     Socioeconomic History    Marital status:    Tobacco Use    Smoking status: Former     Current packs/day: 0.50     Average packs/day: 0.5 packs/day for 35.0 years (17.5 ttl pk-yrs)     Types: Cigarettes     Passive exposure: Never    Smokeless tobacco: Former    Tobacco comments:     half pack a day   Vaping Use    Vaping status: Never Used   Substance and Sexual Activity    Alcohol use: No    Drug use: No       Medical History:   Past Medical History:    Aortic aneurysm, thoracic (HCC)    Back problem    Calculus of kidney    Cataract    COPD (chronic obstructive pulmonary disease) (HCC)    Coronary atherosclerosis    Deep vein thrombosis (HCC)    Diabetes (HCC)    Diverticulosis of large intestine    Heart attack (HCC)    2013    High blood pressure    High cholesterol    History of stomach ulcers    Incontinence    Osteoarthritis    Ovarian cancer (HCC)    PONV (postoperative nausea and vomiting)    Renal disorder    Shortness of breath    Stroke (HCC)    shaking, wheelchair       Surgical history:   Past Surgical History:   Procedure Laterality Date    Appendectomy      Arthroscopy of joint unlisted Right     Rotator cuff surgery    Back surgery  07/21/2017    L3-4 LUIS and hemilaminectomies    Cataract      Cath drug eluting stent      2013    Endovas aaa repr w/3-p part      Ep loop recorder implant Left 2017    Foot surgery Bilateral     Fracture surgery Right     right clavicle    Hysterectomy      Theodora localization wire 1 site right (cpt=19281)      benign-20 years ago    Spine surgery procedure unlisted           PHYSICAL EXAM  Vitals:     08/12/24 1419   BP: 151/63   Pulse: 53   Weight: 155 lb (70.3 kg)   Height: 5' 1\" (1.549 m)         General Appearance:  alert, well developed, in no acute distress  Neck: Trachea midline: Normal  Back: no kyphosis or back tenderness  Lymph Nodes:  No abnormal nodes noted  Musculoskeletal:  normal muscle strength and tone  Skin:  normal moisture and skin texture  Hair & Nails:  normal scalp hair     Hematologic:  no excessive bruising  Neuro:  sensory grossly intact and motor grossly intact.  Psychiatric:  oriented to time, self, and place  Nutritional:  no abnormal weight gain or loss      ASSESSMENT/PLAN:    Diabetes mellitus type 2   -HgA1c- 8.1% POC today   -Reviewed target of 7.5% or below.   - Reviewed pathogenesis of diabetes.   - Reviewed importance of good glycemic control to prevent microvascular and macrovascular complications including nephropathy, neuropathy, retinopathy, and cardiovascular disease.  - Reviewed importance of SBGM- check glucose 2-3 times daily   - Reviewed target glucose goals for patient  fasting and <200 post prandially   - Reviewed importance of following diabetic diet- recommended 135 grams of CHO per day or 45 grams per meal.   - Provided patient education materials    - Reviewed importance of limiting soda and sweetened beverages with meals.    - Increase Lantus to 10 units subcutaneous daily. Reviewed insulin timing and administration.     - Continue to monitor sugars twice daily- call with readings in 2 weeks.   - Would likely benefit from small dose of weekly GLP-1 agonist but cost with brand name prescriptions if very high.     -normotensive   - Lipids at goal 5/2024;    - + nephropathy- following with nephrology   - Needs optho appt and daughter is aware- will schedule appt   - Following with podiatry- has appt next month, last foot exam 3/2024.   - Check glucose 1-2 times daily- fasting and evening Reviewed glucose targets.       RTC in 3 months, call with readings in  2 weeks   8/12/24  KEVAN Ibrahim    A total of  30 minutes was spent on obtaining history, reviewing pertinent imaging/labs and specialists notes, evaluating patient, providing multiple treatment options, reinforcing diet/exercise and compliance, and completing documentation.

## 2024-08-20 ENCOUNTER — TELEPHONE (OUTPATIENT)
Dept: ENDOCRINOLOGY CLINIC | Facility: CLINIC | Age: 79
End: 2024-08-20

## 2024-08-20 NOTE — TELEPHONE ENCOUNTER
Received fax from [facility] attached is a request for O dated 08/16/2024. Fax placed in provider folder for review and signature.

## 2024-08-26 ENCOUNTER — OFFICE VISIT (OUTPATIENT)
Dept: NEPHROLOGY | Facility: CLINIC | Age: 79
End: 2024-08-26

## 2024-08-26 VITALS
BODY MASS INDEX: 29 KG/M2 | SYSTOLIC BLOOD PRESSURE: 130 MMHG | HEIGHT: 61 IN | DIASTOLIC BLOOD PRESSURE: 67 MMHG | HEART RATE: 48 BPM

## 2024-08-26 DIAGNOSIS — N18.32 STAGE 3B CHRONIC KIDNEY DISEASE (HCC): Primary | ICD-10-CM

## 2024-08-26 PROCEDURE — 99214 OFFICE O/P EST MOD 30 MIN: CPT | Performed by: INTERNAL MEDICINE

## 2024-08-26 NOTE — PROGRESS NOTES
08/26/24        Patient: Jillian Yip   YOB: 1945   Date of Visit: 8/26/2024       Dear  Dr. Weiler, DO,      Thank you for referring Jillian Yip to my practice.  Please find my assessment and plan below.      As you know she is an 79-year-old female with a history of coronary artery disease, COPD, history of DVTs, adult onset diabetes mellitus, hypertension, hypercholesterolemia, status post CVA, history of recurrent renal calculi, stress incontinence and a former cigarette smoker who I now had the pleasure of seeing for follow-up of chronic kidney disease stage IV.    The patient was just hospitalized from July 24 through July 29, 2024.  She had acute cystitis but also suffered another acute MI.  After she was hospitalized she developed chest pain and was thought to have another acute MI.  They elected to treat her medically and did not do a cardiac cath given severe peripheral vascular disease and her chronic kidney disease.  She did go to rehab for 8 days and is now back home.  Here with daughter.  She can walk a bit around the house with a walker but is otherwise wheelchair-bound.  He has some right-sided weakness but speech and swallowing have been okay.    On physical exam her blood pressure 130/67 with a pulse of 48.  Her neck was supple without JVD.  Lungs were clear.  Heart revealed a regular rate and rhythm with an S4 but no gallops, murmurs or rubs.  Abdomen soft, flat, nontender without organomegaly, masses or bruits.  Extremities revealed no edema.    I reviewed her most recent laboratory studies her her creatinine during that hospitalization was up to 2.11 but improved down to 1.79 at time of discharge.  They did labs at rehab on July 30, 2024 creatinine was 1.98 with an estimated GFR 25 cc/min.  Electrolytes were okay.  Reinforced the importance of maintaining anticoagulation.  Avoid nonsteroidals.  Keep blood pressure and blood sugars under good control.  I will repeat a CBC  and renal panel in September 2024.  If stable will continue quarterly.  I will see her again in 6 months for follow-up or sooner if clinically indicated.    Thank you again for allowing me to participate in the care of your patient.  If you have any questions please feel free to call.           Sincerely,   Tab Thayer MD   [facility], [facility], [place]  [address]  [facility] [address]    Document electronically generated by:  Tab Thayer MD

## 2024-09-26 ENCOUNTER — APPOINTMENT (OUTPATIENT)
Dept: MRI IMAGING | Facility: HOSPITAL | Age: 79
End: 2024-09-26
Attending: EMERGENCY MEDICINE
Payer: MEDICARE

## 2024-09-26 ENCOUNTER — HOSPITAL ENCOUNTER (EMERGENCY)
Facility: HOSPITAL | Age: 79
Discharge: HOME | End: 2024-09-26
Attending: EMERGENCY MEDICINE
Payer: MEDICARE

## 2024-09-26 ENCOUNTER — NURSE TRIAGE (OUTPATIENT)
Dept: FAMILY MEDICINE CLINIC | Facility: CLINIC | Age: 79
End: 2024-09-26

## 2024-09-26 VITALS
SYSTOLIC BLOOD PRESSURE: 144 MMHG | DIASTOLIC BLOOD PRESSURE: 90 MMHG | RESPIRATION RATE: 17 BRPM | TEMPERATURE: 98 F | BODY MASS INDEX: 29 KG/M2 | OXYGEN SATURATION: 96 % | WEIGHT: 155 LBS | HEART RATE: 68 BPM

## 2024-09-26 DIAGNOSIS — R42 VERTIGO: Primary | ICD-10-CM

## 2024-09-26 DIAGNOSIS — N18.9 CHRONIC RENAL IMPAIRMENT, UNSPECIFIED CKD STAGE: ICD-10-CM

## 2024-09-26 LAB
ALBUMIN SERPL-MCNC: 4.4 G/DL (ref 3.2–4.8)
ALBUMIN/GLOB SERPL: 1.6 {RATIO} (ref 1–2)
ALP LIVER SERPL-CCNC: 149 U/L
ALT SERPL-CCNC: <7 U/L
ANION GAP SERPL CALC-SCNC: 7 MMOL/L (ref 0–18)
AST SERPL-CCNC: 12 U/L (ref ?–34)
BASOPHILS # BLD AUTO: 0.06 X10(3) UL (ref 0–0.2)
BASOPHILS NFR BLD AUTO: 0.8 %
BILIRUB SERPL-MCNC: 0.4 MG/DL (ref 0.2–1.1)
BILIRUB UR QL: NEGATIVE
BUN BLD-MCNC: 43 MG/DL (ref 9–23)
BUN/CREAT SERPL: 18.5 (ref 10–20)
CALCIUM BLD-MCNC: 10 MG/DL (ref 8.7–10.4)
CHLORIDE SERPL-SCNC: 102 MMOL/L (ref 98–112)
CLARITY UR: CLEAR
CO2 SERPL-SCNC: 29 MMOL/L (ref 21–32)
COLOR UR: YELLOW
CREAT BLD-MCNC: 2.32 MG/DL
DEPRECATED RDW RBC AUTO: 45.5 FL (ref 35.1–46.3)
EGFRCR SERPLBLD CKD-EPI 2021: 21 ML/MIN/1.73M2 (ref 60–?)
EOSINOPHIL # BLD AUTO: 0.18 X10(3) UL (ref 0–0.7)
EOSINOPHIL NFR BLD AUTO: 2.5 %
ERYTHROCYTE [DISTWIDTH] IN BLOOD BY AUTOMATED COUNT: 14.7 % (ref 11–15)
GLOBULIN PLAS-MCNC: 2.7 G/DL (ref 2–3.5)
GLUCOSE BLD-MCNC: 204 MG/DL (ref 70–99)
GLUCOSE UR-MCNC: NORMAL MG/DL
HCT VFR BLD AUTO: 35.5 %
HGB BLD-MCNC: 11.6 G/DL
HGB UR QL STRIP.AUTO: NEGATIVE
IMM GRANULOCYTES # BLD AUTO: 0.03 X10(3) UL (ref 0–1)
IMM GRANULOCYTES NFR BLD: 0.4 %
KETONES UR-MCNC: NEGATIVE MG/DL
LEUKOCYTE ESTERASE UR QL STRIP.AUTO: NEGATIVE
LYMPHOCYTES # BLD AUTO: 1.36 X10(3) UL (ref 1–4)
LYMPHOCYTES NFR BLD AUTO: 18.9 %
MCH RBC QN AUTO: 27.8 PG (ref 26–34)
MCHC RBC AUTO-ENTMCNC: 32.7 G/DL (ref 31–37)
MCV RBC AUTO: 84.9 FL
MONOCYTES # BLD AUTO: 0.57 X10(3) UL (ref 0.1–1)
MONOCYTES NFR BLD AUTO: 7.9 %
NEUTROPHILS # BLD AUTO: 4.99 X10 (3) UL (ref 1.5–7.7)
NEUTROPHILS # BLD AUTO: 4.99 X10(3) UL (ref 1.5–7.7)
NEUTROPHILS NFR BLD AUTO: 69.5 %
NITRITE UR QL STRIP.AUTO: NEGATIVE
OSMOLALITY SERPL CALC.SUM OF ELEC: 303 MOSM/KG (ref 275–295)
PH UR: 6 [PH] (ref 5–8)
PLATELET # BLD AUTO: 332 10(3)UL (ref 150–450)
POTASSIUM SERPL-SCNC: 4.7 MMOL/L (ref 3.5–5.1)
PROT SERPL-MCNC: 7.1 G/DL (ref 5.7–8.2)
PROT UR-MCNC: 20 MG/DL
RBC # BLD AUTO: 4.18 X10(6)UL
SODIUM SERPL-SCNC: 138 MMOL/L (ref 136–145)
SP GR UR STRIP: 1.02 (ref 1–1.03)
TROPONIN I SERPL HS-MCNC: 7 NG/L
UROBILINOGEN UR STRIP-ACNC: NORMAL
WBC # BLD AUTO: 7.2 X10(3) UL (ref 4–11)

## 2024-09-26 PROCEDURE — 99284 EMERGENCY DEPT VISIT MOD MDM: CPT

## 2024-09-26 PROCEDURE — 80053 COMPREHEN METABOLIC PANEL: CPT | Performed by: EMERGENCY MEDICINE

## 2024-09-26 PROCEDURE — 96374 THER/PROPH/DIAG INJ IV PUSH: CPT

## 2024-09-26 PROCEDURE — 93010 ELECTROCARDIOGRAM REPORT: CPT

## 2024-09-26 PROCEDURE — 85025 COMPLETE CBC W/AUTO DIFF WBC: CPT | Performed by: EMERGENCY MEDICINE

## 2024-09-26 PROCEDURE — 84484 ASSAY OF TROPONIN QUANT: CPT | Performed by: EMERGENCY MEDICINE

## 2024-09-26 PROCEDURE — 81001 URINALYSIS AUTO W/SCOPE: CPT | Performed by: EMERGENCY MEDICINE

## 2024-09-26 PROCEDURE — 70551 MRI BRAIN STEM W/O DYE: CPT | Performed by: EMERGENCY MEDICINE

## 2024-09-26 PROCEDURE — 99285 EMERGENCY DEPT VISIT HI MDM: CPT

## 2024-09-26 PROCEDURE — 93005 ELECTROCARDIOGRAM TRACING: CPT

## 2024-09-26 RX ORDER — MECLIZINE HYDROCHLORIDE 25 MG/1
25 TABLET ORAL ONCE
Status: COMPLETED | OUTPATIENT
Start: 2024-09-26 | End: 2024-09-26

## 2024-09-26 RX ORDER — ONDANSETRON 2 MG/ML
4 INJECTION INTRAMUSCULAR; INTRAVENOUS ONCE
Status: COMPLETED | OUTPATIENT
Start: 2024-09-26 | End: 2024-09-26

## 2024-09-26 RX ORDER — MECLIZINE HYDROCHLORIDE 25 MG/1
25 TABLET ORAL 3 TIMES DAILY PRN
Qty: 12 TABLET | Refills: 0 | Status: SHIPPED | OUTPATIENT
Start: 2024-09-26 | End: 2024-09-30

## 2024-09-26 RX ORDER — ONDANSETRON 4 MG/1
4 TABLET, ORALLY DISINTEGRATING ORAL EVERY 4 HOURS PRN
Qty: 10 TABLET | Refills: 0 | Status: SHIPPED | OUTPATIENT
Start: 2024-09-26 | End: 2024-10-03

## 2024-09-26 NOTE — ED INITIAL ASSESSMENT (HPI)
Patient c/o dark tarry stools with abdominal pain for the past month but recently has been having nausea and headache for the past couple days. Her PCP told her to come in. Daughter is also stating a new lump in on her left leg. She recently had a stroke and was put on blood thinners- Eliquis.

## 2024-09-26 NOTE — TELEPHONE ENCOUNTER
Please reply to pool: EM RN TRIAGE  Action Requested: Summary for Provider     []  Critical Lab, Recommendations Needed  [] Need Additional Advice  [x]   FYI    []   Need Orders  [] Need Medications Sent to Pharmacy  []  Other     SUMMARY: Received call from JOVANNA Loaiza of [facility].  She is wit the patient and reporting black tarry stool x 1 month.  Fatigue, dizziness and the feeling that something is moving in her abdomen.  She is on eliquis.  O2 saturation 92-93%.  Remaining vital signs stable.  Nurse advised emergency room evaluation.  Patient's caregiver will take her.  Flagged for follow up.      Reason for call: Blood In Stool  Onset: Data Unavailable                       Reason for Disposition   Bloody, black, or tarry bowel movements  (Exception: Chronic-unchanged black-grey bowel movements and is taking iron pills or Pepto-Bismol.)    Protocols used: Diarrhea-A-OH

## 2024-09-26 NOTE — DISCHARGE INSTRUCTIONS
Return for changes or worsening.  Continue medications.  Follow-up with your doctor.  Read all instructions.

## 2024-09-26 NOTE — ED PROVIDER NOTES
Patient Seen in: [facility] Emergency Department      History     Chief Complaint   Patient presents with    GI Bleeding     Stated Complaint: multiple complaints    Subjective:   79-year-old female with history of aneurysm, COPD, CAD, DVT, diabetes, hypertension, hyperlipidemia, ulcers, ovarian cancer, renal disease, stroke on Eliquis here with daughter for evaluation of dizziness and nausea at least since last week along with some stool color changes.  The patient describes the dizziness as spinning.  It gets worse when she sits up.  It makes her nauseated but no actual vomiting.  She was able to eat and drink today.  She said last week she thinks she saw some red blood in her stool.  Her daughter thinks her stools might be darker than normal.  Her healthcare providers on the phone thought maybe she was having a GI bleed causing her symptoms.  No shortness of breath.  No focal weakness or numbness.            Objective:   Past Medical History:    Aortic aneurysm, thoracic (HCC)    Back problem    Calculus of kidney    Cataract    COPD (chronic obstructive pulmonary disease) (HCC)    Coronary atherosclerosis    Deep vein thrombosis (HCC)    Diabetes (HCC)    Diverticulosis of large intestine    Heart attack (HCC)    2013    High blood pressure    High cholesterol    History of stomach ulcers    Incontinence    Osteoarthritis    Ovarian cancer (HCC)    PONV (postoperative nausea and vomiting)    Renal disorder    Shortness of breath    Stroke (HCC)    shaking, wheelchair              Past Surgical History:   Procedure Laterality Date    Appendectomy      Arthroscopy of joint unlisted Right     Rotator cuff surgery    Back surgery  07/21/2017    L3-4 LUIS and hemilaminectomies    Cataract      Cath drug eluting stent      2013    Endovas aaa repr w/3-p part      Ep loop recorder implant Left 2017    Foot surgery Bilateral     Fracture surgery Right     right clavicle    Hysterectomy      Theodora localization wire  1 site right (cpt=19281)      benign-20 years ago    Spine surgery procedure unlisted                  Social History     Socioeconomic History    Marital status:    Tobacco Use    Smoking status: Former     Current packs/day: 0.50     Average packs/day: 0.5 packs/day for 35.0 years (17.5 ttl pk-yrs)     Types: Cigarettes     Passive exposure: Never    Smokeless tobacco: Former    Tobacco comments:     half pack a day   Vaping Use    Vaping status: Never Used   Substance and Sexual Activity    Alcohol use: No    Drug use: No     Social Determinants of Health     Food Insecurity: No Food Insecurity (7/29/2024)    Received from [facility]    Hunger Vital Sign     Worried About Running Out of Food in the Last Year: Never true     Ran Out of Food in the Last Year: Never true   Transportation Needs: No Transportation Needs (7/29/2024)    Received from [facility]    PRAPARE - Transportation     Lack of Transportation (Medical): No     Lack of Transportation (Non-Medical): No   Housing Stability: Unknown (7/29/2024)    Received from [facility]    Housing Stability Vital Sign     Unable to Pay for Housing in the Last Year: No     Homeless in the Last Year: No              Review of Systems    Positive for stated Chief Complaint: GI Bleeding    Other systems are as noted in HPI.  Constitutional and vital signs reviewed.      All other systems reviewed and negative except as noted above.    Physical Exam     ED Triage Vitals [09/26/24 1600]   /82   Pulse 60   Resp 20   Temp 98.4 °F (36.9 °C)   Temp src Oral   SpO2 96 %   O2 Device None (Room air)       Current Vitals:   Vital Signs  BP: 138/52  Pulse: 57  Resp: 17  Temp: 98.4 °F (36.9 °C)  Temp src: Oral  MAP (mmHg): 78    Oxygen Therapy  SpO2: 95 %  O2 Device: None (Room air)            Physical Exam  HENT:      Head: Normocephalic and atraumatic.      Right Ear: External ear normal.      Left Ear: External  ear normal.      Nose: Nose normal.      Mouth/Throat:      Mouth: Mucous membranes are moist.   Eyes:      Extraocular Movements: Extraocular movements intact.      Pupils: Pupils are equal, round, and reactive to light.   Cardiovascular:      Rate and Rhythm: Normal rate and regular rhythm.      Pulses: Normal pulses.      Heart sounds: Normal heart sounds.   Pulmonary:      Effort: Pulmonary effort is normal.      Breath sounds: Normal breath sounds.   Abdominal:      Palpations: Abdomen is soft.   Genitourinary:     Rectum: Guaiac result negative.   Musculoskeletal:         General: Normal range of motion.      Cervical back: Normal range of motion.   Skin:     General: Skin is warm.      Capillary Refill: Capillary refill takes less than 2 seconds.   Neurological:      Mental Status: She is alert.      Sensory: No sensory deficit.      Motor: No weakness.               ED Course     Labs Reviewed   COMP METABOLIC PANEL (14) - Abnormal; Notable for the following components:       Result Value    Glucose 204 (*)     BUN 43 (*)     Creatinine 2.32 (*)     Calculated Osmolality 303 (*)     eGFR-Cr 21 (*)     ALT <7 (*)     Alkaline Phosphatase 149 (*)     All other components within normal limits   CBC WITH DIFFERENTIAL WITH PLATELET - Abnormal; Notable for the following components:    HGB 11.6 (*)     All other components within normal limits   URINALYSIS WITH CULTURE REFLEX - Abnormal; Notable for the following components:    Protein Urine 20 (*)     All other components within normal limits   TROPONIN I HIGH SENSITIVITY - Normal     EKG    Rate, intervals and axes as noted on EKG Report.  Rate: 64  Rhythm: Sinus Rhythm  Reading: Left axis deviation, LVH criteria.  No acute ST elevation.              ED Course as of 09/26/24 1815  ------------------------------------------------------------  Time: 09/26 9776  Comment: Labs independently interpreted by me.  No UTI.  CBC shows hemoglobin actually improved from 1  month ago.  It is 11.6  ------------------------------------------------------------  Time: 09/26 1812  Comment: Patient is feeling much better.  Looks good.  If MRI is unremarkable will send home with meclizine and Zofran              [facility]                                         Medical Decision Making  Patient with vertigo and nausea.  Also thinks her stools were bloody but they were actually heme-negative.  Differential is vast could include but is not limited to peripheral vertigo, central vertigo such as stroke or mass.  Could also be related to anemia or electrolyte disturbances or dehydration or viral illness and many other possibilities.  Will get labs and MRI and give Zofran.    Amount and/or Complexity of Data Reviewed  External Data Reviewed: labs.  Labs: ordered. Decision-making details documented in ED Course.  Radiology: ordered and independent interpretation performed.  Discussion of management or test interpretation with external provider(s): Patient doing well.  Will endorse results of MRI    Risk  Prescription drug management.        Disposition and Plan     Clinical Impression:  1. Vertigo         Disposition:  There is no disposition on file for this visit.  There is no disposition time on file for this visit.    Follow-up:  Weiler, Colleen M, DO  [address] [facility] [address]  [place] [facility] [id number] [phone]    Follow up      We recommend that you schedule follow up care with a primary care provider within the next three months to obtain basic health screening including reassessment of your blood pressure.      Medications Prescribed:  Current Discharge Medication List        START taking these medications    Details   ondansetron 4 MG Oral Tablet Dispersible Take 1 tablet (4 mg total) by mouth every 4 (four) hours as needed for Nausea.  Qty: 10 tablet, Refills: 0      meclizine 25 MG Oral Tab Take 1 tablet (25 mg total) by mouth 3 (three) times daily as needed.  Qty: 12 tablet, Refills: 0

## 2024-09-27 LAB
ATRIAL RATE: 64 BPM
P AXIS: 55 DEGREES
P-R INTERVAL: 168 MS
Q-T INTERVAL: 462 MS
QRS DURATION: 102 MS
QTC CALCULATION (BEZET): 476 MS
R AXIS: -38 DEGREES
T AXIS: 48 DEGREES
VENTRICULAR RATE: 64 BPM

## 2024-09-27 NOTE — ED PROVIDER NOTES
This patient was endorsed to me by the earlier emergency department physician.  I was asked to follow-up on the MRI which is negative.  I discussed all of the results with the patient and the family member at the bedside including the increasing creatinine.  She states she already has an appointment to see her nephrologist and she will follow-up with nephrology in the next day or 2 and return immediately any worsening symptoms or new concerns.  The patient and family member verbalized understanding and agreement this plan

## 2024-10-09 ENCOUNTER — PATIENT MESSAGE (OUTPATIENT)
Dept: NEUROLOGY | Facility: CLINIC | Age: 79
End: 2024-10-09

## 2024-10-11 ENCOUNTER — TELEPHONE (OUTPATIENT)
Dept: FAMILY MEDICINE CLINIC | Facility: CLINIC | Age: 79
End: 2024-10-11

## 2024-10-11 ENCOUNTER — NURSE TRIAGE (OUTPATIENT)
Dept: FAMILY MEDICINE CLINIC | Facility: CLINIC | Age: 79
End: 2024-10-11

## 2024-10-11 DIAGNOSIS — M25.539 PAIN IN WRIST, UNSPECIFIED LATERALITY: Primary | ICD-10-CM

## 2024-10-11 RX ORDER — TRAMADOL HYDROCHLORIDE 50 MG/1
50 TABLET ORAL EVERY 8 HOURS PRN
Qty: 30 TABLET | Refills: 0 | Status: SHIPPED | OUTPATIENT
Start: 2024-10-11

## 2024-10-11 NOTE — TELEPHONE ENCOUNTER
Action Requested: Summary for Provider     []  Critical Lab, Recommendations Needed  [] Need Additional Advice  []   FYI    []   Need Orders  [] Need Medications Sent to Pharmacy  []  Other     SUMMARY: Disposition per protocol  is to be seen in office today, no appointments available.  Daughter will take her to [facility] today      Reason for call: Wrist Injury  Onset:2 days ago     Daughter Keaton calling back from my chart message,verified Patient name and date of birth. She has release of information permission. Patient Hit  right wrist on bed bar  at home 2 days ago. No improvement with home care of [facility]. No deformity or bruising.Today fingers  and wrist are swollen. Pain 7/10, difficult to sleep. She has movement in her fingers  but range of motion  in wrist is limited. She's not able to use her walker due to injury  Mental status is at baseline, appetite decreased but family is encouraging fluids. Reviewed care advice to continue ice , compression, elevation, go to immediate care center today, call back for increasing pain, numbness or tingling. Daughter Keaton  verbalizes understanding and agrees to plan of care.    Reason for Disposition   SEVERE pain (e.g., excruciating)    Protocols used: Hand and Wrist Injury-A-OH

## 2024-10-11 NOTE — TELEPHONE ENCOUNTER
No NUGENT was called and informed the tramadol was called sent to the pharmacy. She verbalized understanding.

## 2024-10-11 NOTE — TELEPHONE ENCOUNTER
for Dr.Weiler I received a call from No NUGENT from [facility] and she stated that patient had a injury to her right wrist 4 days ago. Daughter will take patient to the immediate care tomorrow but in the meantime patient is in a lot of pain. No NUGENT will like to know if a script can be given for tramadol 50 mg as she has had this in the past. Since it will be once a day what else can she take for break through pain? No NUGENT will like to be called with the reply.     If need more information see acute encounter from today. Patient was triaged by RN.    Please reply to pool: EM RN TRIAGE

## 2024-10-12 ENCOUNTER — APPOINTMENT (OUTPATIENT)
Dept: GENERAL RADIOLOGY | Facility: HOSPITAL | Age: 79
End: 2024-10-12
Payer: MEDICARE

## 2024-10-12 ENCOUNTER — APPOINTMENT (OUTPATIENT)
Dept: GENERAL RADIOLOGY | Facility: HOSPITAL | Age: 79
End: 2024-10-12
Attending: EMERGENCY MEDICINE
Payer: MEDICARE

## 2024-10-12 ENCOUNTER — HOSPITAL ENCOUNTER (EMERGENCY)
Facility: HOSPITAL | Age: 79
Discharge: HOME | End: 2024-10-12
Attending: EMERGENCY MEDICINE
Payer: MEDICARE

## 2024-10-12 VITALS
RESPIRATION RATE: 15 BRPM | DIASTOLIC BLOOD PRESSURE: 106 MMHG | TEMPERATURE: 98 F | OXYGEN SATURATION: 98 % | SYSTOLIC BLOOD PRESSURE: 129 MMHG | HEART RATE: 71 BPM

## 2024-10-12 DIAGNOSIS — S62.114A: Primary | ICD-10-CM

## 2024-10-12 PROCEDURE — 73110 X-RAY EXAM OF WRIST: CPT | Performed by: EMERGENCY MEDICINE

## 2024-10-12 PROCEDURE — 99284 EMERGENCY DEPT VISIT MOD MDM: CPT

## 2024-10-12 PROCEDURE — 99283 EMERGENCY DEPT VISIT LOW MDM: CPT

## 2024-10-12 PROCEDURE — 73140 X-RAY EXAM OF FINGER(S): CPT | Performed by: EMERGENCY MEDICINE

## 2024-10-12 RX ORDER — HYDROCODONE BITARTRATE AND ACETAMINOPHEN 5; 325 MG/1; MG/1
1 TABLET ORAL ONCE
Status: COMPLETED | OUTPATIENT
Start: 2024-10-12 | End: 2024-10-12

## 2024-10-12 NOTE — ED INITIAL ASSESSMENT (HPI)
Patient arrives through triage with c/o of R. Wrist pain post \"whacking\" the wrist on a security bar by her bed.

## 2024-10-12 NOTE — ED PROVIDER NOTES
Patient Seen in: [facility] Emergency Department    History     Chief Complaint   Patient presents with    Wrist Injury       HPI    79-year-old female who presents to the emergency department after the patient accidentally struck her wrist and finger against a bed rail at home.  She did not fall.  She has no weakness or numbness.    History reviewed.   Past Medical History:    Aortic aneurysm, thoracic (HCC)    Back problem    Calculus of kidney    Cataract    COPD (chronic obstructive pulmonary disease) (HCC)    Coronary atherosclerosis    Deep vein thrombosis (HCC)    Diabetes (HCC)    Diverticulosis of large intestine    Heart attack (HCC)    2013    High blood pressure    High cholesterol    History of stomach ulcers    Incontinence    Osteoarthritis    Ovarian cancer (HCC)    PONV (postoperative nausea and vomiting)    Renal disorder    Shortness of breath    Stroke (HCC)    shaking, wheelchair       History reviewed.   Past Surgical History:   Procedure Laterality Date    Appendectomy      Arthroscopy of joint unlisted Right     Rotator cuff surgery    Back surgery  07/21/2017    L3-4 LUIS and hemilaminectomies    Cataract      Cath drug eluting stent      2013    Endovas aaa repr w/3-p part      Ep loop recorder implant Left 2017    Foot surgery Bilateral     Fracture surgery Right     right clavicle    Hysterectomy      Theodora localization wire 1 site right (cpt=19281)      benign-20 years ago    Spine surgery procedure unlisted           Medications :  Prescriptions Prior to Admission[1]     Family History   Problem Relation Age of Onset    Cancer Father     Diabetes Father     Diabetes Mother     Cancer Daughter     Breast Cancer Maternal Aunt         x2 in 70's    Ovarian Cancer Self        Smoking Status:   Social History     Socioeconomic History    Marital status:    Tobacco Use    Smoking status: Former     Current packs/day: 0.50     Average packs/day: 0.5 packs/day for 35.0 years (17.5  ttl pk-yrs)     Types: Cigarettes     Passive exposure: Never    Smokeless tobacco: Former    Tobacco comments:     half pack a day   Vaping Use    Vaping status: Never Used   Substance and Sexual Activity    Alcohol use: No    Drug use: No       Constitutional and vital signs reviewed.      Social History and Family History elements reviewed from today, pertinent positives to the presenting problem noted.    Physical Exam     ED Triage Vitals   BP 10/12/24 1609 124/66   Pulse 10/12/24 1609 78   Resp 10/12/24 1609 20   Temp 10/12/24 1611 98.1 °F (36.7 °C)   Temp src --    SpO2 10/12/24 1609 97 %   O2 Device 10/12/24 1800 None (Room air)       All measures to prevent infection transmission during my interaction with the patient were taken. The patient was already wearing a droplet mask on my arrival to the room. Personal protective equipment was worn throughout the duration of the exam.  Handwashing was performed prior to and after the exam.  Stethoscope and any equipment used during my examination was cleaned with super sani-cloth germicidal wipes following the exam.     Physical Exam    General: NAD  Head: Normocephalic and atraumatic.  Mouth/Throat/Ears/Nose: No hoarseness of voice  Eyes: Conjunctivae and EOM are normal.  Neck: Normal range of motion. Supple.   Cardiovascular: Normal rate  Respiratory/Chest: No tachypnea.  Gastrointestinal: Nondistended  Musculoskeletal:No deformity.  There is trace amount of focal tenderness and swelling at the right second finger PIP as well as at the radial aspect of the wrist.  2+ radial pulse, sensation intact to light touch.  Normal A-OK and thumbs up signs   Neurological: Alert and appropriate.   Skin: Skin is warm and dry. No pallor.  Psychiatric: Has a normal mood and affect.      ED Course      Labs Reviewed - No data to display    As Interpreted by me    Imaging Results Available and Reviewed while in ED: XR FINGER(S) (MIN 2 VIEWS), RIGHT 2ND (CPT=73140)    Result Date:  10/12/2024  CONCLUSION:   No discrete fracture or dislocation.  Mild soft tissue swelling about the proximal interphalangeal joint.     Dictated by (CST): Lucho Thomas MD on 10/12/2024 at 6:23 PM     Finalized by (CST): Lucho Thomas MD on 10/12/2024 at 6:27 PM          XR WRIST COMPLETE (MIN 3 VIEWS), RIGHT (CPT=73110)    Result Date: 10/12/2024  CONCLUSION:   Cortical irregularity involving the triquetrum with overlying soft tissue swelling, which is concerning for a nondisplaced fracture.  Recommend correlation with point tenderness.  Multifocal degenerative change involving the wrist, which is described above.     Dictated by (CST): Lucho Thomas MD on 10/12/2024 at 5:34 PM     Finalized by (CST): Lucho Thomas MD on 10/12/2024 at 5:37 PM         ED Medications Administered:   Medications   HYDROcodone-acetaminophen (Norco) 5-325 MG per tab 1 tablet (1 tablet Oral Given 10/12/24 1755)         MDM     Vitals:    10/12/24 1609 10/12/24 1611 10/12/24 1800   BP: 124/66  (!) 129/106   Pulse: 78  71   Resp: 20  15   Temp:  98.1 °F (36.7 °C)    SpO2: 97%  98%     *I personally reviewed and interpreted all ED vitals.    Pulse Ox: 97%, Room air, Normal       Medical Decision Making      Differential Diagnosis/ Diagnostic Considerations: Wrist fracture and finger fracture versus contusions    Complicating Factors: The patient already has dementia  to contribute to the complexity of this ED evaluation.    I reviewed prior chart records including office visit note from October 2, 2024.  X-ray of the right wrist without any displaced fracture on my interpretation, though radiologist notes nondisplaced triquetrum fracture, splint placed.  X-ray of the right second finger unremarkable for fracture on my interpretation.  Nonweightbearing to right upper extremity    Procedure: Splint application  Indication: Triquetrum fracture  Type: Sugar-tong  Performed by: ED tech  Complications: None   Post-splint exam by myself:  Neurovascularly intact       Dc In stable condition.  Patient is comfortable with the plan.      Disposition and Plan     Clinical Impression:  1. Nondisp fx of triquetrum bone, right wrist, init for clos fx        Disposition:  Discharge    Follow-up:  Sudheer Koch MD  [facility] [address] [address]  [phone]    Schedule an appointment as soon as possible for a visit in 2 day(s)        Medications Prescribed:  Discharge Medication List as of 10/12/2024  7:16 PM                           [1] (Not in a hospital admission)

## 2024-10-13 NOTE — DISCHARGE INSTRUCTIONS
Please do not hold any objects with the right hand or use the right hand for anything.  Return if any severe pain or any tingling or numbness or any weakness of the fingers or for any concerns or any pale appearance.

## 2024-10-13 NOTE — ED QUICK NOTES
Discharge instructions given to pt and family. Pt and family verbalized understanding of home care, and to follow up with PCP. Pt and family denied further questions or concerns. Pt discharged in stable condition.

## 2024-10-16 ENCOUNTER — TELEPHONE (OUTPATIENT)
Dept: GASTROENTEROLOGY | Facility: CLINIC | Age: 79
End: 2024-10-16

## 2024-10-16 ENCOUNTER — LAB ENCOUNTER (OUTPATIENT)
Dept: LAB | Age: 79
End: 2024-10-16
Attending: FAMILY MEDICINE
Payer: MEDICARE

## 2024-10-16 ENCOUNTER — OFFICE VISIT (OUTPATIENT)
Dept: FAMILY MEDICINE CLINIC | Facility: CLINIC | Age: 79
End: 2024-10-16

## 2024-10-16 VITALS
OXYGEN SATURATION: 90 % | SYSTOLIC BLOOD PRESSURE: 129 MMHG | HEART RATE: 75 BPM | BODY MASS INDEX: 29 KG/M2 | WEIGHT: 155 LBS | DIASTOLIC BLOOD PRESSURE: 85 MMHG

## 2024-10-16 DIAGNOSIS — K59.00 CONSTIPATION, UNSPECIFIED CONSTIPATION TYPE: ICD-10-CM

## 2024-10-16 DIAGNOSIS — Z00.00 ENCOUNTER FOR ANNUAL WELLNESS EXAM IN MEDICARE PATIENT: Primary | ICD-10-CM

## 2024-10-16 DIAGNOSIS — I63.132 CEREBROVASCULAR ACCIDENT (CVA) DUE TO EMBOLISM OF LEFT CAROTID ARTERY (HCC): ICD-10-CM

## 2024-10-16 DIAGNOSIS — Z98.890 S/P LUMBAR MICRODISCECTOMY: ICD-10-CM

## 2024-10-16 DIAGNOSIS — M25.539 PAIN IN WRIST, UNSPECIFIED LATERALITY: ICD-10-CM

## 2024-10-16 DIAGNOSIS — F20.9 SCHIZOPHRENIA, UNSPECIFIED TYPE (HCC): ICD-10-CM

## 2024-10-16 DIAGNOSIS — F39 EPISODIC MOOD DISORDER (HCC): ICD-10-CM

## 2024-10-16 DIAGNOSIS — E53.8 B12 DEFICIENCY: ICD-10-CM

## 2024-10-16 DIAGNOSIS — Z00.00 ENCOUNTER FOR ANNUAL WELLNESS EXAM IN MEDICARE PATIENT: ICD-10-CM

## 2024-10-16 DIAGNOSIS — I71.60 THORACOABDOMINAL AORTIC ANEURYSM (TAAA) WITHOUT RUPTURE, UNSPECIFIED PART (HCC): ICD-10-CM

## 2024-10-16 DIAGNOSIS — Z79.4 TYPE 2 DIABETES MELLITUS WITH HYPERGLYCEMIA, WITH LONG-TERM CURRENT USE OF INSULIN (HCC): ICD-10-CM

## 2024-10-16 DIAGNOSIS — I10 PRIMARY HYPERTENSION: ICD-10-CM

## 2024-10-16 DIAGNOSIS — E11.65 TYPE 2 DIABETES MELLITUS WITH HYPERGLYCEMIA, WITH LONG-TERM CURRENT USE OF INSULIN (HCC): ICD-10-CM

## 2024-10-16 DIAGNOSIS — N18.4 STAGE 4 CHRONIC KIDNEY DISEASE (HCC): ICD-10-CM

## 2024-10-16 DIAGNOSIS — J43.9 PULMONARY EMPHYSEMA, UNSPECIFIED EMPHYSEMA TYPE (HCC): ICD-10-CM

## 2024-10-16 DIAGNOSIS — M43.16 SPONDYLOLISTHESIS OF LUMBAR REGION: ICD-10-CM

## 2024-10-16 LAB
ALBUMIN SERPL-MCNC: 4.2 G/DL (ref 3.2–4.8)
ALBUMIN/GLOB SERPL: 1.6 {RATIO} (ref 1–2)
ALP LIVER SERPL-CCNC: 114 U/L
ALT SERPL-CCNC: <7 U/L
ANION GAP SERPL CALC-SCNC: 7 MMOL/L (ref 0–18)
AST SERPL-CCNC: 9 U/L (ref ?–34)
BASOPHILS # BLD AUTO: 0.08 X10(3) UL (ref 0–0.2)
BASOPHILS NFR BLD AUTO: 1 %
BILIRUB SERPL-MCNC: 0.3 MG/DL (ref 0.2–1.1)
BUN BLD-MCNC: 33 MG/DL (ref 9–23)
BUN/CREAT SERPL: 17.6 (ref 10–20)
CALCIUM BLD-MCNC: 10.1 MG/DL (ref 8.7–10.4)
CHLORIDE SERPL-SCNC: 103 MMOL/L (ref 98–112)
CHOLEST SERPL-MCNC: 99 MG/DL (ref ?–200)
CO2 SERPL-SCNC: 30 MMOL/L (ref 21–32)
CREAT BLD-MCNC: 1.87 MG/DL
DEPRECATED RDW RBC AUTO: 43.8 FL (ref 35.1–46.3)
EGFRCR SERPLBLD CKD-EPI 2021: 27 ML/MIN/1.73M2 (ref 60–?)
EOSINOPHIL # BLD AUTO: 0.18 X10(3) UL (ref 0–0.7)
EOSINOPHIL NFR BLD AUTO: 2.2 %
ERYTHROCYTE [DISTWIDTH] IN BLOOD BY AUTOMATED COUNT: 13.7 % (ref 11–15)
EST. AVERAGE GLUCOSE BLD GHB EST-MCNC: 189 MG/DL (ref 68–126)
FASTING PATIENT LIPID ANSWER: NO
FASTING STATUS PATIENT QL REPORTED: NO
GLOBULIN PLAS-MCNC: 2.6 G/DL (ref 2–3.5)
GLUCOSE BLD-MCNC: 160 MG/DL (ref 70–99)
HBA1C MFR BLD: 8.2 % (ref ?–5.7)
HCT VFR BLD AUTO: 31.1 %
HDLC SERPL-MCNC: 38 MG/DL (ref 40–59)
HGB BLD-MCNC: 10 G/DL
IMM GRANULOCYTES # BLD AUTO: 0.03 X10(3) UL (ref 0–1)
IMM GRANULOCYTES NFR BLD: 0.4 %
LDLC SERPL CALC-MCNC: 38 MG/DL (ref ?–100)
LYMPHOCYTES # BLD AUTO: 1.16 X10(3) UL (ref 1–4)
LYMPHOCYTES NFR BLD AUTO: 14.1 %
MCH RBC QN AUTO: 28 PG (ref 26–34)
MCHC RBC AUTO-ENTMCNC: 32.2 G/DL (ref 31–37)
MCV RBC AUTO: 87.1 FL
MONOCYTES # BLD AUTO: 0.5 X10(3) UL (ref 0.1–1)
MONOCYTES NFR BLD AUTO: 6.1 %
NEUTROPHILS # BLD AUTO: 6.3 X10 (3) UL (ref 1.5–7.7)
NEUTROPHILS # BLD AUTO: 6.3 X10(3) UL (ref 1.5–7.7)
NEUTROPHILS NFR BLD AUTO: 76.2 %
NONHDLC SERPL-MCNC: 61 MG/DL (ref ?–130)
OSMOLALITY SERPL CALC.SUM OF ELEC: 301 MOSM/KG (ref 275–295)
PLATELET # BLD AUTO: 413 10(3)UL (ref 150–450)
POTASSIUM SERPL-SCNC: 4.1 MMOL/L (ref 3.5–5.1)
PROT SERPL-MCNC: 6.8 G/DL (ref 5.7–8.2)
RBC # BLD AUTO: 3.57 X10(6)UL
SODIUM SERPL-SCNC: 140 MMOL/L (ref 136–145)
TRIGL SERPL-MCNC: 128 MG/DL (ref 30–149)
TSI SER-ACNC: 0.91 MIU/ML (ref 0.55–4.78)
VLDLC SERPL CALC-MCNC: 18 MG/DL (ref 0–30)
WBC # BLD AUTO: 8.3 X10(3) UL (ref 4–11)

## 2024-10-16 PROCEDURE — 36415 COLL VENOUS BLD VENIPUNCTURE: CPT

## 2024-10-16 PROCEDURE — 80053 COMPREHEN METABOLIC PANEL: CPT

## 2024-10-16 PROCEDURE — 85025 COMPLETE CBC W/AUTO DIFF WBC: CPT

## 2024-10-16 PROCEDURE — G0439 PPPS, SUBSEQ VISIT: HCPCS | Performed by: FAMILY MEDICINE

## 2024-10-16 PROCEDURE — 83036 HEMOGLOBIN GLYCOSYLATED A1C: CPT

## 2024-10-16 PROCEDURE — 84443 ASSAY THYROID STIM HORMONE: CPT

## 2024-10-16 PROCEDURE — 80061 LIPID PANEL: CPT

## 2024-10-16 RX ORDER — LOSARTAN POTASSIUM 25 MG/1
1 TABLET ORAL 2 TIMES DAILY
COMMUNITY
Start: 2024-08-08

## 2024-10-16 RX ORDER — AMLODIPINE BESYLATE 10 MG/1
10 TABLET ORAL EVERY EVENING
COMMUNITY
Start: 2024-08-08

## 2024-10-16 RX ORDER — METOPROLOL SUCCINATE 25 MG/1
25 TABLET, EXTENDED RELEASE ORAL DAILY
COMMUNITY
Start: 2024-09-18

## 2024-10-16 RX ORDER — TRAMADOL HYDROCHLORIDE 50 MG/1
50 TABLET ORAL EVERY 8 HOURS PRN
Qty: 60 TABLET | Refills: 0 | Status: SHIPPED | OUTPATIENT
Start: 2024-10-16

## 2024-10-16 RX ORDER — EVOLOCUMAB 140 MG/ML
1 INJECTION, SOLUTION SUBCUTANEOUS
COMMUNITY
Start: 2024-09-11

## 2024-10-16 NOTE — PROGRESS NOTES
Subjective:   Jillian Yip is a 79 year old female who presents for a Medicare Subsequent Annual Wellness visit (Pt already had Initial Annual Wellness) and scheduled follow up of multiple significant but stable problems.        79 yr old female who presents for Medicare physical. Fell and fractured right wrist last Wednesday.  Went to ER on Saturday. Saw Orthopedics this morning, cast removed and is now in removable splint. Pain is between 8-9.  Taking Tramadol for pain which is helping.  Makes her sleepy. Daughter is Brigida Maldonado. Lives with daughter and grandson. Performs ADLs. Low appetite.  Drinking lemonade. Tries to get out of the house when she can.     Had previous heart attack and stroke.  In July, she had another stroke and a small heart attack in the hospital. Sees Dr. Minor and Dr. Dow. Memory has improved.  On memory medications and B12 therapy. Daughter feels she is doing better. Went to rehab. States she is depressed at times due to lack of independence. Declined therapy.     Has chronic back issues.  Saw pain clinic and had injections in the past.  Takes Tramadol for pain.     Has diabetes. Diagnosed about 10 years ago.  Sees Endocrinology. Has appt with November.  Nighttime glucose are 300+, mornings are 200+.  Daughter states she does cheat.     Sees Dr. Thayer for CKD.  Needs bloodwork.     Has COPD.  Was a smoker for 30 years but quit for over a year now. Uses Albuterol few times per week.     Has HTN and is controlled.     Has chronic constipation.  Used enemas, Miralax and suppository.  Taking stool softener once daily and Senna. Has tried Miralax in the past without improvement. Has had to be disimpacted before. Stomach does hurt.     Has soft tissue lesion to left lower lateral leg.  Improves with heating pad.     History/Other:   Fall Risk Assessment:   She has been screened for Falls and is High Risk. Fall Prevention information provided to patient in After Visit Summary.    Do  you feel unsteady when standing or walking?: Yes  Do you worry about falling?: Yes  Have you fallen in the past year?: Yes  How many times have you fallen?: 1  Were you injured?: No     Cognitive Assessment:   Abnormal  What day of the week is this?: Correct  What month is it?: Incorrect  What year is it?: Correct  Recall \"Ball\": Correct  Recall \"Flag\": Incorrect  Recall \"Tree\": Correct    Functional Ability/Status:   Jillian Yip has some abnormal functions as listed below:  She has Dressing and/or Bathing issues based on screening of functional status.  Difficulty dressing or bathing?: Yes  Bathing or Showering: Need some help  Dressing: Need some help  She has Toileting difficulties based on screening of functional status.She has Driving difficulties based on screening of functional status. She has Meal Preparation difficulties based on screening of functional status.She has difficulties Managing Money/Bills based on screening of functional status.She has difficulties Shopping for Groceries based on screening of functional status. She has difficulties Taking Meds as Rx'd based on screening of functional status. She has Hearing problems based on screening of functional status.She has Vision problems based on screening of functional status. She has Walking problems based on screening of functional status. She has problems with Daily Activities based on screening of functional status. She has problems with Memory based on screening of functional status.       Depression Screening (PHQ):  PHQ-9 TOTAL SCORE: 6  , done 10/16/2024   Little interest or pleasure in doing things: 3    Feeling down, depressed, or hopeless: 3    Last Marked [place] Suicide Screening on 10/25/2024 was No Risk.          Advanced Directives:   She does have a Living Will but we do NOT have it on file in Epic.    She has a Power of  for Health Care on file in [facility].  Discussed Advance Care Planning with patient (and family/surrogate if  present). Standard forms made available to patient in After Visit Summary.      Patient Active Problem List   Diagnosis    Spondylolisthesis of lumbar region    S/P lumbar microdiscectomy    Cerebrovascular accident (CVA) due to embolism of left carotid artery (HCC)    Episodic mood disorder (HCC)    Schizophrenia (HCC)    Thoraco abdominal aneurysm (HCC)    B12 deficiency    Type 2 diabetes mellitus with hyperglycemia, with long-term current use of insulin (HCC)    Stage 4 chronic kidney disease (HCC)    Pulmonary emphysema (HCC)     Allergies:  She is allergic to codeine, enalapril maleate-felodipine, nyquil severe cold-flu [gnp multi-symptom cold], radiology contrast iodinated dyes, shellfish-derived products, dilaudid [hydromorphone], and sulfa antibiotics.    Current Medications:  Outpatient Medications Marked as Taking for the 10/16/24 encounter (Office Visit) with Weiler, Colleen M, DO   Medication Sig    donepezil 5 MG Oral Tab Take 1 tablet (5 mg total) by mouth nightly.    memantine 5 MG Oral Tab Take 1 tablet (5 mg total) by mouth daily.    metoprolol succinate ER 25 MG Oral Tablet 24 Hr Take 1 tablet (25 mg total) by mouth daily.    amLODIPine 10 MG Oral Tab Take 1 tablet (10 mg total) by mouth every evening.    losartan 25 MG Oral Tab Take 1 tablet (25 mg total) by mouth 2 (two) times daily.    REPATHA SURECLICK 140 MG/ML Subcutaneous Solution Auto-injector Inject 1 mL into the skin every 14 (fourteen) days.    traMADol 50 MG Oral Tab Take 1 tablet (50 mg total) by mouth every 8 (eight) hours as needed for Pain.    cyanocobalamin 1000 MCG Oral Tab Take 1 tablet (1,000 mcg total) by mouth daily.    aspirin 81 MG Oral Chew Tab Chew 1 tablet (81 mg total) by mouth daily.    umeclidinium-vilanterol (ANORO ELLIPTA) 62.5-25 MCG/ACT Inhalation Aerosol Powder, Breath Activated Inhale 1 puff into the lungs daily.    apixaban 2.5 MG Oral Tab Take 1 tablet (2.5 mg total) by mouth 2 (two) times daily.    ranolazine   MG Oral Tablet 12 Hr Take 1 tablet (500 mg total) by mouth 2 (two) times daily.    insulin aspart 100 Units/mL Subcutaneous Solution Pen-injector Inject 1-7 Units into the skin 3 (three) times daily with meals.    isosorbide mononitrate  MG Oral Tablet 24 Hr Take 1 tablet (120 mg total) by mouth daily.    Insulin Pen Needle (CARETOUCH PEN NEEDLES) 32G X 4 MM Does not apply Misc Use with injection once daily as directed    insulin glargine (LANTUS SOLOSTAR) 100 UNIT/ML Subcutaneous Solution Pen-injector Inject 6 Units into the skin nightly.    Blood Glucose Monitoring Suppl (ACCU-CHEK GUIDE) w/Device Does not apply Kit 1 each daily.    Glucose Blood (ACCU-CHEK GUIDE) In Vitro Strip 1 each by In Vitro route daily.    Blood Glucose Monitoring Suppl (ONETOUCH VERIO IQ SYSTEM) w/Device Does not apply Kit 1 Device by Other route 3 (three) times daily. Use as directed.    Glucose Blood (ONETOUCH VERIO) In Vitro Strip Use as directed.    OneTouch Delica Lancets 33G Does not apply Misc 1 Lancet by Finger stick route 3 (three) times daily.    pantoprazole 40 MG Oral Tab EC Take 1 tablet (40 mg total) by mouth 2 (two) times daily before meals.    albuterol 108 (90 Base) MCG/ACT Inhalation Aero Soln Inhale 2 puffs into the lungs every 4 (four) hours as needed.    acetaminophen 500 MG Oral Tab Take 1 tablet (500 mg total) by mouth every 8 (eight) hours.    lidocaine-menthol 4-1 % External Patch Place 2 patches onto the skin daily.    nitroglycerin 0.4 MG Sublingual SL Tab Place 1 tablet (0.4 mg total) under the tongue every 5 (five) minutes as needed for Chest pain.    amLODIPine Besylate 5 MG Oral Tab Take 2 tablets (10 mg total) by mouth at bedtime.    ONETOUCH DELICA LANCETS 33G Does not apply Misc     ezetimibe 10 MG Oral Tab Take 1 tablet (10 mg total) by mouth nightly.       Medical History:  She  has a past medical history of Aortic aneurysm, thoracic (HCC), Back problem, Calculus of kidney, Cataract, COPD  (chronic obstructive pulmonary disease) (HCC), Coronary atherosclerosis, Deep vein thrombosis (HCC), Diabetes (HCC), Diverticulosis of large intestine, Heart attack (HCC), High blood pressure, High cholesterol, History of stomach ulcers, Incontinence, Osteoarthritis, Ovarian cancer (HCC), PONV (postoperative nausea and vomiting), Renal disorder, Shortness of breath, and Stroke (HCC) (10/15/2017).  Surgical History:  She  has a past surgical history that includes cath drug eluting stent; cataract; hysterectomy; appendectomy; back surgery (07/21/2017); spine surgery procedure unlisted; merlyn localization wire 1 site right (cpt=19281); foot surgery (Bilateral); arthroscopy of joint unlisted (Right); Fracture surgery (Right); ep loop recorder implant (Left, 2017); and endovas aaa repr w/3-p part.   Family History:  Her family history includes Breast Cancer in her maternal aunt; Cancer in her daughter and father; Diabetes in her father and mother; Ovarian Cancer in her self.  Social History:  She  reports that she has quit smoking. Her smoking use included cigarettes. She has a 17.5 pack-year smoking history. She has never been exposed to tobacco smoke. She has quit using smokeless tobacco. She reports that she does not drink alcohol and does not use drugs.    Tobacco:  She smoked tobacco in the past but quit greater than 12 months ago.  Social History     Tobacco Use   Smoking Status Former    Current packs/day: 0.50    Average packs/day: 0.5 packs/day for 35.0 years (17.5 ttl pk-yrs)    Types: Cigarettes    Passive exposure: Never   Smokeless Tobacco Former   Tobacco Comments    half pack a day          CAGE Alcohol Screen:   CAGE screening score of 0 on 10/16/2024, showing low risk of alcohol abuse.      Patient Care Team:  Weiler, Colleen M, DO as PCP - General (Family Medicine)  Meghan Woods (Cardiovascular Diseases)  Sergei Anders MD (SURGERY, ORTHOPEDIC)  Kashmir Whalen DO (Cardiovascular Diseases)    Review  of Systems  Please see extensive HPI for ROS    Objective:   Physical Exam       /85   Pulse 75   Wt 155 lb (70.3 kg)   SpO2 90%   BMI 29.29 kg/m²  Estimated body mass index is 29.29 kg/m² as calculated from the following:    Height as of 10/2/24: 5' 1\" (1.549 m).    Weight as of this encounter: 155 lb (70.3 kg).    Medicare Hearing Assessment:   Hearing Screening    Time taken: 10/16/2024  2:13 PM  Entry User: FindYogi  Screening Method: Finger Rub  Finger Rub Result: Pass         Visual Acuity:   Right Eye Visual Acuity: Uncorrected Right Eye Chart Acuity: 20/40   Left Eye Visual Acuity: Corrected Left Eye Chart Acuity: 20/40   Both Eyes Visual Acuity: Corrected Both Eyes Chart Acuity: 20/40   Able To Tolerate Visual Acuity: Yes    Gen:  Well-nourished.  No distress.  HEENT: Conjunctive clear.  Manfred ear canals clear.  Manfred TMs intact with good landmarks noted.  Nares patent.  Oral mucous membrane moist.  Normal lips, teeth, and gums.  Oropharynx normal.  Neck supple.  Good ROM.  No LAD.  Thyroid normal.  CV:  Regular rate and rhythm; no murmurs  Lungs:  Clear to ausculation; good aeration               No wheezes, rales or rhonchi  Abd: soft, non-tender, non-distended          Normal bowel sounds; no masses          No hepatosplenomegaly  Ext: Mobile, soft lesion noted to left lateral shin      Assessment & Plan:   Jillian Yip is a 79 year old female who presents for a Medicare Assessment.     1. Encounter for annual wellness exam in Medicare patient (Primary)    Lives with daughter.  Recovering from CVA and recent fracture.  At high risk for falls. Labs done  -     Comp Metabolic Panel (14); Future; Expected date: 10/16/2024  -     Hemoglobin A1C; Future; Expected date: 10/16/2024  -     Lipid Panel; Future; Expected date: 10/16/2024  -     Assay, Thyroid Stim Hormone; Future; Expected date: 10/16/2024  -     Microalb/Creat Ratio, Random Urine; Future; Expected date: 10/16/2024  -     CBC  With Differential With Platelet; Future; Expected date: 10/16/2024    2. Pain in wrist, unspecified laterality    Taking Tramadol for pain.  Discussed possibility that It may make her sleepy and she would be at increased risk of falls. Following with Orthopedics.  -     traMADol HCl; Take 1 tablet (50 mg total) by mouth every 8 (eight) hours as needed for Pain.  Dispense: 60 tablet; Refill: 0    3. Constipation, unspecified constipation type    Chronic. Advised Miralax daily.   -     Gastro Referral - In Network    4. Type 2 diabetes mellitus with hyperglycemia, with long-term current use of insulin ([facility])    Blood sugars have been elevated.  Check hemoglobin A1C today.     5. Stage 4 chronic kidney disease ([facility])    Check CMP.  Sees Nephrology.     6. Pulmonary emphysema, unspecified emphysema type ([facility])    Uses Albuterol occasionally.     7. Primary hypertension    Controlled.  CPM    8. Thoracoabdominal aortic aneurysm (TAAA)   without rupture, unspecified part ([facility])    Stable.  CPM    9. Schizophrenia, unspecified type ([facility])    Stable.     10. Episodic mood disorder ([facility])    Stable.     11. Cerebrovascular accident (CVA) due to embolism of left carotid artery ([facility])    Memory has improved some.     12. B12 deficiency    Gets monthly B12 injections.     13. S/P lumbar microdiscectomy    Chronic low back pain.  Follows with pain clinic    14. Spondylolisthesis of lumbar region    Chronic low back pain.  Follows with pain clinic.     The patient indicates understanding of these issues and agrees to the plan.  Reinforced healthy diet, lifestyle, and exercise.      Return in 3 months (on 1/16/2025).     Colleen Weiler, DO, 10/29/2024     Supplementary Documentation:   General Health:  In the past six months, have you lost more than 10 pounds without trying?: 2 - No  Has your appetite been poor?: Yes  Type of Diet: Low Salt  How does the patient maintain a good energy level?: Other  How would you describe your daily  physical activity?: Light  How would you describe your current health state?: Fair  How do you maintain positive mental well-being?: Visiting Family  On a scale of 0 to 10, with 0 being no pain and 10 being severe pain, what is your pain level?: 9 - (Severe)  In the past six months, have you experienced urine leakage?: 1-Yes  At any time do you feel concerned for the safety/well-being of yourself and/or your children, in your home or elsewhere?: No  Have you had any immunizations at another office such as Influenza, Hepatitis B, Tetanus, or Pneumococcal?: No    Health Maintenance   Topic Date Due    Diabetes Care Dilated Eye Exam  Never done    Diabetes Care: Microalb/Creat Ratio  Never done    Annual Physical  Never done    Zoster Vaccines (1 of 2) Never done    DEXA Scan  Never done    COVID-19 Vaccine (4 - 2023-24 season) 09/01/2024    Influenza Vaccine (1) 10/01/2024    Diabetes Care A1C  01/16/2025    Diabetes Care Foot Exam  03/14/2025    Diabetes Care: GFR  10/25/2025    Annual Depression Screening  Completed    Fall Risk Screening (Annual)  Completed    Pneumococcal Vaccine: 65+ Years  Completed

## 2024-10-16 NOTE — TELEPHONE ENCOUNTER
Spoke to patients daughter, Keaton  Ok per STEVEN    I offered her an appt in [place], however she preferred Mondays. I let her know that Dorys is in [place] on Mondays, and she was ok with that    Scheduled patient  Location, date and time confirmed.    Your Appointments      Monday October 28, 2024 2:00 PM  Consult with KEVAN Llamas  [facility] ([facility]) [address] [address]  [phone]

## 2024-10-16 NOTE — TELEPHONE ENCOUNTER
Nursing:  This is a referral from Dr. Weiler for chronic constipation requiring disimpaction.    Please contact her to schedule with me at [place].    Thanks,  Dorys

## 2024-10-25 ENCOUNTER — HOSPITAL ENCOUNTER (EMERGENCY)
Facility: HOSPITAL | Age: 79
Discharge: HOME | End: 2024-10-25
Attending: EMERGENCY MEDICINE
Payer: MEDICARE

## 2024-10-25 ENCOUNTER — APPOINTMENT (OUTPATIENT)
Dept: GENERAL RADIOLOGY | Facility: HOSPITAL | Age: 79
End: 2024-10-25
Attending: EMERGENCY MEDICINE
Payer: MEDICARE

## 2024-10-25 VITALS
HEART RATE: 92 BPM | TEMPERATURE: 98 F | SYSTOLIC BLOOD PRESSURE: 185 MMHG | OXYGEN SATURATION: 91 % | DIASTOLIC BLOOD PRESSURE: 79 MMHG | RESPIRATION RATE: 23 BRPM

## 2024-10-25 DIAGNOSIS — U07.1 COVID-19: Primary | ICD-10-CM

## 2024-10-25 LAB
ALBUMIN SERPL-MCNC: 4.7 G/DL (ref 3.2–4.8)
ALBUMIN/GLOB SERPL: 1.8 {RATIO} (ref 1–2)
ALP LIVER SERPL-CCNC: 133 U/L
ALT SERPL-CCNC: <7 U/L
ANION GAP SERPL CALC-SCNC: 9 MMOL/L (ref 0–18)
AST SERPL-CCNC: 19 U/L (ref ?–34)
BASOPHILS # BLD AUTO: 0.07 X10(3) UL (ref 0–0.2)
BASOPHILS NFR BLD AUTO: 0.8 %
BILIRUB SERPL-MCNC: 0.2 MG/DL (ref 0.2–1.1)
BUN BLD-MCNC: 26 MG/DL (ref 9–23)
BUN/CREAT SERPL: 12.6 (ref 10–20)
CALCIUM BLD-MCNC: 10.1 MG/DL (ref 8.7–10.4)
CHLORIDE SERPL-SCNC: 103 MMOL/L (ref 98–112)
CO2 SERPL-SCNC: 29 MMOL/L (ref 21–32)
CREAT BLD-MCNC: 2.07 MG/DL
DEPRECATED RDW RBC AUTO: 44.3 FL (ref 35.1–46.3)
EGFRCR SERPLBLD CKD-EPI 2021: 24 ML/MIN/1.73M2 (ref 60–?)
EOSINOPHIL # BLD AUTO: 0.11 X10(3) UL (ref 0–0.7)
EOSINOPHIL NFR BLD AUTO: 1.2 %
ERYTHROCYTE [DISTWIDTH] IN BLOOD BY AUTOMATED COUNT: 14.3 % (ref 11–15)
FLUAV + FLUBV RNA SPEC NAA+PROBE: NEGATIVE
FLUAV + FLUBV RNA SPEC NAA+PROBE: NEGATIVE
GLOBULIN PLAS-MCNC: 2.6 G/DL (ref 2–3.5)
GLUCOSE BLD-MCNC: 162 MG/DL (ref 70–99)
HCT VFR BLD AUTO: 35.1 %
HGB BLD-MCNC: 11 G/DL
IMM GRANULOCYTES # BLD AUTO: 0.06 X10(3) UL (ref 0–1)
IMM GRANULOCYTES NFR BLD: 0.7 %
LYMPHOCYTES # BLD AUTO: 0.59 X10(3) UL (ref 1–4)
LYMPHOCYTES NFR BLD AUTO: 6.6 %
MCH RBC QN AUTO: 26.6 PG (ref 26–34)
MCHC RBC AUTO-ENTMCNC: 31.3 G/DL (ref 31–37)
MCV RBC AUTO: 85 FL
MONOCYTES # BLD AUTO: 0.56 X10(3) UL (ref 0.1–1)
MONOCYTES NFR BLD AUTO: 6.3 %
NEUTROPHILS # BLD AUTO: 7.54 X10 (3) UL (ref 1.5–7.7)
NEUTROPHILS # BLD AUTO: 7.54 X10(3) UL (ref 1.5–7.7)
NEUTROPHILS NFR BLD AUTO: 84.4 %
OSMOLALITY SERPL CALC.SUM OF ELEC: 300 MOSM/KG (ref 275–295)
PLATELET # BLD AUTO: 440 10(3)UL (ref 150–450)
POTASSIUM SERPL-SCNC: 4.9 MMOL/L (ref 3.5–5.1)
PROT SERPL-MCNC: 7.3 G/DL (ref 5.7–8.2)
RBC # BLD AUTO: 4.13 X10(6)UL
RSV RNA SPEC NAA+PROBE: NEGATIVE
SARS-COV-2 RNA RESP QL NAA+PROBE: DETECTED
SODIUM SERPL-SCNC: 141 MMOL/L (ref 136–145)
TROPONIN I SERPL HS-MCNC: 7 NG/L
WBC # BLD AUTO: 8.9 X10(3) UL (ref 4–11)

## 2024-10-25 PROCEDURE — 85025 COMPLETE CBC W/AUTO DIFF WBC: CPT | Performed by: EMERGENCY MEDICINE

## 2024-10-25 PROCEDURE — 80053 COMPREHEN METABOLIC PANEL: CPT | Performed by: EMERGENCY MEDICINE

## 2024-10-25 PROCEDURE — 36415 COLL VENOUS BLD VENIPUNCTURE: CPT

## 2024-10-25 PROCEDURE — 93010 ELECTROCARDIOGRAM REPORT: CPT

## 2024-10-25 PROCEDURE — 93005 ELECTROCARDIOGRAM TRACING: CPT

## 2024-10-25 PROCEDURE — 99285 EMERGENCY DEPT VISIT HI MDM: CPT

## 2024-10-25 PROCEDURE — 99284 EMERGENCY DEPT VISIT MOD MDM: CPT

## 2024-10-25 PROCEDURE — 0241U SARS-COV-2/FLU A AND B/RSV BY PCR (GENEXPERT): CPT | Performed by: EMERGENCY MEDICINE

## 2024-10-25 PROCEDURE — 84484 ASSAY OF TROPONIN QUANT: CPT | Performed by: EMERGENCY MEDICINE

## 2024-10-25 PROCEDURE — 71045 X-RAY EXAM CHEST 1 VIEW: CPT | Performed by: EMERGENCY MEDICINE

## 2024-10-25 NOTE — ED INITIAL ASSESSMENT (HPI)
Chest pain starting last night. MI and stroke this summer. Took nitroglycerin tab at 1500, no help for the pain. Productive cough last night. Daughter is concerned for another stroke. No focal weakness. BEFAST negative.

## 2024-10-26 LAB
ATRIAL RATE: 87 BPM
P AXIS: 59 DEGREES
P-R INTERVAL: 148 MS
Q-T INTERVAL: 386 MS
QRS DURATION: 100 MS
QTC CALCULATION (BEZET): 464 MS
R AXIS: -31 DEGREES
T AXIS: 63 DEGREES
VENTRICULAR RATE: 87 BPM

## 2024-10-26 NOTE — ED PROVIDER NOTES
Patient Seen in: [facility] Emergency Department    History     Chief Complaint   Patient presents with    Chest Pain Angina       HPI    Patient presents to the ED complaining of chest pain starting last night.  Associated cough.  No fevers.  No other complaints.  Daughter states that she is weak today.    History reviewed.   Past Medical History:    Aortic aneurysm, thoracic (HCC)    Back problem    Calculus of kidney    Cataract    COPD (chronic obstructive pulmonary disease) (HCC)    Coronary atherosclerosis    Deep vein thrombosis (HCC)    Diabetes (HCC)    Diverticulosis of large intestine    Heart attack (HCC)    2013    High blood pressure    High cholesterol    History of stomach ulcers    Incontinence    Osteoarthritis    Ovarian cancer (HCC)    PONV (postoperative nausea and vomiting)    Renal disorder    Shortness of breath    Stroke (HCC)    shaking, wheelchair       History reviewed.   Past Surgical History:   Procedure Laterality Date    Appendectomy      Arthroscopy of joint unlisted Right     Rotator cuff surgery    Back surgery  07/21/2017    L3-4 LUIS and hemilaminectomies    Cataract      Cath drug eluting stent      2013    Endovas aaa repr w/3-p part      Ep loop recorder implant Left 2017    Foot surgery Bilateral     Fracture surgery Right     right clavicle    Hysterectomy      Theodora localization wire 1 site right (cpt=19281)      benign-20 years ago    Spine surgery procedure unlisted           Medications :  Prescriptions Prior to Admission[1]     Family History   Problem Relation Age of Onset    Cancer Father     Diabetes Father     Diabetes Mother     Cancer Daughter     Breast Cancer Maternal Aunt         x2 in 70's    Ovarian Cancer Self        Smoking Status:   Social History     Socioeconomic History    Marital status:    Tobacco Use    Smoking status: Former     Current packs/day: 0.50     Average packs/day: 0.5 packs/day for 35.0 years (17.5 ttl pk-yrs)     Types:  Cigarettes     Passive exposure: Never    Smokeless tobacco: Former    Tobacco comments:     half pack a day   Vaping Use    Vaping status: Never Used   Substance and Sexual Activity    Alcohol use: No    Drug use: No       Constitutional and vital signs reviewed.      Social History and Family History elements reviewed from today, pertinent positives to the presenting problem noted.    Physical Exam     ED Triage Vitals [10/25/24 1829]   BP (!) 156/102   Pulse 81   Resp 18   Temp 98.1 °F (36.7 °C)   Temp src Temporal   SpO2 95 %   O2 Device None (Room air)       All measures to prevent infection transmission during my interaction with the patient were taken. Handwashing was performed prior to and after the exam.  Stethoscope and any equipment used during my examination was cleaned with super sani-cloth germicidal wipes following the exam.     Physical Exam  Vitals and nursing note reviewed.   Constitutional:       General: She is not in acute distress.     Appearance: She is well-developed. She is not ill-appearing or toxic-appearing.   HENT:      Head: Normocephalic and atraumatic.   Eyes:      General:         Right eye: No discharge.         Left eye: No discharge.      Conjunctiva/sclera: Conjunctivae normal.   Neck:      Trachea: No tracheal deviation.   Cardiovascular:      Rate and Rhythm: Normal rate and regular rhythm.      Heart sounds: Normal heart sounds.   Pulmonary:      Effort: Pulmonary effort is normal. No respiratory distress.      Breath sounds: Normal breath sounds. No stridor.   Abdominal:      General: There is no distension.      Palpations: Abdomen is soft.   Musculoskeletal:         General: No deformity.   Skin:     General: Skin is warm and dry.   Neurological:      Mental Status: She is alert and oriented to person, place, and time.   Psychiatric:         Mood and Affect: Mood normal.         Behavior: Behavior normal.         ED Course        Labs Reviewed   CBC WITH DIFFERENTIAL WITH  PLATELET - Abnormal; Notable for the following components:       Result Value    HGB 11.0 (*)     Lymphocyte Absolute 0.59 (*)     All other components within normal limits   COMP METABOLIC PANEL (14) - Abnormal; Notable for the following components:    Glucose 162 (*)     BUN 26 (*)     Creatinine 2.07 (*)     Calculated Osmolality 300 (*)     eGFR-Cr 24 (*)     ALT <7 (*)     All other components within normal limits   SARS-COV-2/FLU A AND B/RSV BY PCR (GENEXPERT) - Abnormal; Notable for the following components:    SARS-CoV-2 (COVID-19) - (GeneXpert) Detected (*)     All other components within normal limits    Narrative:     This test is intended for the qualitative detection and differentiation of SARS-CoV-2, influenza A, influenza B, and respiratory syncytial virus (RSV) viral RNA in nasopharyngeal or nares swabs from individuals suspected of respiratory viral infection consistent with COVID-19 by their healthcare provider. Signs and symptoms of respiratory viral infection due to SARS-CoV-2, influenza, and RSV can be similar.                                    Test performed using the Xpert Xpress SARS-CoV-2/FLU/RSV (real time RT-PCR)  assay on the [employer] instrument, [employer], [place] [address].                   This test is being used under the Food and Drug Administration's Emergency Use Authorization.                                    The authorized Fact Sheet for Healthcare Providers for this assay is available upon request from the laboratory.   TROPONIN I HIGH SENSITIVITY - Normal   RAINBOW DRAW BLUE   RAINBOW DRAW GOLD     EKG    Rate, intervals and axes as noted on EKG Report.  Rate: Normal, 87 bpm  Rhythm: Sinus Rhythm  Reading: Left axis deviation, LVH, normal ST segments, abnormal EKG           As Interpreted by me    Imaging Results Available and Reviewed while in ED: XR CHEST AP PORTABLE  (CPT=71045)    Result Date: 10/25/2024  CONCLUSION:   No acute cardiopulmonary abnormality.  Enlarged  cardiomediastinal silhouette, unchanged.     Dictated by (CST): Vincent Leonard MD on 10/25/2024 at 9:08 PM     Finalized by (CST): Vincent Leonard MD on 10/25/2024 at 9:09 PM         ED Medications Administered: Medications - No data to display      MDM     Vitals:    10/25/24 1829 10/25/24 1930 10/25/24 2100   BP: (!) 156/102 (!) 161/76 (!) 185/79   Pulse: 81 85 92   Resp: 18  23   Temp: 98.1 °F (36.7 °C)     TempSrc: Temporal     SpO2: 95% 96% 91%     *I personally reviewed and interpreted all ED vitals.    Pulse Ox: 91%, Room air, Normal     Monitor Interpretation:   normal sinus rhythm as interpreted by me.  The cardiac monitor was ordered to monitor heart rate.    Differential Diagnosis/ Diagnostic Considerations: Pneumonia, UTI, ACS, viral syndrome, other    Complicating Factors: The patient already has does not have any pertinent problems on file. to contribute to the complexity of this ED evaluation.    Medical Decision Making  The patient presents to the ED with weakness, cough and chest pain.  Nondistressed on examination.  Cardiac workup without concerning findings, no ischemia on EKG.  No pneumonia chest x-ray but COVID testing positive.  Discussed findings with patient and family.  They would like to take the patient home and I feel stable to do so.  Recommended PCP follow-up and return precautions.  Family declines Paxlovid.    Problems Addressed:  COVID-19: acute illness or injury    Amount and/or Complexity of Data Reviewed  Independent Historian: parent     Details: Daughter provides history details  Labs: ordered. Decision-making details documented in ED Course.  Radiology: ordered and independent interpretation performed. Decision-making details documented in ED Course.     Details: I personally reviewed the patient's chest x-ray image and noted no pneumothorax or pneumonia  ECG/medicine tests: ordered and independent interpretation performed. Decision-making details documented in ED  Course.    Risk  OTC drugs.        Condition upon leaving the department: Stable    Disposition and Plan     Clinical Impression:  1. COVID-19        Disposition:  Discharge    Follow-up:  Weiler, Colleen M, DO  [address] [place] [id number] [phone]    Schedule an appointment as soon as possible for a visit in 3 day(s)        Medications Prescribed:  Discharge Medication List as of 10/25/2024 10:21 PM                           [1] (Not in a hospital admission)

## 2024-10-29 PROBLEM — N30.00 ACUTE CYSTITIS WITHOUT HEMATURIA: Status: RESOLVED | Noted: 2024-07-25 | Resolved: 2024-10-29

## 2024-10-29 PROBLEM — Z79.4 TYPE 2 DIABETES MELLITUS WITH HYPERGLYCEMIA, WITH LONG-TERM CURRENT USE OF INSULIN (HCC): Status: ACTIVE | Noted: 2024-10-29

## 2024-10-29 PROBLEM — I63.9 ACUTE CVA (CEREBROVASCULAR ACCIDENT) (HCC): Status: RESOLVED | Noted: 2024-07-25 | Resolved: 2024-10-29

## 2024-10-29 PROBLEM — L03.116 LEFT LEG CELLULITIS: Status: RESOLVED | Noted: 2021-07-03 | Resolved: 2024-10-29

## 2024-10-29 PROBLEM — R53.1 WEAKNESS: Status: RESOLVED | Noted: 2023-10-20 | Resolved: 2024-10-29

## 2024-10-29 PROBLEM — E87.6 HYPOKALEMIA: Status: RESOLVED | Noted: 2021-07-03 | Resolved: 2024-10-29

## 2024-10-29 PROBLEM — G45.1 HEMISPHERIC CAROTID ARTERY SYNDROME: Status: RESOLVED | Noted: 2017-10-16 | Resolved: 2024-10-29

## 2024-10-29 PROBLEM — N18.9 CHRONIC KIDNEY DISEASE, UNSPECIFIED CKD STAGE: Status: RESOLVED | Noted: 2021-07-03 | Resolved: 2024-10-29

## 2024-10-29 PROBLEM — N18.4 STAGE 4 CHRONIC KIDNEY DISEASE (HCC): Status: ACTIVE | Noted: 2024-10-29

## 2024-10-29 PROBLEM — R73.9 HYPERGLYCEMIA: Status: RESOLVED | Noted: 2017-10-16 | Resolved: 2024-10-29

## 2024-10-29 PROBLEM — E11.65 TYPE 2 DIABETES MELLITUS WITH HYPERGLYCEMIA, WITH LONG-TERM CURRENT USE OF INSULIN (HCC): Status: ACTIVE | Noted: 2024-10-29

## 2024-10-29 PROBLEM — I16.9 HYPERTENSIVE CRISIS: Status: RESOLVED | Noted: 2017-10-16 | Resolved: 2024-10-29

## 2024-10-29 PROBLEM — J43.9 PULMONARY EMPHYSEMA (HCC): Status: ACTIVE | Noted: 2024-10-29

## 2024-11-20 DIAGNOSIS — F02.80 MIXED ALZHEIMER'S AND VASCULAR DEMENTIA (HCC): ICD-10-CM

## 2024-11-20 DIAGNOSIS — G30.9 MIXED ALZHEIMER'S AND VASCULAR DEMENTIA (HCC): ICD-10-CM

## 2024-11-20 DIAGNOSIS — F01.50 MIXED ALZHEIMER'S AND VASCULAR DEMENTIA (HCC): ICD-10-CM

## 2024-11-20 RX ORDER — DONEPEZIL HYDROCHLORIDE 5 MG/1
5 TABLET, FILM COATED ORAL NIGHTLY
Qty: 90 TABLET | Refills: 3 | Status: SHIPPED | OUTPATIENT
Start: 2024-11-20 | End: 2025-11-15

## 2024-11-20 NOTE — TELEPHONE ENCOUNTER
Pt requesting a refill of donepezil 5mg tablets.    Last office visit: 10/2/24    Next office visit: None scheduled.     ASSESSMENT:  The patient is a 79 year old woman with past medical history of left MCA stroke in 2017, diabetes, carotid artery disease, hypertension, hyperlipidemia, coronary artery disease, DVT, COPD, thoracic aortic aneurysm, gastritis peptic ulcer disease, ovarian cancer who presents for follow-up.  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  The progression of her symptoms over the past couple of years per documentation, daughter's history and neurology notes also raises concerns for Alzheimer's disease.  Severe vitamin B12 deficiency  - Vitamin B12 1000 mcg daily    -Donepezil 5 mg daily   -Add memantine 5 mg daily   - PET scan not done   - Future consideration: Adding Namenda renally dosed; Zoloft     Recurrent embolic strokes  - Continue apixaban and aspirin, Zetia     BPPV  -Vestibular PT      Parotid gland lesion   -Refer to ENT      Follow up in 4 months        Discussed indication, administration, dose, and side effects with patient of any medications personally prescribed. Patient was advised to let my office know if they have any questions or concerns.         Today, I personally spent 25 minutes in this case, including chart review, time spent with patient doing face to face evaluation w/ interview and exam and patient education, counseling, and time was spent in patient education, counseling, and coordination of care as described above.   Issues discussed: Diagnosis and implications on future health, benefits and side effects of present and future medications, test results as well as further testing and medications  required.     This note was prepared using Dragon Medical voice recognition dictation software and as a result, errors may occur. When identified, these errors have been corrected. While every attempt is made to correct errors during dictation, discrepancies may still exist     BARRERA Dow DO   Staff Physician, Neurology   10/2/2024  2:24 PM

## 2024-11-25 ENCOUNTER — OFFICE VISIT (OUTPATIENT)
Dept: ENDOCRINOLOGY CLINIC | Facility: CLINIC | Age: 79
End: 2024-11-25
Payer: MEDICARE

## 2024-11-25 VITALS
SYSTOLIC BLOOD PRESSURE: 127 MMHG | WEIGHT: 156 LBS | BODY MASS INDEX: 29 KG/M2 | HEART RATE: 78 BPM | DIASTOLIC BLOOD PRESSURE: 81 MMHG

## 2024-11-25 DIAGNOSIS — E11.65 UNCONTROLLED TYPE 2 DIABETES MELLITUS WITH HYPERGLYCEMIA (HCC): Primary | ICD-10-CM

## 2024-11-25 LAB
GLUCOSE BLOOD: 352
HEMOGLOBIN A1C: 8.7 % (ref 4.3–5.6)
TEST STRIP LOT #: NORMAL NUMERIC

## 2024-11-25 PROCEDURE — 99214 OFFICE O/P EST MOD 30 MIN: CPT

## 2024-11-25 PROCEDURE — 82947 ASSAY GLUCOSE BLOOD QUANT: CPT

## 2024-11-25 PROCEDURE — 83036 HEMOGLOBIN GLYCOSYLATED A1C: CPT

## 2024-11-25 RX ORDER — GLIPIZIDE 2.5 MG/1
2.5 TABLET ORAL DAILY
Qty: 30 TABLET | Refills: 4 | Status: SHIPPED | OUTPATIENT
Start: 2024-11-25

## 2024-11-25 NOTE — PROGRESS NOTES
Name: Jillian Yip  Date: 11/25/24    Referring Physician: No ref. provider found    HISTORY OF PRESENT ILLNESS   Jillian Yip is a 79 year old female who presents for follow up for diabetes mellitus.     She was diagnosed with diabetes about 10 years ago during hospitalization for MI. Currently living with her daughter and grandson and she also has a caregiver Tuesdays- Fridays.   She does have dementia which is worsening per daughter. She was hospitalized a few months ago following aortic aneurysm repair and then developed pneumonia. Hospitalized again 7/2024 for CVA, and 10/2024- following fall- has fracture of wrist.     Diabetes History:  Diagnosed- 10 years ago   Patient has not had hospitalizations for blood sugar issues    Prior glycohemoglobin were: 8.1% 5/2024; 8.4% 7/2024; 8.7% POC today       Dietary compliance: Poor- she does cheat frequently with diet- sweets and other high CHO snacks during the day. Eating 2 -3 meals daily      Recall:  Breakfast- toast with coffee and occ. sausage  Lunch- skips or tuna sandwich, lean cuisine, soup   Dinner- protein with vegetable and starch, or takeout 1-2 days weekly, chicken soup    Snack- potato chips, or sweets   Beverages- soda, has cut back fairly significantly     Exercise: No  Polyuria/polydipsia: No  Blurred vision: No    Episodes of hypoglycemia: No  Blood Glucose:  Checking 1-3 times per day    Fasting- 150-225  Before bedtime- 300's     Previous DM therapies:  Metformin - stopped due CKD   Tradjenta- too costly     Current DM Regimen:  Lantus - 9 units subcutaneous daily in the evening     Modifying factors:  Medication adherence: Yes   Recent steroids, illness or infections: None       REVIEW OF SYSTEMS  Eyes: Diabetic retinopathy present: No, hx of cataract             Most recent visit to eye doctor in last 12 months: No- is due for appt. Has seen Dr. Rose in the past.     CV: Cardiovascular disease present: Yes, MI- 2013         Hypertension  present: Yes         Hyperlipidemia present: Yes         Peripheral Vascular Disease present: No       Cerebrovascular: CVA - 7 years     : Nephropathy present: CKD- following with nephrology- Dr. Thayer.     Neuro: Neuropathy present: Mild symptoms- seeing podiatry.    Skin: Infection or ulceration: No     Osteoporosis: No    Thyroid disease: No       Medications:     Current Outpatient Medications:     glipiZIDE 2.5 MG Oral Tab, Take 2.5 mg by mouth daily., Disp: 30 tablet, Rfl: 4    donepezil 5 MG Oral Tab, Take 1 tablet (5 mg total) by mouth nightly., Disp: 90 tablet, Rfl: 3    memantine 5 MG Oral Tab, Take 1 tablet (5 mg total) by mouth daily., Disp: 90 tablet, Rfl: 3    metoprolol succinate ER 25 MG Oral Tablet 24 Hr, Take 1 tablet (25 mg total) by mouth daily., Disp: , Rfl:     amLODIPine 10 MG Oral Tab, Take 1 tablet (10 mg total) by mouth every evening., Disp: , Rfl:     losartan 25 MG Oral Tab, Take 1 tablet (25 mg total) by mouth 2 (two) times daily., Disp: , Rfl:     REPATHA SURECLICK 140 MG/ML Subcutaneous Solution Auto-injector, Inject 1 mL into the skin every 14 (fourteen) days., Disp: , Rfl:     traMADol 50 MG Oral Tab, Take 1 tablet (50 mg total) by mouth every 8 (eight) hours as needed for Pain., Disp: 60 tablet, Rfl: 0    cyanocobalamin 1000 MCG Oral Tab, Take 1 tablet (1,000 mcg total) by mouth daily., Disp: 90 tablet, Rfl: 3    aspirin 81 MG Oral Chew Tab, Chew 1 tablet (81 mg total) by mouth daily., Disp: 30 tablet, Rfl: 0    umeclidinium-vilanterol (ANORO ELLIPTA) 62.5-25 MCG/ACT Inhalation Aerosol Powder, Breath Activated, Inhale 1 puff into the lungs daily., Disp: 1 each, Rfl: 1    apixaban 2.5 MG Oral Tab, Take 1 tablet (2.5 mg total) by mouth 2 (two) times daily., Disp: , Rfl:     ranolazine  MG Oral Tablet 12 Hr, Take 1 tablet (500 mg total) by mouth 2 (two) times daily., Disp: , Rfl:     isosorbide mononitrate  MG Oral Tablet 24 Hr, Take 1 tablet (120 mg total) by mouth  daily., Disp: , Rfl:     Insulin Pen Needle (CARETOUCH PEN NEEDLES) 32G X 4 MM Does not apply Misc, Use with injection once daily as directed, Disp: 100 each, Rfl: 3    insulin glargine (LANTUS SOLOSTAR) 100 UNIT/ML Subcutaneous Solution Pen-injector, Inject 6 Units into the skin nightly. (Patient taking differently: Inject 9 Units into the skin nightly.), Disp: 15 mL, Rfl: 1    Blood Glucose Monitoring Suppl (ACCU-CHEK GUIDE) w/Device Does not apply Kit, 1 each daily., Disp: 1 kit, Rfl: 0    Glucose Blood (ACCU-CHEK GUIDE) In Vitro Strip, 1 each by In Vitro route daily., Disp: 100 strip, Rfl: 3    Blood Glucose Monitoring Suppl (ONETOUCH VERIO IQ SYSTEM) w/Device Does not apply Kit, 1 Device by Other route 3 (three) times daily. Use as directed., Disp: 1 kit, Rfl: 0    Glucose Blood (ONETOUCH VERIO) In Vitro Strip, Use as directed., Disp: 300 strip, Rfl: 3    OneTouch Delica Lancets 33G Does not apply Misc, 1 Lancet by Finger stick route 3 (three) times daily., Disp: 300 each, Rfl: 3    pantoprazole 40 MG Oral Tab EC, Take 1 tablet (40 mg total) by mouth 2 (two) times daily before meals., Disp: 180 tablet, Rfl: 3    albuterol 108 (90 Base) MCG/ACT Inhalation Aero Soln, Inhale 2 puffs into the lungs every 4 (four) hours as needed., Disp: 1 each, Rfl: 1    acetaminophen 500 MG Oral Tab, Take 1 tablet (500 mg total) by mouth every 8 (eight) hours., Disp: , Rfl:     lidocaine-menthol 4-1 % External Patch, Place 2 patches onto the skin daily., Disp: , Rfl:     nitroglycerin 0.4 MG Sublingual SL Tab, Place 1 tablet (0.4 mg total) under the tongue every 5 (five) minutes as needed for Chest pain., Disp: , Rfl:     amLODIPine Besylate 5 MG Oral Tab, Take 2 tablets (10 mg total) by mouth at bedtime., Disp: , Rfl:     ONETOUCH DELICA LANCETS 33G Does not apply Misc, , Disp: , Rfl:     ezetimibe 10 MG Oral Tab, Take 1 tablet (10 mg total) by mouth nightly., Disp: , Rfl:     insulin aspart 100 Units/mL Subcutaneous Solution  Pen-injector, Inject 1-7 Units into the skin 3 (three) times daily with meals. (Patient not taking: Reported on 11/25/2024), Disp: , Rfl:      Allergies:   Allergies   Allergen Reactions    Codeine SWELLING and OTHER (SEE COMMENTS)     Face and body get puffy/swell, headache  Per pt, has tolerated morphine and Norco previously with no adverse effects    Enalapril Maleate-Felodipine SHORTNESS OF BREATH    Nyquil Severe Cold-Flu [Gnp Multi-Symptom Cold] PALPITATIONS    Radiology Contrast Iodinated Dyes ANAPHYLAXIS    Shellfish-Derived Products     Dilaudid [Hydromorphone] OTHER (SEE COMMENTS)     PATIENT'S DAUGHTER HAD A VERY BAD REACTION FROM DILAUDID (MENTALLY)    Sulfa Antibiotics RASH and OTHER (SEE COMMENTS)     Rash         Social History:   Social History     Socioeconomic History    Marital status:    Tobacco Use    Smoking status: Former     Current packs/day: 0.50     Average packs/day: 0.5 packs/day for 35.0 years (17.5 ttl pk-yrs)     Types: Cigarettes     Passive exposure: Never    Smokeless tobacco: Former    Tobacco comments:     half pack a day   Vaping Use    Vaping status: Never Used   Substance and Sexual Activity    Alcohol use: No    Drug use: No       Medical History:   Past Medical History:    Aortic aneurysm, thoracic (HCC)    Back problem    Calculus of kidney    Cataract    COPD (chronic obstructive pulmonary disease) (HCC)    Coronary atherosclerosis    Deep vein thrombosis (HCC)    Diabetes (HCC)    Diverticulosis of large intestine    Heart attack (HCC)    2013    High blood pressure    High cholesterol    History of stomach ulcers    Incontinence    Osteoarthritis    Ovarian cancer (HCC)    PONV (postoperative nausea and vomiting)    Renal disorder    Shortness of breath    Stroke (HCC)    shaking, wheelchair       Surgical history:   Past Surgical History:   Procedure Laterality Date    Appendectomy      Arthroscopy of joint unlisted Right     Rotator cuff surgery    Back surgery   07/21/2017    L3-4 LUIS and hemilaminectomies    Cataract      Cath drug eluting stent      2013    Endovas aaa repr w/3-p part      Ep loop recorder implant Left 2017    Foot surgery Bilateral     Fracture surgery Right     right clavicle    Hysterectomy      Theodora localization wire 1 site right (cpt=19281)      benign-20 years ago    Spine surgery procedure unlisted           PHYSICAL EXAM  Vitals:    11/25/24 1356   BP: 127/81   Pulse: 78   Weight: 156 lb (70.8 kg)       General Appearance:  alert, well developed, in no acute distress  Neck: Trachea midline: Normal  Back: no kyphosis or back tenderness  Lymph Nodes:  No abnormal nodes noted  Musculoskeletal:  normal muscle strength and tone  Skin:  normal moisture and skin texture  Hair & Nails:  normal scalp hair     Hematologic:  no excessive bruising  Neuro:  sensory grossly intact and motor grossly intact.  Psychiatric:  oriented to time, self, and place  Nutritional:  no abnormal weight gain or loss      ASSESSMENT/PLAN:    Diabetes mellitus type 2   -HgA1c- 8.7% POC today   -Reviewed target of 7.5% or below.   - Reviewed pathogenesis of diabetes.   - Reviewed importance of good glycemic control to prevent microvascular and macrovascular complications including nephropathy, neuropathy, retinopathy, and cardiovascular disease.  - Reviewed importance of SBGM- check glucose 2-3 times daily   - Reviewed target glucose goals for patient  fasting and <200 post prandially   - Reviewed importance of following diabetic diet- recommended 135 grams of CHO per day or 45 grams per meal.   - Provided patient education materials    - Reviewed importance of limiting soda and sweetened beverages with meals.    - Continue Lantus to 9 units subcutaneous daily. Reviewed insulin timing and administration.     - Add Glipizide 2.5 mg once daily with dinner meal only. Reviewed at length symptoms and management of hypoglycemia.     - Needs prandial coverage but unfortunately  brand name DM medications are very costly for her.     - Continue to monitor sugars twice daily- call with readings in 2 weeks.   - Would likely benefit from small dose of weekly GLP-1 agonist but cost with brand name prescriptions if very high.     -  normotensive   -  Lipids at goal 5/2024;    -  + nephropathy- following with nephrology   -  Needs optho appt and daughter is aware- will schedule appt   -  Following with podiatry- has appt next month, last foot exam 3/2024.   -  Check glucose 1-2 times daily- fasting and evening Reviewed glucose targets.       RTC in 6 months but call with readings as discussed.   11/25/24  KEVAN Ibrahim    A total of  30 minutes was spent on obtaining history, reviewing pertinent imaging/labs and specialists notes, evaluating patient, providing multiple treatment options, reinforcing diet/exercise and compliance, and completing documentation.

## 2024-11-26 ENCOUNTER — LAB ENCOUNTER (OUTPATIENT)
Dept: LAB | Facility: HOSPITAL | Age: 79
End: 2024-11-26
Attending: FAMILY MEDICINE
Payer: MEDICARE

## 2024-11-26 LAB
CREAT UR-SCNC: 66.4 MG/DL
MICROALBUMIN UR-MCNC: 2.3 MG/DL
MICROALBUMIN/CREAT 24H UR-RTO: 34.6 UG/MG (ref ?–30)

## 2024-11-27 RX ORDER — BLOOD SUGAR DIAGNOSTIC
STRIP MISCELLANEOUS
Qty: 200 EACH | Refills: 1 | Status: SHIPPED | OUTPATIENT
Start: 2024-11-27

## 2024-12-06 NOTE — TELEPHONE ENCOUNTER
Keaton advised fax received and placed on Dr Weiler's desk for review.   [facility]    Medicine Progress Note - Hospitalist Service    Date of Admission:  10/2/2024    Assessment & Plan     Summary of Stay: Jemal Gray is a 87 year old male with history of dementia with chronic aphasia. He was admitted on 10/2/2024 after he was brought to the ED by EMS from facility for evaluation of agitation. He had a prolonged hospitalization from 6/5/24 through 10/2/24 during which he was treated for cellulitis but mostly awaited long-term care placement due to dementia. He required appointment of guardianship and ultimately discharged to a long-term care facility on 10/2. Upon transfer to the long-term care facility he became agitated with swearing.  He was wandering around the long-term care facility, entering other patient's rooms and staff was unable to redirect him. He required transfer back to the ED and was readmitted.     In the ED he was stable, no major acute medical issue. He was given seroquel.  He required a bedside attendant as he was agitated and unable to be redirected.  He attempted to leave the emergency department and go into other patient's rooms. His facility was not able to take him back and there was no immediate safe disposition option.  He is here under assisted care status.  Patient has improved and is no longer agitated. He remains medically stable and can be discharged whenever placement is found. Prior long term care facility is reportedly not taking patient back.  is looking into memory care units.     Patient has been here since 10/2/244 awaiting placement.  Elderly waiver appointment is scheduled for 12/9 and this meeting is necessary to establish funding for further cares.     -Patient remains assisted care  -Await long-term placement after meeting with Elderly waiver meeting expected on 12/9  -Awake, seems in good spirits.  Speech garbled at baseline.         Safe Disposition/penitentiary Care:  -  Patient remains medically  stable for discharge when location found.  SW/CM following and working on options.  No current safe disposition option.    -Patient had prolonged hospitalization due to need for disposition assistance in earlier 2024.  His daughter filed for guardianship paperwork which was completed in July.  Acquired MA application and long-term care placement.    -Ultimately will require 24/7 care and Memory care placement      Acute agitation, resolved  Behavior disturbances   Dementia with chronic aphasia:  Suspect triggered by changes in setting and recent move.  No physical complaints or concerns of infection.  Historically required Zyprexa over the summer when he was hospitalized for agitation.  Most recently had been prescribed Seroquel 12.5 mg twice daily as needed although he had not been needing most recently.  -Continue Seroquel PRN  - ms prior check  -SW consulted  -needs memory care     Actinic Keratosis:  -Had surgery for this and prescribed steroids cream and ketoconazole cream, restarted per daughter request previously.     Sinus Bradycardia:  -Patient was noted to have HR in 50s.  Asymptomatic.  -no telemetry monitoring needed      History of Constipation:  -laxatives PRN.  Had BM 11/4.     Barriers to discharge: needs Memory care placement. Unfortunately the patient will not be assessed for elderly waiver until 12/9/2024.           Diet: Combination Diet Regular Diet  Snacks/Supplements Pediatric: Ensure Enlive; Between Meals  Room Service    DVT Prophylaxis: Pneumatic Compression Devices  Maddox Catheter: Not present  Lines: None     Cardiac Monitoring: None  Code Status: No CPR- Do NOT Intubate      Clinically Significant Risk Factors Present on Admission                       # Dementia: noted on problem list                  Social Drivers of Health    Food Insecurity: Unknown (10/3/2024)    Food Insecurity     Within the past 12 months, did you worry that your food would run out before you got money to  buy more?: Patient unable to answer     Within the past 12 months, did the food you bought just not last and you didn t have money to get more?: Patient unable to answer   Housing Stability: Unknown (10/3/2024)    Housing Stability     Do you have housing? : Patient unable to answer     Are you worried about losing your housing?: Patient unable to answer   Tobacco Use: Medium Risk (6/3/2024)    Received from [facility]    Patient History     Smoking Tobacco Use: Former     Smokeless Tobacco Use: Never   Financial Resource Strain: Unknown (10/3/2024)    Financial Resource Strain     Within the past 12 months, have you or your family members you live with been unable to get utilities (heat, electricity) when it was really needed?: Patient unable to answer   Transportation Needs: Unknown (10/3/2024)    Transportation Needs     Within the past 12 months, has lack of transportation kept you from medical appointments, getting your medicines, non-medical meetings or appointments, work, or from getting things that you need?: Patient unable to answer   Interpersonal Safety: Unknown (10/4/2024)    Interpersonal Safety     Do you feel physically and emotionally safe where you currently live?: Patient declined     Within the past 12 months, have you been hit, slapped, kicked or otherwise physically hurt by someone?: Patient declined     Within the past 12 months, have you been humiliated or emotionally abused in other ways by your partner or ex-partner?: Patient declined    Received from [facility]    Social Connections          Disposition Plan     Medically Ready for Discharge: Ready Now             Guicho Rashid MD  Hospitalist Service  [facility]  Securely message with Samantha (more info)  Text page via [facility] Paging/Directory   ______________________________________________________________________    Interval History   Stable night  "per nursing documentation. Ambulated last evening.    Alert and interactive.     Physical Exam   Vital Signs:                    Weight: 157 lbs 6.4 oz    Gen:  NAD, awake, speech garbled.  Unable to articulate any concerns. Humming and singing. \"Thumbs up\"  Eyes:  PERRL, sclera anicteric.  OP:  MMM, no lesions.  Neck:  Supple.  CV:   Lung:  normal effort in RA  Abd:    Skin: No rash.  Ext:  No pitting edema LE b/l.      Medical Decision Making       26 MINUTES SPENT BY ME on the date of service doing chart review, history, exam, documentation & further activities per the note.      Data         Imaging results reviewed over the past 24 hrs:   No results found for this or any previous visit (from the past 24 hours).  "

## 2024-12-17 ENCOUNTER — TELEPHONE (OUTPATIENT)
Dept: ENDOCRINOLOGY CLINIC | Facility: CLINIC | Age: 79
End: 2024-12-17

## 2024-12-17 NOTE — TELEPHONE ENCOUNTER
Received fax from [employer] regarding DWO form.   Form has been placed in providers folder for review and signature.

## 2024-12-26 ENCOUNTER — TELEPHONE (OUTPATIENT)
Dept: ENDOCRINOLOGY CLINIC | Facility: CLINIC | Age: 79
End: 2024-12-26

## 2024-12-26 ENCOUNTER — PATIENT MESSAGE (OUTPATIENT)
Dept: FAMILY MEDICINE CLINIC | Facility: CLINIC | Age: 79
End: 2024-12-26

## 2024-12-26 DIAGNOSIS — E11.65 TYPE 2 DIABETES MELLITUS WITH HYPERGLYCEMIA, WITH LONG-TERM CURRENT USE OF INSULIN (HCC): Primary | ICD-10-CM

## 2024-12-26 DIAGNOSIS — Z79.4 TYPE 2 DIABETES MELLITUS WITH HYPERGLYCEMIA, WITH LONG-TERM CURRENT USE OF INSULIN (HCC): Primary | ICD-10-CM

## 2024-12-26 RX ORDER — BLOOD-GLUCOSE METER
1 EACH MISCELLANEOUS DAILY
Qty: 1 KIT | Refills: 0 | Status: SHIPPED | OUTPATIENT
Start: 2024-12-26

## 2024-12-26 RX ORDER — BLOOD SUGAR DIAGNOSTIC
STRIP MISCELLANEOUS
Qty: 200 EACH | Refills: 1 | Status: SHIPPED | OUTPATIENT
Start: 2024-12-26

## 2024-12-26 NOTE — TELEPHONE ENCOUNTER
Endocrine Refill protocol for Glucose testing supplies     Protocol Criteria: PASSED Reason: N/A    If below requirement is met, send a 90-day supply with 1 refill per provider protocol.    Verify appointment with Endocrinology completed in the last 6 months or scheduled in the next 3 months.    Last completed office visit: 11/25/2024 Anali Cruz APRN   Next scheduled Follow up: no future appt

## 2024-12-27 NOTE — TELEPHONE ENCOUNTER
Connie- please approve refill if appropriate. Accu-chek test strips sent on 12/26    Endocrine refill protocol for basal insulins     Protocol Criteria: FAILED Reason: Elevated A1C    If all below requirements are met, send a 90-day supply with 1 refill per provider protocol.       Verify Appointment with Endocrinology completed in the last 6 months or scheduled in the next 3 months.  Verify A1C has been completed within the last 6 months and is below 8.5%     Last completed office visit:11/25/24 with Anali Cruz  Next scheduled Follow up:   Future Appointments   Date Time Provider Department Center   1/29/2025  6:30 PM Weiler, Colleen M, DO [facility]      Last A1c result: Last A1c value was 8.7% done 11/25/2024.

## 2024-12-30 RX ORDER — INSULIN GLARGINE 100 [IU]/ML
9 INJECTION, SOLUTION SUBCUTANEOUS NIGHTLY
Qty: 15 ML | Refills: 1 | Status: SHIPPED | OUTPATIENT
Start: 2024-12-30

## 2025-01-06 ENCOUNTER — TELEPHONE (OUTPATIENT)
Dept: FAMILY MEDICINE CLINIC | Facility: CLINIC | Age: 80
End: 2025-01-06

## 2025-01-06 NOTE — TELEPHONE ENCOUNTER
Daughter states she is power of  and is requesting a note that states patient well being and that daughter needs to take care of mother and that she is patient power of healthcare and could not be taken away for long extended time  Would like it sent through [employer]

## 2025-01-07 ENCOUNTER — PATIENT MESSAGE (OUTPATIENT)
Dept: FAMILY MEDICINE CLINIC | Facility: CLINIC | Age: 80
End: 2025-01-07

## 2025-01-09 NOTE — TELEPHONE ENCOUNTER
Message left for Keaton to call back regarding the reason for the note.  The POA is in the patients chart.

## 2025-01-10 ENCOUNTER — TELEPHONE (OUTPATIENT)
Dept: FAMILY MEDICINE CLINIC | Facility: CLINIC | Age: 80
End: 2025-01-10

## 2025-01-10 NOTE — TELEPHONE ENCOUNTER
Verified name and  of patient.    Daughter of patient (on release of information) calling to state that she is requesting a letter to be excused from jury duty. She states that she needs a doctor to confirm the conditions that patient has.

## 2025-01-10 NOTE — TELEPHONE ENCOUNTER
Fadia [facility] called stating she is going to see the patient now. She stated the patients daughter said patient has been depressed lately and having difficulty swallowing. She would like to know what Dr.Weiler would like her to do.

## 2025-01-13 NOTE — TELEPHONE ENCOUNTER
Called patient's daughter Keaton,verified patient name and date of birth.  She reports that patient had a swallow study in the past and she was advised to use straws  for drinking as needed.  Keaton prefers to keep video appointment with Dr Weiler as scheduled and will call back if symptoms worsen or new symptoms occur.  Future Appointments   Date Time Provider Department Center   1/29/2025  6:30 PM Weiler, Colleen M, DO [facility]

## 2025-01-20 ENCOUNTER — TELEPHONE (OUTPATIENT)
Dept: NEPHROLOGY | Facility: CLINIC | Age: 80
End: 2025-01-20

## 2025-01-20 DIAGNOSIS — N18.32 STAGE 3B CHRONIC KIDNEY DISEASE (HCC): Primary | ICD-10-CM

## 2025-01-20 NOTE — TELEPHONE ENCOUNTER
Dr. Thayer- orders needed to do closer to 3/25/24 appointment.    Can orders be placed for cbc and renal panel, CBC, Renal panel standing orders  25

## 2025-01-22 ENCOUNTER — TELEPHONE (OUTPATIENT)
Dept: FAMILY MEDICINE CLINIC | Facility: CLINIC | Age: 80
End: 2025-01-22

## 2025-01-23 NOTE — TELEPHONE ENCOUNTER
Called Fadia NUGENT from [facility], verified patientname and date of birth.  Informed Fadia that  Dr Weiler agrees to  a home health visit today as  per her 1/23/25 note below.  Fadia has no further questions.

## 2025-01-29 ENCOUNTER — TELEMEDICINE (OUTPATIENT)
Dept: FAMILY MEDICINE CLINIC | Facility: CLINIC | Age: 80
End: 2025-01-29
Payer: MEDICARE

## 2025-01-29 DIAGNOSIS — M79.606 PAIN AND SWELLING OF LOWER EXTREMITY, UNSPECIFIED LATERALITY: ICD-10-CM

## 2025-01-29 DIAGNOSIS — M79.89 PAIN AND SWELLING OF LOWER EXTREMITY, UNSPECIFIED LATERALITY: ICD-10-CM

## 2025-01-29 DIAGNOSIS — R21 RASH: Primary | ICD-10-CM

## 2025-01-29 PROCEDURE — 99213 OFFICE O/P EST LOW 20 MIN: CPT | Performed by: FAMILY MEDICINE

## 2025-01-30 NOTE — PROGRESS NOTES
HPI: Jillian is a 79 year old female who presents for multiple concerns. Has rash which started about 3 weeks ago after starting Glipizide.  Itchy. All over face and down the back of neck. No other new exposures.     Has swelling in bilateral feet.  Hurt to stand on. Gets sharp pains in them.  Has been going on for the past few months. Has had the problem in the past. Sitting most of the day. Improved some in the morning.  Pt states she does not sleep well at night due to pain in the feet. Pt thinks that bandages were absorbed on her feet. Takes Amlodipine at night. Cannot put on compression stockings.     PMH:    Past Medical History:    Aortic aneurysm, thoracic    Back problem    Calculus of kidney    Cataract    COPD (chronic obstructive pulmonary disease) (HCC)    Coronary atherosclerosis    Deep vein thrombosis (HCC)    Diabetes (HCC)    Diverticulosis of large intestine    Heart attack (HCC)    2013    High blood pressure    High cholesterol    History of stomach ulcers    Incontinence    Osteoarthritis    Ovarian cancer (HCC)    PONV (postoperative nausea and vomiting)    Renal disorder    Shortness of breath    Stroke (HCC)    shaking, wheelchair      Alg:  Codeine, Enalapril maleate-felodipine, Nyquil severe cold-flu [gnp multi-symptom cold], Radiology contrast iodinated dyes, Shellfish-derived products, Dilaudid [hydromorphone], and Sulfa antibiotics   Meds: Medications Ordered Prior to Encounter[1]   Tobacco Use: no    Objective:   Gen: AOx3. NAD  There were no vitals taken for this visit.  Face- erythematous macular rash noted    Assessment:/Plan:  Encounter Diagnoses   Name Primary?    Rash    Seems to be related to the start of the Glipizide.  Advised to stop and monitor sugars.  Will let Endocrinology know.    Yes    Pain and swelling of lower extremity, unspecified laterality    May be due to use of Amlodipine.  Advised to discontinue.  Increase Losartan to twice daily.  Monitor BPs and let me know in  a week.  Daughter states she will MyChart.        Please note that the following visit was completed using two-way, real-time interactive audio and/or video communication.  This has been done in good hortencia to provide continuity of care in the best interest of the provider-patient relationship.  There are limitations of this visit as no physical exam could be performed.  Every conscious effort was taken to allow for sufficient and adequate time.  This billing was spent on reviewing labs, medications, radiology tests and decision making.  Appropriate medical decision-making and tests are ordered as detailed in the plan of care above.      Colleen Weiler, DO             [1]   Current Outpatient Medications on File Prior to Visit   Medication Sig Dispense Refill    insulin glargine (LANTUS SOLOSTAR) 100 UNIT/ML Subcutaneous Solution Pen-injector Inject 9 Units into the skin nightly. 15 mL 1    Blood Glucose Monitoring Suppl (ACCU-CHEK GUIDE) w/Device Does not apply Kit 1 each daily. 1 kit 0    Glucose Blood (ACCU-CHEK GUIDE TEST) In Vitro Strip Check glucose twice daily 200 each 1    glipiZIDE 2.5 MG Oral Tab Take 2.5 mg by mouth daily. 30 tablet 4    donepezil 5 MG Oral Tab Take 1 tablet (5 mg total) by mouth nightly. 90 tablet 3    memantine 5 MG Oral Tab Take 1 tablet (5 mg total) by mouth daily. 90 tablet 3    metoprolol succinate ER 25 MG Oral Tablet 24 Hr Take 1 tablet (25 mg total) by mouth daily.      amLODIPine 10 MG Oral Tab Take 1 tablet (10 mg total) by mouth every evening.      losartan 25 MG Oral Tab Take 1 tablet (25 mg total) by mouth 2 (two) times daily.      REPATHA SURECLICK 140 MG/ML Subcutaneous Solution Auto-injector Inject 1 mL into the skin every 14 (fourteen) days.      traMADol 50 MG Oral Tab Take 1 tablet (50 mg total) by mouth every 8 (eight) hours as needed for Pain. 60 tablet 0    cyanocobalamin 1000 MCG Oral Tab Take 1 tablet (1,000 mcg total) by mouth daily. 90 tablet 3    aspirin 81 MG  Oral Chew Tab Chew 1 tablet (81 mg total) by mouth daily. 30 tablet 0    umeclidinium-vilanterol (ANORO ELLIPTA) 62.5-25 MCG/ACT Inhalation Aerosol Powder, Breath Activated Inhale 1 puff into the lungs daily. 1 each 1    apixaban 2.5 MG Oral Tab Take 1 tablet (2.5 mg total) by mouth 2 (two) times daily.      ranolazine  MG Oral Tablet 12 Hr Take 1 tablet (500 mg total) by mouth 2 (two) times daily.      insulin aspart 100 Units/mL Subcutaneous Solution Pen-injector Inject 1-7 Units into the skin 3 (three) times daily with meals. (Patient not taking: Reported on 11/25/2024)      isosorbide mononitrate  MG Oral Tablet 24 Hr Take 1 tablet (120 mg total) by mouth daily.      Insulin Pen Needle (CARETOUCH PEN NEEDLES) 32G X 4 MM Does not apply Misc Use with injection once daily as directed 100 each 3    Glucose Blood (ACCU-CHEK GUIDE) In Vitro Strip 1 each by In Vitro route daily. 100 strip 3    Blood Glucose Monitoring Suppl (ONETOUCH VERIO IQ SYSTEM) w/Device Does not apply Kit 1 Device by Other route 3 (three) times daily. Use as directed. 1 kit 0    Glucose Blood (ONETOUCH VERIO) In Vitro Strip Use as directed. 300 strip 3    OneTouch Delica Lancets 33G Does not apply Misc 1 Lancet by Finger stick route 3 (three) times daily. 300 each 3    pantoprazole 40 MG Oral Tab EC Take 1 tablet (40 mg total) by mouth 2 (two) times daily before meals. 180 tablet 3    albuterol 108 (90 Base) MCG/ACT Inhalation Aero Soln Inhale 2 puffs into the lungs every 4 (four) hours as needed. 1 each 1    acetaminophen 500 MG Oral Tab Take 1 tablet (500 mg total) by mouth every 8 (eight) hours.      lidocaine-menthol 4-1 % External Patch Place 2 patches onto the skin daily.      nitroglycerin 0.4 MG Sublingual SL Tab Place 1 tablet (0.4 mg total) under the tongue every 5 (five) minutes as needed for Chest pain.      amLODIPine Besylate 5 MG Oral Tab Take 2 tablets (10 mg total) by mouth at bedtime.      ONETOUCH DELICA LANCETS 33G  Does not apply Misc       ezetimibe 10 MG Oral Tab Take 1 tablet (10 mg total) by mouth nightly.       No current facility-administered medications on file prior to visit.

## 2025-02-03 ENCOUNTER — PATIENT MESSAGE (OUTPATIENT)
Dept: FAMILY MEDICINE CLINIC | Facility: CLINIC | Age: 80
End: 2025-02-03

## 2025-02-03 ENCOUNTER — TELEPHONE (OUTPATIENT)
Dept: FAMILY MEDICINE CLINIC | Facility: CLINIC | Age: 80
End: 2025-02-03

## 2025-02-03 NOTE — TELEPHONE ENCOUNTER
Fadia from [facility] called to state the patient will be discharged from [facility] on 02/05/25. Please call if you have any questions.

## 2025-02-07 ENCOUNTER — TELEPHONE (OUTPATIENT)
Dept: FAMILY MEDICINE CLINIC | Facility: CLINIC | Age: 80
End: 2025-02-07

## 2025-02-07 RX ORDER — LOSARTAN POTASSIUM 25 MG/1
TABLET ORAL
Qty: 270 TABLET | Refills: 0 | Status: SHIPPED | OUTPATIENT
Start: 2025-02-07 | End: 2025-05-08

## 2025-02-07 NOTE — TELEPHONE ENCOUNTER
Dr.Weiler please see the [employer] message below. I did inform daughter that you are wanting patient to be on Losartan 75 mg daily not twice a day. She verbalized understanding. She will like to know if she can give her losartan 25 mg in the morning and 50 mg of losartan at night or should she give the three 25 mg tablets at the same time? Please advise  ALSO she does need a new script to be sent to the pharmacy. Please advise.    Daughter aware Dr. Weiler is not in the office today and will return tomorrow. She verbalized understanding.          Jillian Yip to P Em Rn Triage (supporting Colleen M Weiler, DO)   MW      2/7/25 12:23 PM  Is that 75 bid? And can I ask for a script I don't have enough to last for too long          Weiler, Colleen M, DO to Jillian Yip         2/7/25 10:20 AM  Hi-       I am so glad to hear the swelling has resolved!!! That must feel so much better!      You can actually increase the Losartan dose to 75mg daily (3 tablets).  The max dose is 100mg daily so we aren't there yet.      Let me just contact Endocrinology about the sugars.  We are moving in the right direction!     Dr. Weiler    Last read by Jillian Yip at 12:22 PM on 2/7/202

## 2025-02-07 NOTE — TELEPHONE ENCOUNTER
Right- 75mg daily.  Let's do 50mg in the AM and 25mg at night.  See if we can get her controlled that way. I sent the script

## 2025-02-25 ENCOUNTER — APPOINTMENT (OUTPATIENT)
Dept: GENERAL RADIOLOGY | Facility: HOSPITAL | Age: 80
End: 2025-02-25
Attending: EMERGENCY MEDICINE
Payer: MEDICARE

## 2025-02-25 ENCOUNTER — HOSPITAL ENCOUNTER (EMERGENCY)
Facility: HOSPITAL | Age: 80
Discharge: HOME | End: 2025-02-25
Attending: EMERGENCY MEDICINE
Payer: MEDICARE

## 2025-02-25 ENCOUNTER — NURSE TRIAGE (OUTPATIENT)
Dept: FAMILY MEDICINE CLINIC | Facility: CLINIC | Age: 80
End: 2025-02-25

## 2025-02-25 VITALS
HEART RATE: 59 BPM | BODY MASS INDEX: 30.43 KG/M2 | OXYGEN SATURATION: 93 % | WEIGHT: 155 LBS | SYSTOLIC BLOOD PRESSURE: 154 MMHG | HEIGHT: 60 IN | DIASTOLIC BLOOD PRESSURE: 39 MMHG | TEMPERATURE: 98 F | RESPIRATION RATE: 17 BRPM

## 2025-02-25 DIAGNOSIS — J10.1 INFLUENZA A: Primary | ICD-10-CM

## 2025-02-25 LAB
ANION GAP SERPL CALC-SCNC: 11 MMOL/L (ref 0–18)
ATRIAL RATE: 62 BPM
BASOPHILS # BLD AUTO: 0.05 X10(3) UL (ref 0–0.2)
BASOPHILS NFR BLD AUTO: 1 %
BUN BLD-MCNC: 43 MG/DL (ref 9–23)
BUN/CREAT SERPL: 17.8 (ref 10–20)
CALCIUM BLD-MCNC: 9 MG/DL (ref 8.7–10.4)
CHLORIDE SERPL-SCNC: 103 MMOL/L (ref 98–112)
CO2 SERPL-SCNC: 26 MMOL/L (ref 21–32)
CREAT BLD-MCNC: 2.41 MG/DL
DEPRECATED RDW RBC AUTO: 46.5 FL (ref 35.1–46.3)
EGFRCR SERPLBLD CKD-EPI 2021: 20 ML/MIN/1.73M2 (ref 60–?)
EOSINOPHIL # BLD AUTO: 0.28 X10(3) UL (ref 0–0.7)
EOSINOPHIL NFR BLD AUTO: 5.5 %
ERYTHROCYTE [DISTWIDTH] IN BLOOD BY AUTOMATED COUNT: 15.9 % (ref 11–15)
FLUAV + FLUBV RNA SPEC NAA+PROBE: NEGATIVE
FLUAV + FLUBV RNA SPEC NAA+PROBE: POSITIVE
GLUCOSE BLD-MCNC: 218 MG/DL (ref 70–99)
HCT VFR BLD AUTO: 32.5 %
HGB BLD-MCNC: 10.1 G/DL
IMM GRANULOCYTES # BLD AUTO: 0.03 X10(3) UL (ref 0–1)
IMM GRANULOCYTES NFR BLD: 0.6 %
LYMPHOCYTES # BLD AUTO: 1.27 X10(3) UL (ref 1–4)
LYMPHOCYTES NFR BLD AUTO: 25.1 %
MCH RBC QN AUTO: 24.9 PG (ref 26–34)
MCHC RBC AUTO-ENTMCNC: 31.1 G/DL (ref 31–37)
MCV RBC AUTO: 80 FL
MONOCYTES # BLD AUTO: 0.62 X10(3) UL (ref 0.1–1)
MONOCYTES NFR BLD AUTO: 12.3 %
NEUTROPHILS # BLD AUTO: 2.81 X10 (3) UL (ref 1.5–7.7)
NEUTROPHILS # BLD AUTO: 2.81 X10(3) UL (ref 1.5–7.7)
NEUTROPHILS NFR BLD AUTO: 55.5 %
OSMOLALITY SERPL CALC.SUM OF ELEC: 307 MOSM/KG (ref 275–295)
P AXIS: 64 DEGREES
P-R INTERVAL: 156 MS
PLATELET # BLD AUTO: 324 10(3)UL (ref 150–450)
POTASSIUM SERPL-SCNC: 4.2 MMOL/L (ref 3.5–5.1)
Q-T INTERVAL: 470 MS
QRS DURATION: 92 MS
QTC CALCULATION (BEZET): 477 MS
R AXIS: -37 DEGREES
RBC # BLD AUTO: 4.06 X10(6)UL
RSV RNA SPEC NAA+PROBE: NEGATIVE
SARS-COV-2 RNA RESP QL NAA+PROBE: NOT DETECTED
SODIUM SERPL-SCNC: 140 MMOL/L (ref 136–145)
T AXIS: 16 DEGREES
TROPONIN I SERPL HS-MCNC: 26 NG/L
VENTRICULAR RATE: 62 BPM
WBC # BLD AUTO: 5.1 X10(3) UL (ref 4–11)

## 2025-02-25 PROCEDURE — 0241U SARS-COV-2/FLU A AND B/RSV BY PCR (GENEXPERT): CPT

## 2025-02-25 PROCEDURE — 96360 HYDRATION IV INFUSION INIT: CPT

## 2025-02-25 PROCEDURE — 71045 X-RAY EXAM CHEST 1 VIEW: CPT | Performed by: EMERGENCY MEDICINE

## 2025-02-25 PROCEDURE — 85025 COMPLETE CBC W/AUTO DIFF WBC: CPT | Performed by: EMERGENCY MEDICINE

## 2025-02-25 PROCEDURE — 99285 EMERGENCY DEPT VISIT HI MDM: CPT

## 2025-02-25 PROCEDURE — 0241U SARS-COV-2/FLU A AND B/RSV BY PCR (GENEXPERT): CPT | Performed by: EMERGENCY MEDICINE

## 2025-02-25 PROCEDURE — 80048 BASIC METABOLIC PNL TOTAL CA: CPT | Performed by: EMERGENCY MEDICINE

## 2025-02-25 PROCEDURE — 84484 ASSAY OF TROPONIN QUANT: CPT | Performed by: EMERGENCY MEDICINE

## 2025-02-25 PROCEDURE — 93010 ELECTROCARDIOGRAM REPORT: CPT

## 2025-02-25 PROCEDURE — 93005 ELECTROCARDIOGRAM TRACING: CPT

## 2025-02-25 RX ORDER — ALBUTEROL SULFATE 90 UG/1
2 INHALANT RESPIRATORY (INHALATION) EVERY 4 HOURS PRN
Qty: 8.5 G | Refills: 3 | Status: SHIPPED | OUTPATIENT
Start: 2025-02-25

## 2025-02-25 RX ORDER — OSELTAMIVIR PHOSPHATE 30 MG/1
30 CAPSULE ORAL ONCE
Status: COMPLETED | OUTPATIENT
Start: 2025-02-25 | End: 2025-02-25

## 2025-02-25 RX ORDER — OSELTAMIVIR PHOSPHATE 30 MG/1
30 CAPSULE ORAL DAILY
Qty: 4 CAPSULE | Refills: 0 | Status: SHIPPED | OUTPATIENT
Start: 2025-02-25 | End: 2025-03-01

## 2025-02-25 NOTE — TELEPHONE ENCOUNTER
Please see triage call dated today.   Patient's daughter stated patient will run out of her albuterol in a few days.    Routing for protocol.

## 2025-02-25 NOTE — TELEPHONE ENCOUNTER
Dr. Weiler - FYI - refill in progress for albuterol - not currently on medication list, should be routed to you soon.    Patient has presented to [facility] today at 1210.   Influenza A (+) was prescribed Tamiflu.

## 2025-02-25 NOTE — ED INITIAL ASSESSMENT (HPI)
Pt presents to the Ed with c/o shortness of breath for two days. +coughing +fevers. Pt reports using her inhaler, more than usual, and getting some relief. History of copd. Clear lungs auscultated by this Rn.

## 2025-02-25 NOTE — TELEPHONE ENCOUNTER
Action Requested: Summary for Provider     []  Critical Lab, Recommendations Needed  [] Need Additional Advice  [x]   FYI    []   Need Orders  [] Need Medications Sent to Pharmacy  []  Other     SUMMARY: Daughter Keaton will bring patient to Emergency Room today.    Reason for call: Difficulty Breathing  Onset: Feb 23, 2025    Spoke to daughter Keaton (on STEVEN), patient's full name and date of birth verified.    Keaton reports she has what she believes is bronchial pneumonia (Keaton has had this before) for about 1 week. Keaton tested herself for Covid and was (-).    Patient's symptoms began about 2 days ago, coughing, breathing worse than normal, fever was 101.3 yesterday, but patient does not feel as warm today.    Keaton stated she can hear crackles in patient's chest, history of emphysema/COPD.     RN recommended emergency room for evaluation and treatment.   Keaton stated when home caregiver arrives, they will get patient ready and bring her to [facility] emergency room.     Postpone for Triage RN follow up.    Reason for Disposition   MODERATE difficulty breathing (e.g., speaks in phrases, SOB even at rest, pulse 100-120) of new-onset or worse than normal    Protocols used: Breathing Difficulty-A-OH

## 2025-02-25 NOTE — ED PROVIDER NOTES
Patient Seen in: [facility] Emergency Department    History     Chief Complaint   Patient presents with    Shortness Of Breath       HPI    79-year-old female here with 3 days now of mild cough, fatigue, subjective fevers.reports very minor chest pain with cough, otherwise no other chest pain.  No dyspnea.      No emesis.    History reviewed.   Past Medical History:    Aortic aneurysm, thoracic    Back problem    Calculus of kidney    Cataract    COPD (chronic obstructive pulmonary disease) (HCC)    Coronary atherosclerosis    Deep vein thrombosis (HCC)    Diabetes (HCC)    Diverticulosis of large intestine    Heart attack (HCC)    2013    High blood pressure    High cholesterol    History of stomach ulcers    Incontinence    Osteoarthritis    Ovarian cancer (HCC)    PONV (postoperative nausea and vomiting)    Renal disorder    Shortness of breath    Stroke (HCC)    shaking, wheelchair       History reviewed.   Past Surgical History:   Procedure Laterality Date    Appendectomy      Arthroscopy of joint unlisted Right     Rotator cuff surgery    Back surgery  07/21/2017    L3-4 LUIS and hemilaminectomies    Cataract      Cath drug eluting stent      2013    Endovas aaa repr w/3-p part      Ep loop recorder implant Left 2017    Foot surgery Bilateral     Fracture surgery Right     right clavicle    Hysterectomy      Theodora localization wire 1 site right (cpt=19281)      benign-20 years ago    Spine surgery procedure unlisted           Medications :  Prescriptions Prior to Admission[1]     Family History   Problem Relation Age of Onset    Cancer Father     Diabetes Father     Diabetes Mother     Cancer Daughter     Breast Cancer Maternal Aunt         x2 in 70's    Ovarian Cancer Self        Smoking Status:   Social History     Socioeconomic History    Marital status:    Tobacco Use    Smoking status: Former     Current packs/day: 0.50     Average packs/day: 0.5 packs/day for 35.0 years (17.5 ttl pk-yrs)      Types: Cigarettes     Passive exposure: Never    Smokeless tobacco: Former    Tobacco comments:     half pack a day   Vaping Use    Vaping status: Never Used   Substance and Sexual Activity    Alcohol use: No    Drug use: No       Constitutional and vital signs reviewed.      Social History and Family History elements reviewed from today, pertinent positives to the presenting problem noted.    Physical Exam     ED Triage Vitals [02/25/25 1221]   /76   Pulse 65   Resp 18   Temp 98.1 °F (36.7 °C)   Temp src Oral   SpO2 93 %   O2 Device None (Room air)       All measures to prevent infection transmission during my interaction with the patient were taken. The patient was already wearing a droplet mask on my arrival to the room. Personal protective equipment was worn throughout the duration of the exam.  Handwashing was performed prior to and after the exam.  Stethoscope and any equipment used during my examination was cleaned with super sani-cloth germicidal wipes following the exam.     Physical Exam    General: NAD  Head: Normocephalic and atraumatic.  Mouth/Throat/Ears/Nose: Oropharynx is clear   Eyes: Conjunctivae and EOM are normal.   Neck: Normal range of motion. Supple.   Cardiovascular: Normal rate, regular rhythm, normal heart sounds.  Respiratory/Chest: Clear and equal bilaterally. Exhibits no tenderness.  Gastrointestinal: Soft, non-tender, non-distended. Bowel sounds are normal.   Musculoskeletal:No swelling or deformity.   Neurological: Alert and appropriate. No focal deficits.   Skin: Skin is warm and dry. No pallor.         ED Course        Labs Reviewed   BASIC METABOLIC PANEL (8) - Abnormal; Notable for the following components:       Result Value    Glucose 218 (*)     BUN 43 (*)     Creatinine 2.41 (*)     Calculated Osmolality 307 (*)     eGFR-Cr 20 (*)     All other components within normal limits   CBC WITH DIFFERENTIAL WITH PLATELET - Abnormal; Notable for the following components:    HGB  10.1 (*)     HCT 32.5 (*)     MCH 24.9 (*)     RDW-SD 46.5 (*)     RDW 15.9 (*)     All other components within normal limits   SARS-COV-2/FLU A AND B/RSV BY PCR (GENEXPERT) - Abnormal; Notable for the following components:    Influenza A by PCR Positive (*)     All other components within normal limits    Narrative:     This test is intended for the qualitative detection and differentiation of SARS-CoV-2, influenza A, influenza B, and respiratory syncytial virus (RSV) viral RNA in nasopharyngeal or nares swabs from individuals suspected of respiratory viral infection consistent with COVID-19 by their healthcare provider. Signs and symptoms of respiratory viral infection due to SARS-CoV-2, influenza, and RSV can be similar.                                    Test performed using the Xpert Xpress SARS-CoV-2/FLU/RSV (real time RT-PCR)  assay on the Etablepert instrument, [employer], [place] [address].                   This test is being used under the Food and Drug Administration's Emergency Use Authorization.                                    The authorized Fact Sheet for Healthcare Providers for this assay is available upon request from the laboratory.   TROPONIN I HIGH SENSITIVITY - Normal   RAINBOW DRAW LAVENDER   RAINBOW DRAW LIGHT GREEN   RAINBOW DRAW BLUE     EKG    Rate, intervals and axes as noted on EKG Report.  Rate: 62  Rhythm: Sinus Rhythm  Reading: No STEMI.  This is my interpretation.           As Interpreted by me    Imaging Results Available and Reviewed while in ED: XR CHEST AP PORTABLE  (CPT=71045)    Result Date: 2/25/2025  CONCLUSION: No acute cardiopulmonary abnormality.  Stent graft within the aortic arch and descending thoracic aorta is redemonstrated.   Dictated by (CST): Sergei Trinh MD on 2/25/2025 at 2:08 PM     Finalized by (CST): Sergei Trinh MD on 2/25/2025 at 2:09 PM         ED Medications Administered:   Medications   sodium chloride 0.9 % IV bolus 500 mL (0 mL Intravenous Stopped  2/25/25 1445)   oseltamivir (Tamiflu) cap 30 mg (30 mg Oral Given 2/25/25 1456)         MDM     Vitals:    02/25/25 1315 02/25/25 1345 02/25/25 1415 02/25/25 1445   BP:  121/49 146/56 154/39   Pulse: 64 60 59 59   Resp: 11 18 17 17   Temp:       TempSrc:       SpO2: 93% 91% 91% 93%   Weight:       Height:         *I personally reviewed and interpreted all ED vitals.    Pulse Ox: 93%, Room air, Normal     Monitor Interpretation:   normal sinus rhythm as interpreted by me.  The cardiac monitor was ordered given dyspnea.      Medical Decision Making      Differential Diagnosis/ Diagnostic Considerations: Influenza, ACS, pneumonia    Complicating Factors: The patient already has CKD to contribute to the complexity of this ED evaluation.    I reviewed prior chart records including nursing triage note prompting referral to the emergency department.  Chest x-ray without focal infiltrate to suggest bacterial pneumonia on my interpretation.  Lab work reviewed and notable on my interpretation for positive influenza A status, CKD at baseline.  Considered admission however the patient is normoxic on room air and demonstrates no signs of respiratory failure.  Ambulatory without issues.  Renally dosed oseltamavir was prescribed    Dc In stable condition.  Patient is comfortable with the plan.    Prescriptions: As below      Disposition and Plan     Clinical Impression:  1. Influenza A        Disposition:  Discharge    Follow-up:  Weiler, Colleen M, DO  [address] [address]  [phone]    Schedule an appointment as soon as possible for a visit in 1 day(s)        Medications Prescribed:  Discharge Medication List as of 2/25/2025  2:59 PM        START taking these medications    Details   oseltamivir 30 MG Oral Cap Take 1 capsule (30 mg total) by mouth daily for 4 days., Normal, Disp-4 capsule, R-0                              [1] (Not in a hospital admission)

## 2025-02-25 NOTE — TELEPHONE ENCOUNTER
Please review; protocol failed/ has no protocol      An Lawrence, RN3 minutes ago (3:03 PM)     PL  Please see triage call dated today.   Patient's daughter stated patient will run out of her albuterol in a few days.     Routing for protocol.

## 2025-02-26 NOTE — TELEPHONE ENCOUNTER
Left patient a detailed voicemail regarding prescription and to call our office. Office phone number provided with office telephone hours.

## 2025-02-26 NOTE — TELEPHONE ENCOUNTER
Keaton/daughter (Last signed Verbal Release verified)--> Left message to call back; made aware [facility] message sent [1st attempt]    RN Triage - please check if patient read [facility] message, if not please make 2nd attempt to call.

## 2025-03-14 ENCOUNTER — TELEPHONE (OUTPATIENT)
Dept: NEPHROLOGY | Facility: CLINIC | Age: 80
End: 2025-03-14

## 2025-03-14 ENCOUNTER — LAB ENCOUNTER (OUTPATIENT)
Dept: LAB | Facility: HOSPITAL | Age: 80
End: 2025-03-14
Attending: INTERNAL MEDICINE
Payer: MEDICARE

## 2025-03-14 ENCOUNTER — OFFICE VISIT (OUTPATIENT)
Dept: NEPHROLOGY | Facility: CLINIC | Age: 80
End: 2025-03-14

## 2025-03-14 VITALS
WEIGHT: 162 LBS | HEART RATE: 62 BPM | SYSTOLIC BLOOD PRESSURE: 130 MMHG | BODY MASS INDEX: 31.8 KG/M2 | HEIGHT: 60 IN | DIASTOLIC BLOOD PRESSURE: 70 MMHG

## 2025-03-14 DIAGNOSIS — D64.9 ANEMIA, UNSPECIFIED TYPE: Primary | ICD-10-CM

## 2025-03-14 DIAGNOSIS — N18.4 STAGE 4 CHRONIC KIDNEY DISEASE (HCC): Primary | ICD-10-CM

## 2025-03-14 DIAGNOSIS — N18.32 STAGE 3B CHRONIC KIDNEY DISEASE (HCC): ICD-10-CM

## 2025-03-14 LAB
ALBUMIN SERPL-MCNC: 4.5 G/DL (ref 3.2–4.8)
ANION GAP SERPL CALC-SCNC: 7 MMOL/L (ref 0–18)
BASOPHILS # BLD AUTO: 0.1 X10(3) UL (ref 0–0.2)
BASOPHILS NFR BLD AUTO: 1.3 %
BUN BLD-MCNC: 36 MG/DL (ref 9–23)
BUN/CREAT SERPL: 16.9 (ref 10–20)
CALCIUM BLD-MCNC: 9.5 MG/DL (ref 8.7–10.4)
CHLORIDE SERPL-SCNC: 104 MMOL/L (ref 98–112)
CO2 SERPL-SCNC: 29 MMOL/L (ref 21–32)
CREAT BLD-MCNC: 2.13 MG/DL
DEPRECATED RDW RBC AUTO: 47.6 FL (ref 35.1–46.3)
EGFRCR SERPLBLD CKD-EPI 2021: 23 ML/MIN/1.73M2 (ref 60–?)
EOSINOPHIL # BLD AUTO: 0.31 X10(3) UL (ref 0–0.7)
EOSINOPHIL NFR BLD AUTO: 4.1 %
ERYTHROCYTE [DISTWIDTH] IN BLOOD BY AUTOMATED COUNT: 16.4 % (ref 11–15)
GLUCOSE BLD-MCNC: 207 MG/DL (ref 70–99)
HCT VFR BLD AUTO: 32.1 %
HGB BLD-MCNC: 9.5 G/DL
IMM GRANULOCYTES # BLD AUTO: 0.03 X10(3) UL (ref 0–1)
IMM GRANULOCYTES NFR BLD: 0.4 %
LYMPHOCYTES # BLD AUTO: 1.51 X10(3) UL (ref 1–4)
LYMPHOCYTES NFR BLD AUTO: 20 %
MCH RBC QN AUTO: 23.8 PG (ref 26–34)
MCHC RBC AUTO-ENTMCNC: 29.6 G/DL (ref 31–37)
MCV RBC AUTO: 80.3 FL
MONOCYTES # BLD AUTO: 0.62 X10(3) UL (ref 0.1–1)
MONOCYTES NFR BLD AUTO: 8.2 %
NEUTROPHILS # BLD AUTO: 4.97 X10 (3) UL (ref 1.5–7.7)
NEUTROPHILS # BLD AUTO: 4.97 X10(3) UL (ref 1.5–7.7)
NEUTROPHILS NFR BLD AUTO: 66 %
OSMOLALITY SERPL CALC.SUM OF ELEC: 304 MOSM/KG (ref 275–295)
PHOSPHATE SERPL-MCNC: 2.9 MG/DL (ref 2.4–5.1)
PLATELET # BLD AUTO: 365 10(3)UL (ref 150–450)
POTASSIUM SERPL-SCNC: 4.2 MMOL/L (ref 3.5–5.1)
RBC # BLD AUTO: 4 X10(6)UL
SODIUM SERPL-SCNC: 140 MMOL/L (ref 136–145)
WBC # BLD AUTO: 7.5 X10(3) UL (ref 4–11)

## 2025-03-14 PROCEDURE — 85025 COMPLETE CBC W/AUTO DIFF WBC: CPT

## 2025-03-14 PROCEDURE — 99214 OFFICE O/P EST MOD 30 MIN: CPT | Performed by: INTERNAL MEDICINE

## 2025-03-14 PROCEDURE — 36415 COLL VENOUS BLD VENIPUNCTURE: CPT

## 2025-03-14 PROCEDURE — 80069 RENAL FUNCTION PANEL: CPT

## 2025-03-14 NOTE — TELEPHONE ENCOUNTER
Patient's daughter calling states got labs done at hospital but unsure if Dr Thayer still wants the same labs completed prior to appointment today. Please call as soon as possible.

## 2025-03-14 NOTE — TELEPHONE ENCOUNTER
Informed patient daughter Keaton HARRIS verified that no need for labs since had it done on 2/25/25.,verbalized understanding.

## 2025-03-14 NOTE — PROGRESS NOTES
03/14/25        Patient: Jillian Yip   YOB: 1945   Date of Visit: 3/14/2025       Dear  Dr. Weiler, DO,      Thank you for referring Jillian Yip to my practice.  Please find my assessment and plan below.      As you know she is an 79-year-old female with a history of coronary artery disease, COPD, history of DVTs, adult onset diabetes mellitus, hypertension, hypercholesterolemia, status post CVA, history of recurrent renal calculi, stress incontinence and a former cigarette smoker who I now had the pleasure of seeing for follow-up of chronic kidney disease stage IV.    The patient is here with her daughter.  They relate the fact that she was diagnosed with influenza A on February 25, 2025.  Went to the ER on that date.  Of note is that her creatinine was higher at 2.41 with an estimated GFR 20 cc/min.  They gave her Tamiflu.  Symptoms gradually improved.  She was having sore throat, cough, nausea and fevers.  Overall is now doing much better.    She is also taken off amlodipine for edema.  Blood pressure increased significantly.  As result amlodipine was resumed but at a lower dose 5 mg daily.  Edema is however significantly better.  Otherwise doing well without any chest pain, shortness of breath, GI or urinary tract symptoms.    Physical exam her blood pressure 164/60 with a pulse of 60 and she weighed 162 pounds.  Her neck was supple without JVD.  Lungs were clear.  Heart revealed a regular rate and rhythm with an S4 but no gallops, murmurs or rubs.  Abdomen was soft, flat, nontender without organomegaly, masses or bruits.  Extremities revealed trace edema bilaterally.    I therefore informed the patient and her daughter that her kidney function was a bit worse when she went to the emergency room February 25, 2025.  May have been secondary to influenza.  Will have her repeat a CBC and renal panel now to ensure stability.  If stable continue quarterly.  I will see her again in 6 months for  follow-up or sooner if clinically indicated.  She is scheduled to see cardiology in a week or 2 to review her blood pressure readings.  Otherwise maintain adequate hydration.  Avoid nonsteroidals.    Thank you again for allowing me to participate in the care of your patient.  If you have any questions please were free to call.           Sincerely,   Tab Thayer MD   [facility], [place], [place] [address] [address]  [facility] [address]    Document electronically generated by:  Tab Thayer MD

## 2025-03-21 ENCOUNTER — TELEPHONE (OUTPATIENT)
Dept: NEPHROLOGY | Facility: CLINIC | Age: 80
End: 2025-03-21

## 2025-03-21 NOTE — TELEPHONE ENCOUNTER
Your kidney function has improved compared to February 25, 2025.  You are however anemic.  Make sure you are drinking plenty of fluids.  Repeat your labs in 1 month to make sure things remain stable.  Will also check your iron levels at that time.  Please call with any questions or concerns.   Written by Tab Thayer MD on 3/14/2025  7:08 PM CDT

## 2025-03-28 ENCOUNTER — TELEPHONE (OUTPATIENT)
Dept: FAMILY MEDICINE CLINIC | Facility: CLINIC | Age: 80
End: 2025-03-28

## 2025-03-31 ENCOUNTER — NURSE TRIAGE (OUTPATIENT)
Dept: FAMILY MEDICINE CLINIC | Facility: CLINIC | Age: 80
End: 2025-03-31

## 2025-03-31 NOTE — TELEPHONE ENCOUNTER
Patient scheduled a mychart appointment noting the following on appointment notes    Possible UTI and rash like items     Left message to call back and mychart message sent

## 2025-04-01 NOTE — TELEPHONE ENCOUNTER
Action Requested: Summary for Provider     []  Critical Lab, Recommendations Needed  [x] Need Additional Advice  []   FYI    []   Need Orders  [] Need Medications Sent to Pharmacy  []  Other     SUMMARY: Per protocol, patient should be seen in the office today. Daughter agrees to bring the patient in tomorrow to see PCP only.    Dr. Weiler please advise.     Reason for call: Urinary Symptoms  Onset: One Week Ago    Daughter states patient was seen in the ER last month for flu. States patient has not been drinking as much at that time. Daughter then noticed odor in the patient's urine one week ago and does not appear to be urinating as much. Patient recently saw nephrologist and was advised to push fluids. She encouraged the patient to drink more water.     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    Advised to schedule an appointment sooner. Patient has an appointment for cardiac testing tomorrow at 10:00. States she can bring the patient in the afternoon preferably and with PCP only. Care advice given per protocol. Verbalized understanding and agreed with plan of care.     Reason for Disposition   Bad or foul-smelling urine    Protocols used: Urinary Symptoms-A-OH

## 2025-04-02 ENCOUNTER — OFFICE VISIT (OUTPATIENT)
Dept: FAMILY MEDICINE CLINIC | Facility: CLINIC | Age: 80
End: 2025-04-02

## 2025-04-02 ENCOUNTER — LAB ENCOUNTER (OUTPATIENT)
Dept: LAB | Facility: HOSPITAL | Age: 80
End: 2025-04-02
Attending: INTERNAL MEDICINE
Payer: MEDICARE

## 2025-04-02 VITALS
OXYGEN SATURATION: 95 % | SYSTOLIC BLOOD PRESSURE: 149 MMHG | HEART RATE: 85 BPM | DIASTOLIC BLOOD PRESSURE: 82 MMHG | TEMPERATURE: 98 F | BODY MASS INDEX: 32 KG/M2 | WEIGHT: 164 LBS

## 2025-04-02 DIAGNOSIS — N94.9 VAGINAL DISCOMFORT: ICD-10-CM

## 2025-04-02 DIAGNOSIS — R82.998 DARK URINE: ICD-10-CM

## 2025-04-02 DIAGNOSIS — R35.0 URINARY FREQUENCY: Primary | ICD-10-CM

## 2025-04-02 DIAGNOSIS — E11.65 TYPE 2 DIABETES MELLITUS WITH HYPERGLYCEMIA, WITH LONG-TERM CURRENT USE OF INSULIN (HCC): ICD-10-CM

## 2025-04-02 DIAGNOSIS — E78.5 HYPERLIPEMIA: Primary | ICD-10-CM

## 2025-04-02 DIAGNOSIS — Z79.4 TYPE 2 DIABETES MELLITUS WITH HYPERGLYCEMIA, WITH LONG-TERM CURRENT USE OF INSULIN (HCC): ICD-10-CM

## 2025-04-02 DIAGNOSIS — R21 FACIAL RASH: ICD-10-CM

## 2025-04-02 DIAGNOSIS — R35.0 URINARY FREQUENCY: ICD-10-CM

## 2025-04-02 DIAGNOSIS — M25.539 PAIN IN WRIST, UNSPECIFIED LATERALITY: ICD-10-CM

## 2025-04-02 LAB
ALBUMIN SERPL-MCNC: 4.3 G/DL (ref 3.2–4.8)
ANION GAP SERPL CALC-SCNC: 7 MMOL/L (ref 0–18)
APPEARANCE: CLEAR
BILIRUBIN: NEGATIVE
BUN BLD-MCNC: 33 MG/DL (ref 9–23)
BUN/CREAT SERPL: 15.7 (ref 10–20)
CALCIUM BLD-MCNC: 9.8 MG/DL (ref 8.7–10.4)
CHLORIDE SERPL-SCNC: 102 MMOL/L (ref 98–112)
CHOLEST SERPL-MCNC: 228 MG/DL (ref ?–200)
CO2 SERPL-SCNC: 29 MMOL/L (ref 21–32)
CREAT BLD-MCNC: 2.1 MG/DL
EGFRCR SERPLBLD CKD-EPI 2021: 23 ML/MIN/1.73M2 (ref 60–?)
FASTING PATIENT LIPID ANSWER: YES
GLUCOSE (URINE DIPSTICK): 100 MG/DL
GLUCOSE BLD-MCNC: 194 MG/DL (ref 70–99)
HDLC SERPL-MCNC: 39 MG/DL (ref 40–59)
KETONES (URINE DIPSTICK): NEGATIVE MG/DL
LDLC SERPL CALC-MCNC: 128 MG/DL (ref ?–100)
LEUKOCYTES: NEGATIVE
MULTISTIX LOT#: ABNORMAL NUMERIC
NITRITE, URINE: NEGATIVE
NONHDLC SERPL-MCNC: 189 MG/DL (ref ?–130)
OCCULT BLOOD: NEGATIVE
OSMOLALITY SERPL CALC.SUM OF ELEC: 299 MOSM/KG (ref 275–295)
PH, URINE: 6 (ref 4.5–8)
PHOSPHATE SERPL-MCNC: 3.6 MG/DL (ref 2.4–5.1)
POTASSIUM SERPL-SCNC: 4.6 MMOL/L (ref 3.5–5.1)
PROTEIN (URINE DIPSTICK): 30 MG/DL
SODIUM SERPL-SCNC: 138 MMOL/L (ref 136–145)
SPECIFIC GRAVITY: 1.02 (ref 1–1.03)
TRIGL SERPL-MCNC: 345 MG/DL (ref 30–149)
UROBILINOGEN,SEMI-QN: 0.2 MG/DL (ref 0–1.9)
VLDLC SERPL CALC-MCNC: 63 MG/DL (ref 0–30)

## 2025-04-02 PROCEDURE — 36415 COLL VENOUS BLD VENIPUNCTURE: CPT

## 2025-04-02 PROCEDURE — 80061 LIPID PANEL: CPT

## 2025-04-02 PROCEDURE — 81003 URINALYSIS AUTO W/O SCOPE: CPT | Performed by: FAMILY MEDICINE

## 2025-04-02 PROCEDURE — G2211 COMPLEX E/M VISIT ADD ON: HCPCS | Performed by: FAMILY MEDICINE

## 2025-04-02 PROCEDURE — 80069 RENAL FUNCTION PANEL: CPT

## 2025-04-02 PROCEDURE — 99214 OFFICE O/P EST MOD 30 MIN: CPT | Performed by: FAMILY MEDICINE

## 2025-04-02 RX ORDER — NYSTATIN 100000 U/G
1 CREAM TOPICAL 2 TIMES DAILY
Qty: 30 G | Refills: 0 | Status: SHIPPED | OUTPATIENT
Start: 2025-04-02 | End: 2025-04-09

## 2025-04-02 RX ORDER — FLUCONAZOLE 150 MG/1
150 TABLET ORAL ONCE
Qty: 1 TABLET | Refills: 0 | Status: SHIPPED | OUTPATIENT
Start: 2025-04-02 | End: 2025-04-02

## 2025-04-02 RX ORDER — HYDROCORTISONE 25 MG/G
1 CREAM TOPICAL 2 TIMES DAILY
Qty: 30 G | Refills: 1 | Status: SHIPPED | OUTPATIENT
Start: 2025-04-02

## 2025-04-02 RX ORDER — INSULIN GLARGINE 100 [IU]/ML
9 INJECTION, SOLUTION SUBCUTANEOUS NIGHTLY
Qty: 15 ML | Refills: 1 | Status: SHIPPED | OUTPATIENT
Start: 2025-04-02

## 2025-04-02 RX ORDER — TRAMADOL HYDROCHLORIDE 50 MG/1
50 TABLET ORAL EVERY 8 HOURS PRN
Qty: 60 TABLET | Refills: 0 | Status: SHIPPED | OUTPATIENT
Start: 2025-04-02

## 2025-04-02 RX ORDER — EVOLOCUMAB 140 MG/ML
1 INJECTION, SOLUTION SUBCUTANEOUS
Qty: 2.1 ML | Refills: 0 | Status: CANCELLED | OUTPATIENT
Start: 2025-04-02

## 2025-04-02 NOTE — PROGRESS NOTES
HPI: Jillian is a 79 year old female who presents for vaginal irritation. Had flu for about 4 weeks. She was not eating or drinking as much. Started to have itching and foul odor. She is now eating better and drinking more but is urinating worse. Sleeping a lot.  Very fatigued. Memory loss is worse. Constipated as well.      PMH:    Past Medical History:    Aortic aneurysm, thoracic    Back problem    Calculus of kidney    Cataract    COPD (chronic obstructive pulmonary disease) (HCC)    Coronary atherosclerosis    Deep vein thrombosis (HCC)    Diabetes (HCC)    Diverticulosis of large intestine    Heart attack (HCC)    2013    High blood pressure    High cholesterol    History of stomach ulcers    Incontinence    Osteoarthritis    Ovarian cancer (HCC)    PONV (postoperative nausea and vomiting)    Renal disorder    Shortness of breath    Stroke (HCC)    shaking, wheelchair      Alg:  Codeine, Enalapril maleate-felodipine, Nyquil severe cold-flu [gnp multi-symptom cold], Radiology contrast iodinated dyes, Shellfish-derived products, Dilaudid [hydromorphone], and Sulfa antibiotics   Meds: Medications Ordered Prior to Encounter[1]   Tobacco Use: no    ROS: see HPI    Objective:   Gen: Awake. Confused.   /82 (BP Location: Right arm, Patient Position: Sitting, Cuff Size: adult)   Pulse 85   Temp 98.4 °F (36.9 °C) (Temporal)   Wt 164 lb (74.4 kg)   SpO2 95%   BMI 32.03 kg/m²   Skin: raised, macular, erythematous rash noted throughout face      Assessment:/Plan:  Encounter Diagnoses   Name Primary?    Urinary frequency    UA does not appear to have infection. Will send urine culture.    Yes    Pain in wrist, unspecified laterality    Tramadol refilled.        Facial rash    Start Hydrocortisone.  If no improvement, schedule with Dermatology.  Referral done.        Dark urine    Check renal function panel as she has been urinating less as well.        Type 2 diabetes mellitus with hyperglycemia, with long-term  current use of insulin (HCC)    Controlled.  Lantus refilled.       Vaginal discomfort    Suspect it may be due to yeast as she wears incontinence pants. Start Diflucan.  Use Nystatin on the outside. Keep area clean and dry.       Colleen Weiler, DO           [1]   Current Outpatient Medications on File Prior to Visit   Medication Sig Dispense Refill    albuterol 108 (90 Base) MCG/ACT Inhalation Aero Soln Inhale 2 puffs into the lungs every 4 (four) hours as needed. 8.5 g 3    losartan 25 MG Oral Tab Take 2 tablets (50 mg total) by mouth every morning AND 1 tablet (25 mg total) every evening. 270 tablet 0    insulin glargine (LANTUS SOLOSTAR) 100 UNIT/ML Subcutaneous Solution Pen-injector Inject 9 Units into the skin nightly. 15 mL 1    Blood Glucose Monitoring Suppl (ACCU-CHEK GUIDE) w/Device Does not apply Kit 1 each daily. 1 kit 0    Glucose Blood (ACCU-CHEK GUIDE TEST) In Vitro Strip Check glucose twice daily 200 each 1    donepezil 5 MG Oral Tab Take 1 tablet (5 mg total) by mouth nightly. 90 tablet 3    memantine 5 MG Oral Tab Take 1 tablet (5 mg total) by mouth daily. 90 tablet 3    metoprolol succinate ER 25 MG Oral Tablet 24 Hr Take 1 tablet (25 mg total) by mouth daily.      traMADol 50 MG Oral Tab Take 1 tablet (50 mg total) by mouth every 8 (eight) hours as needed for Pain. 60 tablet 0    aspirin 81 MG Oral Chew Tab Chew 1 tablet (81 mg total) by mouth daily. 30 tablet 0    apixaban 2.5 MG Oral Tab Take 1 tablet (2.5 mg total) by mouth 2 (two) times daily.      ranolazine  MG Oral Tablet 12 Hr Take 1 tablet (500 mg total) by mouth 2 (two) times daily.      isosorbide mononitrate  MG Oral Tablet 24 Hr Take 1 tablet (120 mg total) by mouth daily.      Insulin Pen Needle (CARETOUCH PEN NEEDLES) 32G X 4 MM Does not apply Misc Use with injection once daily as directed 100 each 3    Glucose Blood (ACCU-CHEK GUIDE) In Vitro Strip 1 each by In Vitro route daily. 100 strip 3    Blood Glucose  Monitoring Suppl (ONETOUCH VERIO IQ SYSTEM) w/Device Does not apply Kit 1 Device by Other route 3 (three) times daily. Use as directed. 1 kit 0    Glucose Blood (ONETOUCH VERIO) In Vitro Strip Use as directed. 300 strip 3    OneTouch Delica Lancets 33G Does not apply Misc 1 Lancet by Finger stick route 3 (three) times daily. 300 each 3    pantoprazole 40 MG Oral Tab EC Take 1 tablet (40 mg total) by mouth 2 (two) times daily before meals. 180 tablet 3    acetaminophen 500 MG Oral Tab Take 1 tablet (500 mg total) by mouth every 8 (eight) hours.      lidocaine-menthol 4-1 % External Patch Place 2 patches onto the skin daily.      nitroglycerin 0.4 MG Sublingual SL Tab Place 1 tablet (0.4 mg total) under the tongue every 5 (five) minutes as needed for Chest pain.      amLODIPine Besylate 5 MG Oral Tab Take 1 tablet (5 mg total) by mouth at bedtime.      ONETOUCH DELICA LANCETS 33G Does not apply Misc       ezetimibe 10 MG Oral Tab Take 1 tablet (10 mg total) by mouth nightly.      glipiZIDE 2.5 MG Oral Tab Take 2.5 mg by mouth daily. (Patient not taking: Reported on 4/2/2025) 30 tablet 4    REPATHA SURECLICK 140 MG/ML Subcutaneous Solution Auto-injector Inject 1 mL into the skin every 14 (fourteen) days. (Patient not taking: Reported on 4/2/2025)       No current facility-administered medications on file prior to visit.

## 2025-04-02 NOTE — TELEPHONE ENCOUNTER
Left detailed message  on patient cell  which is her daughter Keaton deras ( per Release of Information )     Advised appointment  is at 2pm today and to please call and confirm if  this message was received and if patient can make the appointment       Routing as For Your Information

## 2025-04-07 ENCOUNTER — TELEPHONE (OUTPATIENT)
Dept: FAMILY MEDICINE CLINIC | Facility: CLINIC | Age: 80
End: 2025-04-07

## 2025-04-07 NOTE — TELEPHONE ENCOUNTER
Patient's daughter called (on Release of Information), verified patient's Name and . States patient was prescribed medication for yeast infection. Pharmacy needs clarification on the quantity, states they received #30.    Medication Quantity Refills Start End   fluconazole (DIFLUCAN) 150 MG Oral Tab () 1 tablet 0 2025   Sig:   Take 1 tablet (150 mg total) by mouth once for 1 dose. May repeat in 72 hours if persistent     Route:   Oral     Order #:   [id number]       Spoke to Maral, pharmacy [facility], verified patient's Name and . Clarified prescription. Quantity should be #2 tablets, verbalized understanding. Medication will be filled today. Patient's daughter updated.

## 2025-04-07 NOTE — TELEPHONE ENCOUNTER
Called the pharmacy - they stated they had ready been informed of the directions to change quantity to 2.

## 2025-04-07 NOTE — TELEPHONE ENCOUNTER
Brittney/Pharmacist with [facility] called, she needs clarification on Rx prescribed on 4/2/25, there was only quantity of #1 was prescribed and Rx instructions states  \"Take 1 tablet (150 mg total) by mouth once for 1 dose. May repeat in 72 hours if persistent\". Patient has not picked up Rx yet. I made her aware I will convey the above to Dr. Weiler. She verbalized understanding. No further questions or concerns at this time.    Dr. Weiler - please advise

## 2025-04-14 ENCOUNTER — PATIENT MESSAGE (OUTPATIENT)
Dept: ENDOCRINOLOGY CLINIC | Facility: CLINIC | Age: 80
End: 2025-04-14

## 2025-04-28 ENCOUNTER — PATIENT MESSAGE (OUTPATIENT)
Dept: FAMILY MEDICINE CLINIC | Facility: CLINIC | Age: 80
End: 2025-04-28

## 2025-05-01 NOTE — TELEPHONE ENCOUNTER
Daughter returned phone call.  Reviewed [place] message and procedure.  They are not interested in hospice at this time.  All questions answered.

## 2025-05-12 ENCOUNTER — OFFICE VISIT (OUTPATIENT)
Dept: DERMATOLOGY CLINIC | Facility: CLINIC | Age: 80
End: 2025-05-12
Payer: MEDICARE

## 2025-05-12 ENCOUNTER — LAB ENCOUNTER (OUTPATIENT)
Dept: LAB | Age: 80
End: 2025-05-12
Attending: Other
Payer: MEDICARE

## 2025-05-12 DIAGNOSIS — E53.8 VITAMIN B12 DEFICIENCY: ICD-10-CM

## 2025-05-12 DIAGNOSIS — R21 RASH AND NONSPECIFIC SKIN ERUPTION: Primary | ICD-10-CM

## 2025-05-12 DIAGNOSIS — N18.32 STAGE 3B CHRONIC KIDNEY DISEASE (HCC): ICD-10-CM

## 2025-05-12 DIAGNOSIS — D64.9 ANEMIA, UNSPECIFIED TYPE: ICD-10-CM

## 2025-05-12 LAB
ALBUMIN SERPL-MCNC: 4.4 G/DL (ref 3.2–4.8)
ANION GAP SERPL CALC-SCNC: 8 MMOL/L (ref 0–18)
BASOPHILS # BLD AUTO: 0.1 X10(3) UL (ref 0–0.2)
BASOPHILS NFR BLD AUTO: 1.4 %
BUN BLD-MCNC: 30 MG/DL (ref 9–23)
BUN/CREAT SERPL: 14 (ref 10–20)
CALCIUM BLD-MCNC: 9.5 MG/DL (ref 8.7–10.4)
CHLORIDE SERPL-SCNC: 105 MMOL/L (ref 98–112)
CO2 SERPL-SCNC: 29 MMOL/L (ref 21–32)
CREAT BLD-MCNC: 2.15 MG/DL (ref 0.55–1.02)
DEPRECATED RDW RBC AUTO: 46.1 FL (ref 35.1–46.3)
EGFRCR SERPLBLD CKD-EPI 2021: 23 ML/MIN/1.73M2 (ref 60–?)
EOSINOPHIL # BLD AUTO: 0.38 X10(3) UL (ref 0–0.7)
EOSINOPHIL NFR BLD AUTO: 5.2 %
ERYTHROCYTE [DISTWIDTH] IN BLOOD BY AUTOMATED COUNT: 15.5 % (ref 11–15)
GLUCOSE BLD-MCNC: 192 MG/DL (ref 70–99)
HCT VFR BLD AUTO: 30.5 % (ref 35–48)
HGB BLD-MCNC: 9 G/DL (ref 12–16)
IMM GRANULOCYTES # BLD AUTO: 0.04 X10(3) UL (ref 0–1)
IMM GRANULOCYTES NFR BLD: 0.6 %
IRON SATN MFR SERPL: 6 % (ref 15–50)
IRON SERPL-MCNC: 25 UG/DL (ref 50–170)
LYMPHOCYTES # BLD AUTO: 1.5 X10(3) UL (ref 1–4)
LYMPHOCYTES NFR BLD AUTO: 20.7 %
MCH RBC QN AUTO: 23.9 PG (ref 26–34)
MCHC RBC AUTO-ENTMCNC: 29.5 G/DL (ref 31–37)
MCV RBC AUTO: 80.9 FL (ref 80–100)
MONOCYTES # BLD AUTO: 0.55 X10(3) UL (ref 0.1–1)
MONOCYTES NFR BLD AUTO: 7.6 %
NEUTROPHILS # BLD AUTO: 4.68 X10 (3) UL (ref 1.5–7.7)
NEUTROPHILS # BLD AUTO: 4.68 X10(3) UL (ref 1.5–7.7)
NEUTROPHILS NFR BLD AUTO: 64.5 %
OSMOLALITY SERPL CALC.SUM OF ELEC: 305 MOSM/KG (ref 275–295)
PHOSPHATE SERPL-MCNC: 3.2 MG/DL (ref 2.4–5.1)
PLATELET # BLD AUTO: 374 10(3)UL (ref 150–450)
POTASSIUM SERPL-SCNC: 4.4 MMOL/L (ref 3.5–5.1)
RBC # BLD AUTO: 3.77 X10(6)UL (ref 3.8–5.3)
SODIUM SERPL-SCNC: 142 MMOL/L (ref 136–145)
TOTAL IRON BINDING CAPACITY: 418 UG/DL (ref 250–425)
TRANSFERRIN SERPL-MCNC: 337 MG/DL (ref 250–380)
VIT B12 SERPL-MCNC: >2000 PG/ML (ref 211–911)
WBC # BLD AUTO: 7.3 X10(3) UL (ref 4–11)

## 2025-05-12 PROCEDURE — 80069 RENAL FUNCTION PANEL: CPT

## 2025-05-12 PROCEDURE — 84466 ASSAY OF TRANSFERRIN: CPT

## 2025-05-12 PROCEDURE — 99204 OFFICE O/P NEW MOD 45 MIN: CPT | Performed by: STUDENT IN AN ORGANIZED HEALTH CARE EDUCATION/TRAINING PROGRAM

## 2025-05-12 PROCEDURE — 85025 COMPLETE CBC W/AUTO DIFF WBC: CPT

## 2025-05-12 PROCEDURE — 83921 ORGANIC ACID SINGLE QUANT: CPT

## 2025-05-12 PROCEDURE — 83540 ASSAY OF IRON: CPT

## 2025-05-12 PROCEDURE — 82607 VITAMIN B-12: CPT

## 2025-05-12 PROCEDURE — 36415 COLL VENOUS BLD VENIPUNCTURE: CPT

## 2025-05-12 RX ORDER — LOSARTAN POTASSIUM 25 MG/1
25 TABLET ORAL EVERY EVENING
COMMUNITY
Start: 2025-03-24

## 2025-05-12 RX ORDER — TRIAMCINOLONE ACETONIDE 1 MG/G
CREAM TOPICAL
Qty: 80 G | Refills: 3 | Status: SHIPPED | OUTPATIENT
Start: 2025-05-12

## 2025-05-12 NOTE — PROGRESS NOTES
New patient     Referred by:   Weiler, Colleen M, DO  [address]  [address]  [place] [address]      CHIEF COMPLAINT: Rash     HISTORY OF PRESENT ILLNESS: Jillian Yip is a 80 year old female here for evaluation of rash.    Location: All over body   Duration: 1 year   Improving, worsening, or stable?: Worsening  Scaly?:Yes  Itchy?:Yes  Current treatment: Hydrocortisone, Cetaphil  Past treatments: None     Personal Dermatologic History  History of chronic skin disease:No    Family History  History autoimmune diseases:  No    Past Medical History  Past Medical History[1]    REVIEW OF SYSTEMS:  Constitutional: Denies fever, chills, unintentional weight loss.   Skin as per HPI    Medications  Current Medications[2]    PHYSICAL EXAM:  General: awake, alert, no acute distress  Skin: Skin exam was performed today including the following: face, trunk, abdomen, and groin Pertinent findings include:   - clear today   - groin with subcutaneous nodules     ASSESSMENT & PLAN:  Pathophysiology of diagnoses discussed with patient.  Therapeutic options reviewed. Risks, benefits, and alternatives discussed with patient. Instructions reviewed at length.    #Rash and nonspecific skin eruption  - Triamcinolone 0.1% twice daily to affected areas Monday-Friday. Take weekends off. Avoid use on face, breasts, groin, or axiillae.   - Plan for biopsy when new lesions arise. Ok to get into office same day.     #cyst  - Reassured regarding benign nature. No treatment necessary unless symptomatic/changing.        Return to clinic:  Ok for same day apt for biopsy  or sooner if something concerning arises    Fracisco Gates MD    By signing my name below, Tawny RIOS MA,  attest that this documentation has been prepared under the direction and in the presence of Fracisco Gates MD.   Electronically Signed: Tawny RUST MA, 5/12/2025, 1:46 PM.    Fracisco RIOS MD,  personally performed the services described in this documentation.  All medical record entries made by the scribe were at my direction and in my presence.  I have reviewed the chart and agree that the record reflects my personal performance and is accurate and complete.  Fracisco Gates MD, 5/12/2025, 3:23 PM             [1]   Past Medical History:   Aortic aneurysm, thoracic    Back problem    Calculus of kidney    Cataract    COPD (chronic obstructive pulmonary disease) (HCC)    Coronary atherosclerosis    Deep vein thrombosis (HCC)    Diabetes (HCC)    Diverticulosis of large intestine    Heart attack (HCC)    2013    High blood pressure    High cholesterol    History of stomach ulcers    Incontinence    Osteoarthritis    Ovarian cancer (HCC)    PONV (postoperative nausea and vomiting)    Renal disorder    Shortness of breath    Stroke (HCC)    shaking, wheelchair   [2]   Current Outpatient Medications   Medication Sig Dispense Refill    bisacodyl 5 MG Oral Tab EC Take 1 tablet (5 mg total) by mouth daily as needed.      Carboxymethylcellul-Glycerin (REFRESH TEARS PF OP)       diclofenac 1 % External Gel Apply 2 g topically 4 (four) times daily as needed.      fluconazole 150 MG Oral Tab       isosorbide mononitrate ER 30 MG Oral Tablet 24 Hr 1 tablet (30 mg total).      losartan 50 MG Oral Tab Take 1 tablet (50 mg total) by mouth daily.      donepezil 5 MG Oral Tab Take 1 tablet (5 mg total) by mouth nightly. 90 tablet 3    memantine 5 MG Oral Tab Take 1 tablet (5 mg total) by mouth daily. 90 tablet 3    mirtazapine 7.5 MG Oral Tab Take 1 tablet (7.5 mg total) by mouth nightly. 90 tablet 0    traMADol 50 MG Oral Tab Take 1 tablet (50 mg total) by mouth every 8 (eight) hours as needed for Pain. 60 tablet 0    insulin glargine (LANTUS SOLOSTAR) 100 UNIT/ML Subcutaneous Solution Pen-injector Inject 9 Units into the skin nightly. 15 mL 1    hydrocortisone 2.5 % External Cream Apply 1 Application topically 2 (two) times daily. 30 g 1    albuterol 108 (90 Base) MCG/ACT Inhalation  Aero Soln Inhale 2 puffs into the lungs every 4 (four) hours as needed. 8.5 g 3    Blood Glucose Monitoring Suppl (ACCU-CHEK GUIDE) w/Device Does not apply Kit 1 each daily. 1 kit 0    Glucose Blood (ACCU-CHEK GUIDE TEST) In Vitro Strip Check glucose twice daily 200 each 1    metoprolol succinate ER 25 MG Oral Tablet 24 Hr Take 1 tablet (25 mg total) by mouth daily.      REPATHA SURECLICK 140 MG/ML Subcutaneous Solution Auto-injector Inject 1 mL into the skin every 14 (fourteen) days. (Patient not taking: Reported on 4/2/2025)      aspirin 81 MG Oral Chew Tab Chew 1 tablet (81 mg total) by mouth daily. 30 tablet 0    apixaban 2.5 MG Oral Tab Take 1 tablet (2.5 mg total) by mouth 2 (two) times daily.      ranolazine  MG Oral Tablet 12 Hr Take 1 tablet (500 mg total) by mouth 2 (two) times daily.      Insulin Pen Needle (CARETOUCH PEN NEEDLES) 32G X 4 MM Does not apply Misc Use with injection once daily as directed 100 each 3    Glucose Blood (ACCU-CHEK GUIDE) In Vitro Strip 1 each by In Vitro route daily. 100 strip 3    pantoprazole 40 MG Oral Tab EC Take 1 tablet (40 mg total) by mouth 2 (two) times daily before meals. 180 tablet 3    acetaminophen 500 MG Oral Tab Take 1 tablet (500 mg total) by mouth every 8 (eight) hours.      lidocaine-menthol 4-1 % External Patch Place 2 patches onto the skin daily.      nitroglycerin 0.4 MG Sublingual SL Tab Place 1 tablet (0.4 mg total) under the tongue every 5 (five) minutes as needed for Chest pain.      amLODIPine Besylate 5 MG Oral Tab Take 1 tablet (5 mg total) by mouth at bedtime.      ONETOUCH DELICA LANCETS 33G Does not apply Misc       ezetimibe 10 MG Oral Tab Take 1 tablet (10 mg total) by mouth nightly.

## 2025-05-13 ENCOUNTER — TELEPHONE (OUTPATIENT)
Dept: NEPHROLOGY | Facility: CLINIC | Age: 80
End: 2025-05-13

## 2025-05-13 RX ORDER — FERROUS SULFATE 325(65) MG
325 TABLET, DELAYED RELEASE (ENTERIC COATED) ORAL 2 TIMES DAILY WITH MEALS
COMMUNITY

## 2025-05-16 LAB — METHYLMALONIC ACID: 287 NMOL/L

## 2025-05-19 ENCOUNTER — OFFICE VISIT (OUTPATIENT)
Dept: ENDOCRINOLOGY CLINIC | Facility: CLINIC | Age: 80
End: 2025-05-19
Payer: MEDICARE

## 2025-05-19 VITALS
BODY MASS INDEX: 32.79 KG/M2 | HEIGHT: 60 IN | WEIGHT: 167 LBS | HEART RATE: 78 BPM | SYSTOLIC BLOOD PRESSURE: 113 MMHG | DIASTOLIC BLOOD PRESSURE: 68 MMHG

## 2025-05-19 DIAGNOSIS — E11.65 UNCONTROLLED TYPE 2 DIABETES MELLITUS WITH HYPERGLYCEMIA (HCC): Primary | ICD-10-CM

## 2025-05-19 LAB
GLUCOSE BLOOD: 281
HEMOGLOBIN A1C: 9.5 % (ref 4.3–5.6)
TEST STRIP LOT #: NORMAL NUMERIC

## 2025-05-19 PROCEDURE — 82947 ASSAY GLUCOSE BLOOD QUANT: CPT

## 2025-05-19 PROCEDURE — 83036 HEMOGLOBIN GLYCOSYLATED A1C: CPT

## 2025-05-19 PROCEDURE — 99214 OFFICE O/P EST MOD 30 MIN: CPT

## 2025-05-19 RX ORDER — GLIPIZIDE 2.5 MG/1
2.5 TABLET, EXTENDED RELEASE ORAL
Qty: 90 TABLET | Refills: 1 | Status: SHIPPED | OUTPATIENT
Start: 2025-05-19

## 2025-05-19 NOTE — PROGRESS NOTES
Name: Jillian Yip  Date: 5/19/25    Referring Physician: No ref. provider found    HISTORY OF PRESENT ILLNESS   Jillian Yip is a 80 year old female who presents for follow up for diabetes mellitus.     She was diagnosed with diabetes about 10 years ago during hospitalization for MI. Currently living with her daughter and grandson and she also has a caregiver Tuesdays- Fridays.     She does have dementia which is worsening per daughter. She was hospitalized a few months ago following aortic aneurysm repair and then developed pneumonia. Hospitalized again 7/2024 for CVA, and 10/2024- following fall- had fracture of wrist.     Diabetes History:  Diagnosed- 10 years ago   Patient has not had hospitalizations for blood sugar issues    Prior glycohemoglobin were: 8.1% 5/2024; 8.4% 7/2024; 8.7% 11/2024; 9.5% POC today       Dietary compliance: Poor- she does cheat frequently with diet- sweets and other high CHO snacks during the day. Eating 2 -3 meals daily      Recall:  Breakfast- toast with coffee and occ. sausage  Lunch- skips or tuna sandwich, lean cuisine, soup   Dinner- protein with vegetable and starch, or takeout 1-2 days weekly, chicken soup    Snack- potato chips, or sweets   Beverages- soda, has cut back fairly significantly     Exercise: No  Polyuria/polydipsia: No  Blurred vision: No    Episodes of hypoglycemia: No  Blood Glucose:  Checking 1-3 times per day    Fasting - 180-200's   Before bedtime- 200-300's     Previous DM therapies:  Metformin - stopped due CKD   Tradjenta- too costly   Glipizide- developed rash a few weeks after starting. Of note, stopped medication but rash not resolved.     Current DM Regimen:  Lantus - 12 units subcutaneous daily in the evening     Modifying factors:  Medication adherence: Yes   Recent steroids, illness or infections: GI virus in the last few months       REVIEW OF SYSTEMS  Eyes: Diabetic retinopathy present: No, hx of cataract             Most recent visit to  eye doctor in last 12 months:Yes-within the last 12 months     CV: Cardiovascular disease present: Yes, MI- 2013         Hypertension present: Yes         Hyperlipidemia present: Yes         Peripheral Vascular Disease present: No       Cerebrovascular: CVA - 7 years     : Nephropathy present: CKD- following with nephrology- Dr. Thayer.     Neuro: Neuropathy present: Mild symptoms- seeing podiatry.    Skin: Infection or ulceration: No     Osteoporosis: No    Thyroid disease: No       Medications:     Current Outpatient Medications:     ferrous sulfate 325 (65 FE) MG Oral Tab EC, Take 1 tablet (325 mg total) by mouth in the morning and 1 tablet (325 mg total) in the evening. Take with meals., Disp: , Rfl:     losartan 25 MG Oral Tab, Take 1 tablet (25 mg total) by mouth every evening., Disp: , Rfl:     triamcinolone 0.1 % External Cream, Apply twice daily  Monday-Friday. Take weekends off. Use on affected areas., Disp: 80 g, Rfl: 3    bisacodyl 5 MG Oral Tab EC, Take 1 tablet (5 mg total) by mouth daily as needed., Disp: , Rfl:     Carboxymethylcellul-Glycerin (REFRESH TEARS PF OP), , Disp: , Rfl:     diclofenac 1 % External Gel, Apply 2 g topically 4 (four) times daily as needed., Disp: , Rfl:     fluconazole 150 MG Oral Tab, , Disp: , Rfl:     isosorbide mononitrate ER 30 MG Oral Tablet 24 Hr, 1 tablet (30 mg total)., Disp: , Rfl:     losartan 50 MG Oral Tab, Take 1 tablet (50 mg total) by mouth daily., Disp: , Rfl:     donepezil 5 MG Oral Tab, Take 1 tablet (5 mg total) by mouth nightly., Disp: 90 tablet, Rfl: 3    memantine 5 MG Oral Tab, Take 1 tablet (5 mg total) by mouth daily., Disp: 90 tablet, Rfl: 3    mirtazapine 7.5 MG Oral Tab, Take 1 tablet (7.5 mg total) by mouth nightly., Disp: 90 tablet, Rfl: 0    traMADol 50 MG Oral Tab, Take 1 tablet (50 mg total) by mouth every 8 (eight) hours as needed for Pain., Disp: 60 tablet, Rfl: 0    insulin glargine (LANTUS SOLOSTAR) 100 UNIT/ML Subcutaneous Solution  Pen-injector, Inject 9 Units into the skin nightly., Disp: 15 mL, Rfl: 1    hydrocortisone 2.5 % External Cream, Apply 1 Application topically 2 (two) times daily., Disp: 30 g, Rfl: 1    albuterol 108 (90 Base) MCG/ACT Inhalation Aero Soln, Inhale 2 puffs into the lungs every 4 (four) hours as needed., Disp: 8.5 g, Rfl: 3    Blood Glucose Monitoring Suppl (ACCU-CHEK GUIDE) w/Device Does not apply Kit, 1 each daily., Disp: 1 kit, Rfl: 0    Glucose Blood (ACCU-CHEK GUIDE TEST) In Vitro Strip, Check glucose twice daily, Disp: 200 each, Rfl: 1    metoprolol succinate ER 25 MG Oral Tablet 24 Hr, Take 1 tablet (25 mg total) by mouth daily., Disp: , Rfl:     aspirin 81 MG Oral Chew Tab, Chew 1 tablet (81 mg total) by mouth daily., Disp: 30 tablet, Rfl: 0    apixaban 2.5 MG Oral Tab, Take 1 tablet (2.5 mg total) by mouth 2 (two) times daily., Disp: , Rfl:     ranolazine  MG Oral Tablet 12 Hr, Take 1 tablet (500 mg total) by mouth 2 (two) times daily., Disp: , Rfl:     Insulin Pen Needle (CARETOUCH PEN NEEDLES) 32G X 4 MM Does not apply Misc, Use with injection once daily as directed, Disp: 100 each, Rfl: 3    Glucose Blood (ACCU-CHEK GUIDE) In Vitro Strip, 1 each by In Vitro route daily., Disp: 100 strip, Rfl: 3    pantoprazole 40 MG Oral Tab EC, Take 1 tablet (40 mg total) by mouth 2 (two) times daily before meals., Disp: 180 tablet, Rfl: 3    acetaminophen 500 MG Oral Tab, Take 1 tablet (500 mg total) by mouth every 8 (eight) hours., Disp: , Rfl:     lidocaine-menthol 4-1 % External Patch, Place 2 patches onto the skin daily., Disp: , Rfl:     nitroglycerin 0.4 MG Sublingual SL Tab, Place 1 tablet (0.4 mg total) under the tongue every 5 (five) minutes as needed for Chest pain., Disp: , Rfl:     amLODIPine Besylate 5 MG Oral Tab, Take 1 tablet (5 mg total) by mouth at bedtime., Disp: , Rfl:     ONETOUCH DELICA LANCETS 33G Does not apply Misc, , Disp: , Rfl:     ezetimibe 10 MG Oral Tab, Take 1 tablet (10 mg total) by  mouth nightly., Disp: , Rfl:      Allergies:   Allergies   Allergen Reactions    Allopurinol ANAPHYLAXIS    Codeine OTHER (SEE COMMENTS), SWELLING and RASH     Face and body get puffy/swell, headache    Per pt, has tolerated morphine and Norco previously with no adverse effects    Enalapril Maleate-Felodipine SHORTNESS OF BREATH    Iodine I 131 Tositumomab ANAPHYLAXIS    Nyquil Severe Cold-Flu [Gnp Multi-Symptom Cold] PALPITATIONS    Radiology Contrast Iodinated Dyes ANAPHYLAXIS and SHORTNESS OF BREATH    Shellfish-Derived Products     Hydromorphone OTHER (SEE COMMENTS) and NAUSEA AND VOMITING     PATIENT'S DAUGHTER HAD A VERY BAD REACTION FROM DILAUDID (MENTALLY)    Sulfa Antibiotics OTHER (SEE COMMENTS) and RASH     Rash       Social History:   Social History     Socioeconomic History    Marital status:    Tobacco Use    Smoking status: Former     Current packs/day: 0.50     Average packs/day: 0.5 packs/day for 35.0 years (17.5 ttl pk-yrs)     Types: Cigarettes     Passive exposure: Never    Smokeless tobacco: Former    Tobacco comments:     half pack a day   Vaping Use    Vaping status: Never Used   Substance and Sexual Activity    Alcohol use: No    Drug use: No   Other Topics Concern    Reaction to local anesthetic No    Pt has a pacemaker No    Pt has a defibrillator No       Medical History:   Past Medical History:    Aortic aneurysm, thoracic    Back problem    Calculus of kidney    Cataract    COPD (chronic obstructive pulmonary disease) (HCC)    Coronary atherosclerosis    Deep vein thrombosis (HCC)    Diabetes (HCC)    Diverticulosis of large intestine    Heart attack (HCC)    2013    High blood pressure    High cholesterol    History of stomach ulcers    Incontinence    Osteoarthritis    Ovarian cancer (HCC)    PONV (postoperative nausea and vomiting)    Renal disorder    Shortness of breath    Stroke (HCC)    shaking, wheelchair       Surgical history:   Past Surgical History:   Procedure  Laterality Date    Appendectomy      Arthroscopy of joint unlisted Right     Rotator cuff surgery    Back surgery  07/21/2017    L3-4 LUIS and hemilaminectomies    Cataract      Cath drug eluting stent      2013    Endovas aaa repr w/3-p part      Ep loop recorder implant Left 2017    Foot surgery Bilateral     Fracture surgery Right     right clavicle    Hysterectomy      Theodora localization wire 1 site right (cpt=19281)      benign-20 years ago    Spine surgery procedure unlisted           PHYSICAL EXAM  Vitals:    05/19/25 1350   BP: 113/68   Pulse: 78   Weight: 167 lb (75.8 kg)   Height: 5' (1.524 m)       General Appearance:  alert, well developed, in no acute distress  Neck: Trachea midline: Normal  Back: no kyphosis or back tenderness  Lymph Nodes:  No abnormal nodes noted  Musculoskeletal:  normal muscle strength and tone  Skin:  normal moisture and skin texture  Hair & Nails:  normal scalp hair     Hematologic:  no excessive bruising  Neuro:  sensory grossly intact and motor grossly intact.  Psychiatric:  oriented to time, self, and place  Nutritional:  no abnormal weight gain or loss      ASSESSMENT/PLAN:    Diabetes mellitus type 2   -HgA1c- 9.5% POC today   -Reviewed target of 7.5% or below.   - Reviewed pathogenesis of diabetes.   - Reviewed importance of good glycemic control to prevent microvascular and macrovascular complications including nephropathy, neuropathy, retinopathy, and cardiovascular disease.  - Reviewed importance of SBGM- check glucose 2-3 times daily   - Reviewed target glucose goals for patient  fasting and <200 post prandially   - Reviewed importance of following diabetic diet- recommended 135 grams of CHO per day or 45 grams per meal.   - Provided patient education materials    - Reviewed importance of limiting soda and sweetened beverages with meals.    - Decrease Lantus back to 9 units subcutaneous daily. Reviewed insulin timing and administration.     - Glipizide was stopped  after she developed rash but daughter now does not think rash was related. Rash not resolved with stopping medication. Reports sugars were much better controlled with glipizide and they would like to try again.     -Restart Glipizide 2.5 mg once daily with dinner meal only. Reviewed at length symptoms and management of hypoglycemia.     - Needs prandial coverage but unfortunately brand name DM medications are very costly for her.     - Continue to monitor sugars twice daily- call with readings in 2 weeks.   - Would likely benefit from small dose of weekly GLP-1 agonist but cost with brand name prescriptions if very high.     -  normotensive   -  Lipids 4/2025-   -  + nephropathy- following with nephrology   - Now UTD with optho   -  Following with podiatry  -  Check glucose 1-2 times daily- fasting and evening Reviewed glucose targets.     RTC in 6 months but call with readings as discussed.   5/19/25  KEVAN Ibrahim    A total of  30 minutes was spent on obtaining history, reviewing pertinent imaging/labs and specialists notes, evaluating patient, providing multiple treatment options, reinforcing diet/exercise and compliance, and completing documentation.

## 2025-05-29 ENCOUNTER — TELEPHONE (OUTPATIENT)
Dept: FAMILY MEDICINE CLINIC | Facility: CLINIC | Age: 80
End: 2025-05-29

## 2025-05-29 NOTE — TELEPHONE ENCOUNTER
Patient daughter Keaton (on verbal) calling stating that she has Family Medical Leave Act forms that she needs to send in to be completed. Gave her information on how to send in her forms. She will call back to make sure forms are received. She states forms are for a one year re certification for intermittent Family Medical Leave Act to care for her mother. Patient will send in a copy of her completed forms from last year with the new set of forms to be completed- all details will be the same.

## 2025-05-30 NOTE — TELEPHONE ENCOUNTER
Patient's daughter Keaton called to confirm receipt of Family Medical Leave Act, located and logged form for processing.  Dtr is requesting completed form be picked up in office.  Please notify via [employer].

## 2025-06-02 ENCOUNTER — PATIENT MESSAGE (OUTPATIENT)
Dept: FAMILY MEDICINE CLINIC | Facility: CLINIC | Age: 80
End: 2025-06-02

## 2025-06-02 DIAGNOSIS — R13.10 DYSPHAGIA, UNSPECIFIED TYPE: Primary | ICD-10-CM

## 2025-06-06 NOTE — TELEPHONE ENCOUNTER
Dr. Weiler       Please sign off on form if you agree to: Family Medical Leave Act recert (daughter) start: 05/02/25  End: 05/01/25  (place your signature on the first page only)    -From your Inbasket, Highlight the patient and click Chart   -Double click the 05/29/25 Forms Completion telephone encounter  -Scroll down to the Media section   -Click the blue Hyperlink: Family Medical Leave Act recert (spouse) Dr Clooeen Weiler 06/06/25  -Click Acknowledge located in the top right ribbon/menu   -Drag the mouse into the blank space of the document and a + sign will appear. Left click to   electronically sign the document.     Thank you,    Davy

## 2025-06-17 RX ORDER — PANTOPRAZOLE SODIUM 40 MG/1
40 TABLET, DELAYED RELEASE ORAL
Qty: 180 TABLET | Refills: 3 | Status: SHIPPED | OUTPATIENT
Start: 2025-06-17

## 2025-06-17 NOTE — TELEPHONE ENCOUNTER
Form completed and faxed to employer at [phone] and confirmation was received. [employer]t message sent to patient.

## 2025-06-18 NOTE — TELEPHONE ENCOUNTER
Refill Per Protocol     Requested Prescriptions   Pending Prescriptions Disp Refills    PANTOPRAZOLE 40 MG Oral Tab EC [Pharmacy Med Name: PANTOPRAZOLE 40MG TABLETS] 180 tablet 3     Sig: TAKE 1 TABLET(40 MG) BY MOUTH TWICE DAILY BEFORE MEALS       Gastrointestional Medication Protocol Passed - 6/17/2025  8:59 PM        Passed - In person appointment or virtual visit in the past 12 mos or appointment in next 3 mos     Recent Outpatient Visits              4 weeks ago Uncontrolled type 2 diabetes mellitus with hyperglycemia ([facility])    [facility] Anali Cruz APRN    Office Visit    1 month ago Rash and nonspecific skin eruption    [facility] [place] Michalik, Daniel, MD    Office Visit    2 months ago Mixed Alzheimer's and vascular dementia ([facility])    [facility] Joan Dow DO    Office Visit    2 months ago Urinary frequency    [facility] Weiler, Colleen M, DO    Office Visit    3 months ago Stage 4 chronic kidney disease ([facility])    [facility] Tba Thayer MD    Office Visit          Future Appointments         Provider Department Appt Notes    In 2 days Corrina Fitzpatrick, SLP; [facility] XR 3 [facility] Speech Therapy     In 2 months Tab Thayer MD [facility] Follow up renal sufficiency and  anemia    In 2 months Joan Dow DO [facility] Follow upon meds and aleep    In 3 months Weiler, Colleen M, DO [facility] 10/2024 LAST PX - Annual physical innoculations    In 3 months Anali Cruz APRN [facility] Follow up high a1c                    Passed - Medication is active on med list

## 2025-06-19 ENCOUNTER — HOSPITAL ENCOUNTER (OUTPATIENT)
Dept: GENERAL RADIOLOGY | Facility: HOSPITAL | Age: 80
Discharge: HOME | End: 2025-06-19
Attending: FAMILY MEDICINE
Payer: MEDICARE

## 2025-06-19 DIAGNOSIS — R13.10 DYSPHAGIA, UNSPECIFIED TYPE: ICD-10-CM

## 2025-06-19 PROCEDURE — 74230 X-RAY XM SWLNG FUNCJ C+: CPT | Performed by: FAMILY MEDICINE

## 2025-06-19 PROCEDURE — 92611 MOTION FLUOROSCOPY/SWALLOW: CPT

## 2025-06-19 NOTE — PROGRESS NOTES
ADULT VIDEOFLUOROSCOPIC SWALLOWING STUDY       ADULT VIDEOFLUOROSCOPIC SWALLOWING STUDY:   Referring Physician: Weiler      Radiologist: Dr. Lamb  Diagnosis: dysphagia    Date of Service: 6/19/2025     PATIENT SUMMARY   Chief Complaint: Pt c/o food and pills sticking in her throat, coughing/choking and food coming back up, especially when laying down.  She takes pantoprazole for reflux.  She has a h/o multiple CVAs, the last in July 2024 and COPD.  She has intermittent shortness of breath.  She denies weight loss and odynophagia.  Pt was accompanied by her daughter.  Current Diet: regular foods, except meats cut up small and thin liquids.       Problem List  Active Problems:  Active Problems:    * No active hospital problems. *      Past Medical History  Past Medical History[1]     Imaging results: 2/25/25 CXR:  CONCLUSION: No acute cardiopulmonary abnormality.  Stent graft within the aortic arch and descending thoracic aorta is redemonstrated.     ASSESSMENT   DYSPHAGIA ASSESSMENT  Test completed in conjunction with Radiologist.   Food/Liquid Types Presented: puree, solid, thin liquids, and barium tablet.    Study Position and View:  Patient was seated upright and viewed laterally and A-P.    Pain Assessment: The patient reports pain at a level of 0/10.    Oral phase:  Bilabial seal was intact with no anterior food or liquid loss.  Reduced bolus formation and control resulted in premature spillage of thin liquids and piecemeal propulsion of the bolus with puree and solids.  Min-mild lingual residue remained on back and base of tongue.     Pharyngeal phase:  The pharyngeal response triggered at the tongue base and valleculae for thin liquids with partial premature spillage to the pyriform sinuses.  Puree and solids triggered at the valleculae.  Premature spillage and delayed epiglottic inversion resulted in one definite episode of laryngeal penetration with thin liquids by cup before the swallow with cough in  response.  No penetration observed with straw.  A chin tuck eliminated the penetration.  Base of tongue retraction was minimally reduced with min-mild vallecular retention remaining with puree and solids.  Hyolaryngeal excursion was adequate.       Esophageal phase:   Barium tablet lodged in lower esophagus and was cleared with second drink of thin liquids.    Penetration Aspiration Scale: 2/8.  Material entered the airway, remained above the vocal cords and was ejected from the airway (with cough).    Overall Impression: Minimal oropharyngeal dysphagia was characterized by one episode of laryngeal penetration with thin liquids with cough in response to eject.  No penetration observed with chin tuck.  Min-mild vallecular retention remained.  Barium tablet lodged in lower esophagus but was cleared with second swallow of thin liquids.    Recommend general to soft diet consistency and regular thin liquids with chin down.  Discussed option for dysphagia therapy. Pt prefers to wait at this juncture and try using chin down. If coughing/choking episodes persist or worsen, recommend dysphagia therapy in the future.      FCM category and level: Swallowing, 6  PLAN   Potential: Good    Diet Recommendations:  Solids: Regular- soft, easy to chew, IDDSI 6-7  Liquids: Thin, IDDSI 0    Recommended compensatory strategies:   Sit upright  Small sips  Eat slowly  Alternate liquids and solids  Use chin tuck for liquids   Remain upright for 90 min after eating  End meals with liquids    Medication Administration:  Take whole in pureed    Further Follow-up:  Pt declined therapy at this time.  She prefers to use chin down and other strategies first.  If coughing persists or worsens after incorporating strategies, recommend dysphagia therapy.      EDUCATION/INSTRUCTION  Reviewed results and recommendations with patient and family.  Written instructions were provided.  Agreement/Understanding verbalized and all questions answered to their  apparent satisfaction.      INTERDISCIPLINARY COMMUNICATION  Reviewed results with Radiologist; agreement verbalized.      Thank you for your referral.  If you have any questions, please contact me at [phone].    Corrina Fitzpatrick MA/KAYDEN-SLP  Speech Language Pathologist  [facility]  [phone]    Electronically signed by therapist: ANA Dobbs  Physician's certification required: No        [1]   Past Medical History:   Aortic aneurysm, thoracic    Back problem    Calculus of kidney    Cataract    COPD (chronic obstructive pulmonary disease) (HCC)    Coronary atherosclerosis    Deep vein thrombosis (HCC)    Diabetes (HCC)    Diverticulosis of large intestine    Heart attack (HCC)    2013    High blood pressure    High cholesterol    History of stomach ulcers    Incontinence    Osteoarthritis    Ovarian cancer (HCC)    PONV (postoperative nausea and vomiting)    Renal disorder    Shortness of breath    Stroke (HCC)    shaking, wheelchair

## 2025-06-19 NOTE — PATIENT INSTRUCTIONS
VIDEO SWALLOW STUDY    Diet Recommendations:  Solids: Regular- soft, easy to chew, IDDSI 6-7  Liquids: Thin, IDDSI 0     Recommended compensatory strategies:   Sit upright  Small sips  Eat slowly  Alternate liquids and solids  Use chin tuck for liquids   Remain upright for 90 min after eating  End meals with liquids     Medication Administration:  Take whole in pureed     Further Follow-up:  If coughing persists or worsens after incorporating strategies, recommend swallowing therapy.  You will need to obtain an order from your doctor.      Corrina Fitzpatrick MA/KAYDEN-SLP  Speech Language Pathologist  [facility], [place]  [phone]

## 2025-06-30 ENCOUNTER — OFFICE VISIT (OUTPATIENT)
Dept: FAMILY MEDICINE CLINIC | Facility: CLINIC | Age: 80
End: 2025-06-30
Payer: MEDICARE

## 2025-06-30 VITALS
SYSTOLIC BLOOD PRESSURE: 143 MMHG | TEMPERATURE: 98 F | DIASTOLIC BLOOD PRESSURE: 77 MMHG | BODY MASS INDEX: 33 KG/M2 | WEIGHT: 169 LBS | RESPIRATION RATE: 18 BRPM | OXYGEN SATURATION: 95 % | HEART RATE: 66 BPM

## 2025-06-30 DIAGNOSIS — J01.10 ACUTE NON-RECURRENT FRONTAL SINUSITIS: Primary | ICD-10-CM

## 2025-06-30 DIAGNOSIS — R35.0 URINARY FREQUENCY: ICD-10-CM

## 2025-06-30 DIAGNOSIS — R07.89 CHEST DISCOMFORT: ICD-10-CM

## 2025-06-30 DIAGNOSIS — R42 VERTIGO: ICD-10-CM

## 2025-06-30 PROCEDURE — G2211 COMPLEX E/M VISIT ADD ON: HCPCS | Performed by: FAMILY MEDICINE

## 2025-06-30 PROCEDURE — 99214 OFFICE O/P EST MOD 30 MIN: CPT | Performed by: FAMILY MEDICINE

## 2025-06-30 RX ORDER — ONDANSETRON 4 MG/1
4 TABLET, FILM COATED ORAL EVERY 8 HOURS PRN
Qty: 20 TABLET | Refills: 0 | Status: SHIPPED | OUTPATIENT
Start: 2025-06-30

## 2025-06-30 RX ORDER — AMOXICILLIN 500 MG/1
500 CAPSULE ORAL 2 TIMES DAILY
Qty: 20 CAPSULE | Refills: 0 | Status: SHIPPED | OUTPATIENT
Start: 2025-06-30 | End: 2025-07-10

## 2025-06-30 NOTE — PROGRESS NOTES
The following individual(s) verbally consented to be recorded using ambient AI listening technology and understand that they can each withdraw their consent to this listening technology at any point by asking the clinician to turn off or pause the recording:    Patient name: Jillian Phi  Additional names:  Keaton Maldonado

## 2025-07-04 NOTE — PROGRESS NOTES
Jillian is a 80 year old female who presents for acute complaint.   History of Present Illness  Jillian Yip is an 80-year-old female who presents with dizziness and nausea following a recent illness. She is accompanied by her daughter, who is her primary caregiver.    She has been experiencing severe dizziness and nausea for the past two weeks, which began after a recent illness. The dizziness is described as a sensation of the world spinning and is triggered by abrupt movements such as getting up or lying down. It is persistent throughout the day. Although she has not vomited, the nausea is constant.    Two weeks ago, she had symptoms resembling a cold, including a runny nose, fever for a day or two, and excessive sleep. Her daughter suspects it might have been COVID, but no testing was done. Despite initial improvement, she continues to feel very tired and experiences dizziness.    She has tried antihistamines and meclizine without relief. She also stopped taking glipizide two weeks ago, suspecting it might be causing dizziness, but her symptoms persisted. She has a history of vertigo, particularly when riding in cars, and has used meclizine in the past for dizziness.    She experiences a runny nose and sneezing after eating, and clear fluid discharge from her ears, leading her daughter to suspect a sinus infection. However, there is no sinus pressure pain. A week before the dizziness started, she fell off her bed and hit her head but did not experience any immediate symptoms such as vertigo, nausea, vomiting, or headache following the fall.    She reports increased urination recently, although during her illness, she was urinating less, likely due to decreased fluid intake while sleeping extensively. No chest pain or shortness of breath, but she mentions occasional chest pain relieved by nitroglycerin.       PMH:  Past Medical History[1]   Alg:  Allopurinol, Codeine, Enalapril maleate-felodipine, Iodine i 131  tositumomab, Nyquil severe cold-flu [gnp multi-symptom cold], Radiology contrast iodinated dyes, Shellfish-derived products, Hydromorphone, and Sulfa antibiotics   Meds: Medications Ordered Prior to Encounter[2]   Tobacco Use: no    ROS: see HPI    Objective:   /77   Pulse 66   Temp 97.8 °F (36.6 °C) (Temporal)   Resp 18   Wt 169 lb (76.7 kg)   SpO2 95%   BMI 33.01 kg/m²   Physical Exam  HEENT: Sinuses mildly tender.  Positive discharge to eyes  CV:  Regular rate and rhythm; no murmurs  Lungs:  Clear to ausculation; good aeration               No wheezes, rales or rhonchi'             Assessment:/Plan:     Vertigo  Presents with dizziness and vertigo, positional and triggered by head movements. Symptoms began after a recent illness suspected to be COVID-19, characterized by rhinorrhea, fever, and fatigue. Likely related to congestion and sinus pressure affecting the inner ear and eustachian tubes. Meclizine was ineffective. Symptoms exacerbated by motion. Sinus infection considered as a potential cause. Discussed potential benefit of antibiotics and vestibular rehabilitation if symptoms persist.  - Prescribe amoxicillin for possible sinus infection  - Prescribe Zofran for nausea  - Recommend over-the-counter antihistamine such as Zyrtec or Claritin  - Recommend Flonase nasal spray  - Consider physical therapy for vestibular rehabilitation if symptoms persist    Chest Pain  Reports intermittent chest pain under the bust, across the top to the middle of the sternum, fairly new, occurring for about a month. Upcoming cardiology appointment. Nitro alleviates the chest pain, suggesting a possible cardiac origin.  - Ensure follow-up with cardiology for further evaluation of chest pain    Increased Urination  Reports increased urination, which was reduced during a recent illness due to decreased fluid intake. No malodor associated with the urine. Plan to rule out UTI with urinalysis.  - Send urine sample for  urinalysis with culture reflex to rule out UTI    General Health Maintenance  Discussed the importance of hydration, limiting sodium and caffeine intake to help with symptoms. Emphasized the role of hydration in alleviating dizziness and vertigo symptoms.  - Encourage adequate hydration  - Advise limiting sodium and caffeine intake    Colleen Weiler, DO              [1]   Past Medical History:   Aortic aneurysm, thoracic    Back problem    Calculus of kidney    Cataract    COPD (chronic obstructive pulmonary disease) (HCC)    Coronary atherosclerosis    Deep vein thrombosis (HCC)    Diabetes (HCC)    Diverticulosis of large intestine    Heart attack (HCC)    2013    High blood pressure    High cholesterol    History of stomach ulcers    Incontinence    Osteoarthritis    Ovarian cancer (HCC)    PONV (postoperative nausea and vomiting)    Renal disorder    Shortness of breath    Stroke (HCC)    shaking, wheelchair   [2]   Current Outpatient Medications on File Prior to Visit   Medication Sig Dispense Refill    pantoprazole 40 MG Oral Tab EC Take 1 tablet (40 mg total) by mouth 2 (two) times daily before meals. 180 tablet 3    ferrous sulfate 325 (65 FE) MG Oral Tab EC Take 1 tablet (325 mg total) by mouth in the morning and 1 tablet (325 mg total) in the evening. Take with meals.      losartan 25 MG Oral Tab Take 1 tablet (25 mg total) by mouth every evening.      triamcinolone 0.1 % External Cream Apply twice daily  Monday-Friday. Take weekends off. Use on affected areas. 80 g 3    bisacodyl 5 MG Oral Tab EC Take 1 tablet (5 mg total) by mouth daily as needed.      Carboxymethylcellul-Glycerin (REFRESH TEARS PF OP)       diclofenac 1 % External Gel Apply 2 g topically 4 (four) times daily as needed.      fluconazole 150 MG Oral Tab       isosorbide mononitrate ER 30 MG Oral Tablet 24 Hr 1 tablet (30 mg total).      losartan 50 MG Oral Tab Take 1 tablet (50 mg total) by mouth daily.      donepezil 5 MG Oral Tab Take 1  tablet (5 mg total) by mouth nightly. 90 tablet 3    memantine 5 MG Oral Tab Take 1 tablet (5 mg total) by mouth daily. 90 tablet 3    mirtazapine 7.5 MG Oral Tab Take 1 tablet (7.5 mg total) by mouth nightly. 90 tablet 0    traMADol 50 MG Oral Tab Take 1 tablet (50 mg total) by mouth every 8 (eight) hours as needed for Pain. 60 tablet 0    insulin glargine (LANTUS SOLOSTAR) 100 UNIT/ML Subcutaneous Solution Pen-injector Inject 9 Units into the skin nightly. 15 mL 1    hydrocortisone 2.5 % External Cream Apply 1 Application topically 2 (two) times daily. 30 g 1    albuterol 108 (90 Base) MCG/ACT Inhalation Aero Soln Inhale 2 puffs into the lungs every 4 (four) hours as needed. 8.5 g 3    Blood Glucose Monitoring Suppl (ACCU-CHEK GUIDE) w/Device Does not apply Kit 1 each daily. 1 kit 0    Glucose Blood (ACCU-CHEK GUIDE TEST) In Vitro Strip Check glucose twice daily 200 each 1    metoprolol succinate ER 25 MG Oral Tablet 24 Hr Take 1 tablet (25 mg total) by mouth daily.      aspirin 81 MG Oral Chew Tab Chew 1 tablet (81 mg total) by mouth daily. 30 tablet 0    apixaban 2.5 MG Oral Tab Take 1 tablet (2.5 mg total) by mouth 2 (two) times daily.      ranolazine  MG Oral Tablet 12 Hr Take 1 tablet (500 mg total) by mouth 2 (two) times daily.      Insulin Pen Needle (CARETOUCH PEN NEEDLES) 32G X 4 MM Does not apply Misc Use with injection once daily as directed 100 each 3    Glucose Blood (ACCU-CHEK GUIDE) In Vitro Strip 1 each by In Vitro route daily. 100 strip 3    acetaminophen 500 MG Oral Tab Take 1 tablet (500 mg total) by mouth every 8 (eight) hours.      lidocaine-menthol 4-1 % External Patch Place 2 patches onto the skin daily.      nitroglycerin 0.4 MG Sublingual SL Tab Place 1 tablet (0.4 mg total) under the tongue every 5 (five) minutes as needed for Chest pain.      amLODIPine Besylate 5 MG Oral Tab Take 1 tablet (5 mg total) by mouth at bedtime.      ONETOUCH DELICA LANCETS 33G Does not apply Misc        ezetimibe 10 MG Oral Tab Take 1 tablet (10 mg total) by mouth nightly.      glipiZIDE ER 2.5 MG Oral Tablet 24 Hr Take 1 tablet (2.5 mg total) by mouth daily with breakfast. (Patient not taking: Reported on 6/30/2025) 90 tablet 1     No current facility-administered medications on file prior to visit.

## 2025-07-10 ENCOUNTER — TELEPHONE (OUTPATIENT)
Dept: FAMILY MEDICINE CLINIC | Facility: CLINIC | Age: 80
End: 2025-07-10

## 2025-07-10 NOTE — TELEPHONE ENCOUNTER
Spoke with patient, Date of Birth verified  She wants to know if they can drop off pt urine sample in the office?   She was informed she can drop it off in any [facility] lab or in OP office and urinalysis with culture reflex order was already placed.   She stated understanding.     KACEY

## 2025-07-12 ENCOUNTER — LAB ENCOUNTER (OUTPATIENT)
Dept: LAB | Facility: HOSPITAL | Age: 80
End: 2025-07-12
Attending: FAMILY MEDICINE
Payer: MEDICARE

## 2025-07-12 DIAGNOSIS — R35.0 URINARY FREQUENCY: ICD-10-CM

## 2025-07-12 LAB
BILIRUB UR QL: NEGATIVE
CLARITY UR: CLEAR
COLOR UR: COLORLESS
GLUCOSE UR-MCNC: NORMAL MG/DL
HGB UR QL STRIP.AUTO: NEGATIVE
KETONES UR-MCNC: NEGATIVE MG/DL
LEUKOCYTE ESTERASE UR QL STRIP.AUTO: NEGATIVE
NITRITE UR QL STRIP.AUTO: NEGATIVE
PH UR: 7.5 [PH] (ref 5–8)
PROT UR-MCNC: NEGATIVE MG/DL
SP GR UR STRIP: 1.01 (ref 1–1.03)
UROBILINOGEN UR STRIP-ACNC: NORMAL

## 2025-07-12 PROCEDURE — 81003 URINALYSIS AUTO W/O SCOPE: CPT

## 2025-07-21 ENCOUNTER — TELEPHONE (OUTPATIENT)
Dept: FAMILY MEDICINE CLINIC | Facility: CLINIC | Age: 80
End: 2025-07-21

## 2025-07-21 DIAGNOSIS — R42 DIZZINESS: Primary | ICD-10-CM

## 2025-07-21 NOTE — TELEPHONE ENCOUNTER
Patient's daughter called (on Release of Information), verified patient's Name and . She wants to update patient's PCP.     States patient finished the course of antibiotic (amoxicillin) but remains to have dizziness. Tried Zyrtec but did not do anything of patient's other symptoms so patient took Coricidin HBP instead which helped relieved the runny nose and watery eyes. Seeking further treatment recommendation.    Dr. Weiler please advise. Pended PT referral per your note below from , for review and approval if appropriate.     Vertigo  Presents with dizziness and vertigo, positional and triggered by head movements. Symptoms began after a recent illness suspected to be COVID-19, characterized by rhinorrhea, fever, and fatigue. Likely related to congestion and sinus pressure affecting the inner ear and eustachian tubes. Meclizine was ineffective. Symptoms exacerbated by motion. Sinus infection considered as a potential cause. Discussed potential benefit of antibiotics and vestibular rehabilitation if symptoms persist.  - Prescribe amoxicillin for possible sinus infection  - Prescribe Zofran for nausea  - Recommend over-the-counter antihistamine such as Zyrtec or Claritin  - Recommend Flonase nasal spray  - Consider physical therapy for vestibular rehabilitation if symptoms persist

## 2025-07-24 NOTE — TELEPHONE ENCOUNTER
Daughter returned call and was notified.  She is going to look for a physical therapy facility in [place] closer to the patient's home.  Will call back with the name so order can be forwarded.

## (undated) DEVICE — PINNACLE INTRODUCER SHEATH: Brand: PINNACLE

## (undated) DEVICE — Device

## (undated) DEVICE — FLEXOR, CHECK-FLO, INTRODUCER ANSEL MODIFICATION - WITH HIGH-FLEX DILATOR AND HYDROPHILIC COATING: Brand: FLEXOR

## (undated) DEVICE — NAVICROSS SUPPORT CATHETER: Brand: NAVICROSS

## (undated) DEVICE — CONTAINER SPEC STR 4OZ GRY LID

## (undated) DEVICE — RADIFOCUS GLIDEWIRE ADVANTAGE GUIDEWIRE: Brand: GLIDEWIRE ADVANTAGE

## (undated) DEVICE — FLOSEAL SEALENT STERILE 10ML

## (undated) DEVICE — VEST SURG LEFT SZ 5

## (undated) DEVICE — CAUTERY TIP TEFLON MEGADYNE

## (undated) DEVICE — LUNDERQUIST DC 300CM WIRE

## (undated) DEVICE — SUTURE SILK 2-0 FS

## (undated) DEVICE — SYRINGE MED 10ML LL TIP W/O SFTY DISP

## (undated) DEVICE — ADVANCE, 35LP LOW PROFILE PTA BALLOON DILATATION CATHETER: Brand: ADVANCE

## (undated) DEVICE — DRAPE TAPE: Brand: CONVERTORS

## (undated) DEVICE — SOLUTION IRRIG 1000ML 0.9% NACL USP BTL

## (undated) DEVICE — CV PACK-LF: Brand: MEDLINE INDUSTRIES, INC.

## (undated) DEVICE — PROXIMATE RH ROTATING HEAD SKIN STAPLERS (35 WIDE) CONTAINS 35 STAINLESS STEEL STAPLES: Brand: PROXIMATE

## (undated) DEVICE — SUTURE PDS II 0 CT-1

## (undated) DEVICE — LIMITORR™ VOLUME LIMITING EXTERNAL CSF DRAINAGE AND MONITORING SYSTEM: Brand: LIMITORR™

## (undated) DEVICE — CATHETER PTCA BLLN L4CM INFL 10-37MM CATH

## (undated) DEVICE — SLEEVE COMPR M KNEE LEN SGL USE KENDALL SCD

## (undated) DEVICE — KIT MICROPUNCTURE STIFF VSI

## (undated) DEVICE — SUTURE MONOCRYL 4-0 PS-2

## (undated) DEVICE — PERCLOSE™ PROSTYLE™ SUTURE-MEDIATED CLOSURE AND REPAIR SYSTEM: Brand: PERCLOSE™ PROSTYLE™

## (undated) DEVICE — SHEATH INTRO 6.5FR L55CM DEFLECTION L17MM

## (undated) DEVICE — 3M™ IOBAN™ 2 ANTIMICROBIAL INCISE DRAPE 6651EZ: Brand: IOBAN™ 2

## (undated) DEVICE — GAUZE SPONGES,12 PLY: Brand: CURITY

## (undated) DEVICE — TRAY CATH 16FR F INCL BARDX IC COMPLT CARE

## (undated) DEVICE — KIT INFL DEV 20ML W/ INSRT TOOL 3 W STPCOCK

## (undated) DEVICE — NITINOL WIRE .018

## (undated) DEVICE — CATHETER SUPP 3.1FR L90CM DIA0.050X0.063IN

## (undated) DEVICE — CUSHION INSERT PRONEVIEW LG

## (undated) DEVICE — Device: Brand: INTELLICART™

## (undated) DEVICE — FLEXOR, CHECK-FLO, INTRODUCER ANSEL 1 MODIFICATION - WITH HIGH-FLEX DILATOR AND HYDROPHILIC COATING: Brand: FLEXOR

## (undated) DEVICE — HERMETIC™ LUMBAR CATHETER CLOSED TIP: Brand: HERMETIC™

## (undated) DEVICE — SOL  .9 1000ML BAG

## (undated) DEVICE — SUTURE VICRYL 2-0 CT-2

## (undated) DEVICE — ENCORE® PERRY STYLE 42 PF SIZE 6.5, STERILE LATEX POWDER-FREE SURGICAL GLOVE: Brand: ENCORE

## (undated) DEVICE — RADIFOCUS GLIDEWIRE: Brand: GLIDEWIRE

## (undated) DEVICE — FLEXIBLE YANKAUER,MEDIUM TIP, NO VACUUM CONTROL: Brand: ARGYLE

## (undated) DEVICE — SUTURE PERMA- SZ 2 L60IN NONABSORBABLE BLK

## (undated) DEVICE — BEACON TIP TORCON NB ADVANTAGE CATHETER: Brand: BEACON TIP TORCON NB

## (undated) DEVICE — ANGIO PACK-LF: Brand: MEDLINE INDUSTRIES, INC.

## (undated) DEVICE — CATH PIGTAIL SIZING 20M UHF

## (undated) DEVICE — SUTURE VICRYL 3-0 SH

## (undated) DEVICE — SUTURE VCRL SZ 3-0 L27IN ABSRB UD L26MM SH

## (undated) DEVICE — 3.0MM PRECISION NEURO (MATCH HEAD)

## (undated) DEVICE — APPLICATOR SKIN PREP 26ML HI LT ORNG 2% CHG

## (undated) DEVICE — HIGH TEMPERATURE CAUTERY: Brand: ACCU-TEMP®

## (undated) DEVICE — CATHETER ANGIO 5FR L65CM GWIRE 0.038IN KMP

## (undated) DEVICE — SOL  .9 1000ML BTL

## (undated) DEVICE — SUTURE MONOCRYL 3-0 Y936H

## (undated) DEVICE — DRAPE SHEET LG

## (undated) DEVICE — SUTURE VICRYL 0 CT-1

## (undated) DEVICE — UNDYED BRAIDED (POLYGLACTIN 910), SYNTHETIC ABSORBABLE SUTURE: Brand: COATED VICRYL

## (undated) DEVICE — PROXIMATE SKIN STAPLERS (35 WIDE) CONTAINS 35 STAINLESS STEEL STAPLES (FIXED HEAD): Brand: PROXIMATE

## (undated) DEVICE — E-Z CLEAN, NON-STICK, PTFE COATED, ELECTROSURGICAL BLADE ELECTRODE, MODIFIED EXTENDED INSULATION, 2.5 INCH (6.35 CM): Brand: MEGADYNE

## (undated) DEVICE — COVER,TABLE,44X90,STERILE: Brand: MEDLINE

## (undated) DEVICE — REM POLYHESIVE ADULT PATIENT RETURN ELECTRODE: Brand: VALLEYLAB

## (undated) DEVICE — PTA BALLOON DILATATION CATHETER: Brand: MUSTANG™

## (undated) DEVICE — 3M™ BAIR HUGGER® UNDERBODY BLANKET, FULL ACCESS, 10 PER CASE 63500: Brand: BAIR HUGGER™

## (undated) DEVICE — DRAPE PACK CHEST & U BAR

## (undated) DEVICE — CAUTERY BLADE 2IN INS E1455

## (undated) DEVICE — MINOR GENERAL: Brand: MEDLINE INDUSTRIES, INC.

## (undated) DEVICE — TRAY LUMBAR PUNCTURE SAFETY

## (undated) DEVICE — ADHESIVE SKIN TOP FOR WND CLSR DERMBND ADV

## (undated) DEVICE — 3M(TM) TEGADERM(TM) TRANSPARENT FILM DRESSING FRAME STYLE 1628: Brand: 3M™ TEGADERM™

## (undated) DEVICE — SUTURE CHROMIC GUT SZ 3-0 L27IN ABSRB UD

## (undated) DEVICE — FIXED CORE WIRE GUIDE SAFE-T-J, CURVED: Brand: COOK

## (undated) DEVICE — 12 ML SYRINGE LUER-LOCK TIP: Brand: MONOJECT

## (undated) DEVICE — COVER PSTN BW FRM SKN CR KT

## (undated) DEVICE — Device: Brand: VISIONS PV .035 DIGITAL IVUS CATHETER

## (undated) DEVICE — PTA BALLOON DILATATION CATHETER: Brand: STERLING™

## (undated) DEVICE — LAMINECTOMY: Brand: MEDLINE INDUSTRIES, INC.

## (undated) NOTE — LETTER
[facility] [place] FOR BLOOD OR BLOOD COMPONENTS      In the course of your treatment, it may become necessary to administer a transfusion of blood or blood components. This form provides basic information concerning this procedure and, if signed by you, authorizes its performance by qualified medical personnel. DESCRIPTION OF PROCEDURE:  Blood is introduced into one of your veins, commonly in the arm, using a sterilized disposable needle. The amount of blood transfused, and whether the transfusion will be of blood or blood components is a judgment the physician will make based on your particular needs. RISKS:  The transfusion is a common procedure of low risk. MINOR AND TEMPORARY REACTIONS ARE NOT UNCOMMON, including a slight bruise, swelling or local reaction in the area where the needle pierces your skin, or a non-serious reaction to the transfused material itself, including headache, fever or a mild skin reaction, such as rash. Serious reactions are possible, though very unlikely and include severe allergic reaction (shock)  and destruction (hemolysis) of transfused blood cells. Infectious diseases which are known to be transmitted by blood transfusion include CERTAIN TYPES OF VIRAL HEPATITIS, a viral infection of the liver, HUMAN IMMUNODEFICIENCY VIRUS (HIV-1,2) infection, a viral infection known to cause ACQUIRED IMMUNODEFICIENCY SYNDROME (AIDS) AS WELL AS CERTAIN OTHER BACTERIAL, VIRAL AND PARASITIC DISEASES. While a minimal risk of acquiring an infectious disease from transfused blood exists, in accordance with Federal and State law all due care has been taken in donor selection and testing to avoid transmission of disease. ALTERNATIVES:  If loss of blood poses serious threats in the course of your treatment, THERE IS NO EFFECTIVE ALTERNATIVE TO BLOOD TRANSFUSION.  However, if you have any further questions on this matter, your physician will fully explain the alternatives to you if it has not already been done. I,Jillian Yip, have read/had read to me the above. I understand the matters bearing on the decision whether or not to authorize a transfusion of blood or blood components. I have no questions which have not been answered to my full satisfaction.  I hereby consent to such transfusion as  my physician may deem necessary or advisable in the course of my treatment.        _______________   __________________________________________________  Date     Signature of Patient, Parent or Legal Guardian      (Orla One)      __________________________________________  Witness to Signature (title or relationship to patient)    Patient Name: Rosario Fernando     : 1945                 Printed: 2023     Medical Record #: [id number]                    Page 1 of 1

## (undated) NOTE — ED AVS SNAPSHOT
Dennie Leriche   MRN: [id number]    Department:  [facility] Emergency Department   Date of Visit:  11/21/2019           Disclosure     Insurance plans vary and the physician(s) referred by the ER may not be covered by your plan.  Please contac within the next three months to obtain basic health screening including reassessment of your blood pressure.     IF THERE IS ANY CHANGE OR WORSENING OF YOUR CONDITION, CALL YOUR PRIMARY CARE PHYSICIAN AT ONCE OR RETURN IMMEDIATELY TO THE EMERGENCY DEPARTMEN

## (undated) NOTE — LETTER
[facility]  Administration of Anesthesia  1. I, Marcy Click, or _________________________________ acting on her behalf, (Patient) (Dependent/Representative) request to receive anesthesia for my pending procedure/operation/treatment. infections, high spinal block, spinal bleeding, seizure, cardiac arrest and death. 7. AWARENESS: I understand that it is possible (but unlikely) to have explicit memory of events from the operating room while under general anesthesia.   8. ELECTROCONVULSIV (Date) (Time)                                                                                               (Responsible person in case of minor/ unconscious pt) /Relationship    My signature below affirms that prior to the time of the procedure, I have ex

## (undated) NOTE — LETTER
No referring provider defined for this encounter.       02/24/24        Patient: Jillian Yip   YOB: 1945   Date of Visit: 2/23/2024       Dear  Dr. Banegas,      Thank you for referring Jillian Yip to my practice.  Please find my assessment and plan below.      As you know she is a 78-year-old female with a history of coronary artery disease, COPD, history of DVTs, adult onset diabetes mellitus, hypertension, hypercholesterolemia, status post CVA, history of recurrent renal calculi, stress incontinence and a former cigarette smoker who I now the pleasure of seeing for follow-up of chronic kidney disease stage III-IV.  I really have not seen her since August 2019.  At that time she had a creatinine in the 1.4 range with an estimated GFR of 36 cc/min.  According to the daughter her kidney function has remained fairly stable but she had undergone recent abdominal aortic aneurysm endovascular repair earlier on October 9, 2023.  She then presented to the hospital again from October 20 through October 30, 2023 with generalized weakness.  She was also found to have acute on chronic renal insufficiency.  She was diagnosed with pneumonia and had metabolic encephalopathy.      During that hospitalization her creatinine initially was 2.6 but was down to 2.14 at time of discharge on October 30, 2023.  Follow-up labs show that on December 19, 2023 her creatinine was down to 1.70 but then on January 31, 2024 the creatinine crept up to 2.22 now with an estimated GFR 22 cc/min and as result the daughter brought her in for further workup and evaluation.  This lab was actually done in the emergency room after she tripped and fell.  CT scan of the head and x-rays were nonrevealing and as result the patient was sent home.    The patient had been in [facility] rehab.  She is now back home.  She is still weak and debilitated but able to ambulate with a walker.  Eating fair.  Tendency towards constipation.   No urinary tract symptoms.    On physical exam her blood pressure is 134/75 with a pulse of 53.  Her neck was supple without JVD.  Lungs were clear.  Heart revealed a regular rate and rhythm with an S4 but no gallops, murmurs or rubs.  Abdomen soft, flat, nontender without organomegaly, masses or bruits.  Extremities reveal no clubbing, cyanosis or edema.    I therefore reviewed her laboratory studies with her daughter.  Her daughter feels she does not really do a good job at drinking fluids.  Reinforced the importance of maintaining adequate hydration.  Avoid nonsteroidals.  Not totally clear if her blood sugars are under adequate control or not.  She will try drink more fluids.  Repeat labs in a couple of weeks.  If stable will continue quarterly.  Will see again in 6 months for follow-up or sooner if clinically indicated.    Thank you again for allowing me to participate in the care of your patient.  If you have any questions please feel free to call.           Sincerely,   Tab Thayer MD   [facility], [facility], [place]  [address]  [facility] [address]    Document electronically generated by:  Tab Thayer MD

## (undated) NOTE — LETTER
[address], [place]  Authorization for Invasive Procedures  1.  I hereby authorize Dr. Dangelo Polo , my physician and whomever may be designated as the doctor's assistant, to perform the following operation and/or procedure:  Marjorie Mandel performed for the purposes of advancing medicine, science, and/or education, provided my identity is not revealed. If the procedure has been videotaped, the physician/surgeon will obtain the original videotape.  The hospital will not be responsible for stor My signature below affirms that prior to the time of the procedure, I have explained to the patient and/or her legal representative, the risks and benefits involved in the proposed treatment and any reasonable alternative to the proposed treatment.  I have

## (undated) NOTE — LETTER
No referring provider defined for this encounter. 07/24/19        Patient: Chey Tidwell   YOB: 1945   Date of Visit: 7/24/2019       Dear  Dr. Karina Lou,      Thank you for referring Chey Tidwell to my practice.   Please find urinary tract symptoms other than stress incontinence. She does wear a diaper. 10 point review of systems is otherwise unremarkable. On physical exam her blood pressure was 124/65 with a pulse of 66 and she weighed 157 pounds.   Her neck was supple wit

## (undated) NOTE — LETTER
1/10/2025              Jillian Yip        [address]        [facility] [address]         To whom it may concern,    Jillian Yip is currently a patient under my medical care.  The patient's medical condition requires her caregiver Cindy Maldonado to provide her care at all times, and makes serving on jury duty for Cindy Maldonado inadvisable at this time.  Please excuse her from serving.  If you require additional information please do not hesitate to contact my office.        Sincerely,          Colleen Weiler, DO  [facility], [address] [address] [phone]

## (undated) NOTE — LETTER
No referring provider defined for this encounter.       03/14/25        Patient: Jillian Yip   YOB: 1945   Date of Visit: 3/14/2025       Dear  Dr. Weiler, DO,      Thank you for referring Jillian Yip to my practice.  Please find my assessment and plan below.      As you know she is an 79-year-old female with a history of coronary artery disease, COPD, history of DVTs, adult onset diabetes mellitus, hypertension, hypercholesterolemia, status post CVA, history of recurrent renal calculi, stress incontinence and a former cigarette smoker who I now had the pleasure of seeing for follow-up of chronic kidney disease stage IV.    The patient is here with her daughter.  They relate the fact that she was diagnosed with influenza A on February 25, 2025.  Went to the ER on that date.  Of note is that her creatinine was higher at 2.41 with an estimated GFR 20 cc/min.  They gave her Tamiflu.  Symptoms gradually improved.  She was having sore throat, cough, nausea and fevers.  Overall is now doing much better.    She is also taken off amlodipine for edema.  Blood pressure increased significantly.  As result amlodipine was resumed but at a lower dose 5 mg daily.  Edema is however significantly better.  Otherwise doing well without any chest pain, shortness of breath, GI or urinary tract symptoms.    Physical exam her blood pressure 164/60 with a pulse of 60 and she weighed 162 pounds.  Her neck was supple without JVD.  Lungs were clear.  Heart revealed a regular rate and rhythm with an S4 but no gallops, murmurs or rubs.  Abdomen was soft, flat, nontender without organomegaly, masses or bruits.  Extremities revealed trace edema bilaterally.    I therefore informed the patient and her daughter that her kidney function was a bit worse when she went to the emergency room February 25, 2025.  May have been secondary to influenza.  Will have her repeat a CBC and renal panel now to ensure stability.  If  stable continue quarterly.  I will see her again in 6 months for follow-up or sooner if clinically indicated.  She is scheduled to see cardiology in a week or 2 to review her blood pressure readings.  Otherwise maintain adequate hydration.  Avoid nonsteroidals.    Thank you again for allowing me to participate in the care of your patient.  If you have any questions please were free to call.           Sincerely,   Tab Thayer MD   [facility] [place]  [address]  [facility] [address]    Document electronically generated by:  Tab Thayer MD

## (undated) NOTE — LETTER
[facility]  Administration of Anesthesia  1. I, Sally No, or _________________________________ acting on her behalf, (Patient) (Dependent/Representative) request to receive anesthesia for my pending procedure/operation/treatment. infections, high spinal block, spinal bleeding, seizure, cardiac arrest and death. 7. AWARENESS: I understand that it is possible (but unlikely) to have explicit memory of events from the operating room while under general anesthesia.   8. ELECTROCONVULSIV (Date) (Time)                                                                                               (Responsible person in case of minor/ unconscious pt) /Relationship    My signature below affirms that prior to the time of the procedure, I have ex

## (undated) NOTE — LETTER
No referring provider defined for this encounter.       08/26/24        Patient: Jillian Yip   YOB: 1945   Date of Visit: 8/26/2024       Dear  Dr. Weiler, DO,      Thank you for referring Jillian Yip to my practice.  Please find my assessment and plan below.      As you know she is an 79-year-old female with a history of coronary artery disease, COPD, history of DVTs, adult onset diabetes mellitus, hypertension, hypercholesterolemia, status post CVA, history of recurrent renal calculi, stress incontinence and a former cigarette smoker who I now had the pleasure of seeing for follow-up of chronic kidney disease stage IV.    The patient was just hospitalized from July 24 through July 29, 2024.  She had acute cystitis but also suffered another acute MI.  After she was hospitalized she developed chest pain and was thought to have another acute MI.  They elected to treat her medically and did not do a cardiac cath given severe peripheral vascular disease and her chronic kidney disease.  She did go to rehab for 8 days and is now back home.  Here with daughter.  She can walk a bit around the house with a walker but is otherwise wheelchair-bound.  He has some right-sided weakness but speech and swallowing have been okay.    On physical exam her blood pressure 130/67 with a pulse of 48.  Her neck was supple without JVD.  Lungs were clear.  Heart revealed a regular rate and rhythm with an S4 but no gallops, murmurs or rubs.  Abdomen soft, flat, nontender without organomegaly, masses or bruits.  Extremities revealed no edema.    I reviewed her most recent laboratory studies her her creatinine during that hospitalization was up to 2.11 but improved down to 1.79 at time of discharge.  They did labs at rehab on July 30, 2024 creatinine was 1.98 with an estimated GFR 25 cc/min.  Electrolytes were okay.  Reinforced the importance of maintaining anticoagulation.  Avoid nonsteroidals.  Keep blood  pressure and blood sugars under good control.  I will repeat a CBC and renal panel in September 2024.  If stable will continue quarterly.  I will see her again in 6 months for follow-up or sooner if clinically indicated.    Thank you again for allowing me to participate in the care of your patient.  If you have any questions please feel free to call.           Sincerely,   Tab Thayer MD   [facility], [facility], [place]  [address]  [facility] [address]    Document electronically generated by:  Tab Thayer MD

## (undated) NOTE — LETTER
[address] [place] [address]  Authorization for Invasive Procedures  1.  I hereby authorize Dr. Delmy Samano  , my physician and whomever may be designated as the doctor's assistant, to perform the following operation and/or procedure: performed for the purposes of advancing medicine, science, and/or education, provided my identity is not revealed. If the procedure has been videotaped, the physician/surgeon will obtain the original videotape.  The hospital will not be responsible for stor My signature below affirms that prior to the time of the procedure, I have explained to the patient and/or her legal representative, the risks and benefits involved in the proposed treatment and any reasonable alternative to the proposed treatment.  I have

## (undated) NOTE — ED AVS SNAPSHOT
Nicole Her   MRN: [id number]    Department:  [facility]   Date of Visit:  10/3/2019           Disclosure     Insurance plans vary and the physician(s) referred by the ER may not be covered by your plan.  Please contact within the next three months to obtain basic health screening including reassessment of your blood pressure.     IF THERE IS ANY CHANGE OR WORSENING OF YOUR CONDITION, CALL YOUR PRIMARY CARE PHYSICIAN AT ONCE OR RETURN IMMEDIATELY TO THE EMERGENCY DEPARTMEN

## (undated) NOTE — LETTER
[address], [place]  Authorization for Invasive Procedures  1.  I hereby authorize  Dr Murali Cooper , my physician and whomever may be designated as the doctor's assistant, to perform the following operation and/or procedure: Linq Imp performed for the purposes of advancing medicine, science, and/or education, provided my identity is not revealed. If the procedure has been videotaped, the physician/surgeon will obtain the original videotape.  The hospital will not be responsible for stor My signature below affirms that prior to the time of the procedure, I have explained to the patient and/or her legal representative, the risks and benefits involved in the proposed treatment and any reasonable alternative to the proposed treatment.  I have

## (undated) NOTE — LETTER
No referring provider defined for this encounter. 08/02/19        Patient: Seble Dodd   YOB: 1945   Date of Visit: 8/2/2019       Dear  Dr. Mohan Slaughter,      Thank you for referring Seble Dodd to my practice.   Please find She will repeat a CBC and renal panel in 3 months. I will see her in 6 months for follow-up or sooner if clinically indicated. Thank you again for allowing me to participate in the care of your patient.   If you have any questions please feel free to ca